# Patient Record
Sex: MALE | Race: WHITE
[De-identification: names, ages, dates, MRNs, and addresses within clinical notes are randomized per-mention and may not be internally consistent; named-entity substitution may affect disease eponyms.]

---

## 2017-03-08 ENCOUNTER — HOSPITAL ENCOUNTER (INPATIENT)
Dept: HOSPITAL 17 - NEPC | Age: 62
LOS: 19 days | Discharge: HOME | DRG: 853 | End: 2017-03-27
Attending: HOSPITALIST | Admitting: HOSPITALIST
Payer: COMMERCIAL

## 2017-03-08 VITALS
SYSTOLIC BLOOD PRESSURE: 105 MMHG | DIASTOLIC BLOOD PRESSURE: 60 MMHG | RESPIRATION RATE: 20 BRPM | HEART RATE: 86 BPM | OXYGEN SATURATION: 97 %

## 2017-03-08 VITALS
OXYGEN SATURATION: 96 % | SYSTOLIC BLOOD PRESSURE: 95 MMHG | TEMPERATURE: 97.8 F | DIASTOLIC BLOOD PRESSURE: 64 MMHG | RESPIRATION RATE: 24 BRPM | HEART RATE: 102 BPM

## 2017-03-08 VITALS
HEART RATE: 81 BPM | SYSTOLIC BLOOD PRESSURE: 105 MMHG | RESPIRATION RATE: 20 BRPM | OXYGEN SATURATION: 98 % | DIASTOLIC BLOOD PRESSURE: 63 MMHG

## 2017-03-08 VITALS — WEIGHT: 144.84 LBS | HEIGHT: 69 IN | BODY MASS INDEX: 21.45 KG/M2

## 2017-03-08 VITALS
RESPIRATION RATE: 18 BRPM | TEMPERATURE: 99.2 F | HEART RATE: 88 BPM | SYSTOLIC BLOOD PRESSURE: 113 MMHG | DIASTOLIC BLOOD PRESSURE: 63 MMHG | OXYGEN SATURATION: 97 %

## 2017-03-08 DIAGNOSIS — Z79.01: ICD-10-CM

## 2017-03-08 DIAGNOSIS — R63.4: ICD-10-CM

## 2017-03-08 DIAGNOSIS — J90: ICD-10-CM

## 2017-03-08 DIAGNOSIS — F41.9: ICD-10-CM

## 2017-03-08 DIAGNOSIS — J86.9: ICD-10-CM

## 2017-03-08 DIAGNOSIS — K22.4: ICD-10-CM

## 2017-03-08 DIAGNOSIS — R00.0: ICD-10-CM

## 2017-03-08 DIAGNOSIS — I42.9: ICD-10-CM

## 2017-03-08 DIAGNOSIS — Z91.14: ICD-10-CM

## 2017-03-08 DIAGNOSIS — D64.9: ICD-10-CM

## 2017-03-08 DIAGNOSIS — R13.10: ICD-10-CM

## 2017-03-08 DIAGNOSIS — Z87.891: ICD-10-CM

## 2017-03-08 DIAGNOSIS — E87.2: ICD-10-CM

## 2017-03-08 DIAGNOSIS — J18.9: ICD-10-CM

## 2017-03-08 DIAGNOSIS — K58.0: ICD-10-CM

## 2017-03-08 DIAGNOSIS — R18.8: ICD-10-CM

## 2017-03-08 DIAGNOSIS — E87.6: ICD-10-CM

## 2017-03-08 DIAGNOSIS — R65.20: ICD-10-CM

## 2017-03-08 DIAGNOSIS — A41.9: Primary | ICD-10-CM

## 2017-03-08 DIAGNOSIS — Z95.2: ICD-10-CM

## 2017-03-08 LAB
ALP SERPL-CCNC: 61 U/L (ref 45–117)
ALT SERPL-CCNC: 57 U/L (ref 12–78)
ANION GAP SERPL CALC-SCNC: 11 MEQ/L (ref 5–15)
APTT BLD: 44.8 SEC (ref 24.3–30.1)
AST SERPL-CCNC: 60 U/L (ref 15–37)
BASOPHILS # BLD AUTO: 0.1 TH/MM3 (ref 0–0.2)
BASOPHILS NFR BLD: 0.3 % (ref 0–2)
BILIRUB SERPL-MCNC: 0.5 MG/DL (ref 0.2–1)
BUN SERPL-MCNC: 11 MG/DL (ref 7–18)
CHLORIDE SERPL-SCNC: 99 MEQ/L (ref 98–107)
CK SERPL-CCNC: 47 U/L (ref 39–308)
EOSINOPHIL # BLD: 0 TH/MM3 (ref 0–0.4)
EOSINOPHIL NFR BLD: 0 % (ref 0–4)
ERYTHROCYTE [DISTWIDTH] IN BLOOD BY AUTOMATED COUNT: 13.2 % (ref 11.6–17.2)
GFR SERPLBLD BASED ON 1.73 SQ M-ARVRAT: 71 ML/MIN (ref 89–?)
HCO3 BLD-SCNC: 27.4 MEQ/L (ref 21–32)
HCT VFR BLD CALC: 33.8 % (ref 39–51)
HEMO FLAGS: (no result)
INR PPP: 1.1 RATIO
LYMPHOCYTES # BLD AUTO: 1.4 TH/MM3 (ref 1–4.8)
LYMPHOCYTES NFR BLD AUTO: 6.3 % (ref 9–44)
MCH RBC QN AUTO: 30.2 PG (ref 27–34)
MCHC RBC AUTO-ENTMCNC: 33.6 % (ref 32–36)
MCV RBC AUTO: 90.1 FL (ref 80–100)
MONOCYTES NFR BLD: 6.2 % (ref 0–8)
NEUTROPHILS # BLD AUTO: 18.8 TH/MM3 (ref 1.8–7.7)
NEUTROPHILS NFR BLD AUTO: 87.2 % (ref 16–70)
PLATELET # BLD: 280 TH/MM3 (ref 150–450)
POTASSIUM SERPL-SCNC: 3.7 MEQ/L (ref 3.5–5.1)
PROTHROMBIN TIME: 12.7 SEC (ref 9.8–11.6)
RBC # BLD AUTO: 3.75 MIL/MM3 (ref 4.5–5.9)
SODIUM SERPL-SCNC: 137 MEQ/L (ref 136–145)
WBC # BLD AUTO: 21.6 TH/MM3 (ref 4–11)

## 2017-03-08 PROCEDURE — 88305 TISSUE EXAM BY PATHOLOGIST: CPT

## 2017-03-08 PROCEDURE — 86077 PHYS BLOOD BANK SERV XMATCH: CPT

## 2017-03-08 PROCEDURE — 87070 CULTURE OTHR SPECIMN AEROBIC: CPT

## 2017-03-08 PROCEDURE — C9290 INJ, BUPIVACAINE LIPOSOME: HCPCS

## 2017-03-08 PROCEDURE — 86922 COMPATIBILITY TEST ANTIGLOB: CPT

## 2017-03-08 PROCEDURE — 87102 FUNGUS ISOLATION CULTURE: CPT

## 2017-03-08 PROCEDURE — 85610 PROTHROMBIN TIME: CPT

## 2017-03-08 PROCEDURE — 87116 MYCOBACTERIA CULTURE: CPT

## 2017-03-08 PROCEDURE — 80202 ASSAY OF VANCOMYCIN: CPT

## 2017-03-08 PROCEDURE — 87641 MR-STAPH DNA AMP PROBE: CPT

## 2017-03-08 PROCEDURE — 85730 THROMBOPLASTIN TIME PARTIAL: CPT

## 2017-03-08 PROCEDURE — 86703 HIV-1/HIV-2 1 RESULT ANTBDY: CPT

## 2017-03-08 PROCEDURE — 80048 BASIC METABOLIC PNL TOTAL CA: CPT

## 2017-03-08 PROCEDURE — 93005 ELECTROCARDIOGRAM TRACING: CPT

## 2017-03-08 PROCEDURE — 86901 BLOOD TYPING SEROLOGIC RH(D): CPT

## 2017-03-08 PROCEDURE — 83735 ASSAY OF MAGNESIUM: CPT

## 2017-03-08 PROCEDURE — 86920 COMPATIBILITY TEST SPIN: CPT

## 2017-03-08 PROCEDURE — 85025 COMPLETE CBC W/AUTO DIFF WBC: CPT

## 2017-03-08 PROCEDURE — 86880 COOMBS TEST DIRECT: CPT

## 2017-03-08 PROCEDURE — 86900 BLOOD TYPING SEROLOGIC ABO: CPT

## 2017-03-08 PROCEDURE — 81001 URINALYSIS AUTO W/SCOPE: CPT

## 2017-03-08 PROCEDURE — 93306 TTE W/DOPPLER COMPLETE: CPT

## 2017-03-08 PROCEDURE — 87206 SMEAR FLUORESCENT/ACID STAI: CPT

## 2017-03-08 PROCEDURE — 87804 INFLUENZA ASSAY W/OPTIC: CPT

## 2017-03-08 PROCEDURE — 71010: CPT

## 2017-03-08 PROCEDURE — 86140 C-REACTIVE PROTEIN: CPT

## 2017-03-08 PROCEDURE — C9113 INJ PANTOPRAZOLE SODIUM, VIA: HCPCS

## 2017-03-08 PROCEDURE — 87015 SPECIMEN INFECT AGNT CONCNTJ: CPT

## 2017-03-08 PROCEDURE — 96374 THER/PROPH/DIAG INJ IV PUSH: CPT

## 2017-03-08 PROCEDURE — 86870 RBC ANTIBODY IDENTIFICATION: CPT

## 2017-03-08 PROCEDURE — 83605 ASSAY OF LACTIC ACID: CPT

## 2017-03-08 PROCEDURE — 80053 COMPREHEN METABOLIC PANEL: CPT

## 2017-03-08 PROCEDURE — 74230 X-RAY XM SWLNG FUNCJ C+: CPT

## 2017-03-08 PROCEDURE — 80074 ACUTE HEPATITIS PANEL: CPT

## 2017-03-08 PROCEDURE — 83690 ASSAY OF LIPASE: CPT

## 2017-03-08 PROCEDURE — 94150 VITAL CAPACITY TEST: CPT

## 2017-03-08 PROCEDURE — 86902 BLOOD TYPE ANTIGEN DONOR EA: CPT

## 2017-03-08 PROCEDURE — 86850 RBC ANTIBODY SCREEN: CPT

## 2017-03-08 PROCEDURE — 94640 AIRWAY INHALATION TREATMENT: CPT

## 2017-03-08 PROCEDURE — 87176 TISSUE HOMOGENIZATION CULTR: CPT

## 2017-03-08 PROCEDURE — 82550 ASSAY OF CK (CPK): CPT

## 2017-03-08 PROCEDURE — P9045 ALBUMIN (HUMAN), 5%, 250 ML: HCPCS

## 2017-03-08 PROCEDURE — 87449 NOS EACH ORGANISM AG IA: CPT

## 2017-03-08 PROCEDURE — 85027 COMPLETE CBC AUTOMATED: CPT

## 2017-03-08 PROCEDURE — 93325 DOPPLER ECHO COLOR FLOW MAPG: CPT

## 2017-03-08 PROCEDURE — 93312 ECHO TRANSESOPHAGEAL: CPT

## 2017-03-08 PROCEDURE — 82565 ASSAY OF CREATININE: CPT

## 2017-03-08 PROCEDURE — 84484 ASSAY OF TROPONIN QUANT: CPT

## 2017-03-08 PROCEDURE — 88312 SPECIAL STAINS GROUP 1: CPT

## 2017-03-08 PROCEDURE — 76937 US GUIDE VASCULAR ACCESS: CPT

## 2017-03-08 PROCEDURE — C1769 GUIDE WIRE: HCPCS

## 2017-03-08 PROCEDURE — 87205 SMEAR GRAM STAIN: CPT

## 2017-03-08 PROCEDURE — 80307 DRUG TEST PRSMV CHEM ANLYZR: CPT

## 2017-03-08 PROCEDURE — 87040 BLOOD CULTURE FOR BACTERIA: CPT

## 2017-03-08 PROCEDURE — 71250 CT THORAX DX C-: CPT

## 2017-03-08 PROCEDURE — 93320 DOPPLER ECHO COMPLETE: CPT

## 2017-03-08 PROCEDURE — 86592 SYPHILIS TEST NON-TREP QUAL: CPT

## 2017-03-08 PROCEDURE — 94664 DEMO&/EVAL PT USE INHALER: CPT

## 2017-03-08 PROCEDURE — 96361 HYDRATE IV INFUSION ADD-ON: CPT

## 2017-03-08 RX ADMIN — VANCOMYCIN HYDROCHLORIDE SCH MLS/HR: 1 INJECTION, SOLUTION INTRAVENOUS at 19:58

## 2017-03-08 RX ADMIN — PANTOPRAZOLE SODIUM SCH MG: 40 INJECTION, POWDER, FOR SOLUTION INTRAVENOUS at 17:45

## 2017-03-08 RX ADMIN — CEFEPIME SCH MLS/HR: 1 INJECTION, POWDER, FOR SOLUTION INTRAMUSCULAR; INTRAVENOUS at 17:45

## 2017-03-08 NOTE — RADRPT
EXAM DATE/TIME:  03/08/2017 16:48 

 

[facility] COMPARISON:     

No previous studies available for comparison.

 

 

INDICATIONS :     

Evaluate for effusion.

                      

 

RADIATION DOSE:     

4.06 CTDIvol (mGy) 

 

 

MEDICAL HISTORY :     

Cardiovascular disease.   

 

SURGICAL HISTORY :        

valve replacement

 

ENCOUNTER:      

Initial

 

ACUITY:      

1 day

 

PAIN SCALE:      

0/10

 

LOCATION:       

Bilateral chest 

 

TECHNIQUE:      

Volumetric scanning of the chest was performed.  Using automated exposure control and adjustment of t
he mA and/or kV according to patient size, radiation dose was kept as low as reasonably achievable to
 obtain optimal diagnostic quality images. 

 

FINDINGS:     

There is a large loculated effusion at the right base. There is associated compressive atelectasis of
 the right lung. The left lung is free of acute parenchymal opacity. Median sternotomy wires are pres
ent.

 

Examination of the mediastinum demonstrates no abnormally enlarged lymph nodes by CT criteria. No axi
llary or hilar abnormalities are identified. Coronary artery calcifications are present. The visualiz
ed upper abdomen demonstrates no abnormality.

 

CONCLUSION:     

1. Large loculated effusion at the right base measuring 13 x 9 CM.

2. Aortic root dilatation measuring 4.5 CM

 

 

 

 Donnell Vincent MD on March 08, 2017 at 17:15           

Board Certified Radiologist.

 This report was verified electronically.

## 2017-03-08 NOTE — RADRPT
EXAM DATE/TIME:  03/08/2017 13:19 

 

[facility] COMPARISON:     

No previous studies available for comparison.

 

                     

INDICATIONS :     

Patient states he has had flu like symptoms for a month. He can't keep food or liquids down. He has b
een vomiting and has had diarrhea.

                     

 

MEDICAL HISTORY :     

None.          

 

SURGICAL HISTORY :     

None.   Aortic valve replacement.

 

ENCOUNTER:     

Initial                                        

 

ACUITY:     

1 month      

 

PAIN SCORE:     

0/10

 

LOCATION:      

chest 

 

FINDINGS:     

Portable upright AP view of the chest demonstrates a normal size cardiac silhouette. There is severe 
right mid and lower lung zone opacity along with blunting of the right costophrenic sulcus. No pneumo
thorax is visualized. Bones and soft tissues demonstrate no acute finding. Patient is post median nafisa
rnotomy.

 

CONCLUSION:     

Opacity in the mid and lower lung zone on the right that likely represents consolidation. The superio
r margin appears somewhat well-defined suggesting potential mass. However, the appearance favors cem
re airspace consolidation with small to moderate size pleural effusion. This may represent a pneumoni
a/infectious process given the clinical history. Recommend followup imaging to confirm improvement an
d ultimately resolution.

 

 

 

 Jonathan Ramirez MD on March 08, 2017 at 13:48           

Board Certified Radiologist.

 This report was verified electronically.

## 2017-03-08 NOTE — PD
HPI


Chief Complaint:  GI Complaint


Time Seen by Provider:  13:10


Travel History


International Travel<30 days:  No


Contact w/Intl Traveler<30days:  No


Traveled to known affect area:  No





History of Present Illness


HPI


61-year-old male with history of aortic stenosis with mechanical aortic valve 

replacement in 2005, on Coumadin who presents for evaluation of nausea and 

vomiting and diarrhea.  For the past month the patient has had vomiting and 

diarrhea on a daily basis.  He describes the diarrhea as watery, approximately 

3 episodes daily.  He reports that he vomits anytime he tries to drink 

anything.  He has been feeling generalized weakness.  His niece reports that 

she had similar symptoms initially but her symptoms have resolved.  The patient 

also endorses a cough with clear sputum production for 3 weeks as well as 

chills.  Denies any abdominal pain, dysuria, testicular or scrotal pain, rash, 

recent travel, recent dietary indiscretions.  He denies any melena, hematochezia

, hematemesis.  Denies any recent antibiotic use.  No history of C. difficile.  

No other complaints.





PFSH


Past Medical History


Hx Anticoagulant Therapy:  Yes


Cardiovascular Problems:  Yes (OPEN HEART SX)





Social History


Tobacco Use:  No





Allergies-Medications


(Allergen,Severity, Reaction):  


Coded Allergies:  


     No Known Allergies (Unverified , 3/8/17)


Reported Meds & Prescriptions





Reported Meds & Active Scripts


Active


Reported


Coumadin (Warfarin) 2 Mg Tab 2 Mg PO DAILY








Review of Systems


Except as stated in HPI:  all other systems reviewed are Neg





Physical Exam


Narrative


GENERAL: Well-developed well-nourished male in no acute distress


SKIN: Warm and dry.


HEAD: Atraumatic. Normocephalic. 


EYES: Pupils equal and round. No scleral icterus. No injection or drainage. 


ENT: No nasal bleeding or discharge.  Mucous membranes pink and moist.


NECK: Trachea midline. No JVD. 


CARDIOVASCULAR: Regular rate and rhythm.  No murmur appreciated.  Systolic 

click noted.


RESPIRATORY: No accessory muscle use. Clear to auscultation. Breath sounds 

equal bilaterally. 


GASTROINTESTINAL: Abdomen soft, non-tender, nondistended. Hepatic and splenic 

margins not palpable. 


MUSCULOSKELETAL: No obvious deformities.  No edema


NEUROLOGICAL: Awake and alert. No obvious cranial nerve deficits.  Motor 

grossly within normal limits. Normal speech.


PSYCHIATRIC: Appropriate mood and affect; insight and judgment normal.





Data


Data


Last Documented VS





Vital Signs








  Date Time  Temp Pulse Resp B/P Pulse Ox O2 Delivery O2 Flow Rate FiO2


 


3/8/17 13:40  81 20 105/63 98 Room Air  


 


3/8/17 12:41 97.8       








Orders





 Complete Blood Count With Diff (3/8/17 13:18)


Comprehensive Metabolic Panel (3/8/17 13:18)


Lipase (3/8/17 13:18)


Lactic Acid (3/8/17 13:18)


Prothrombin Time / Inr (Pt) (3/8/17 13:18)


Act Partial Throm Time (Ptt) (3/8/17 13:18)


Iv Access Insert/Monitor (3/8/17 13:18)


Ecg Monitoring (3/8/17 13:18)


Oximetry (3/8/17 13:18)


Ondansetron Inj (Zofran Inj) (3/8/17 13:30)


Sodium Chlor 0.9% 1000 Ml Inj (Ns 1000 M (3/8/17 13:18)


Sodium Chloride 0.9% Flush (Ns Flush) (3/8/17 13:30)


Electrocardiogram (3/8/17 13:18)


Sodium Chlor 0.9% 1000 Ml Inj (Ns 1000 M (3/8/17 13:18)


C Diff Toxin Pcr (3/8/17 13:18)


Enteric Path (Stool) (3/8/17 13:18)


Chest, Single Ap (3/8/17 )


Influenzae A/B Antigen (3/8/17 13:18)


Ckmb (Isoenzyme) Profile (3/8/17 13:57)


Troponin I (3/8/17 13:57)


Blood Culture (3/8/17 13:57)


Pneumococcal Urinary Antigen (3/8/17 14:05)


Legionella Urinary Antigen (3/8/17 14:05)


Sputum Culture And Gram Stain (3/8/17 14:05)


Ceftriaxone Inj (Rocephin Inj) (3/8/17 14:15)


Azithromycin Inj (Zithromax Inj) (3/8/17 14:15)





Labs





 Laboratory Tests








Test 3/8/17





 13:45


 


White Blood Count 21.6 TH/MM3


 


Red Blood Count 3.75 MIL/MM3


 


Hemoglobin 11.3 GM/DL


 


Hematocrit 33.8 %


 


Mean Corpuscular Volume 90.1 FL


 


Mean Corpuscular Hemoglobin 30.2 PG


 


Mean Corpuscular Hemoglobin 33.6 %





Concent 


 


Red Cell Distribution Width 13.2 %


 


Platelet Count 280 TH/MM3


 


Mean Platelet Volume 8.1 FL


 


Neutrophils (%) (Auto) 87.2 %


 


Lymphocytes (%) (Auto) 6.3 %


 


Monocytes (%) (Auto) 6.2 %


 


Eosinophils (%) (Auto) 0.0 %


 


Basophils (%) (Auto) 0.3 %


 


Neutrophils # (Auto) 18.8 TH/MM3


 


Lymphocytes # (Auto) 1.4 TH/MM3


 


Monocytes # (Auto) 1.3 TH/MM3


 


Eosinophils # (Auto) 0.0 TH/MM3


 


Basophils # (Auto) 0.1 TH/MM3


 


CBC Comment DIFF FINAL 


 


Differential Comment  


 


Prothrombin Time 12.7 SEC


 


Prothromb Time International 1.1 RATIO





Ratio 


 


Activated Partial 44.8 SEC





Thromboplast Time 


 


Sodium Level 137 MEQ/L


 


Potassium Level 3.7 MEQ/L


 


Chloride Level 99 MEQ/L


 


Carbon Dioxide Level 27.4 MEQ/L


 


Anion Gap 11 MEQ/L


 


Blood Urea Nitrogen 11 MG/DL


 


Creatinine 1.06 MG/DL


 


Estimat Glomerular Filtration 71 ML/MIN





Rate 


 


Random Glucose 134 MG/DL


 


Lactic Acid Level 2.5 mmol/L


 


Calcium Level 8.7 MG/DL


 


Total Bilirubin 0.5 MG/DL


 


Aspartate Amino Transf 60 U/L





(AST/SGOT) 


 


Alanine Aminotransferase 57 U/L





(ALT/SGPT) 


 


Alkaline Phosphatase 61 U/L


 


Total Creatine Kinase 47 U/L


 


Troponin I LESS THAN 0.02





 NG/ML


 


Total Protein 7.0 GM/DL


 


Albumin 2.1 GM/DL


 


Lipase 69 U/L











MDM


Medical Decision Making


Medical Screen Exam Complete:  Yes


Emergency Medical Condition:  Yes


Medical Record Reviewed:  Yes


Interpretation(s)


Chest x-ray reveals opacity in the mid and lower lung zone in the right long 

likely consolidative process


CBC WBC 21.6


EKG sinus rhythm, ST depression/T wave inversion noted in the lateral leads


Lactic acid 2.5


Differential Diagnosis


Pneumonia, gastroenteritis, dehydration, electrolyte abnormalities, sepsis


Narrative Course


61-year-old male with 3-4 weeks of nausea, vomiting, diarrhea as well as cough 

and generalized weakness.  His abdomen is soft and nontender.  He is mildly 

hypotensive.  We'll check lab work, chest x-ray, stool culture and C. difficile 

PCR.  The patient was given 2 L IV fluid bolus.  He'll be given Zofran.


Chest x-ray reveals opacity in the mid and lower lungs on the right lung 

consistent with pneumonia.  Patient is being started on azithromycin and 

Rocephin for community-acquired pneumonia.  Pneumococcal and Legionella antigen 

test have been ordered.  Sputum culture has been ordered.  He also has a lactic 

acidosis with a lactic acid of 2.5.


The patient is being admitted for community-acquired pneumonia, lactic acidosis

, sepsis.





Sepsis Criteria


SIRS Criteria (2 or more):  WBC > 99901, < 4000 or > 10% bands


Sepsis Criteria (SIRS+source):  Infect source susp/known


Severe Sepsis (+one):  Lactate >2





Diagnosis





 Primary Impression:  


 Pneumonia


 Qualified Code:  J18.9 - Pneumonia of right lung due to infectious organism, 

unspecified part of lung


 Additional Impressions:  


 Sepsis


 Qualified Code:  A41.9 - Sepsis, due to unspecified organism


 Lactic acidosis


 Vomiting and diarrhea





Admitting Information


Admitting Physician Requests:  Admit








Norman Shaikh Mar 8, 2017 13:21

## 2017-03-08 NOTE — PD
Physical Exam


Date Seen by Provider:  Mar 8, 2017


Time Seen by Provider:  14:50


Narrative


This is a 61-year-old gentleman with history of aortic valve replacement, who 

presents today with 1 month history of nausea and diarrhea.  Patient states 

that he's had these episodes daily.  His niece is at the bedside states she's 

had similar symptoms however her symptoms resolved.  The patient was seen with 

Norman Shaikh PA-C.





Data


Data


Last Documented VS





Vital Signs








  Date Time  Temp Pulse Resp B/P Pulse Ox O2 Delivery O2 Flow Rate FiO2


 


3/8/17 13:40  81 20 105/63 98 Room Air  


 


3/8/17 12:41 97.8       








Orders





 Complete Blood Count With Diff (3/8/17 13:18)


Comprehensive Metabolic Panel (3/8/17 13:18)


Lipase (3/8/17 13:18)


Lactic Acid (3/8/17 13:18)


Prothrombin Time / Inr (Pt) (3/8/17 13:18)


Act Partial Throm Time (Ptt) (3/8/17 13:18)


Iv Access Insert/Monitor (3/8/17 13:18)


Ecg Monitoring (3/8/17 13:18)


Oximetry (3/8/17 13:18)


Ondansetron Inj (Zofran Inj) (3/8/17 13:30)


Sodium Chlor 0.9% 1000 Ml Inj (Ns 1000 M (3/8/17 13:18)


Sodium Chloride 0.9% Flush (Ns Flush) (3/8/17 13:30)


Electrocardiogram (3/8/17 13:18)


Sodium Chlor 0.9% 1000 Ml Inj (Ns 1000 M (3/8/17 13:18)


C Diff Toxin Pcr (3/8/17 13:18)


Enteric Path (Stool) (3/8/17 13:18)


Chest, Single Ap (3/8/17 )


Influenzae A/B Antigen (3/8/17 13:18)


Ckmb (Isoenzyme) Profile (3/8/17 13:57)


Troponin I (3/8/17 13:57)


Blood Culture (3/8/17 13:57)


Pneumococcal Urinary Antigen (3/8/17 14:05)


Legionella Urinary Antigen (3/8/17 14:05)


Sputum Culture And Gram Stain (3/8/17 14:05)


Ceftriaxone Inj (Rocephin Inj) (3/8/17 14:15)


Azithromycin Inj (Zithromax Inj) (3/8/17 14:15)


Admit To Inpatient (3/8/17 )


Inpatient Certification (3/8/17 )


Diet Clear Liquid (3/8/17 Dinner)


Activity Bed Rest With Brp (3/8/17 14:52)


Vital Signs (Adult) [id number] (3/8/17 14:52)


Admit Order (Ed Use Only) (3/8/17 14:53)





Labs





 Laboratory Tests








Test 3/8/17





 13:45


 


White Blood Count 21.6 TH/MM3


 


Red Blood Count 3.75 MIL/MM3


 


Hemoglobin 11.3 GM/DL


 


Hematocrit 33.8 %


 


Mean Corpuscular Volume 90.1 FL


 


Mean Corpuscular Hemoglobin 30.2 PG


 


Mean Corpuscular Hemoglobin 33.6 %





Concent 


 


Red Cell Distribution Width 13.2 %


 


Platelet Count 280 TH/MM3


 


Mean Platelet Volume 8.1 FL


 


Neutrophils (%) (Auto) 87.2 %


 


Lymphocytes (%) (Auto) 6.3 %


 


Monocytes (%) (Auto) 6.2 %


 


Eosinophils (%) (Auto) 0.0 %


 


Basophils (%) (Auto) 0.3 %


 


Neutrophils # (Auto) 18.8 TH/MM3


 


Lymphocytes # (Auto) 1.4 TH/MM3


 


Monocytes # (Auto) 1.3 TH/MM3


 


Eosinophils # (Auto) 0.0 TH/MM3


 


Basophils # (Auto) 0.1 TH/MM3


 


CBC Comment DIFF FINAL 


 


Differential Comment  


 


Prothrombin Time 12.7 SEC


 


Prothromb Time International 1.1 RATIO





Ratio 


 


Activated Partial 44.8 SEC





Thromboplast Time 


 


Sodium Level 137 MEQ/L


 


Potassium Level 3.7 MEQ/L


 


Chloride Level 99 MEQ/L


 


Carbon Dioxide Level 27.4 MEQ/L


 


Anion Gap 11 MEQ/L


 


Blood Urea Nitrogen 11 MG/DL


 


Creatinine 1.06 MG/DL


 


Estimat Glomerular Filtration 71 ML/MIN





Rate 


 


Random Glucose 134 MG/DL


 


Lactic Acid Level 2.5 mmol/L


 


Calcium Level 8.7 MG/DL


 


Total Bilirubin 0.5 MG/DL


 


Aspartate Amino Transf 60 U/L





(AST/SGOT) 


 


Alanine Aminotransferase 57 U/L





(ALT/SGPT) 


 


Alkaline Phosphatase 61 U/L


 


Total Creatine Kinase 47 U/L


 


Troponin I LESS THAN 0.02





 NG/ML


 


Total Protein 7.0 GM/DL


 


Albumin 2.1 GM/DL


 


Lipase 69 U/L











[facility]


Medical Record Reviewed:  Yes


Supervised Visit with RAJIV:  Yes


Differential Diagnosis


Colitis versus gastroenteritis versus pancreatitis versus diverticulitis


Narrative Course


61-year-old gentleman with history of a aortic valve replacementl presents with 

one-month history of nausea and diarrhea.  The patient has a large right sided 

pneumonia.  White count 21,000.  He's been started on antibiotics here in the 

emergency department further testing for Legionella and pneumococcal antigen 

were ordered at the request of the admitting physician.  The patient be 

admitted for pneumonia.  His lactic acid was 2.5.  He does qualify for sepsis 

criteria.





I, Dr. Alva, have reviewed the advance practice practitioner's 

documentation and am in agreement, met with the patient face to face, made the 

diagnosis, and the medical decision making was done by me.  





*My assessment and Findings: As above


Diagnosis





 Primary Impression:  


 Pneumonia


 Qualified Code:  J18.9 - Pneumonia of right lung due to infectious organism, 

unspecified part of lung


 Additional Impressions:  


 Vomiting and diarrhea


 Sepsis


 Qualified Code:  A41.9 - Sepsis, due to unspecified organism


 Lactic acidosis








Ruiz Alva MD Mar 8, 2017 15:16

## 2017-03-08 NOTE — HHI.HP
__________________________________________________





[facility]


Service


[facility]


Primary Care Physician


No Primary Care Physician


Admission Diagnosis


pneumonia, sepsis, lactic acidosis, vomiting and diarrhea


Diagnoses:  


Chief Complaint:  


Nausea, vomiting diarrhea cough


Travel History


International Travel<30 Days:  No


Contact w/Intl Traveler <30 Da:  No


Traveled to Known Affected Are:  No





Sepsis Criteria


SIRS Criteria (2 or more):  Heart rate over 90


Sepsis Criteria (SIRS+source):  Infect source susp/known


Severe Sepsis (+one):  Lactate >2


Criteria Outcome:  Meets severe sepsis criteria


History of Present Illness


Patient is a very pleasant 61-year-old male with history of mechanical aortic 

valve replacement in 2005 who presented to the emergency room complaining of 

about 4 weeks Duration of nausea vomiting diarrhea intermittently on and off.  

Lasting for 5 days at a time and will be okay for to 3 days then recurred.  

Patient describes stools as liquid brown nonbloody.  For the past 5 days now 

has been having productive cough off white sputum associated with body Monday, 

nausea, diarrhea which prompted patient to come in here and admitted. 





On admission, with elevated WBC, elevated lactic acid level





Review of Systems


Constitutional:  COMPLAINS OF: Change in appetite


Endocrine:  DENIES: Heat/cold intolerance, Polydipsia, Polyuria, Polyphagia


Eyes:  DENIES: Blurred vision, Diplopia, Eye inflammation, Eye pain, Vision loss

, Photosensitivity, Double Vision


Ears, nose, mouth, throat:  DENIES: Tinnitus, Hearing loss, Vertigo, Nasal 

discharge, Oral lesions, Throat pain, Hoarseness, Ear Pain, Running Nose, 

Epistaxis, Sinus Pain, Toothache, Odynophagia


Respiratory:  COMPLAINS OF: Cough


Cardiovascular:  DENIES: Chest pain, Palpitations, Syncope, Dyspnea on Exertion

, PND, Lower Extremity Edema, Orthopnea, Claudication


Gastrointestinal:  COMPLAINS OF: Diarrhea, Nausea, Vomiting


Genitourinary:  DENIES: Sexual dysfunction, Urinary frequency, Urinary 

incontinence, Urgency, Hematuria, Dysuria, Nocturia, Penile Discharge, 

Testicular Pain, Testicular Swelling


Musculoskeletal:  COMPLAINS OF: Muscle aches,  DENIES: Joint pain, Stiffness, 

Joint Swelling, Back pain, Neck pain


Integumentary:  DENIES: Abnormal pigmentation, Nail changes, Pruritus, Rash


Hematologic/lymphatic:  DENIES: Bruising, Lymphadenopathy


Immunologic/allergic:  DENIES: Eczema, Urticaria


Neurologic:  DENIES: Abnormal gait, Headache, Localized weakness, Paresthesias, 

Seizures, Speech Problems, Tremor, Poor Balance


Psychiatric:  DENIES: Anxiety, Confusion, Mood changes, Depression, 

Hallucinations, Agitation, Suicidal Ideation, Homicidal Ideation, Delusions





Past Family Social History


Past Medical History


Aortic valve mechanical replacement in 2005


Past Surgical History


Open heart surgery with aortic valve replacement in 2005


Reported Medications


Takes Coumadin 2 mg daily


Allergies:  


Coded Allergies:  


     No Known Allergies (Unverified , 3/8/17)


Family History


Noncontributory


Social History


Smoker quit 20 years ago


Occasional beer


Denies any substance abuse





Physical Exam


Vital Signs





 Vital Signs








  Date Time  Temp Pulse Resp B/P Pulse Ox O2 Delivery O2 Flow Rate FiO2


 


3/8/17 13:40  81 20 105/63 98 Room Air  


 


3/8/17 12:41 97.8 102 24 95/64 96 Room Air  








Physical Exam


GENERAL: This is a well-nourished, well-developed patient, in no apparent 

distress.


SKIN: No rashes, ecchymoses or lesions. Cool and dry.


HEAD: Atraumatic. Normocephalic. No temporal or scalp tenderness.


EYES: Pupils equal round and reactive. Extraocular motions intact. No scleral 

icterus. No injection or drainage. 


ENT: Nose without bleeding, Throat without erythema, tonsillar hypertrophy or 

exudate. Uvula midline. Airway patent.


NECK: Trachea midline. No JVD or lymphadenopathy. Supple, nontender, no 

meningeal signs.


CARDIOVASCULAR: Regular rate and rhythm ,


RESPIRATORY: Decreased breath sounds and decreased focal fremitus by on the 

right lung field from base to mid


GASTROINTESTINAL: Abdomen soft, non-tender, nondistended. No hepato-splenomegaly

, or palpable masses. No guarding.


MUSCULOSKELETAL: Extremities without clubbing, cyanosis, or edema. No joint 

tenderness, effusion, or edema noted. No calf tenderness. Negative Homans sign 

bilaterally.


NEUROLOGICAL: Awake and alert. Cranial nerves II through XII intact.  Motor and 

sensory grossly within normal limits. Five out of 5 muscle strength in all 

muscle groups.  Normal speech.


Laboratory





Laboratory Tests








Test 3/8/17





 13:45


 


White Blood Count 21.6 


 


Red Blood Count 3.75 


 


Hemoglobin 11.3 


 


Hematocrit 33.8 


 


Mean Corpuscular Volume 90.1 


 


Mean Corpuscular Hemoglobin 30.2 


 


Mean Corpuscular Hemoglobin 33.6 





Concent 


 


Red Cell Distribution Width 13.2 


 


Platelet Count 280 


 


Mean Platelet Volume 8.1 


 


Neutrophils (%) (Auto) 87.2 


 


Lymphocytes (%) (Auto) 6.3 


 


Monocytes (%) (Auto) 6.2 


 


Eosinophils (%) (Auto) 0.0 


 


Basophils (%) (Auto) 0.3 


 


Neutrophils # (Auto) 18.8 


 


Lymphocytes # (Auto) 1.4 


 


Monocytes # (Auto) 1.3 


 


Eosinophils # (Auto) 0.0 


 


Basophils # (Auto) 0.1 


 


CBC Comment DIFF FINAL 


 


Differential Comment  


 


Prothrombin Time 12.7 


 


Prothromb Time International 1.1 





Ratio 


 


Activated Partial 44.8 





Thromboplast Time 


 


Sodium Level 137 


 


Potassium Level 3.7 


 


Chloride Level 99 


 


Carbon Dioxide Level 27.4 


 


Anion Gap 11 


 


Blood Urea Nitrogen 11 


 


Creatinine 1.06 


 


Estimat Glomerular Filtration 71 





Rate 


 


Random Glucose 134 


 


Lactic Acid Level 2.5 


 


Calcium Level 8.7 


 


Total Bilirubin 0.5 


 


Aspartate Amino Transf 60 





(AST/SGOT) 


 


Alanine Aminotransferase 57 





(ALT/SGPT) 


 


Alkaline Phosphatase 61 


 


Total Creatine Kinase 47 


 


Troponin I LESS THAN 0.02 


 


Total Protein 7.0 


 


Albumin 2.1 


 


Lipase 69 














 Date/Time Procedure Status





Source Growth 


 


 3/8/17 14:15 Aerobic Blood Culture Received





Blood Peripheral Pending 





 3/8/17 14:15 Anaerobic Blood Culture Received





Blood Peripheral Pending 


 


 3/8/17 13:45 Influenza Types A,B Antigen (BONI) - Final Complete





Nasal Aspirate NEGATIVE FOR FLU A AND B ANTIGEN.... 








Result Diagram:  


3/8/17 1345                                                                    

            3/8/17 1345





Imaging





Last Impressions








Chest X-Ray 3/8/17 0000 Signed





Impressions: 





 Service Date/Time:  Wednesday, March 8, 2017 13:19 - CONCLUSION:  Opacity in 

the 





 mid and lower lung zone on the right that likely represents consolidation. The 





 superior margin appears somewhat well-defined suggesting potential mass. 





 However, the appearance favors severe airspace consolidation with small to 





 moderate size pleural effusion. This may represent a pneumonia/infectious 





 process given the clinical history. Recommend followup imaging to confirm 





 improvement and ultimately resolution.     Jonathan Ramirez MD 











Septic Shock Reassessment


Heart:  Regular rate and rhythm


Lungs:  Diminished


Skin:  Warm





Assessment and Plan


Assessment and Plan


Patient is a 61-year-old male presenting with 4 weeks duration of body malaise, 

flulike symptoms nausea vomiting diarrhea for the past 5 days with cough 

productive of off-white sputum


Patient with leukocytosis 





Sepsis secondary to right pneumonic process- with effusion ? parapneumonic 

effusion


   Cultures has been drawn


   We'll start patient on cefepime and Vancomycin. consult ID


   We'll get a CT to quantify and verify effusion.-consider pleurocentesis if 

large amount 


   CBC in a.m.


   Monitor clinical response


   O2 when necessary


   Consider pulmonary consult


History of aortic mechanical valve replacement


   INR subtherapeutic


   Will give Coumadin 10 mg by mouth 1 today check INR in a.m.


Discussed Condition With


Patient





Physician Certification


2 Midnight Certification Type:  Admission for Inpatient Services


Order for Inpatient Services


The services are ordered in accordance with Medicare regulations or non-

Medicare payer requirements, as applicable.  In the case of services not 

specified as inpatient-only, they are appropriately provided as inpatient 

services in accordance with the 2-midnight benchmark.


Estimated LOS (days):  3


 days is the estimated time the patient will need to remain in the hospital, 

assuming treatment plan goals are met and no additional complications.


Post-Hospital Plan:  Not yet determined








Osmani Padgett MD Mar 8, 2017 16:29

## 2017-03-09 VITALS
OXYGEN SATURATION: 95 % | SYSTOLIC BLOOD PRESSURE: 109 MMHG | RESPIRATION RATE: 22 BRPM | DIASTOLIC BLOOD PRESSURE: 59 MMHG | HEART RATE: 110 BPM | TEMPERATURE: 101.7 F

## 2017-03-09 VITALS
RESPIRATION RATE: 18 BRPM | SYSTOLIC BLOOD PRESSURE: 112 MMHG | HEART RATE: 100 BPM | TEMPERATURE: 100.3 F | DIASTOLIC BLOOD PRESSURE: 56 MMHG | OXYGEN SATURATION: 95 %

## 2017-03-09 VITALS
DIASTOLIC BLOOD PRESSURE: 57 MMHG | RESPIRATION RATE: 22 BRPM | SYSTOLIC BLOOD PRESSURE: 104 MMHG | TEMPERATURE: 99.8 F | OXYGEN SATURATION: 96 % | HEART RATE: 104 BPM

## 2017-03-09 VITALS
RESPIRATION RATE: 18 BRPM | HEART RATE: 106 BPM | OXYGEN SATURATION: 92 % | TEMPERATURE: 102.2 F | DIASTOLIC BLOOD PRESSURE: 59 MMHG | SYSTOLIC BLOOD PRESSURE: 114 MMHG

## 2017-03-09 VITALS
DIASTOLIC BLOOD PRESSURE: 65 MMHG | OXYGEN SATURATION: 94 % | SYSTOLIC BLOOD PRESSURE: 116 MMHG | HEART RATE: 107 BPM | RESPIRATION RATE: 18 BRPM | TEMPERATURE: 100.5 F

## 2017-03-09 VITALS
SYSTOLIC BLOOD PRESSURE: 104 MMHG | HEART RATE: 79 BPM | OXYGEN SATURATION: 96 % | RESPIRATION RATE: 20 BRPM | DIASTOLIC BLOOD PRESSURE: 61 MMHG | TEMPERATURE: 98 F

## 2017-03-09 LAB
AMPHETAMINE, URINE: (no result)
BACTERIA #/AREA URNS HPF: (no result) /HPF
BARBITURATES, URINE: (no result)
BASOPHILS # BLD AUTO: 0 TH/MM3 (ref 0–0.2)
BASOPHILS NFR BLD: 0.3 % (ref 0–2)
COCAINE UR-MCNC: (no result) NG/ML
COLOR UR: YELLOW
COMMENT (UR): (no result)
CULTURE IF INDICATED: (no result)
EOSINOPHIL # BLD: 0 TH/MM3 (ref 0–0.4)
EOSINOPHIL NFR BLD: 0.1 % (ref 0–4)
ERYTHROCYTE [DISTWIDTH] IN BLOOD BY AUTOMATED COUNT: 13.4 % (ref 11.6–17.2)
GLUCOSE UR STRIP-MCNC: (no result) MG/DL
HCT VFR BLD CALC: 30 % (ref 39–51)
HEMO FLAGS: (no result)
HGB UR QL STRIP: (no result)
INR PPP: 1.2 RATIO
KETONES UR STRIP-MCNC: (no result) MG/DL
LYMPHOCYTES # BLD AUTO: 1 TH/MM3 (ref 1–4.8)
LYMPHOCYTES NFR BLD AUTO: 6.7 % (ref 9–44)
MCH RBC QN AUTO: 30.3 PG (ref 27–34)
MCHC RBC AUTO-ENTMCNC: 33.7 % (ref 32–36)
MCV RBC AUTO: 90.1 FL (ref 80–100)
MONOCYTES NFR BLD: 6.3 % (ref 0–8)
MUCOUS THREADS #/AREA URNS LPF: (no result) /LPF
NEUTROPHILS # BLD AUTO: 13.6 TH/MM3 (ref 1.8–7.7)
NEUTROPHILS NFR BLD AUTO: 86.6 % (ref 16–70)
NITRITE UR QL STRIP: (no result)
PLATELET # BLD: 251 TH/MM3 (ref 150–450)
PROTHROMBIN TIME: 13.3 SEC (ref 9.8–11.6)
RBC # BLD AUTO: 3.33 MIL/MM3 (ref 4.5–5.9)
SP GR UR STRIP: 1.01 (ref 1–1.03)
WBC # BLD AUTO: 15.7 TH/MM3 (ref 4–11)

## 2017-03-09 RX ADMIN — CEFEPIME SCH MLS/HR: 1 INJECTION, POWDER, FOR SOLUTION INTRAMUSCULAR; INTRAVENOUS at 02:47

## 2017-03-09 RX ADMIN — PANTOPRAZOLE SODIUM SCH MG: 40 INJECTION, POWDER, FOR SOLUTION INTRAVENOUS at 17:18

## 2017-03-09 RX ADMIN — ACETAMINOPHEN PRN MG: 325 TABLET ORAL at 04:45

## 2017-03-09 RX ADMIN — VANCOMYCIN HYDROCHLORIDE SCH MLS/HR: 1 INJECTION, SOLUTION INTRAVENOUS at 08:32

## 2017-03-09 RX ADMIN — VANCOMYCIN HYDROCHLORIDE SCH MLS/HR: 1 INJECTION, SOLUTION INTRAVENOUS at 21:11

## 2017-03-09 RX ADMIN — CEFEPIME SCH MLS/HR: 1 INJECTION, POWDER, FOR SOLUTION INTRAMUSCULAR; INTRAVENOUS at 10:00

## 2017-03-09 RX ADMIN — ACETAMINOPHEN PRN MG: 325 TABLET ORAL at 15:30

## 2017-03-09 RX ADMIN — CEFEPIME SCH MLS/HR: 1 INJECTION, POWDER, FOR SOLUTION INTRAMUSCULAR; INTRAVENOUS at 17:18

## 2017-03-09 NOTE — PD.ID.CON
History of Present Illness


Service


ID


Consult Requested By





Reason for Consult


Evaluation and Mment of loculated empyema possible endocarditis of mechanical 

aortic valve.


Primary Care Physician


No Primary Care Physician


Diagnoses:  


History of Present Illness


 is a 62 y/o CF with PMHx of congenital aortic valve problems 

diagnosed at age of 17 years when for a preemployment screen he was found to 

have a heart murmur. He subsequently underwent open heart surgery with 

mechanical aortic valve replacement in 2005?. Patient reports needing 

pericardiocentesis for hemopericardium based on his description. He also 

reports being on coumadin and being compliant with medications. He denies any 

heart valve infections or any infections in general. Patient kept repeating 

that other than all these problems I am very healthy person.





Patient reports that approximately 6 weeks back he had " Flu-like symptoms". He 

reports he does not like to go to doctors so he toughed it out. He reports 

symptoms lasting 5-6 days with cold, cough like symptoms. He reports feeling 

well for 4 days or so and since then for last 4 weeks he has had generalized 

weakness that progressed to the point that he was unable to go to the bathroom. 

He denies being on any antibiotics and did not go to MD or Urgent care. 





He denies chest pain or shortness of breath.


He denies any pleuritic chest pain or discomfort.


He reports productive cough with white sputum.


He thinks he may have had night sweats but no fevers


He thinks he has lost some weight but could not quantify.


He also reports nausea, vomiting and diarrhea non bloody with no mucus.





On admission, with elevated WBC, elevated lactic acid level, with fevers upto 

102 F. Patient underwent a sepsis workup and CT chest. CT chest shows large 

loculated effusion in the right base measuring 13 x 8 cm in size. Patient has 

been started on Cefepime IV and Vanco IV. ID is consulted for evaluation and 

Mment of Right side empyema.





Review of Systems


ROS Limitations:  Poor Historian, Other


Constitutional:  COMPLAINS OF: Diaphoretic episodes, Weight loss, Chills,  

DENIES: Fatigue, Fever, Weight gain, Dizziness, Change in appetite, Night Sweats


Endocrine:  DENIES: Heat/cold intolerance, Polydipsia, Polyuria, Polyphagia


Eyes:  DENIES: Blurred vision, Diplopia, Eye inflammation, Eye pain, Vision loss

, Photosensitivity, Double Vision


Ears, nose, mouth, throat:  DENIES: Tinnitus, Hearing loss, Vertigo, Nasal 

discharge, Oral lesions, Throat pain, Hoarseness, Ear Pain, Running Nose, 

Epistaxis, Sinus Pain, Toothache, Odynophagia


Respiratory:  COMPLAINS OF: Sputum production,  DENIES: Apneas, Cough, Snoring, 

Wheezing, Hemoptysis, Shortness of breath


Cardiovascular:  DENIES: Chest pain, Palpitations, Syncope, Dyspnea on Exertion

, PND, Lower Extremity Edema, Orthopnea, Claudication


Gastrointestinal:  DENIES: Abdominal pain, Black stools, Bloody stools, 

Constipation, Diarrhea, Nausea, Vomiting, Difficulty Swallowing, Anorexia


Genitourinary:  DENIES: Sexual dysfunction, Urinary frequency, Urinary 

incontinence, Urgency, Hematuria, Dysuria, Nocturia, Penile Discharge, 

Testicular Pain, Testicular Swelling


Musculoskeletal:  DENIES: Joint pain, Muscle aches, Stiffness, Joint Swelling, 

Back pain, Neck pain


Integumentary:  DENIES: Abnormal pigmentation, Nail changes, Pruritus, Rash


Hematologic/lymphatic:  DENIES: Bruising, Lymphadenopathy


Immunologic/allergic:  DENIES: Eczema, Urticaria


Neurologic:  DENIES: Abnormal gait, Headache, Localized weakness, Paresthesias, 

Seizures, Speech Problems, Tremor, Poor Balance


Psychiatric:  DENIES: Anxiety, Confusion, Mood changes, Depression, 

Hallucinations, Agitation, Suicidal Ideation, Homicidal Ideation, Delusions


Except as stated in HPI:  all other systems reviewed are Neg


? reliability.





Past Family Social History


Allergies:  


Coded Allergies:  


     No Known Allergies (Unverified , 3/8/17)


Past Medical History


Open heart surgery with Aortic valve mechanical replacement in 2005.


Pericardiocentesis.


Congenital aortic valve problem details not known.


On coumadin








Past Surgical History


Open heart surgery with Aortic valve mechanical replacement in 2005.


Pericardiocentesis.


Reported Medications


Reported Meds & Active Scripts


Active


Reported


Coumadin (Warfarin) 2 Mg Tab 2 Mg PO DAILY


Active Ordered Medications





 Current Medications








 Medications


  (Trade)  Dose


 Ordered  Sig/Marcia


 Route  Start Time


 Stop Time Status Last Admin


 


 IV Flush 2 ml  2 ml  UNSCH  PRN


 IVF  3/8/17 13:30


     


 


 


 Cefepime HCl 1000


 mg/Sodium Chloride  100 ml @ 


 200 mls/hr  Q8H


 IV  3/8/17 18:00


    3/9/17 02:47


 


 


  (Vancomycin Inj/


  ml Inj)  250 ml @ 


 250 mls/hr  Q12H


 IV  3/8/17 20:00


    3/9/17 08:32


 


 


  (Zofran Inj)  4 mg  Q8HR  PRN


 IV PUSH  3/8/17 17:00


     


 


 


  (Protonix Inj)  40 mg  Q24H


 IV PUSH  3/8/17 18:00


    3/8/17 17:45


 


 


  (Tylenol)  650 mg  Q4H  PRN


 PO  3/9/17 04:30


    3/9/17 15:30


 








Family History


reviewed and NC to current problems.


Social History


reviewed. Lives with niece and her daughter [place] home. 


Occ alcohol per records but patient denies alcohol.


No smoking per patient. Has told others he quit many yrs back.


Denies any IVDA


 two yrs back after 25 yrs of marriage.





Physical Exam


Vital Signs





 Vital Signs








  Date Time  Temp Pulse Resp B/P Pulse Ox O2 Delivery O2 Flow Rate FiO2


 


3/9/17 12:00 99.8 104 22 104/57 96   


 


3/9/17 08:00 98.0 79 20 104/61 96   


 


3/9/17 08:00     96 Room Air  


 


3/9/17 04:00 102.2 106 18 114/59 92   


 


3/9/17 00:00 100.3 100 18 112/56 95   


 


3/8/17 21:00      Room Air  


 


3/8/17 20:00 99.2 88 18 113/63 97   


 


3/8/17 18:53  86 20 105/60 97 Room Air  








Physical Exam


GENERAL: This is a well-nourished, well-developed patient, in no apparent 

distress.


SKIN: No rashes, ecchymoses or lesions. Cool and dry.


HEAD: Atraumatic. Normocephalic. No temporal or scalp tenderness.


EYES: Pupils equal round and reactive. Extraocular motions intact. No scleral 

icterus. No injection or drainage. 


ENT: Nose without bleeding, purulent drainage or septal hematoma. Throat 

without erythema, tonsillar hypertrophy or exudate. Uvula midline. Airway 

patent.


NECK: Trachea midline. No JVD or lymphadenopathy. Supple, nontender, no 

meningeal signs.


CARDIOVASCULAR: Regular rate and rhythm without murmurs, gallops, or rubs. 


RESPIRATORY: Clear to auscultation. Breath sounds equal diminished on right 

side.


GASTROINTESTINAL: Abdomen soft, non-tender, nondistended. No hepato-splenomegaly

, or palpable masses. No guarding.


MUSCULOSKELETAL: Extremities without clubbing, cyanosis, or edema. No joint 

tenderness, effusion, or edema noted. No calf tenderness. Negative Homans sign 

bilaterally.


NEUROLOGICAL: Awake and alert. Grossly non focal.


Psych: cooperative


IV line sites with no e.o infection.


Laboratory





Laboratory Tests








Test 3/8/17 3/9/17





 16:45 06:30


 


Lactic Acid Level 1.2  


 


White Blood Count  15.7 


 


Red Blood Count  3.33 


 


Hemoglobin  10.1 


 


Hematocrit  30.0 


 


Mean Corpuscular Volume  90.1 


 


Mean Corpuscular Hemoglobin  30.3 


 


Mean Corpuscular Hemoglobin  33.7 





Concent  


 


Red Cell Distribution Width  13.4 


 


Platelet Count  251 


 


Mean Platelet Volume  8.5 


 


Neutrophils (%) (Auto)  86.6 


 


Lymphocytes (%) (Auto)  6.7 


 


Monocytes (%) (Auto)  6.3 


 


Eosinophils (%) (Auto)  0.1 


 


Basophils (%) (Auto)  0.3 


 


Neutrophils # (Auto)  13.6 


 


Lymphocytes # (Auto)  1.0 


 


Monocytes # (Auto)  1.0 


 


Eosinophils # (Auto)  0.0 


 


Basophils # (Auto)  0.0 


 


CBC Comment  DIFF FINAL 


 


Differential Comment   


 


Prothrombin Time  13.3 


 


Prothromb Time International  1.2 





Ratio  














 Date/Time Procedure Status





Source Growth 


 


 3/8/17 16:45 Legionella Antigen - Final Complete





Urine Clean Catch PRESUMPTIVE NEGATIVE FOR LEGIONELLA P... 





 3/8/17 16:45 Streptococcus pneumoniae Antigen (M - Final Complete





Urine Clean Catch PRESUMPTIVE NEGATIVE FOR STREPTOCOCCU... 


 


 3/8/17 14:15 Aerobic Blood Culture - Preliminary Resulted





Blood Peripheral NO GROWTH IN 1 DAY 





 3/8/17 14:15 Anaerobic Blood Culture - Preliminary Resulted





Blood Peripheral NO GROWTH IN 1 DAY 


 


 3/8/17 13:45 Influenza Types A,B Antigen (BONI) - Final Complete





Nasal Aspirate NEGATIVE FOR FLU A AND B ANTIGEN.... 








Result Diagram:  


3/9/17 0630                                                                    

            3/8/17 1345





Imaging


Last Impressions








Chest CT 3/8/17 1642 Signed





Impressions: 





 Service Date/Time:  Wednesday, March 8, 2017 16:48 - CONCLUSION:  1. Large 





 loculated effusion at the right base measuring 13 x 9 CM. 2. Aortic root 





 dilatation measuring 4.5 CM     Donnell Vincent MD 


 


Chest X-Ray 3/8/17 0000 Signed





Impressions: 





 Service Date/Time:  Wednesday, March 8, 2017 13:19 - CONCLUSION:  Opacity in 

the 





 mid and lower lung zone on the right that likely represents consolidation. The 





 superior margin appears somewhat well-defined suggesting potential mass. 





 However, the appearance favors severe airspace consolidation with small to 





 moderate size pleural effusion. This may represent a pneumonia/infectious 





 process given the clinical history. Recommend followup imaging to confirm 





 improvement and ultimately resolution.     Jonathan Ramirez MD 











Assessment and Plan


Assessment and Plan


Sepsis present on admission.


Right side large empyema. 6 week history suggestive of infectious process after 

flu like symptoms.


Rule out mechanical aortic valve endocarditis.





Recs


Continue Cefepime IV 


Continue Vanco IV (target 15-20)


Consult Cardiothoracic surgery for Bronchoscopy and VATS/open surgery for Right 

loculated empyema. 


Follow 2D ECHO. 


May need MARY CARMEN and other endovascular workup to r/o mechanical valve endocarditis 

depending on blood cultures and ECHO.


Follow cultures


Follow clinically.


Patient does not want thoracentesis but is agreeable to surgery under 

anaesthesia. He reports a fear of needles after his pericardiocentesis as he 

was awake during that procedure.


Agrees to HIV and Hepatitis panel testing.


Check RPR.


Sputum culture and gram stain if able to deep expectorate.


Check UA.


d.w patient. Spent time explaining need for empyema to be addressed and workup 

for endocarditis. Time in excess of 80 mins total. Critical thinking and 

decision making.








Sharron Horowitz MD Mar 9, 2017 15:31

## 2017-03-09 NOTE — EKG
Date Performed: 03/08/2017       Time Performed: 13:51:25

 

PTAGE:      61 years

 

EKG:      Sinus rhythm 

 

 LEFT AXIS DEVIATION LEFT ANTERIOR FASCICULAR BLOCK NON SPECIFIC ST-T WAVE CHANGES CONNECTED TO ISCHE
LORE 

 

NO PREVIOUS TRACING            

 

DOCTOR:   Ti Melgar  Interpretating Date/Time  03/09/2017 13:05:44

## 2017-03-09 NOTE — HHI.PR
Subjective


Remarks


clinically feeling much much better


T max 102.2


no nausea or vomiting





Objective


Vitals





 Vital Signs








  Date Time  Temp Pulse Resp B/P Pulse Ox O2 Delivery O2 Flow Rate FiO2


 


3/9/17 08:00 98.0 79 20 104/61 96   


 


3/9/17 04:00 102.2 106 18 114/59 92   


 


3/9/17 00:00 100.3 100 18 112/56 95   


 


3/8/17 21:00      Room Air  


 


3/8/17 20:00 99.2 88 18 113/63 97   


 


3/8/17 18:53  86 20 105/60 97 Room Air  


 


3/8/17 13:40  81 20 105/63 98 Room Air  


 


3/8/17 12:41 97.8 102 24 95/64 96 Room Air  








 I/O








 3/8/17 3/8/17 3/8/17 3/9/17 3/9/17 3/9/17





 07:00 15:00 23:00 07:00 15:00 23:00


 


Intake Total   2600 ml 800 ml  


 


Output Total    1000 ml  


 


Balance   2600 ml -200 ml  


 


      


 


Intake Oral    800 ml  


 


IV Total   2600 ml   


 


Output Urine Total    1000 ml  


 


# Bowel Movements    0  








Result Diagram:  


3/9/17 0630                                                                    

            3/8/17 1345





Imaging





Last Impressions








Chest CT 3/8/17 1642 Signed





Impressions: 





 Service Date/Time:  Wednesday, March 8, 2017 16:48 - CONCLUSION:  1. Large 





 loculated effusion at the right base measuring 13 x 9 CM. 2. Aortic root 





 dilatation measuring 4.5 CM     Donnell Vincent MD 


 


Chest X-Ray 3/8/17 0000 Signed





Impressions: 





 Service Date/Time:  Wednesday, March 8, 2017 13:19 - CONCLUSION:  Opacity in 

the 





 mid and lower lung zone on the right that likely represents consolidation. The 





 superior margin appears somewhat well-defined suggesting potential mass. 





 However, the appearance favors severe airspace consolidation with small to 





 moderate size pleural effusion. This may represent a pneumonia/infectious 





 process given the clinical history. Recommend followup imaging to confirm 





 improvement and ultimately resolution.     Jonathan Ramirez MD 








Objective Remarks


awake and alert, NAD


decreased breath sounds and decrease vocal fremiti- r base to mid


regular rhythm


abdomen soft, nontender


extremities no edema


neuro exam- non focal





A/P


Assessment and Plan


Patient is a 61-year-old male presenting with 4 weeks duration of body malaise, 

flulike symptoms nausea vomiting diarrhea for the past 5 days with cough 

productive of off-white sputum


Patient with leukocytosis 





Sepsis secondary to right pneumonic process- with large pleural effusion 

parapneumonic effusion- T max 102.2


Leukoctyosis- trending down


   Cultures has been drawn


   on cefepime and Vancomycin. consult ID


   O2 when necessary


   Infectious disease consulted.


   IR consult for thoracentesis- fluid studies


History of aortic mechanical valve replacement


   INR subtherapeutic. start heparin drip after tap 


   hold coumadin for  tap today


   get Echo


PPI








Osmani Padgett MD Mar 9, 2017 12:04


Osmani Padgett MD Mar 9, 2017 12:04

## 2017-03-10 VITALS
HEART RATE: 114 BPM | OXYGEN SATURATION: 94 % | RESPIRATION RATE: 20 BRPM | TEMPERATURE: 100.2 F | SYSTOLIC BLOOD PRESSURE: 125 MMHG | DIASTOLIC BLOOD PRESSURE: 66 MMHG

## 2017-03-10 VITALS
HEART RATE: 111 BPM | RESPIRATION RATE: 20 BRPM | TEMPERATURE: 100 F | SYSTOLIC BLOOD PRESSURE: 122 MMHG | DIASTOLIC BLOOD PRESSURE: 69 MMHG | OXYGEN SATURATION: 96 %

## 2017-03-10 VITALS
HEART RATE: 132 BPM | SYSTOLIC BLOOD PRESSURE: 115 MMHG | RESPIRATION RATE: 18 BRPM | DIASTOLIC BLOOD PRESSURE: 68 MMHG | OXYGEN SATURATION: 94 % | TEMPERATURE: 97.8 F

## 2017-03-10 VITALS
TEMPERATURE: 98.6 F | SYSTOLIC BLOOD PRESSURE: 122 MMHG | OXYGEN SATURATION: 96 % | HEART RATE: 121 BPM | RESPIRATION RATE: 18 BRPM | DIASTOLIC BLOOD PRESSURE: 70 MMHG

## 2017-03-10 VITALS
HEART RATE: 91 BPM | DIASTOLIC BLOOD PRESSURE: 60 MMHG | SYSTOLIC BLOOD PRESSURE: 125 MMHG | RESPIRATION RATE: 18 BRPM | OXYGEN SATURATION: 94 % | TEMPERATURE: 101.3 F

## 2017-03-10 VITALS
DIASTOLIC BLOOD PRESSURE: 70 MMHG | RESPIRATION RATE: 20 BRPM | SYSTOLIC BLOOD PRESSURE: 123 MMHG | HEART RATE: 114 BPM | OXYGEN SATURATION: 94 % | TEMPERATURE: 100.2 F

## 2017-03-10 VITALS — HEART RATE: 90 BPM

## 2017-03-10 LAB
ANION GAP SERPL CALC-SCNC: 8 MEQ/L (ref 5–15)
APTT BLD: 40.3 SEC (ref 24.3–30.1)
APTT BLD: 48.6 SEC (ref 24.3–30.1)
BASOPHILS # BLD AUTO: 0.1 TH/MM3 (ref 0–0.2)
BASOPHILS NFR BLD: 0.4 % (ref 0–2)
BUN SERPL-MCNC: 9 MG/DL (ref 7–18)
CHLORIDE SERPL-SCNC: 105 MEQ/L (ref 98–107)
EOSINOPHIL # BLD: 0 TH/MM3 (ref 0–0.4)
EOSINOPHIL NFR BLD: 0.1 % (ref 0–4)
ERYTHROCYTE [DISTWIDTH] IN BLOOD BY AUTOMATED COUNT: 12.9 % (ref 11.6–17.2)
GFR SERPLBLD BASED ON 1.73 SQ M-ARVRAT: 92 ML/MIN (ref 89–?)
HCO3 BLD-SCNC: 26 MEQ/L (ref 21–32)
HCT VFR BLD CALC: 30.2 % (ref 39–51)
HEMO FLAGS: (no result)
INR PPP: 1.2 RATIO
LYMPHOCYTES # BLD AUTO: 1.2 TH/MM3 (ref 1–4.8)
LYMPHOCYTES NFR BLD AUTO: 5.8 % (ref 9–44)
MCH RBC QN AUTO: 29.9 PG (ref 27–34)
MCHC RBC AUTO-ENTMCNC: 33.3 % (ref 32–36)
MCV RBC AUTO: 89.7 FL (ref 80–100)
MONOCYTES NFR BLD: 6.2 % (ref 0–8)
NEUTROPHILS # BLD AUTO: 18.2 TH/MM3 (ref 1.8–7.7)
NEUTROPHILS NFR BLD AUTO: 87.5 % (ref 16–70)
PLATELET # BLD: 242 TH/MM3 (ref 150–450)
POTASSIUM SERPL-SCNC: 3.6 MEQ/L (ref 3.5–5.1)
PROTHROMBIN TIME: 13.1 SEC (ref 9.8–11.6)
RBC # BLD AUTO: 3.37 MIL/MM3 (ref 4.5–5.9)
SODIUM SERPL-SCNC: 139 MEQ/L (ref 136–145)
WBC # BLD AUTO: 20.8 TH/MM3 (ref 4–11)

## 2017-03-10 RX ADMIN — CEFEPIME SCH MLS/HR: 1 INJECTION, POWDER, FOR SOLUTION INTRAMUSCULAR; INTRAVENOUS at 17:02

## 2017-03-10 RX ADMIN — CEFEPIME SCH MLS/HR: 1 INJECTION, POWDER, FOR SOLUTION INTRAMUSCULAR; INTRAVENOUS at 08:38

## 2017-03-10 RX ADMIN — SODIUM CHLORIDE, PRESERVATIVE FREE SCH ML: 5 INJECTION INTRAVENOUS at 20:50

## 2017-03-10 RX ADMIN — CEFEPIME SCH MLS/HR: 1 INJECTION, POWDER, FOR SOLUTION INTRAMUSCULAR; INTRAVENOUS at 00:56

## 2017-03-10 RX ADMIN — ACETAMINOPHEN PRN MG: 325 TABLET ORAL at 00:54

## 2017-03-10 RX ADMIN — ACETAMINOPHEN PRN MG: 325 TABLET ORAL at 12:29

## 2017-03-10 RX ADMIN — SODIUM CHLORIDE SCH MLS/HR: 900 INJECTION INTRAVENOUS at 17:01

## 2017-03-10 RX ADMIN — HEPARIN SODIUM SCH MLS/HR: 10000 INJECTION, SOLUTION INTRAVENOUS at 13:41

## 2017-03-10 RX ADMIN — VANCOMYCIN HYDROCHLORIDE SCH MLS/HR: 1 INJECTION, SOLUTION INTRAVENOUS at 08:35

## 2017-03-10 NOTE — MB
cc:

JACKY BALLESTEROS MD

****

 

DATE OF CONSULTATION

3/10/17

 

 1955.

 

HISTORY OF PRESENT ILLNESS

Mr. Majano is a 61-year-old, history of congenital aortic valve problems as a

child or  noted at age 17 found during the pre-employment screen,  found to

have a heart murmur. He subsequently underwent mechanical aortic valve

replacement in  by Dr. Jonathan York at [facility].  He had

postoperative need for pericardiocentesis for hemopericardium. He has been

placed on Coumadin.  He states he has been compliant. However, after talking to

him he has a primary care that periodically, a Dr. Behrens in [facility] that

he gets his INRs checked, but he came in with an INR that was subtherapeutic.

He denies having any  heart infections and approximately six weeks ago he had

some flu-like symptoms.  He did not go to the doctor so he kind of toughed  it

out.  He  reported symptoms of 5-6 days of cold-like symptoms, got better for

about five days and for the last four weeks he has been generally weak with

fevers and chills, some nausea and vomiting at home, right-sided chest

discomfort, more pain also when he lies on his right side.  He has a productive

cough, was clear, no blood.  No recent ill exposure, no recent travel.   No

recent night sweats or weight loss. Apparently on admission he had an elevated

white cell count of 21,000, lactic acid was 2.5 and fevers were up to 102.  He

underwent sepsis workup and a CT of the chest which showed a large right

loculated effusion.  He was started on IV antibiotics to include Cefepime and

vancomycin.  We were consulted for right-sided empyema.  Currently, he is

pending a 2-D echo which apparently he refused earlier this morning because he

said he did not have a heart problem.

 

PAST MEDICAL HISTORY

1.  Aortic valve disease,

2.  Recent upper respiratory infection.

 

PAST SURGICAL HISTORY

1.  Mechanical aortic valve replacement in 

2.  Pericardiocentesis on Coumadin, concern for noncompliance.

ALLERGIES:

The patient has no known allergies

 

MEDICATIONS

Home meds include _____ 2 mg p.o. daily.

 

FAMILY HISTORY

Unremarkable.

 

SOCIAL HISTORY

Lives with his niece and her daughter,   two years back after 25-year

marriage. Smoked in the past, quit 20 years ago.  Occasional beer, no illicit

drugs.  No IV drug use.  Tox screen was negative.

 

REVIEW OF SYSTEMS

GENERAL: No night sweats,  heat and cold.  Positive for recent fever or chills.

 

SKIN:  No psoriasis, itching or hives.

HEENT:  No blurred vision, hearing loss.

RESPIRATORY:  Positive for shortness of breath, cough, pleuritic chest type

discomfort.

CARDIOVASCULAR:  As above in HPI.

GASTROINTESTINAL:  Recent nausea, vomiting, diarrhea which has improved.

GENITOURINARY::   No burning, frequency, urgency

CNS:  No history of TIA, CVA, seizure disorder.

ENDOCRINE: No history of diabetes and/or hypothyroidism

 

PHYSICAL EXAMINATION

VITAL SIGNS:  Blood pressure 120/70, heart rate of 110, temperature max now

100, respiratory rate of 20.  Room air sat 96%.

GENERAL:  Patient is awake, alert, no acute distress.

HEENT:   Head is normocephalic, atraumatic.  Pupils equal and reactive.  Oral

mucosa pink, moist.

NECK:  Supple.  No JVD.

CARDIAC:  Heart sounds S1-S2 slightly tachycardiac, positive for  click.

LUNGS:  Very diminished on the right lower mid lobe.

ABDOMEN:  Soft, nontender.

EXTREMITIES:  No cyanosis, clubbing or edema.

 

LABORATORY FINDINGS

Hemoglobin 10, hematocrit of 30, white cell count 20, platelet count 242.

 

Sodium 137, potassium 3.7, BUN of 11, creatinine 1.06, lactic acid 2.5, INR

1.2. Hepatitis is negative. HIV negative.

 

Micro negative blood cultures  times 48 hours done on the . Negative for

influenza A, negative for Streptococcus pneumoniae antigen and Legionella.

 

IMPRESSION

This is a 61-year-old male with history of mechanical aortic valve replacement

in 2005 now with fever, sepsis, large right loculated effusion, concern for

empyema on antibiotics.

 

PLAN

Right video-assisted thoracoscopy, possible thoracotomy, decortication

pleurodesis on Tuesday, 2017.  Continue antibiotics. Dr. Ballesteros did speak

with Dr. Padgett in regards to restarting at  least heparin at this time due

to his mechanical valve.  Procedures, alternatives and risks have been

discussed with the patient.  He is agreeable to proceed. Echo is still pending

to evaluate his valve.

 

 

Dictated by Jackie Terwilliger, ARNP

 

 

 

 

                              _________________________________

                              Jacky BARGER

D:  3/10/2017/2:29 PM

T:  3/13/2017/1:28 PM

Visit #:  [id number]

Job #:  [id number]

## 2017-03-10 NOTE — HHI.PR
Subjective


Remarks


patient refuses any thoracentesis/needles


when clarified states he is agreeable to VATS procedure


very anxious





Objective


Vitals





 Vital Signs








  Date Time  Temp Pulse Resp B/P Pulse Ox O2 Delivery O2 Flow Rate FiO2


 


3/10/17 12:00 100.0 111 20 122/69 96   


 


3/10/17 09:11      Room Air  


 


3/10/17 08:00 98.6 121 18 122/70 96   


 


3/10/17 04:00 97.8 132 18 115/68 94   


 


3/10/17 00:00 101.3 91 18 125/60 94   


 


3/9/17 20:00 100.5 107 18 116/65 94   


 


3/9/17 19:45      Room Air  


 


3/9/17 14:00 101.7 110 22 109/59 95   








 I/O








 3/9/17 3/9/17 3/9/17 3/10/17 3/10/17 3/10/17





 07:00 15:00 23:00 07:00 15:00 23:00


 


Intake Total 800 ml 840 ml 360 ml 120 ml  


 


Output Total 1000 ml 100 ml 400 ml 400 ml  


 


Balance -200 ml 740 ml -40 ml -280 ml  


 


      


 


Intake Oral 800 ml 840 ml 360 ml 120 ml  


 


Output Urine Total 1000 ml 100 ml 400 ml 400 ml  


 


# Voids  3    


 


# Bowel Movements 0 1 0 0  








Result Diagram:  


3/10/17 1204                                                                   

             3/10/17 0745





Imaging





Last Impressions








Chest CT 3/8/17 1642 Signed





Impressions: 





 Service Date/Time:  Wednesday, March 8, 2017 16:48 - CONCLUSION:  1. Large 





 loculated effusion at the right base measuring 13 x 9 CM. 2. Aortic root 





 dilatation measuring 4.5 CM     Donnell Vincent MD 


 


Chest X-Ray 3/8/17 0000 Signed





Impressions: 





 Service Date/Time:  Wednesday, March 8, 2017 13:19 - CONCLUSION:  Opacity in 

the 





 mid and lower lung zone on the right that likely represents consolidation. The 





 superior margin appears somewhat well-defined suggesting potential mass. 





 However, the appearance favors severe airspace consolidation with small to 





 moderate size pleural effusion. This may represent a pneumonia/infectious 





 process given the clinical history. Recommend followup imaging to confirm 





 improvement and ultimately resolution.     Jonathan Ramirez MD 








Objective Remarks


awake and alert, NAD


decreased breath sounds and decrease vocal fremiti-  base to mid, right 


regular rhythm


abdomen soft, nontender


extremities no edema


neuro exam- non focal





A/P


Assessment and Plan


Patient is a 61-year-old male presenting with 4 weeks duration of body malaise, 

flulike symptoms nausea vomiting diarrhea for the past 5 days with cough 

productive of off-white sputum


Patient with leukocytosis 





Sepsis secondary to right pneumonic process- with large pleural effusion 

parapneumonic effusion- T max 102.2


Leukoctyosis- trending down


   Cultures has been drawn


   on cefepime and Vancomycin. 


   O2 when necessary


   Infectious disease Service  ff along with us


   Dr. Gross ff- for VATs procedure- Tuesday- 


History of aortic mechanical valve replacement


   restart heparin drip 


   hold coumadin- for procedure Tuesday


   get Echo report pending


Anxiety- Reaction


   Ativan po prn


PPI








Osmani Padgett MD Mar 10, 2017 13:58

## 2017-03-10 NOTE — EC
Study

 

Study Date:03/10/2017

 

 

 

STUDY CONCLUSIONS

 

SUMMARY

 

- Left ventricle: The cavity size was normal. Wall thickness was

normal. Systolic function was moderately reduced. The estimated

ejection fraction was in the range of 35% to 40%. Wall motion was

normal; there were no regional wall motion abnormalities.

- Aortic valve: A mechanical prosthesis was present. Cannot exclude

vegetation. Valve area: 0.91cm^2(VTI). Valve area: 0.93cm^2

(Vmax).

- Pulmonary arteries: PA peak pressure: 36mm Hg (S).

Recommendations: Transesophageal echocardiography should be

performed in order to exclude intracardiac thrombus.

 

-------------------------------------------------------------------

If LV function is below 40, please consider prescribing an ACEI or

ARB or document rationale for non-use.

 

-------------------------------------------------------------------

PROCEDURE DATA

 

STUDY STATUS:

Elective. Procedure: Transthoracic echocardiography.

Image quality was good. Scanning was performed from the

parasternal, apical, and subcostal acoustic windows. Study

completion: The patient tolerated the procedure well.

Transthoracic echocardiography. M-mode, complete 2D, complete

spectral Doppler, and color Doppler. Patient status: Inpatient.

 

-------------------------------------------------------------------

CARDIAC ANATOMY

 

LEFT VENTRICLE:

The cavity size was normal. Wall thickness was

normal. Systolic function was moderately reduced. The estimated

ejection fraction was in the range of 35% to 40%. Wall motion was

normal; there were no regional wall motion abnormalities.

 

AORTIC VALVE:

Normal thickness leaflets. A mechanical prosthesis

was present. Cannot exclude vegetation. Doppler: Transvalvular

velocity was within the normal range. There was no stenosis. No

regurgitation.  Valve area: 0.91cm^2(VTI). Valve area: 0.93cm^2

(Vmax).  Mean gradient: 14mm Hg (S). Peak gradient: 28mm Hg (S).

 

AORTA:

Aortic root: The aortic root was normal in size.

 

MITRAL VALVE:

Structurally normal valve. Doppler: Transvalvular

velocity was within the normal range. There was no evidence for

stenosis. No regurgitation.

 

LEFT ATRIUM:

The atrium was normal in size.

 

RIGHT VENTRICLE:

The cavity size was normal. Wall thickness was

normal.

 

PULMONIC VALVE:

Doppler: Transvalvular velocity was within the

normal range. There was no evidence for stenosis. No regurgitation.

 

TRICUSPID VALVE:

Structurally normal valve. Doppler: Transvalvular

velocity was within the normal range. No regurgitation.

 

PULMONARY ARTERY:

The main pulmonary artery was normal-sized.

Systolic pressure was within the normal range.

 

RIGHT ATRIUM:

The atrium was normal in size.

 

PERICARDIUM:

There was no pericardial effusion.

 

SYSTEMIC VEINS:

Inferior vena cava: The vessel was normal in size.

 

-------------------------------------------------------------------

 

BASIC MEASUREMENTS                                      ADULT

Normal

Left ventricle

LV internal dimension, ED, chordal level,    50.2 mm    43-52

PLAX

LV internal dimension, ES, chordal level,   *43.1 mm    23-38

PLAX

Fractional shortening, chordal level, PLAX    *14 %     >29

LV posterior wall thickness, ED              11.3 mm    -----------

IVS/LVPW ratio, ED                            0.8       <1.3

Ventricular septum

Septal thickness, ED                         8.99 mm    -----------

Aorta

Root diameter, ED                              31 mm    -----------

Left atrium

Anterior-posterior dimension                   25 mm    -----------

Right ventricle

RV internal dimension, ED, PLAX              24.9 mm    19-38

 

DOPPLER MEASUREMENTS                                    ADULT

Normal

Main pulmonary artery

Pressure, S                                   *36 mm Hg =30

Aortic valve

Peak velocity, S                              265 cm/s  -----------

Mean velocity, S                              173 cm/s  -----------

VTI, S                                       38.7 cm    -----------

Mean gradient, S                               14 mm Hg -----------

Peak gradient, S                               28 mm Hg -----------

Valve area, VTI                              0.91 cm^2  -----------

Valve area, Vmax                             0.93 cm^2  -----------

Tricuspid valve

Regurgitant peak velocity                     255 cm/s  -----------

Peak RV-RA gradient, S                         26 mm Hg -----------

Maximal regurgitant velocity                  255 cm/s  -----------

Systemic veins

Estimated CVP                                  10 mm Hg -----------

Right ventricle

RV pressure, S                                *36 mm Hg <30

 

LEGEND:

Mean values are shown as u=mean value.

Asterisk (*) marks values outside specified normal range.

Prepared and signed by

 

Ephraim Gtz

[id number]

## 2017-03-10 NOTE — HHI.IDPN
Subjective


Subjective


Remarks


 is a 62 y/o CF with PMHx of congenital aortic valve problems 

diagnosed at age of 17 years when for a preemployment screen he was found to 

have a heart murmur. He subsequently underwent open heart surgery with 

mechanical aortic valve replacement in 2005?. Patient reports needing 

pericardiocentesis for hemopericardium based on his description. He also 

reports being on coumadin and being compliant with medications. He denies any 

heart valve infections or any infections in general. Patient kept repeating 

that other than all these problems I am very healthy person.





Patient reports that approximately 6 weeks back he had " Flu-like symptoms". He 

reports he does not like to go to doctors so he toughed it out. He reports 

symptoms lasting 5-6 days with cold, cough like symptoms. He reports feeling 

well for 4 days or so and since then for last 4 weeks he has had generalized 

weakness that progressed to the point that he was unable to go to the bathroom. 

He denies being on any antibiotics and did not go to MD or Urgent care. 





Patient now being evaluated and Managed for Empyema of lung, possible 

endocarditis.


Overnight events reviewed


Tmax 101.3 F


No rash


No diarrhea


Antibiotics


Cefepime IV


Vanco IV


Lines


Line sites with no e.o infection.


Past Medical History


reviewed


Allergies:  


Coded Allergies:  


     No Known Allergies (Unverified , 3/8/17)





Objective


.





 Vital Signs








  Date Time  Temp Pulse Resp B/P Pulse Ox O2 Delivery O2 Flow Rate FiO2


 


3/10/17 12:00 100.0 111 20 122/69 96   


 


3/10/17 09:11      Room Air  


 


3/10/17 08:00 98.6 121 18 122/70 96   


 


3/10/17 04:00 97.8 132 18 115/68 94   


 


3/10/17 00:00 101.3 91 18 125/60 94   


 


3/9/17 20:00 100.5 107 18 116/65 94   


 


3/9/17 19:45      Room Air  














 3/9/17 3/9/17 3/10/17





 15:00 23:00 07:00


 


Intake Total 840 ml 360 ml 120 ml


 


Output Total 100 ml 400 ml 400 ml


 


Balance 740 ml -40 ml -280 ml


 


   


 


Intake Oral 840 ml 360 ml 120 ml


 


Output Urine Total 100 ml 400 ml 400 ml


 


# Voids 3  


 


# Bowel Movements 1 0 0








.





Laboratory Tests








Test 3/9/17 3/10/17





 06:30 12:04


 


White Blood Count 15.7 TH/MM3 20.8 TH/MM3


 


Red Blood Count 3.33 MIL/MM3 3.37 MIL/MM3


 


Hemoglobin 10.1 GM/DL 10.1 GM/DL


 


Hematocrit 30.0 % 30.2 %


 


Mean Corpuscular Volume 90.1 FL 89.7 FL


 


Mean Corpuscular Hemoglobin 30.3 PG 29.9 PG


 


Mean Corpuscular Hemoglobin 33.7 % 33.3 %





Concent  


 


Red Cell Distribution Width 13.4 % 12.9 %


 


Platelet Count 251 TH/MM3 242 TH/MM3


 


Mean Platelet Volume 8.5 FL 8.4 FL


 


Neutrophils (%) (Auto) 86.6 % 87.5 %


 


Lymphocytes (%) (Auto) 6.7 % 5.8 %


 


Monocytes (%) (Auto) 6.3 % 6.2 %


 


Eosinophils (%) (Auto) 0.1 % 0.1 %


 


Basophils (%) (Auto) 0.3 % 0.4 %


 


Neutrophils # (Auto) 13.6 TH/MM3 18.2 TH/MM3


 


Lymphocytes # (Auto) 1.0 TH/MM3 1.2 TH/MM3


 


Monocytes # (Auto) 1.0 TH/MM3 1.3 TH/MM3


 


Eosinophils # (Auto) 0.0 TH/MM3 0.0 TH/MM3


 


Basophils # (Auto) 0.0 TH/MM3 0.1 TH/MM3


 


CBC Comment DIFF FINAL  DIFF FINAL 


 


Differential Comment    








Laboratory Tests








Test 3/8/17 3/9/17 3/10/17





 16:45 19:30 07:45


 


Lactic Acid Level 1.2 mmol/L  


 


C-Reactive Protein  16.00 MG/DL 


 


Sodium Level   139 MEQ/L


 


Potassium Level   3.6 MEQ/L


 


Chloride Level   105 MEQ/L


 


Carbon Dioxide Level   26.0 MEQ/L


 


Anion Gap   8 MEQ/L


 


Blood Urea Nitrogen   9 MG/DL


 


Creatinine   0.84 MG/DL


 


Estimat Glomerular Filtration   92 ML/MIN





Rate   


 


Random Glucose   110 MG/DL


 


Calcium Level   7.9 MG/DL


 


Magnesium Level   1.9 MG/DL








Microbiology








 Date/Time Procedure Status





Source Growth 


 


 3/8/17 13:45 Influenza Types A,B Antigen (BONI) - Final Complete





Nasal Aspirate NEGATIVE FOR FLU A AND B ANTIGEN.... 


 


 3/8/17 14:00 Aerobic Blood Culture - Preliminary Resulted





Blood Peripheral NO GROWTH IN 2 DAYS 





 3/8/17 14:00 Anaerobic Blood Culture - Preliminary Resulted





Blood Peripheral NO GROWTH IN 2 DAYS 


 


 3/8/17 14:15 Aerobic Blood Culture - Preliminary Resulted





Blood Peripheral NO GROWTH IN 2 DAYS 





 3/8/17 14:15 Anaerobic Blood Culture - Preliminary Resulted





Blood Peripheral NO GROWTH IN 2 DAYS 


 


 3/8/17 16:45 Legionella Antigen - Final Complete





Urine Clean Catch PRESUMPTIVE NEGATIVE FOR LEGIONELLA P... 





 3/8/17 16:45 Streptococcus pneumoniae Antigen (M - Final Complete





Urine Clean Catch PRESUMPTIVE NEGATIVE FOR STREPTOCOCCU... 








Imaging


Last Impressions








Chest CT 3/8/17 1642 Signed





Impressions: 





 Service Date/Time:  Wednesday, March 8, 2017 16:48 - CONCLUSION:  1. Large 





 loculated effusion at the right base measuring 13 x 9 CM. 2. Aortic root 





 dilatation measuring 4.5 CM     Donnell Vincent MD 


 


Chest X-Ray 3/8/17 0000 Signed





Impressions: 





 Service Date/Time:  Wednesday, March 8, 2017 13:19 - CONCLUSION:  Opacity in 

the 





 mid and lower lung zone on the right that likely represents consolidation. The 





 superior margin appears somewhat well-defined suggesting potential mass. 





 However, the appearance favors severe airspace consolidation with small to 





 moderate size pleural effusion. This may represent a pneumonia/infectious 





 process given the clinical history. Recommend followup imaging to confirm 





 improvement and ultimately resolution.     Jonathan Ramirez MD 








Physical Exam


GENERAL: This is a well-nourished, well-developed patient, in no apparent 

distress.


SKIN: No rashes, ecchymoses or lesions. Cool and dry.


HEAD: Atraumatic. Normocephalic. No temporal or scalp tenderness.


EYES: Pupils equal round and reactive. Extraocular motions intact. No scleral 

icterus. No injection or drainage. 


ENT: Nose without bleeding, purulent drainage or septal hematoma. Throat 

without erythema, tonsillar hypertrophy or exudate. Uvula midline. Airway 

patent.


NECK: Trachea midline. No JVD or lymphadenopathy. Supple, nontender, no 

meningeal signs.


CARDIOVASCULAR: Mechanical heart valve sounds. No murmur appreciated. 


RESPIRATORY: Clear to auscultation. Breath sounds equal diminished on right 

side.


GASTROINTESTINAL: Abdomen soft, non-tender, nondistended. 


MUSCULOSKELETAL: Extremities without clubbing, cyanosis, or edema. No joint 

tenderness, effusion, or edema noted. No calf tenderness. Negative Homans sign 

bilaterally.


NEUROLOGICAL: Awake and alert. Grossly non focal.


Psych: cooperative


IV line sites with no e.o infection.





Assessment & Plan


Remarks


Sepsis present on admission.


Right side large empyema. 6 week history suggestive of infectious process after 

flu like symptoms.


Rule out mechanical aortic valve endocarditis.





Recs


Continue Cefepime IV increase dose to PSAE doses.


Continue Vanco IV (target 15-20)


Add Flagyl ? anaerobic component.


Cardiothoracic surgery for Bronchoscopy and VATS/open surgery for Right 

loculated empyema: ON tuesday per d/w .


Now on Heparin gtt in preparation for surgery.


Follow 2D ECHO. \


HIV and Hepatitis panel negative


RPR negative.


May need MARY CARMEN and other endovascular workup to r/o mechanical valve endocarditis 

depending on blood cultures and ECHO.


Follow cultures


Follow clinically.


Patient does not want thoracentesis but is agreeable to surgery under 

anaesthesia. He reports a fear of needles after his pericardiocentesis as he 

was awake during that procedure.


d.w patient, 


Not ready for PICC, blood cultures pending.


 to cover for me over the weekend.








Sharron Horowitz MD Mar 10, 2017 15:18


Sharron Horowitz MD Mar 10, 2017 15:18

## 2017-03-10 NOTE — EKG
Date Performed: 03/10/2017       Time Performed: 06:25:44

 

PTAGE:      61 years

 

EKG:      Sinus tachycardia with PVC(s) Left anterior fascicular block Possible left ventricular hype
rtrophy Lateral ST-T changes are probably due to ventricular hypertrophy Abnormal ECG

 

PREVIOUS TRACING       : 03/08/2017 13.51 Compared to the previous tracing, previously more extensive
 ST/T wave changes

 

DOCTOR:   Pan Roberts  Interpretating Date/Time  03/10/2017 19:12:32

## 2017-03-11 VITALS
DIASTOLIC BLOOD PRESSURE: 67 MMHG | RESPIRATION RATE: 18 BRPM | OXYGEN SATURATION: 96 % | SYSTOLIC BLOOD PRESSURE: 108 MMHG | HEART RATE: 97 BPM | TEMPERATURE: 103.3 F

## 2017-03-11 VITALS
OXYGEN SATURATION: 94 % | RESPIRATION RATE: 20 BRPM | SYSTOLIC BLOOD PRESSURE: 142 MMHG | HEART RATE: 123 BPM | TEMPERATURE: 102.5 F | DIASTOLIC BLOOD PRESSURE: 65 MMHG

## 2017-03-11 VITALS
OXYGEN SATURATION: 96 % | DIASTOLIC BLOOD PRESSURE: 60 MMHG | RESPIRATION RATE: 16 BRPM | HEART RATE: 99 BPM | TEMPERATURE: 99 F | SYSTOLIC BLOOD PRESSURE: 91 MMHG

## 2017-03-11 VITALS
RESPIRATION RATE: 18 BRPM | OXYGEN SATURATION: 96 % | HEART RATE: 97 BPM | SYSTOLIC BLOOD PRESSURE: 108 MMHG | TEMPERATURE: 103.3 F | DIASTOLIC BLOOD PRESSURE: 67 MMHG

## 2017-03-11 VITALS
OXYGEN SATURATION: 95 % | SYSTOLIC BLOOD PRESSURE: 118 MMHG | RESPIRATION RATE: 20 BRPM | HEART RATE: 106 BPM | TEMPERATURE: 99.8 F | DIASTOLIC BLOOD PRESSURE: 66 MMHG

## 2017-03-11 VITALS
SYSTOLIC BLOOD PRESSURE: 117 MMHG | HEART RATE: 126 BPM | TEMPERATURE: 99.4 F | OXYGEN SATURATION: 95 % | RESPIRATION RATE: 20 BRPM | DIASTOLIC BLOOD PRESSURE: 70 MMHG

## 2017-03-11 VITALS — HEART RATE: 96 BPM

## 2017-03-11 VITALS
DIASTOLIC BLOOD PRESSURE: 72 MMHG | HEART RATE: 134 BPM | RESPIRATION RATE: 20 BRPM | TEMPERATURE: 99.9 F | OXYGEN SATURATION: 94 % | SYSTOLIC BLOOD PRESSURE: 119 MMHG

## 2017-03-11 VITALS — HEART RATE: 97 BPM

## 2017-03-11 VITALS — TEMPERATURE: 101.1 F

## 2017-03-11 LAB
ANION GAP SERPL CALC-SCNC: 9 MEQ/L (ref 5–15)
APTT BLD: 52.4 SEC (ref 24.3–30.1)
BASOPHILS # BLD AUTO: 0.1 TH/MM3 (ref 0–0.2)
BASOPHILS NFR BLD: 0.4 % (ref 0–2)
BUN SERPL-MCNC: 10 MG/DL (ref 7–18)
CHLORIDE SERPL-SCNC: 102 MEQ/L (ref 98–107)
EOSINOPHIL # BLD: 0.1 TH/MM3 (ref 0–0.4)
EOSINOPHIL NFR BLD: 0.3 % (ref 0–4)
ERYTHROCYTE [DISTWIDTH] IN BLOOD BY AUTOMATED COUNT: 13 % (ref 11.6–17.2)
GFR SERPLBLD BASED ON 1.73 SQ M-ARVRAT: 96 ML/MIN (ref 89–?)
HCO3 BLD-SCNC: 27.2 MEQ/L (ref 21–32)
HCT VFR BLD CALC: 31.1 % (ref 39–51)
HEMO FLAGS: (no result)
LYMPHOCYTES # BLD AUTO: 1.7 TH/MM3 (ref 1–4.8)
LYMPHOCYTES NFR BLD AUTO: 8.7 % (ref 9–44)
MAGNESIUM SERPL-MCNC: 1.8 MG/DL (ref 1.5–2.5)
MCH RBC QN AUTO: 30.7 PG (ref 27–34)
MCHC RBC AUTO-ENTMCNC: 34.7 % (ref 32–36)
MCV RBC AUTO: 88.6 FL (ref 80–100)
MONOCYTES NFR BLD: 5.9 % (ref 0–8)
NEUTROPHILS # BLD AUTO: 16.6 TH/MM3 (ref 1.8–7.7)
NEUTROPHILS NFR BLD AUTO: 84.7 % (ref 16–70)
PLATELET # BLD: 245 TH/MM3 (ref 150–450)
POTASSIUM SERPL-SCNC: 3.6 MEQ/L (ref 3.5–5.1)
RBC # BLD AUTO: 3.51 MIL/MM3 (ref 4.5–5.9)
SODIUM SERPL-SCNC: 138 MEQ/L (ref 136–145)
WBC # BLD AUTO: 19.5 TH/MM3 (ref 4–11)

## 2017-03-11 RX ADMIN — CARVEDILOL SCH MG: 3.12 TABLET, FILM COATED ORAL at 13:01

## 2017-03-11 RX ADMIN — ACETAMINOPHEN PRN MG: 325 TABLET ORAL at 15:16

## 2017-03-11 RX ADMIN — CEFEPIME SCH MLS/HR: 1 INJECTION, POWDER, FOR SOLUTION INTRAMUSCULAR; INTRAVENOUS at 17:26

## 2017-03-11 RX ADMIN — SODIUM CHLORIDE SCH MLS/HR: 900 INJECTION INTRAVENOUS at 18:00

## 2017-03-11 RX ADMIN — CEFEPIME SCH MLS/HR: 1 INJECTION, POWDER, FOR SOLUTION INTRAMUSCULAR; INTRAVENOUS at 08:30

## 2017-03-11 RX ADMIN — SODIUM CHLORIDE, PRESERVATIVE FREE SCH ML: 5 INJECTION INTRAVENOUS at 08:29

## 2017-03-11 RX ADMIN — CEFEPIME SCH MLS/HR: 1 INJECTION, POWDER, FOR SOLUTION INTRAMUSCULAR; INTRAVENOUS at 01:56

## 2017-03-11 RX ADMIN — ACETAMINOPHEN PRN MG: 325 TABLET ORAL at 00:14

## 2017-03-11 RX ADMIN — CARVEDILOL SCH MG: 3.12 TABLET, FILM COATED ORAL at 20:53

## 2017-03-11 RX ADMIN — SODIUM CHLORIDE SCH MLS/HR: 900 INJECTION INTRAVENOUS at 05:31

## 2017-03-11 RX ADMIN — PANTOPRAZOLE SCH MG: 40 TABLET, DELAYED RELEASE ORAL at 08:29

## 2017-03-11 NOTE — HHI.PR
Subjective


Remarks


feeling better- but very very anxious


d/w him results of echo-





HR- increase with agitation when calm- 100-105





Objective


Vitals





 Vital Signs








  Date Time  Temp Pulse Resp B/P Pulse Ox O2 Delivery O2 Flow Rate FiO2


 


3/11/17 08:55      Room Air  


 


3/11/17 08:00 99.9 134 20 119/72 94   


 


3/11/17 04:00 99.4 126 20 117/70 95   


 


3/11/17 00:14 102.5 123 20 142/65 94   


 


3/10/17 20:00  118      


 


3/10/17 20:00 100.2 114 20 123/70 94   


 


3/10/17 20:00      Room Air  


 


3/10/17 16:00 100.2 114 20 125/66 94   


 


3/10/17 12:00 100.0 111 20 122/69 96   








 I/O








 3/10/17 3/10/17 3/10/17 3/11/17 3/11/17 3/11/17





 07:00 15:00 23:00 07:00 15:00 23:00


 


Intake Total 120 ml 480 ml 720 ml 761 ml  


 


Output Total 400 ml 600 ml 1425 ml 1300 ml  


 


Balance -280 ml -120 ml -705 ml -539 ml  


 


      


 


Intake Oral 120 ml 480 ml 720 ml 360 ml  


 


IV Total    401 ml  


 


Output Urine Total 400 ml 600 ml 1425 ml 1300 ml  


 


# Bowel Movements 0  0 0  








Result Diagram:  


3/11/17 0341                                                                   

             3/11/17 0341





Imaging





Last Impressions








Chest CT 3/8/17 1642 Signed





Impressions: 





 Service Date/Time:  Wednesday, March 8, 2017 16:48 - CONCLUSION:  1. Large 





 loculated effusion at the right base measuring 13 x 9 CM. 2. Aortic root 





 dilatation measuring 4.5 CM     Donnell Vincent MD 


 


Chest X-Ray 3/8/17 0000 Signed





Impressions: 





 Service Date/Time:  Wednesday, March 8, 2017 13:19 - CONCLUSION:  Opacity in 

the 





 mid and lower lung zone on the right that likely represents consolidation. The 





 superior margin appears somewhat well-defined suggesting potential mass. 





 However, the appearance favors severe airspace consolidation with small to 





 moderate size pleural effusion. This may represent a pneumonia/infectious 





 process given the clinical history. Recommend followup imaging to confirm 





 improvement and ultimately resolution.     Jonathan Ramirez MD 








Objective Remarks


awake and alert, NAD


decreased breath sounds and decrease vocal fremiti-  base to mid, right 


regular rhythm


abdomen soft, nontender


extremities no edema


neuro exam- non focal





A/P


Assessment and Plan


Patient is a 61-year-old male presenting with 4 weeks duration of body malaise, 

flulike symptoms nausea vomiting diarrhea for the past 5 days with cough 

productive of off-white sputum


Patient with leukocytosis 





Sepsis secondary to right pneumonic process- with large pleural effusion 

parapneumonic effusion- T max 102.2


Leukoctyosis- trending down


   Cultures has been drawn


   on cefepime and Vancomycin. 


   O2 when necessary


   Infectious disease Service  ff along with us


   Dr. Gross ff- for VATs procedure- Tuesday- 


History of aortic mechanical valve replacement


TAchycardia- some anxiety component


Cardiomyopathy


Echo with possible vegetation/thrombus


   on heparin drip 


   hold coumadin- for procedure Tuesday


   Start Coreg 3.125 mg bid


   agrees to MARY CARMEN if needed - Cardiology consult for evaluation


Anxiety- Reaction


   Ativan po prn q 8 scheduled


PPI








Osmani Padgett MD Mar 11, 2017 11:46

## 2017-03-11 NOTE — MB
cc:

EVERARDO LATIF MD

****

 

 

DATE OF CONSULTATION:  03/11/2017.

 

REASON FOR CONSULTATION:

Abnormal echocardiogram.

 

HISTORY OF PRESENT ILLNESS:

The patient is a very pleasant 61-year-old gentleman with a history of a

mechanical aortic valve placed about 10 years ago.  The patient has not been

following up well with his Coumadin.  He says that he has a friend who would

give him 2 mg of Coumadin but he has not had  his INR checked in and about

eight or more months.  He presented with pneumonia symptoms but was found to

have a subtherapeutic INR of 1.1 and had an echocardiogram which could not rule

out mass or thrombus on the aortic valve and possibly also with a left

ventricular thrombus and a low ejection fraction.

 

The patient denies any cardiac symptoms such as chest pain, shortness of

breath, lightheadedness or dizziness.  He is somewhat agitated and is saying he

wishes he had never presented to the hospital as he is not sure why I have do

heart tests for pneumonia. I did explain this in length and he says that I

should "just do whatever"

 

PAST MEDICAL HISTORY:

1. Mechanical aortic valve placement.

2. Noncompliant on coumadin circa 2005.

3. Normal coronary arteries per the patient at the time of preoperative heart

   catheterization.

 

CURRENT MEDICATIONS:

1. Coreg 3.25 milligrams twice a day.

2. Heparin drip.

3. Cefazolin.

4. Vancomycin.

 

ALLERGIES:

NO KNOWN DRUG ALLERGIES.

 

PHYSICAL EXAMINATION:

VITAL SIGNS: Temperature 102.5 max, current 99.9, pulse 134, respiratory rate

20, blood pressure 199/72 and satting 94 on room air.

GENERAL:  A pleasant gentleman in no distress.

NECK: No jugular venous distention.

LUNGS: Clear to auscultation.

CARDIOVASCULAR: Mechanical sounds appreciated. Regular rhythm. Mildly

tachycardic rate.

EXTREMITIES: No edema.

 

LABORATORY DATA:

INR on admission was 1.1.  Current PTT is 52.4.

 

Sodium 138, potassium 3.6, chloride 102, bicarb 27.2, BUN 10, creatinine 0.81,

glucose 110.

 

White count 19.5 down from 20.8, hematocrit 31.1, platelets 245,000.

 

Micro has shown no growth to-date.

 

IMAGING STUDIES:

Chest CT showed a large loculated effusion at the right base with aortic root

dilatation of 4.5 cm.

 

Echocardiogram was read as an ejection fraction of 35% to 40% with inability to

exclude vegetation as well as the inability to exclude intracardiac thrombus

and transesophageal echocardiogram was recommended.

 

IMPRESSION:

1. Abnormal echocardiogram. With the patient's clinical findings and abnormal

echocardiogram above, I believe it is reasonable to do a transesophageal

echocardiogram. I explained this to the patient and though he initially was

somewhat agitated, he does wish me to proceed with the procedure.  He will be

kept n.p.o. past midnight.  We will obtain consents and then proceed with the

plan tomorrow or Monday.

 

Thank you again for the opportunity to participate in this patient's care.

 

                              _________________________________

                              MD JUAN MIGUEL Meehan/BRITT

D:  3/11/2017/12:28 PM

T:  3/11/2017/2:36 PM

Visit #:  [id number]

Job #:  [id number]

## 2017-03-12 VITALS
OXYGEN SATURATION: 94 % | TEMPERATURE: 100.5 F | SYSTOLIC BLOOD PRESSURE: 99 MMHG | RESPIRATION RATE: 24 BRPM | DIASTOLIC BLOOD PRESSURE: 67 MMHG | HEART RATE: 96 BPM

## 2017-03-12 VITALS
SYSTOLIC BLOOD PRESSURE: 97 MMHG | OXYGEN SATURATION: 97 % | DIASTOLIC BLOOD PRESSURE: 52 MMHG | RESPIRATION RATE: 16 BRPM | HEART RATE: 84 BPM | TEMPERATURE: 98.3 F

## 2017-03-12 VITALS
HEART RATE: 94 BPM | SYSTOLIC BLOOD PRESSURE: 97 MMHG | OXYGEN SATURATION: 95 % | RESPIRATION RATE: 20 BRPM | DIASTOLIC BLOOD PRESSURE: 62 MMHG | TEMPERATURE: 99.8 F

## 2017-03-12 VITALS
OXYGEN SATURATION: 94 % | SYSTOLIC BLOOD PRESSURE: 91 MMHG | RESPIRATION RATE: 20 BRPM | DIASTOLIC BLOOD PRESSURE: 53 MMHG | TEMPERATURE: 100.4 F | HEART RATE: 90 BPM

## 2017-03-12 VITALS
SYSTOLIC BLOOD PRESSURE: 88 MMHG | DIASTOLIC BLOOD PRESSURE: 51 MMHG | OXYGEN SATURATION: 94 % | HEART RATE: 107 BPM | TEMPERATURE: 101.2 F | RESPIRATION RATE: 16 BRPM

## 2017-03-12 VITALS — HEART RATE: 102 BPM

## 2017-03-12 VITALS
HEART RATE: 101 BPM | DIASTOLIC BLOOD PRESSURE: 67 MMHG | OXYGEN SATURATION: 95 % | RESPIRATION RATE: 24 BRPM | TEMPERATURE: 101.5 F | SYSTOLIC BLOOD PRESSURE: 91 MMHG

## 2017-03-12 VITALS — HEART RATE: 88 BPM

## 2017-03-12 VITALS — HEART RATE: 101 BPM

## 2017-03-12 VITALS — HEART RATE: 108 BPM

## 2017-03-12 VITALS — HEART RATE: 99 BPM

## 2017-03-12 VITALS — HEART RATE: 106 BPM

## 2017-03-12 VITALS — HEART RATE: 78 BPM

## 2017-03-12 VITALS — HEART RATE: 84 BPM

## 2017-03-12 VITALS — HEART RATE: 96 BPM

## 2017-03-12 LAB
ALP SERPL-CCNC: 53 U/L (ref 45–117)
ALT SERPL-CCNC: 29 U/L (ref 12–78)
ANION GAP SERPL CALC-SCNC: 8 MEQ/L (ref 5–15)
APTT BLD: 59 SEC (ref 24.3–30.1)
AST SERPL-CCNC: 16 U/L (ref 15–37)
BASOPHILS # BLD AUTO: 0.1 TH/MM3 (ref 0–0.2)
BASOPHILS NFR BLD: 0.4 % (ref 0–2)
BILIRUB SERPL-MCNC: 0.5 MG/DL (ref 0.2–1)
BUN SERPL-MCNC: 12 MG/DL (ref 7–18)
CHLORIDE SERPL-SCNC: 100 MEQ/L (ref 98–107)
EOSINOPHIL # BLD: 0 TH/MM3 (ref 0–0.4)
EOSINOPHIL NFR BLD: 0.2 % (ref 0–4)
ERYTHROCYTE [DISTWIDTH] IN BLOOD BY AUTOMATED COUNT: 13.4 % (ref 11.6–17.2)
GFR SERPLBLD BASED ON 1.73 SQ M-ARVRAT: 73 ML/MIN (ref 89–?)
HCO3 BLD-SCNC: 29 MEQ/L (ref 21–32)
HCT VFR BLD CALC: 32.4 % (ref 39–51)
HEMO FLAGS: (no result)
LYMPHOCYTES # BLD AUTO: 1.3 TH/MM3 (ref 1–4.8)
LYMPHOCYTES NFR BLD AUTO: 6.5 % (ref 9–44)
MCH RBC QN AUTO: 30.2 PG (ref 27–34)
MCHC RBC AUTO-ENTMCNC: 33.9 % (ref 32–36)
MCV RBC AUTO: 89.1 FL (ref 80–100)
MONOCYTES NFR BLD: 5.8 % (ref 0–8)
NEUTROPHILS # BLD AUTO: 17.6 TH/MM3 (ref 1.8–7.7)
NEUTROPHILS NFR BLD AUTO: 87.1 % (ref 16–70)
PLATELET # BLD: 251 TH/MM3 (ref 150–450)
POTASSIUM SERPL-SCNC: 3.8 MEQ/L (ref 3.5–5.1)
RBC # BLD AUTO: 3.64 MIL/MM3 (ref 4.5–5.9)
SODIUM SERPL-SCNC: 137 MEQ/L (ref 136–145)
WBC # BLD AUTO: 20.2 TH/MM3 (ref 4–11)

## 2017-03-12 PROCEDURE — B246ZZ4 ULTRASONOGRAPHY OF RIGHT AND LEFT HEART, TRANSESOPHAGEAL: ICD-10-PCS | Performed by: INTERNAL MEDICINE

## 2017-03-12 RX ADMIN — SODIUM CHLORIDE, PRESERVATIVE FREE SCH ML: 5 INJECTION INTRAVENOUS at 10:01

## 2017-03-12 RX ADMIN — SODIUM CHLORIDE SCH MLS/HR: 900 INJECTION INTRAVENOUS at 18:04

## 2017-03-12 RX ADMIN — CEFEPIME SCH MLS/HR: 1 INJECTION, POWDER, FOR SOLUTION INTRAMUSCULAR; INTRAVENOUS at 19:57

## 2017-03-12 RX ADMIN — SODIUM CHLORIDE SCH MLS/HR: 900 INJECTION INTRAVENOUS at 05:30

## 2017-03-12 RX ADMIN — CARVEDILOL SCH MG: 3.12 TABLET, FILM COATED ORAL at 21:00

## 2017-03-12 RX ADMIN — PANTOPRAZOLE SCH MG: 40 TABLET, DELAYED RELEASE ORAL at 10:00

## 2017-03-12 RX ADMIN — CARVEDILOL SCH MG: 3.12 TABLET, FILM COATED ORAL at 10:00

## 2017-03-12 RX ADMIN — CEFEPIME SCH MLS/HR: 1 INJECTION, POWDER, FOR SOLUTION INTRAMUSCULAR; INTRAVENOUS at 10:00

## 2017-03-12 RX ADMIN — CEFEPIME SCH MLS/HR: 1 INJECTION, POWDER, FOR SOLUTION INTRAMUSCULAR; INTRAVENOUS at 01:53

## 2017-03-12 RX ADMIN — ACETAMINOPHEN PRN MG: 325 TABLET ORAL at 00:03

## 2017-03-12 RX ADMIN — ACETAMINOPHEN PRN MG: 325 TABLET ORAL at 18:22

## 2017-03-12 RX ADMIN — HEPARIN SODIUM SCH MLS/HR: 10000 INJECTION, SOLUTION INTRAVENOUS at 10:04

## 2017-03-12 RX ADMIN — ACETAMINOPHEN PRN MG: 325 TABLET ORAL at 10:01

## 2017-03-12 RX ADMIN — SODIUM CHLORIDE, PRESERVATIVE FREE SCH ML: 5 INJECTION INTRAVENOUS at 21:24

## 2017-03-12 NOTE — HHI.PR
Subjective


Remarks


T max 101.2


cough non productive





Objective


Vitals





 Vital Signs








  Date Time  Temp Pulse Resp B/P Pulse Ox O2 Delivery O2 Flow Rate FiO2


 


3/12/17 07:00  96      


 


3/12/17 04:00 98.3 88 16 97/52 97   


 


3/12/17 04:00  84      


 


3/12/17 02:00  88      


 


3/12/17 00:00  107      


 


3/12/17 00:00 101.2 107 16 88/51 94   


 


3/11/17 22:00  96      


 


3/11/17 20:00 99.0 99 16 91/60 96   


 


3/11/17 20:00     96 Room Air  


 


3/11/17 20:00  99      


 


3/11/17 16:25 101.1       


 


3/11/17 15:58 103.3 97 18 108/67 96   


 


3/11/17 15:57  97      


 


3/11/17 15:56 103.3 97 18 108/67 96   


 


3/11/17 12:00 99.8 106 20 118/66 95   


 


3/11/17 08:55      Room Air  


 


3/11/17 08:00 99.9 134 20 119/72 94   








 I/O








 3/11/17 3/11/17 3/11/17 3/12/17 3/12/17 3/12/17





 07:00 15:00 23:00 07:00 15:00 23:00


 


Intake Total 761 ml  86 ml 833 ml  


 


Output Total 1300 ml   1000 ml  


 


Balance -539 ml  86 ml -167 ml  


 


      


 


Intake Oral 360 ml   480 ml  


 


IV Total 401 ml  86 ml 353 ml  


 


Output Urine Total 1300 ml   1000 ml  


 


# Bowel Movements 0   0  








Result Diagram:  


3/11/17 0341                                                                   

             3/11/17 0341





Imaging





Last Impressions








Chest CT 3/8/17 1642 Signed





Impressions: 





 Service Date/Time:  Wednesday, March 8, 2017 16:48 - CONCLUSION:  1. Large 





 loculated effusion at the right base measuring 13 x 9 CM. 2. Aortic root 





 dilatation measuring 4.5 CM     Donnell Vincent MD 


 


Chest X-Ray 3/8/17 0000 Signed





Impressions: 





 Service Date/Time:  Wednesday, March 8, 2017 13:19 - CONCLUSION:  Opacity in 

the 





 mid and lower lung zone on the right that likely represents consolidation. The 





 superior margin appears somewhat well-defined suggesting potential mass. 





 However, the appearance favors severe airspace consolidation with small to 





 moderate size pleural effusion. This may represent a pneumonia/infectious 





 process given the clinical history. Recommend followup imaging to confirm 





 improvement and ultimately resolution.     Jonathan Ramirez MD 








Objective Remarks


awake and alert, NAD


decreased breath sounds and decrease vocal fremiti-  base to mid, right 


regular rhythm


abdomen soft, nontender


extremities no edema





A/P


Assessment and Plan


Patient is a 61-year-old male presenting with 4 weeks duration of body malaise, 

flulike symptoms nausea vomiting diarrhea for the past 5 days with cough 

productive of off-white sputum


Patient with leukocytosis 





Sepsis secondary to right pneumonic process- with large pleural effusion 

parapneumonic effusion- T max 102.


   CBC repeat cultures


   Cultures negative so far x 3 days


   on cefepime, Vancomycin., Flagyl


   O2 when necessary


   Infectious disease Service  ff along with us


   Dr. Gross ff- for VATs procedure- Tuesday- 


   for MARY CARMEN today- Dr. Whitlock


History of aortic mechanical valve replacement


TAchycardia- some anxiety component


Cardiomyopathy


Echo with possible vegetation/thrombus


   on heparin drip 


   hold coumadin- for procedure Tuesday


   Coreg 3.125 mg bid


   MARY CARMEN


Anxiety- Reaction


   Ativan po prn q 8 scheduled- increase to 1 mg po q8


PPI


D/W patient








Osmani Padgett MD Mar 12, 2017 07:51


Osmani Padgett MD Mar 12, 2017 07:51

## 2017-03-12 NOTE — ETE
Study

 

Study Date:03/12/2017

 

 

 

STUDY CONCLUSIONS

 

SUMMARY

 

- Procedure narrative: Transesophageal echocardiography. Topical

anesthesia was obtained using viscous lidocaine. A

transesophageal probe was inserted by the attending cardiologist.

Image quality was suboptimal. The study was technically

difficult, as a result of shadowing artifact from prosthetic

material.

- Left ventricle: The cavity size was normal. Wall thickness was

normal. Systolic function was moderately reduced. Diffuse

hypokinesis. No evidence of thrombus.

- Aortic valve: A mechanical prosthesis was present. No evidence of

vegetation. Trace regurgitation.

- Mitral valve: No evidence of vegetation.

- Left atrium: No evidence of thrombus in the atrial cavity or

appendage.

- Right atrium: No evidence of thrombus in the atrial cavity or

appendage.

- Tricuspid valve: No evidence of vegetation.

- Pulmonic valve: No evidence of vegetation.

 

-------------------------------------------------------------------

If LV function is below 40, please consider prescribing an ACEI or

ARB or document rationale for non-use.

 

-------------------------------------------------------------------

PROCEDURE DATA

Consent: The risks, benefits, and alternatives to the procedure

were explained to the patient and informed consent was obtained.

Procedure: Initial setup. The patient was brought to the laboratory

in the fasting state. Intravenous access was obtained. Surface ECG

leads and pulse oximetric signals were monitored. Sedation.

Conscious sedation was administered by cardiology staff.

Transesophageal echocardiography. Topical anesthesia was obtained

using viscous lidocaine. A transesophageal probe was inserted by

the attending cardiologist. Image quality was suboptimal. The study

was technically difficult, as a result of shadowing artifact from

prosthetic material. Study completion: All IVs inserted during the

procedure were removed. The patient tolerated the procedure well.

There were no complications.    Transesophageal echocardiography.

2D, complete spectral Doppler, and color Doppler.

 

-------------------------------------------------------------------

CARDIAC ANATOMY

 

LEFT VENTRICLE:

The cavity size was normal. Wall thickness was

normal. Systolic function was moderately reduced. Diffuse

hypokinesis. No evidence of thrombus.

 

AORTIC VALVE:

Normal thickness leaflets. A mechanical prosthesis

was present. Cusp separation was normal. No evidence of vegetation.

Doppler: Trace regurgitation.

Aorta:

 

- There was mild atheromatous plaque extending to the mid aortic

arch. There was no evidence for dissection.

Aortic root: The aortic root was not dilated.

Ascending aorta: The ascending aorta was normal in size.

Descending aorta: The descending aorta was normal in size.

 

MITRAL VALVE:

Structurally normal valve. Leaflet separation was

normal. No evidence of vegetation. Doppler: No significant

regurgitation.

 

LEFT ATRIUM:

The atrium was normal in size. No evidence of thrombus

in the atrial cavity or appendage. The appendage was

morphologically a left appendage, multilobulated, and of normal

size. Emptying velocity was normal.

 

RIGHT VENTRICLE:

The cavity size was normal. Wall thickness was

normal. Systolic function was normal.

 

PULMONIC VALVE:

Poorly visualized. Structurally normal valve. No

evidence of vegetation.

 

TRICUSPID VALVE:

Poorly visualized. Structurally normal valve.

Leaflet separation was normal. No evidence of vegetation. Doppler:

Trace regurgitation.

 

PULMONARY ARTERY:

The main pulmonary artery was normal-sized.

 

RIGHT ATRIUM:

The atrium was normal in size. No evidence of

thrombus in the atrial cavity or appendage. The appendage was

morphologically a right appendage.

 

PERICARDIUM:

There was no pericardial effusion.

Amended

 

Bernard Whitlock

[id number]

## 2017-03-12 NOTE — PD.CARD.PN
Subjective


Subjective Remarks


pt doing well, no cardiac  complaints.





Objective


Medications





Administered Medications








 Medications


  (Trade)  Dose


 Ordered  Sig/Marcia


 Route


 PRN Reason  Start Time


 Stop Time Status Last Admin


Dose Admin


 


 Acetaminophen 650


 mg  650 mg  Q4H  PRN


 PO


 fever >101  3/9/17 04:30


    3/12/17 10:01


 


 


 Vancomycin HCl


 1250 mg/Sodium


 Chloride  262.5 ml @ 


 262.5 mls/


 hr  Q12H


 IV


   3/10/17 18:00


    3/12/17 05:30


 


 


 Heparin Sodium/


 Dextrose


  (Heparin-D5W Inj)  250 ml @ 0


 mls/hr  TITRATE


 IV


   3/10/17 12:00


    3/12/17 10:04


 


 


 Pantoprazole


 Sodium


  (Protonix)  40 mg  DAILY


 PO


   3/11/17 09:00


    3/12/17 10:00


 


 


 IV Flush 2 ml  2 ml  BID


 IV FLUSH


   3/10/17 21:00


    3/12/17 10:01


 


 


 Cefepime HCl/


 Sodium Chloride


  (Maxipime Inj/NS


 Inj)  100 ml @ 


 200 mls/hr  Q8H


 IV


   3/10/17 18:00


    3/12/17 10:00


 


 


 Metronidazole


  (Flagyl)  500 mg  Q8H


 PO


   3/10/17 16:00


    3/12/17 10:00


 


 


 Carvedilol


  (Coreg)  3.125 mg  BID


 PO


   3/11/17 12:00


    3/12/17 10:00


 


 


 Lorazepam


  (Ativan)  1 mg  Q8H  PRN


 PO


 ANXIETY  3/12/17 15:00


    3/12/17 13:43


 








Vital Signs / I&O





 Vital Signs








  Date Time  Temp Pulse Resp B/P Pulse Ox O2 Delivery O2 Flow Rate FiO2


 


3/12/17 07:00  96      


 


3/12/17 04:00 98.3 88 16 97/52 97   


 


3/12/17 04:00  84      


 


3/12/17 02:00  88      


 


3/12/17 00:00  107      


 


3/12/17 00:00 101.2 107 16 88/51 94   


 


3/11/17 22:00  96      


 


3/11/17 20:00 99.0 99 16 91/60 96   


 


3/11/17 20:00     96 Room Air  


 


3/11/17 20:00  99      


 


3/11/17 16:25 101.1       


 


3/11/17 15:58 103.3 97 18 108/67 96   


 


3/11/17 15:57  97      


 


3/11/17 15:56 103.3 97 18 108/67 96   








 I/O








 3/11/17 3/11/17 3/11/17 3/12/17 3/12/17 3/12/17





 07:00 15:00 23:00 07:00 15:00 23:00


 


Intake Total 761 ml  86 ml 833 ml  


 


Output Total 1300 ml   1000 ml  


 


Balance -539 ml  86 ml -167 ml  


 


      


 


Intake Oral 360 ml   480 ml  


 


IV Total 401 ml  86 ml 353 ml  


 


Output Urine Total 1300 ml   1000 ml  


 


# Bowel Movements 0   0  








Physical Exam


GENERAL: This is a well-nourished, well-developed patient, in no apparent 

distress.


CARDIOVASCULAR: mechanical valve sounds 


RESPIRATORY: Clear to auscultation. Breath sounds equal bilaterally. No wheezes

, rales, or rhonchi.  


GASTROINTESTINAL: Abdomen soft, non-tender, nondistended. Normal active bowel 

sounds


MUSCULOSKELETAL: Extremities without clubbing, cyanosis, or edema.


NEURO:  Alert & Oriented x4 to person, place, time, situation.  Moves all ext x4


Laboratory





Laboratory Tests








Test 3/12/17 3/12/17





 03:51 08:40


 


Activated Partial 59.0 SEC 





Thromboplast Time  


 


White Blood Count  20.2 TH/MM3


 


Red Blood Count  3.64 MIL/MM3


 


Hemoglobin  11.0 GM/DL


 


Hematocrit  32.4 %


 


Mean Corpuscular Volume  89.1 FL


 


Mean Corpuscular Hemoglobin  30.2 PG


 


Mean Corpuscular Hemoglobin  33.9 %





Concent  


 


Red Cell Distribution Width  13.4 %


 


Platelet Count  251 TH/MM3


 


Mean Platelet Volume  8.3 FL


 


Neutrophils (%) (Auto)  87.1 %


 


Lymphocytes (%) (Auto)  6.5 %


 


Monocytes (%) (Auto)  5.8 %


 


Eosinophils (%) (Auto)  0.2 %


 


Basophils (%) (Auto)  0.4 %


 


Neutrophils # (Auto)  17.6 TH/MM3


 


Lymphocytes # (Auto)  1.3 TH/MM3


 


Monocytes # (Auto)  1.2 TH/MM3


 


Eosinophils # (Auto)  0.0 TH/MM3


 


Basophils # (Auto)  0.1 TH/MM3


 


CBC Comment  DIFF FINAL 


 


Differential Comment   


 


Sodium Level  137 MEQ/L


 


Potassium Level  3.8 MEQ/L


 


Chloride Level  100 MEQ/L


 


Carbon Dioxide Level  29.0 MEQ/L


 


Anion Gap  8 MEQ/L


 


Blood Urea Nitrogen  12 MG/DL


 


Creatinine  1.03 MG/DL


 


Estimat Glomerular Filtration  73 ML/MIN





Rate  


 


Random Glucose  112 MG/DL


 


Calcium Level  8.4 MG/DL


 


Total Bilirubin  0.5 MG/DL


 


Aspartate Amino Transf  16 U/L





(AST/SGOT)  


 


Alanine Aminotransferase  29 U/L





(ALT/SGPT)  


 


Alkaline Phosphatase  53 U/L


 


Total Protein  6.5 GM/DL


 


Albumin  1.9 GM/DL








Imaging





Last Impressions








Chest CT 3/8/17 1642 Signed





Impressions: 





 Service Date/Time:  Wednesday, March 8, 2017 16:48 - CONCLUSION:  1. Large 





 loculated effusion at the right base measuring 13 x 9 CM. 2. Aortic root 





 dilatation measuring 4.5 CM     Donnell Vincent MD 


 


Chest X-Ray 3/8/17 0000 Signed





Impressions: 





 Service Date/Time:  Wednesday, March 8, 2017 13:19 - CONCLUSION:  Opacity in 

the 





 mid and lower lung zone on the right that likely represents consolidation. The 





 superior margin appears somewhat well-defined suggesting potential mass. 





 However, the appearance favors severe airspace consolidation with small to 





 moderate size pleural effusion. This may represent a pneumonia/infectious 





 process given the clinical history. Recommend followup imaging to confirm 





 improvement and ultimately resolution.     Jonathan Ramirez MD 











Assessment and Plan


Problem List:  


(1) Abnormal echocardiogram


Assessment and Plan:  MARY CARMEN performed, no evidence of endocarditis or cardiac 

thrombus (though was somewhat technically difficult study.)





(2) H/O mechanical aortic valve replacement


Assessment and Plan:  continue heparin bridge until INR 2.5-3.5; will require 

outpatient coumadin f/u w/ pcp





Assessment and Plan


will sign off at this time, please call with questions








Bernard Whitlock MD Mar 12, 2017 14:35

## 2017-03-13 VITALS
HEART RATE: 97 BPM | OXYGEN SATURATION: 92 % | TEMPERATURE: 99.4 F | DIASTOLIC BLOOD PRESSURE: 60 MMHG | SYSTOLIC BLOOD PRESSURE: 101 MMHG | RESPIRATION RATE: 18 BRPM

## 2017-03-13 VITALS
TEMPERATURE: 98.6 F | HEART RATE: 97 BPM | DIASTOLIC BLOOD PRESSURE: 62 MMHG | SYSTOLIC BLOOD PRESSURE: 100 MMHG | RESPIRATION RATE: 22 BRPM | OXYGEN SATURATION: 93 %

## 2017-03-13 VITALS
SYSTOLIC BLOOD PRESSURE: 104 MMHG | HEART RATE: 111 BPM | TEMPERATURE: 99.9 F | OXYGEN SATURATION: 97 % | RESPIRATION RATE: 20 BRPM | DIASTOLIC BLOOD PRESSURE: 63 MMHG

## 2017-03-13 VITALS
SYSTOLIC BLOOD PRESSURE: 87 MMHG | HEART RATE: 93 BPM | DIASTOLIC BLOOD PRESSURE: 60 MMHG | RESPIRATION RATE: 18 BRPM | TEMPERATURE: 98.7 F | OXYGEN SATURATION: 95 %

## 2017-03-13 VITALS — HEART RATE: 100 BPM

## 2017-03-13 VITALS — HEART RATE: 110 BPM

## 2017-03-13 VITALS
OXYGEN SATURATION: 96 % | SYSTOLIC BLOOD PRESSURE: 109 MMHG | RESPIRATION RATE: 18 BRPM | DIASTOLIC BLOOD PRESSURE: 69 MMHG | TEMPERATURE: 98.9 F | HEART RATE: 96 BPM

## 2017-03-13 VITALS — HEART RATE: 102 BPM

## 2017-03-13 VITALS
OXYGEN SATURATION: 93 % | SYSTOLIC BLOOD PRESSURE: 90 MMHG | DIASTOLIC BLOOD PRESSURE: 54 MMHG | TEMPERATURE: 101 F | HEART RATE: 109 BPM | RESPIRATION RATE: 20 BRPM

## 2017-03-13 VITALS — HEART RATE: 103 BPM

## 2017-03-13 VITALS — HEART RATE: 108 BPM

## 2017-03-13 VITALS — HEART RATE: 88 BPM

## 2017-03-13 VITALS — HEART RATE: 107 BPM

## 2017-03-13 VITALS — HEART RATE: 94 BPM

## 2017-03-13 VITALS
HEART RATE: 99 BPM | OXYGEN SATURATION: 92 % | DIASTOLIC BLOOD PRESSURE: 60 MMHG | TEMPERATURE: 100.5 F | RESPIRATION RATE: 22 BRPM | SYSTOLIC BLOOD PRESSURE: 101 MMHG

## 2017-03-13 VITALS — HEART RATE: 87 BPM

## 2017-03-13 VITALS — HEART RATE: 86 BPM

## 2017-03-13 VITALS — HEART RATE: 109 BPM

## 2017-03-13 VITALS — HEART RATE: 106 BPM

## 2017-03-13 VITALS — HEART RATE: 111 BPM

## 2017-03-13 VITALS — HEART RATE: 93 BPM

## 2017-03-13 VITALS — HEART RATE: 99 BPM

## 2017-03-13 VITALS — HEART RATE: 98 BPM

## 2017-03-13 LAB
APTT BLD: 49.1 SEC (ref 24.3–30.1)
ERYTHROCYTE [DISTWIDTH] IN BLOOD BY AUTOMATED COUNT: 13.5 % (ref 11.6–17.2)
HCT VFR BLD CALC: 29.3 % (ref 39–51)
MCH RBC QN AUTO: 30.4 PG (ref 27–34)
MCHC RBC AUTO-ENTMCNC: 33.7 % (ref 32–36)
MCV RBC AUTO: 90.3 FL (ref 80–100)
PLATELET # BLD: 215 TH/MM3 (ref 150–450)
RBC # BLD AUTO: 3.24 MIL/MM3 (ref 4.5–5.9)
REVIEW FLAG: (no result)
WBC # BLD AUTO: 17.1 TH/MM3 (ref 4–11)

## 2017-03-13 RX ADMIN — ACETAMINOPHEN PRN MG: 325 TABLET ORAL at 09:59

## 2017-03-13 RX ADMIN — PHENYLEPHRINE HYDROCHLORIDE SCH MLS/HR: 10 INJECTION INTRAVENOUS at 21:00

## 2017-03-13 RX ADMIN — LINEZOLID SCH MLS/HR: 600 INJECTION, SOLUTION INTRAVENOUS at 16:10

## 2017-03-13 RX ADMIN — SODIUM CHLORIDE, PRESERVATIVE FREE SCH ML: 5 INJECTION INTRAVENOUS at 09:00

## 2017-03-13 RX ADMIN — CEFEPIME SCH MLS/HR: 1 INJECTION, POWDER, FOR SOLUTION INTRAMUSCULAR; INTRAVENOUS at 18:28

## 2017-03-13 RX ADMIN — SODIUM CHLORIDE SCH MLS/HR: 900 INJECTION INTRAVENOUS at 05:28

## 2017-03-13 RX ADMIN — OXYTOCIN SCH MLS/HR: 10 INJECTION, SOLUTION INTRAMUSCULAR; INTRAVENOUS at 21:00

## 2017-03-13 RX ADMIN — CARVEDILOL SCH MG: 3.12 TABLET, FILM COATED ORAL at 09:56

## 2017-03-13 RX ADMIN — PANTOPRAZOLE SCH MG: 40 TABLET, DELAYED RELEASE ORAL at 09:56

## 2017-03-13 RX ADMIN — CARVEDILOL SCH MG: 3.12 TABLET, FILM COATED ORAL at 21:58

## 2017-03-13 RX ADMIN — CEFEPIME SCH MLS/HR: 1 INJECTION, POWDER, FOR SOLUTION INTRAMUSCULAR; INTRAVENOUS at 02:35

## 2017-03-13 RX ADMIN — SODIUM CHLORIDE, PRESERVATIVE FREE SCH ML: 5 INJECTION INTRAVENOUS at 21:58

## 2017-03-13 RX ADMIN — CEFEPIME SCH MLS/HR: 1 INJECTION, POWDER, FOR SOLUTION INTRAMUSCULAR; INTRAVENOUS at 10:03

## 2017-03-13 RX ADMIN — LISINOPRIL SCH MG: 5 TABLET ORAL at 09:00

## 2017-03-13 NOTE — PD.CAR.PN
CVT Progress Note


Subjective/Hospital Course:


 61-year-old male with history of mechanical aortic valve replacement in 2005 

who presented to the emergency room complaining of about 4 weeks Duration of 

nausea vomiting diarrhea intermittently on and off.  Lasting for 5 days at a 

time and will be okay for to 3 days then recurred.  Patient describes stools as 

liquid brown nonbloody.  For the past 5 days now has been having productive 

cough off white sputum associated with body Monday, nausea, diarrhea which 

prompted patient to come in here and admitted. 





On admission, with elevated WBC, elevated lactic acid level


found to have large loculated pleural effusion 





3/13


still having fevers 


on vanc , cefepime and flagly 


pending right VATS , possible decortication and possible pleurodesis in am


Objective:


GENERAL: 


SKIN: Warm and dry.


HEAD: Normocephalic.


EYES: No scleral icterus. No injection or drainage. 


NECK: Supple, trachea midline. No JVD or lymphadenopathy.


CARDIOVASCULAR: Regular rate and rhythm without murmurs, gallops, or rubs. 


RESPIRATORY: coarse bilateral breath sounds , R> left 


. No accessory muscle use.


GASTROINTESTINAL: Abdomen soft, non-tender, nondistended. 


MUSCULOSKELETAL: No cyanosis, or edema. 


BACK: Nontender without obvious deformity. No CVA tenderness.








 Vital Signs








  Date Time  Temp Pulse Resp B/P Pulse Ox O2 Delivery O2 Flow Rate FiO2


 


3/13/17 10:10 101.0 109 20 90/54 93   


 


3/13/17 06:00  107      


 


3/13/17 05:00  106      


 


3/13/17 04:44 99.9 111 20 104/63 97   


 


3/13/17 04:00      Room Air  


 


3/13/17 04:00  107      


 


3/13/17 03:00  109      


 


3/13/17 02:00  111      


 


3/13/17 01:00  99      


 


3/13/17 00:00 98.9 96 18 109/69 96   


 


3/13/17 00:00      Room Air  


 


3/13/17 00:00  96      


 


3/12/17 23:00  102      


 


3/12/17 22:00  99      


 


3/12/17 21:00  101      


 


3/12/17 20:00  94      


 


3/12/17 20:00 99.8 94 20 97/62 95   


 


3/12/17 20:00      Room Air  


 


3/12/17 18:00  101      


 


3/12/17 17:00  99      


 


3/12/17 16:00  88      


 


3/12/17 15:00  84      


 


3/12/17 15:00 100.4 90 20 91/53 94   


 


3/12/17 14:00  84      


 


3/12/17 13:00  78      








Labs:





Laboratory Tests








Test 3/13/17 3/13/17 3/13/17





 05:13 08:05 10:19


 


White Blood Count 17.1 TH/MM3  





 (4.0-11.0)  


 


Red Blood Count 3.24 MIL/MM3  





 (4.50-5.90)  


 


Hemoglobin 9.9 GM/DL  





 (13.0-17.0)  


 


Hematocrit 29.3 %  





 (39.0-51.0)  


 


Mean Corpuscular Volume 90.3 FL  





 (80.0-100.0)  


 


Mean Corpuscular Hemoglobin 30.4 PG  





 (27.0-34.0)  


 


Mean Corpuscular Hemoglobin 33.7 %  





Concent (32.0-36.0)  


 


Red Cell Distribution Width 13.5 %  





 (11.6-17.2)  


 


Platelet Count 215 TH/MM3  





 (150-450)  


 


Mean Platelet Volume 9.2 FL  





 (7.0-11.0)  


 


Activated Partial 49.1 SEC  





Thromboplast Time (24.3-30.1)  


 


Blood Type A NEGATIVE  A NEGATIVE  


 


Antibody Screen POSITIVE   


 


Antigen Identification K Antigen -  





 NEGATIVE  


 


Direct Antiglobulin Test NEGATIVE  





(Jennifer) (NEGATIVE)  


 


Crossmatch Leukocyte-Reduced  





 Red Blood  





 Cells  


 


Blood Bank Comment    


 


Antibody Identification   Anti-K 








Result Diagram:  


3/13/17 0513                                                                   

             3/12/17 0840








(1) Abnormal echocardiogram


Plan:  MARY CARMEN performed by Dr Whitlock 


no evidence of endocarditis or cardiac thrombus





(2) H/O mechanical aortic valve replacement


Plan:  continue heparin bridge





resume coumadin after surgery and when chest tube out 





 until INR 2.5-3.5; will require outpatient coumadin f/u w/ pcp





(3) Cardiomyopathy


Plan:  LVEF ~35-40%, on BB, 








re try low dose ace-I.





(4) right parapneumonic effusion 


Plan:  for right VATS , possible decortication, possible pleurodesis in am Terwilliger,Jacqueline R. ARNP Mar 13, 2017 12:16

## 2017-03-13 NOTE — PD.CARD.PN
Subjective


Subjective Remarks


Pt w/ cough, no cardiac complaints.





Objective


Medications





Administered Medications








 Medications


  (Trade)  Dose


 Ordered  Sig/Marcia


 Route


 PRN Reason  Start Time


 Stop Time Status Last Admin


Dose Admin


 


 Acetaminophen 650


 mg  650 mg  Q4H  PRN


 PO


 fever >101  3/9/17 04:30


    3/12/17 18:22


 


 


 Vancomycin HCl


 1250 mg/Sodium


 Chloride  262.5 ml @ 


 262.5 mls/


 hr  Q12H


 IV


   3/10/17 18:00


    3/13/17 05:28


 


 


 Heparin Sodium/


 Dextrose


  (Heparin-D5W Inj)  250 ml @ 0


 mls/hr  TITRATE


 IV


   3/10/17 12:00


    3/12/17 10:04


 


 


 Pantoprazole


 Sodium


  (Protonix)  40 mg  DAILY


 PO


   3/11/17 09:00


    3/12/17 10:00


 


 


 IV Flush 2 ml  2 ml  BID


 IV FLUSH


   3/10/17 21:00


    3/12/17 21:24


 


 


 Cefepime HCl/


 Sodium Chloride


  (Maxipime Inj/NS


 Inj)  100 ml @ 


 200 mls/hr  Q8H


 IV


   3/10/17 18:00


    3/13/17 02:35


 


 


 Metronidazole


  (Flagyl)  500 mg  Q8H


 PO


   3/10/17 16:00


    3/13/17 00:00


 


 


 Carvedilol


  (Coreg)  3.125 mg  BID


 PO


   3/11/17 12:00


    3/12/17 10:00


 


 


 Lorazepam


  (Ativan)  1 mg  Q8H  PRN


 PO


 ANXIETY  3/12/17 15:00


    3/12/17 21:25


 








Vital Signs / I&O





 Vital Signs








  Date Time  Temp Pulse Resp B/P Pulse Ox O2 Delivery O2 Flow Rate FiO2


 


3/13/17 06:00  107      


 


3/13/17 05:00  106      


 


3/13/17 04:44 99.9 111 20 104/63 97   


 


3/13/17 04:00      Room Air  


 


3/13/17 04:00  107      


 


3/13/17 03:00  109      


 


3/13/17 02:00  111      


 


3/13/17 01:00  99      


 


3/13/17 00:00 98.9 96 18 109/69 96   


 


3/13/17 00:00      Room Air  


 


3/13/17 00:00  96      


 


3/12/17 23:00  102      


 


3/12/17 22:00  99      


 


3/12/17 21:00  101      


 


3/12/17 20:00  94      


 


3/12/17 20:00 99.8 94 20 97/62 95   


 


3/12/17 20:00      Room Air  


 


3/12/17 18:00  101      


 


3/12/17 17:00  99      


 


3/12/17 16:00  88      


 


3/12/17 15:00  84      


 


3/12/17 15:00 100.4 90 20 91/53 94   


 


3/12/17 14:00  84      


 


3/12/17 13:00  78      


 


3/12/17 12:00  96      


 


3/12/17 11:00 101.5 106 24 91/67 95   


 


3/12/17 11:00  101      


 


3/12/17 10:00  102      


 


3/12/17 09:00  108      








 I/O








 3/12/17 3/12/17 3/12/17 3/13/17 3/13/17 3/13/17





 07:00 15:00 23:00 07:00 15:00 23:00


 


Intake Total 833 ml  1703 ml 1200 ml  


 


Output Total 1000 ml  1100 ml 950 ml  


 


Balance -167 ml  603 ml 250 ml  


 


      


 


Intake Oral 480 ml  680 ml 800 ml  


 


IV Total 353 ml  1023 ml 400 ml  


 


Output Urine Total 1000 ml  1100 ml 950 ml  


 


# Bowel Movements 0  0 0  








Physical Exam


GENERAL: This is a well-nourished, well-developed patient, in no apparent 

distress.


CARDIOVASCULAR: mechanical valve sounds 


RESPIRATORY: Clear to auscultation. Breath sounds equal bilaterally. No wheezes

, rales, or rhonchi.  


GASTROINTESTINAL: Abdomen soft, non-tender, nondistended. Normal active bowel 

sounds


MUSCULOSKELETAL: Extremities without clubbing, cyanosis, or edema.


NEURO:  Alert & Oriented x4 to person, place, time, situation.  Moves all ext x4


Laboratory





Laboratory Tests








Test 3/12/17 3/12/17 3/13/17 3/13/17





 08:40 18:00 05:13 08:05


 


White Blood Count 20.2 TH/MM3  17.1 TH/MM3 


 


Red Blood Count 3.64 MIL/MM3  3.24 MIL/MM3 


 


Hemoglobin 11.0 GM/DL  9.9 GM/DL 


 


Hematocrit 32.4 %  29.3 % 


 


Mean Corpuscular Volume 89.1 FL  90.3 FL 


 


Mean Corpuscular Hemoglobin 30.2 PG  30.4 PG 


 


Mean Corpuscular Hemoglobin 33.9 %  33.7 % 





Concent    


 


Red Cell Distribution Width 13.4 %  13.5 % 


 


Platelet Count 251 TH/MM3  215 TH/MM3 


 


Mean Platelet Volume 8.3 FL  9.2 FL 


 


Neutrophils (%) (Auto) 87.1 %   


 


Lymphocytes (%) (Auto) 6.5 %   


 


Monocytes (%) (Auto) 5.8 %   


 


Eosinophils (%) (Auto) 0.2 %   


 


Basophils (%) (Auto) 0.4 %   


 


Neutrophils # (Auto) 17.6 TH/MM3   


 


Lymphocytes # (Auto) 1.3 TH/MM3   


 


Monocytes # (Auto) 1.2 TH/MM3   


 


Eosinophils # (Auto) 0.0 TH/MM3   


 


Basophils # (Auto) 0.1 TH/MM3   


 


CBC Comment DIFF FINAL    


 


Differential Comment     


 


Sodium Level 137 MEQ/L   


 


Potassium Level 3.8 MEQ/L   


 


Chloride Level 100 MEQ/L   


 


Carbon Dioxide Level 29.0 MEQ/L   


 


Anion Gap 8 MEQ/L   


 


Blood Urea Nitrogen 12 MG/DL   


 


Creatinine 1.03 MG/DL   


 


Estimat Glomerular Filtration 73 ML/MIN   





Rate    


 


Random Glucose 112 MG/DL   


 


Calcium Level 8.4 MG/DL   


 


Total Bilirubin 0.5 MG/DL   


 


Aspartate Amino Transf 16 U/L   





(AST/SGOT)    


 


Alanine Aminotransferase 29 U/L   





(ALT/SGPT)    


 


Alkaline Phosphatase 53 U/L   


 


Total Protein 6.5 GM/DL   


 


Albumin 1.9 GM/DL   


 


Vancomycin Level Trough  17.0 MCG/ML  


 


Activated Partial   49.1 SEC 





Thromboplast Time    


 


Blood Type   A NEGATIVE  A NEGATIVE 


 


Antibody Screen   POSITIVE  


 


Blood Bank Comment     








Imaging





Last Impressions








Chest CT 3/8/17 1642 Signed





Impressions: 





 Service Date/Time:  Wednesday, March 8, 2017 16:48 - CONCLUSION:  1. Large 





 loculated effusion at the right base measuring 13 x 9 CM. 2. Aortic root 





 dilatation measuring 4.5 CM     Donnell Vincent MD 


 


Chest X-Ray 3/8/17 0000 Signed





Impressions: 





 Service Date/Time:  Wednesday, March 8, 2017 13:19 - CONCLUSION:  Opacity in 

the 





 mid and lower lung zone on the right that likely represents consolidation. The 





 superior margin appears somewhat well-defined suggesting potential mass. 





 However, the appearance favors severe airspace consolidation with small to 





 moderate size pleural effusion. This may represent a pneumonia/infectious 





 process given the clinical history. Recommend followup imaging to confirm 





 improvement and ultimately resolution.     Jonathan Ramirez MD 











Assessment and Plan


Problem List:  


(1) Abnormal echocardiogram


Assessment and Plan:  MARY CARMEN performed, no evidence of endocarditis or cardiac 

thrombus (though was somewhat technically difficult study.)





(2) H/O mechanical aortic valve replacement


Assessment and Plan:  continue heparin bridge until INR 2.5-3.5; will require 

outpatient coumadin f/u w/ pcp





(3) Cardiomyopathy


Assessment and Plan:  LVEF ~35-40%, on BB, will try adding low dose ace-I.





Assessment and Plan


will sign off at this time, please call with questions








Bernard Whitlock MD Mar 13, 2017 08:39

## 2017-03-13 NOTE — HHI.IDPN
Subjective


Subjective


Remarks


 is a 62 y/o CF with PMHx of congenital aortic valve problems 

diagnosed at age of 17 years when for a preemployment screen he was found to 

have a heart murmur. He subsequently underwent open heart surgery with 

mechanical aortic valve replacement in 2005?. Patient reports needing 

pericardiocentesis for hemopericardium based on his description. He also 

reports being on coumadin and being compliant with medications. He denies any 

heart valve infections or any infections in general. Patient kept repeating 

that other than all these problems I am very healthy person.





Patient reports that approximately 6 weeks back he had " Flu-like symptoms". He 

reports he does not like to go to doctors so he toughed it out. He reports 

symptoms lasting 5-6 days with cold, cough like symptoms. He reports feeling 

well for 4 days or so and since then for last 4 weeks he has had generalized 

weakness that progressed to the point that he was unable to go to the bathroom. 

He denies being on any antibiotics and did not go to MD or Urgent care. 





Delayed entry patient seen at 3 pm ~





Patient now being evaluated and Managed for Empyema of lung, possible 

endocarditis.


Overnight events reviewed


Tmax 101.3 F


No rash


No diarrhea


Antibiotics


Cefepime IV


Vanco IV


flagyl oral.


Lines


Line sites with no e.o infection.


Past Medical History


reviewed


Allergies:  


Coded Allergies:  


     No Known Allergies (Unverified , 3/8/17)





Objective


.





 Vital Signs








  Date Time  Temp Pulse Resp B/P Pulse Ox O2 Delivery O2 Flow Rate FiO2


 


3/13/17 18:00  94      


 


3/13/17 17:00  102      


 


3/13/17 16:54 99.4 97 18 101/60 92   


 


3/13/17 16:00  98      


 


3/13/17 15:00  88      


 


3/13/17 14:00  93      


 


3/13/17 13:00  88      


 


3/13/17 12:00  86      


 


3/13/17 11:00 98.7 86 18 87/60 95   


 


3/13/17 11:00  93      


 


3/13/17 10:10 101.0 109 20 90/54 93   


 


3/13/17 10:00  110      


 


3/13/17 09:00  108      


 


3/13/17 08:00  87      


 


3/13/17 07:00      Room Air  


 


3/13/17 07:00  103      


 


3/13/17 06:00  107      


 


3/13/17 05:00  106      


 


3/13/17 04:44 99.9 111 20 104/63 97   


 


3/13/17 04:00      Room Air  


 


3/13/17 04:00  107      


 


3/13/17 03:00  109      


 


3/13/17 02:00  111      


 


3/13/17 01:00  99      


 


3/13/17 00:00 98.9 96 18 109/69 96   


 


3/13/17 00:00      Room Air  


 


3/13/17 00:00  96      


 


3/12/17 23:00  102      


 


3/12/17 22:00  99      














 3/12/17 3/12/17 3/13/17





 15:00 23:00 07:00


 


Intake Total  1703 ml 1200 ml


 


Output Total  1100 ml 950 ml


 


Balance  603 ml 250 ml


 


   


 


Intake Oral  680 ml 800 ml


 


IV Total  1023 ml 400 ml


 


Output Urine Total  1100 ml 950 ml


 


# Bowel Movements  0 0








.





Laboratory Tests








Test 3/12/17 3/13/17





 08:40 05:13


 


White Blood Count 20.2 TH/MM3 17.1 TH/MM3


 


Red Blood Count 3.64 MIL/MM3 3.24 MIL/MM3


 


Hemoglobin 11.0 GM/DL 9.9 GM/DL


 


Hematocrit 32.4 % 29.3 %


 


Mean Corpuscular Volume 89.1 FL 90.3 FL


 


Mean Corpuscular Hemoglobin 30.2 PG 30.4 PG


 


Mean Corpuscular Hemoglobin 33.9 % 33.7 %





Concent  


 


Red Cell Distribution Width 13.4 % 13.5 %


 


Platelet Count 251 TH/MM3 215 TH/MM3


 


Mean Platelet Volume 8.3 FL 9.2 FL


 


Neutrophils (%) (Auto) 87.1 % 


 


Lymphocytes (%) (Auto) 6.5 % 


 


Monocytes (%) (Auto) 5.8 % 


 


Eosinophils (%) (Auto) 0.2 % 


 


Basophils (%) (Auto) 0.4 % 


 


Neutrophils # (Auto) 17.6 TH/MM3 


 


Lymphocytes # (Auto) 1.3 TH/MM3 


 


Monocytes # (Auto) 1.2 TH/MM3 


 


Eosinophils # (Auto) 0.0 TH/MM3 


 


Basophils # (Auto) 0.1 TH/MM3 


 


CBC Comment DIFF FINAL  


 


Differential Comment   








Laboratory Tests








Test 3/12/17





 08:40


 


Sodium Level 137 MEQ/L


 


Potassium Level 3.8 MEQ/L


 


Chloride Level 100 MEQ/L


 


Carbon Dioxide Level 29.0 MEQ/L


 


Anion Gap 8 MEQ/L


 


Blood Urea Nitrogen 12 MG/DL


 


Creatinine 1.03 MG/DL


 


Estimat Glomerular Filtration 73 ML/MIN





Rate 


 


Random Glucose 112 MG/DL


 


Calcium Level 8.4 MG/DL


 


Total Bilirubin 0.5 MG/DL


 


Aspartate Amino Transf 16 U/L





(AST/SGOT) 


 


Alanine Aminotransferase 29 U/L





(ALT/SGPT) 


 


Alkaline Phosphatase 53 U/L


 


Total Protein 6.5 GM/DL


 


Albumin 1.9 GM/DL








Microbiology








 Date/Time Procedure Status





Source Growth 


 


 3/12/17 08:40 Aerobic Blood Culture - Preliminary Resulted





Blood Peripheral NO GROWTH IN 1 DAY 





 3/12/17 08:40 Anaerobic Blood Culture - Preliminary Resulted





Blood Peripheral NO GROWTH IN 1 DAY 


 


 3/12/17 08:48 Aerobic Blood Culture - Preliminary Resulted





Blood Peripheral NO GROWTH IN 1 DAY 





 3/12/17 08:48 Anaerobic Blood Culture - Preliminary Resulted





Blood Peripheral NO GROWTH IN 1 DAY 








Imaging


Last Impressions








Chest CT 3/8/17 1642 Signed





Impressions: 





 Service Date/Time:  Wednesday, March 8, 2017 16:48 - CONCLUSION:  1. Large 





 loculated effusion at the right base measuring 13 x 9 CM. 2. Aortic root 





 dilatation measuring 4.5 CM     Donnell Vincent MD 


 


Chest X-Ray 3/8/17 0000 Signed





Impressions: 





 Service Date/Time:  Wednesday, March 8, 2017 13:19 - CONCLUSION:  Opacity in 

the 





 mid and lower lung zone on the right that likely represents consolidation. The 





 superior margin appears somewhat well-defined suggesting potential mass. 





 However, the appearance favors severe airspace consolidation with small to 





 moderate size pleural effusion. This may represent a pneumonia/infectious 





 process given the clinical history. Recommend followup imaging to confirm 





 improvement and ultimately resolution.     Jonathan Ramirez MD 








Physical Exam


GENERAL: This is a well-nourished, well-developed patient, in no apparent 

distress.


SKIN: No rashes, ecchymoses or lesions. Cool and dry.


HEAD: Atraumatic. Normocephalic. No temporal or scalp tenderness.


EYES: Pupils equal round and reactive. Extraocular motions intact. No scleral 

icterus. No injection or drainage. 


ENT: Nose without bleeding, purulent drainage or septal hematoma. Throat 

without erythema, tonsillar hypertrophy or exudate. Uvula midline. Airway 

patent.


NECK: Trachea midline. No JVD or lymphadenopathy. Supple, nontender, no 

meningeal signs.


CARDIOVASCULAR: Mechanical heart valve sounds. No murmur appreciated. 


RESPIRATORY: Clear to auscultation. Breath sounds equal diminished on right 

side.


GASTROINTESTINAL: Abdomen soft, non-tender, nondistended. 


MUSCULOSKELETAL: Extremities without clubbing, cyanosis, or edema. No joint 

tenderness, effusion, or edema noted. No calf tenderness. Negative Homans sign 

bilaterally.


NEUROLOGICAL: Awake and alert. Grossly non focal.


Psych: cooperative


IV line sites with no e.o infection.





Assessment & Plan


Remarks


Sepsis present on admission.


Right side large empyema. 6 week history suggestive of infectious process after 

flu like symptoms.


Rule out mechanical aortic valve endocarditis.


Persistent fevers despite broad spectrum antibiotics ? drug fever ? vanco IV





Recs


Continue Cefepime IV increase dose to PSAE doses.


DC Vanco IV (? drug fever)


Start Zyvox IV


Continue Flagyl ? anaerobic component.


Appreciate Cardiothoracic surgery for Bronchoscopy and VATS/open surgery for 

Right loculated empyema: ON tuesday per d/w .


Now on Heparin gtt in preparation for surgery.


MARY CARMEN negative for vegetations.


HIV and Hepatitis panel negative


RPR negative.


Follow cultures


Follow clinically.


Not ready for PICC, blood cultures pending.


Ok to place central line in OR.








Sharron Horowitz MD Mar 13, 2017 21:40

## 2017-03-13 NOTE — HHI.PR
Subjective


Remarks


cough less- "dry hacks", no sputum production, feeling better


slept very well overnight, not anxious


no pain complaints, no diarrhea


still with some low grade fever





Objective


Vitals





 Vital Signs








  Date Time  Temp Pulse Resp B/P Pulse Ox O2 Delivery O2 Flow Rate FiO2


 


3/13/17 06:00  107      


 


3/13/17 05:00  106      


 


3/13/17 04:44 99.9 111 20 104/63 97   


 


3/13/17 04:00      Room Air  


 


3/13/17 04:00  107      


 


3/13/17 03:00  109      


 


3/13/17 02:00  111      


 


3/13/17 01:00  99      


 


3/13/17 00:00 98.9 96 18 109/69 96   


 


3/13/17 00:00      Room Air  


 


3/13/17 00:00  96      


 


3/12/17 23:00  102      


 


3/12/17 22:00  99      


 


3/12/17 21:00  101      


 


3/12/17 20:00  94      


 


3/12/17 20:00 99.8 94 20 97/62 95   


 


3/12/17 20:00      Room Air  


 


3/12/17 18:00  101      


 


3/12/17 17:00  99      


 


3/12/17 16:00  88      


 


3/12/17 15:00  84      


 


3/12/17 15:00 100.4 90 20 91/53 94   


 


3/12/17 14:00  84      


 


3/12/17 13:00  78      


 


3/12/17 12:00  96      


 


3/12/17 11:00 101.5 106 24 91/67 95   


 


3/12/17 11:00  101      


 


3/12/17 10:00  102      


 


3/12/17 09:00  108      


 


3/12/17 08:00  106      








 I/O








 3/12/17 3/12/17 3/12/17 3/13/17 3/13/17 3/13/17





 07:00 15:00 23:00 07:00 15:00 23:00


 


Intake Total 833 ml  1703 ml 1200 ml  


 


Output Total 1000 ml  1100 ml 950 ml  


 


Balance -167 ml  603 ml 250 ml  


 


      


 


Intake Oral 480 ml  680 ml 800 ml  


 


IV Total 353 ml  1023 ml 400 ml  


 


Output Urine Total 1000 ml  1100 ml 950 ml  


 


# Bowel Movements 0  0 0  








Result Diagram:  


3/13/17 0513                                                                   

             3/12/17 0840





Imaging





Last Impressions








Chest CT 3/8/17 1642 Signed





Impressions: 





 Service Date/Time:  Wednesday, March 8, 2017 16:48 - CONCLUSION:  1. Large 





 loculated effusion at the right base measuring 13 x 9 CM. 2. Aortic root 





 dilatation measuring 4.5 CM     Donnell Vincent MD 


 


Chest X-Ray 3/8/17 0000 Signed





Impressions: 





 Service Date/Time:  Wednesday, March 8, 2017 13:19 - CONCLUSION:  Opacity in 

the 





 mid and lower lung zone on the right that likely represents consolidation. The 





 superior margin appears somewhat well-defined suggesting potential mass. 





 However, the appearance favors severe airspace consolidation with small to 





 moderate size pleural effusion. This may represent a pneumonia/infectious 





 process given the clinical history. Recommend followup imaging to confirm 





 improvement and ultimately resolution.     Jonathan Ramirez MD 








Objective Remarks


awake and alert, NAD


decreased breath sounds and decrease vocal fremiti-  base to mid, right 


regular rhythm-HR 98- 102


abdomen soft, nontender


extremities no edema


neuro exam- unremarkable


Procedures


3/12- MARY CARMEN- no vegetations, no thrombus





A/P


Assessment and Plan


Patient is a 61-year-old male presenting with 4 weeks duration of body malaise, 

flulike symptoms nausea vomiting diarrhea for the past 5 days with cough 

productive of off-white sputum


Patient with leukocytosis 





Sepsis secondary to right pneumonic process- with large pleural effusion 

parapneumonic effusion- still low grade fever


   Cultures negative so far


   on cefepime, Vancomycin., Flagyl


   O2 when necessary


   Infectious disease Service  ff along with us


   Dr. Gross ff- for VATs procedure- Tuesday- 


History of aortic mechanical valve replacement


TAchycardia- some anxiety component


Cardiomyopathy


   Echo with possible vegetation/thrombus


   S/P MARY CARMEN 3/12- no vegetation,s no thrombus EF 30-40%


   on heparin drip 


   hold coumadin- for procedure Tuesday


   Coreg 3.125 mg bid-- . consider eventually adding ACE- as BP tolerates


Anxiety- Reaction


   Ativan po 1 mg po q8


PPI


D/W patient








Osmani Padgett MD Mar 13, 2017 07:43

## 2017-03-14 VITALS
SYSTOLIC BLOOD PRESSURE: 85 MMHG | OXYGEN SATURATION: 95 % | HEART RATE: 63 BPM | RESPIRATION RATE: 12 BRPM | DIASTOLIC BLOOD PRESSURE: 59 MMHG

## 2017-03-14 VITALS
OXYGEN SATURATION: 92 % | SYSTOLIC BLOOD PRESSURE: 86 MMHG | DIASTOLIC BLOOD PRESSURE: 55 MMHG | HEART RATE: 65 BPM | RESPIRATION RATE: 20 BRPM | TEMPERATURE: 97.3 F

## 2017-03-14 VITALS
TEMPERATURE: 102.8 F | OXYGEN SATURATION: 92 % | DIASTOLIC BLOOD PRESSURE: 64 MMHG | RESPIRATION RATE: 20 BRPM | HEART RATE: 103 BPM | SYSTOLIC BLOOD PRESSURE: 106 MMHG

## 2017-03-14 VITALS
HEART RATE: 64 BPM | OXYGEN SATURATION: 96 % | DIASTOLIC BLOOD PRESSURE: 54 MMHG | RESPIRATION RATE: 18 BRPM | SYSTOLIC BLOOD PRESSURE: 82 MMHG

## 2017-03-14 VITALS — HEART RATE: 100 BPM

## 2017-03-14 VITALS
DIASTOLIC BLOOD PRESSURE: 58 MMHG | HEART RATE: 78 BPM | RESPIRATION RATE: 20 BRPM | OXYGEN SATURATION: 97 % | SYSTOLIC BLOOD PRESSURE: 85 MMHG | TEMPERATURE: 97.6 F

## 2017-03-14 VITALS — HEART RATE: 66 BPM

## 2017-03-14 VITALS — HEART RATE: 69 BPM

## 2017-03-14 VITALS — HEART RATE: 102 BPM

## 2017-03-14 VITALS — HEART RATE: 70 BPM

## 2017-03-14 VITALS — HEART RATE: 104 BPM

## 2017-03-14 VITALS — OXYGEN SATURATION: 92 %

## 2017-03-14 VITALS — HEART RATE: 92 BPM

## 2017-03-14 VITALS — HEART RATE: 67 BPM

## 2017-03-14 VITALS — HEART RATE: 63 BPM

## 2017-03-14 VITALS — HEART RATE: 80 BPM

## 2017-03-14 LAB
APTT BLD: 73.1 SEC (ref 24.3–30.1)
GFR SERPLBLD BASED ON 1.73 SQ M-ARVRAT: 75 ML/MIN (ref 89–?)

## 2017-03-14 PROCEDURE — 0W9940Z DRAINAGE OF RIGHT PLEURAL CAVITY WITH DRAINAGE DEVICE, PERCUTANEOUS ENDOSCOPIC APPROACH: ICD-10-PCS | Performed by: THORACIC SURGERY (CARDIOTHORACIC VASCULAR SURGERY)

## 2017-03-14 PROCEDURE — 0BDN4ZZ EXTRACTION OF RIGHT PLEURA, PERCUTANEOUS ENDOSCOPIC APPROACH: ICD-10-PCS | Performed by: THORACIC SURGERY (CARDIOTHORACIC VASCULAR SURGERY)

## 2017-03-14 PROCEDURE — 3E0T3CZ: ICD-10-PCS | Performed by: THORACIC SURGERY (CARDIOTHORACIC VASCULAR SURGERY)

## 2017-03-14 RX ADMIN — CARVEDILOL SCH MG: 3.12 TABLET, FILM COATED ORAL at 14:01

## 2017-03-14 RX ADMIN — CLONIDINE HYDROCHLORIDE SCH MG: 0.1 INJECTION, SOLUTION EPIDURAL at 12:15

## 2017-03-14 RX ADMIN — ACETAMINOPHEN SCH MG: 10 INJECTION, SOLUTION INTRAVENOUS at 21:12

## 2017-03-14 RX ADMIN — LINEZOLID SCH MLS/HR: 600 INJECTION, SOLUTION INTRAVENOUS at 03:00

## 2017-03-14 RX ADMIN — ALBUTEROL SULFATE SCH MG: 2.5 SOLUTION RESPIRATORY (INHALATION) at 21:02

## 2017-03-14 RX ADMIN — PANTOPRAZOLE SCH MG: 40 TABLET, DELAYED RELEASE ORAL at 21:24

## 2017-03-14 RX ADMIN — CEFEPIME SCH MLS/HR: 1 INJECTION, POWDER, FOR SOLUTION INTRAMUSCULAR; INTRAVENOUS at 13:59

## 2017-03-14 RX ADMIN — PHENYLEPHRINE HYDROCHLORIDE SCH MLS/HR: 10 INJECTION INTRAVENOUS at 13:40

## 2017-03-14 RX ADMIN — LISINOPRIL SCH MG: 5 TABLET ORAL at 14:01

## 2017-03-14 RX ADMIN — DOCUSATE CALCIUM SCH MG: 240 CAPSULE, LIQUID FILLED ORAL at 21:00

## 2017-03-14 RX ADMIN — CEFEPIME SCH MLS/HR: 1 INJECTION, POWDER, FOR SOLUTION INTRAMUSCULAR; INTRAVENOUS at 17:31

## 2017-03-14 RX ADMIN — OXYTOCIN SCH MLS/HR: 10 INJECTION, SOLUTION INTRAMUSCULAR; INTRAVENOUS at 21:00

## 2017-03-14 RX ADMIN — CARVEDILOL SCH MG: 3.12 TABLET, FILM COATED ORAL at 21:24

## 2017-03-14 RX ADMIN — HEPARIN SODIUM SCH MLS/HR: 10000 INJECTION, SOLUTION INTRAVENOUS at 14:07

## 2017-03-14 RX ADMIN — LINEZOLID SCH MLS/HR: 600 INJECTION, SOLUTION INTRAVENOUS at 16:36

## 2017-03-14 RX ADMIN — ACETAMINOPHEN PRN MG: 325 TABLET ORAL at 04:52

## 2017-03-14 RX ADMIN — SODIUM CHLORIDE, PRESERVATIVE FREE SCH ML: 5 INJECTION INTRAVENOUS at 21:25

## 2017-03-14 RX ADMIN — CLONIDINE HYDROCHLORIDE SCH MG: 0.1 INJECTION, SOLUTION EPIDURAL at 17:47

## 2017-03-14 RX ADMIN — ACETAMINOPHEN SCH MG: 10 INJECTION, SOLUTION INTRAVENOUS at 14:00

## 2017-03-14 RX ADMIN — CEFEPIME SCH MLS/HR: 1 INJECTION, POWDER, FOR SOLUTION INTRAMUSCULAR; INTRAVENOUS at 02:00

## 2017-03-14 NOTE — PD.OP
cc:   Bayron Gross MD


__________________________________________________





Operative Report


Date of Surgery:  Mar 14, 2017


Preoperative Diagnosis:  


Postoperative Diagnosis:  


Procedure:








1. Right Video-Assisted Thoracoscopic Surgery (VATS). 


2. Evacuation of Pleural Abscess. 


3. Decortication. 


4. Intercostal Nerve Block








.


Surgeon:


Bayron Gross


Assistant(s):


JUAN Vanegas


Operation and Findings:











PREOPERATIVE DIAGNOSES 


1. Right Empyema. 


2. Sepsis


3. S/P Mechanical AVR 





POSTOPERATIVE DIAGNOSES 


Same





SURGICAL PROCEDURE 


1. Right Video-Assisted Thoracoscopic Surgery (VATS). 


2. Evacuation of Pleural Abscess. 


3. Decortication. 


4. Intercostal Nerve Block





SURGEON 


Bayron Gross MD 





ASSISTANT 


Andrew Palma, [facility]





ANESTHESIA 


General double lumen endotracheal. 





ANESTHETIST 


GUERO Tineo MD





PREPARATION 


ChloraPrep. 





COUNTS 


Needle, sponge, and instrument counts are correct. 





DRAINS 


One 32 FR CT. 





COMPLICATIONS 


None. 





INDICATIONS 


The patient is a 62 yo male status post AVR, now presenting with general 

malaise and SOB with CT evidence of loculated empyema. The patient is being 

brought to the operating room for drainage procedure. 





DESCRIPTION OF PROCEDURE 


The patient was brought to the operating room and placed supine on the OR 

table. Following the induction of adequate general double lumen endotracheal 

anesthesia and placement of appropriate monitoring devices, the patient was 

placed in the left lateral decubitus position. The right chest and surrounding 

areas were then prepped and draped in a standard sterile fashion. A 5 mm camera 

port was introduced into the 8th intercostal space in posterior axillary line, 

and the camera introduced. A second 5 mm port was then placed inferiorly under 

direct visual guidance. Through the port, the suction device was advanced, and 

approximately 700 mls of pus was evacuated and sent for cytologic and 

microbiologic analysis. Complete evacuation of all fluid was confirmed under 

visual guidance.Decortication of the right lung was then performed. The pleural 

cavity was copiously irrigated with sterile saline solution.A 32 Fr CT was 

placed and exited through the anterolateral chest wall. This was maintained in 

place with a nonabsorbable suture. Both of the port sites were injected with 

Exparel. Following confirmation of the chest tube in the proper place, the 

incisions were closed with 2 layers. The CT was attached to a suction device, 

and sterile dressing applied. Intercostal nerve block was performed using 

Ropivacaine/Morphine solution. The patient tolerated the procedure well and was 

extubated and transferred to the recovery room in stable condition.








Bayron Gross MD Mar 14, 2017 17:47

## 2017-03-14 NOTE — RADRPT
EXAM DATE/TIME:  03/14/2017 09:36 

 

[facility] COMPARISON:     

CHEST SINGLE AP, March 08, 2017, 13:19.

 

                     

INDICATIONS :     

Short of breath.

                     

 

MEDICAL HISTORY :     

None.          

 

SURGICAL HISTORY :        

Post thoracotomy

 

ENCOUNTER:     

Initial                                        

 

ACUITY:     

1 day      

 

PAIN SCORE:     

Non-responsive.

 

LOCATION:     

Bilateral chest 

 

FINDINGS:     

A single view of the chest demonstrates right basilar pleural-parenchymal density. Right-sided chest 
tube placed with decreasing density when compared to previous study. No pneumothorax. Previous median
 sternotomy and heart valve replacement.  Osseous structures are intact.

 

CONCLUSION:     

1. Right-sided chest tube placement without pneumothorax.

2. Decrease in right basilar pleural-parenchymal density.

 

 

 

 Jeremy Tejeda MD on March 14, 2017 at 10:57           

Board Certified Radiologist.

 This report was verified electronically.

## 2017-03-14 NOTE — HHI.PR
Subjective


Remarks


patient back from procedure- tolerted well- chest tube in place


no pain complains





Objective


Vitals





 Vital Signs








  Date Time  Temp Pulse Resp B/P Pulse Ox O2 Delivery O2 Flow Rate FiO2


 


3/14/17 13:23   18     


 


3/14/17 06:00  92      


 


3/14/17 05:00  104      


 


3/14/17 04:00  102      


 


3/14/17 03:00 102.8 103 20 106/64 92   


 


3/14/17 03:00  101      


 


3/14/17 02:00  102      


 


3/14/17 01:00  104      


 


3/14/17 00:00  100      


 


3/13/17 23:00  99      


 


3/13/17 23:00 100.5 100 22 101/60 92   


 


3/13/17 22:00  108      


 


3/13/17 21:00  100      


 


3/13/17 20:00  100      


 


3/13/17 19:00 98.6 107 22 100/62 93   


 


3/13/17 19:00     93 Room Air  


 


3/13/17 19:00  97      


 


3/13/17 18:00  94      


 


3/13/17 17:00  102      


 


3/13/17 16:54 99.4 97 18 101/60 92   


 


3/13/17 16:00  98      


 


3/13/17 15:00  88      


 


3/13/17 14:00  93      








 I/O








 3/13/17 3/13/17 3/13/17 3/14/17 3/14/17 3/14/17





 07:00 15:00 23:00 07:00 15:00 23:00


 


Intake Total 1200 ml 960 ml 1622 ml 892 ml  


 


Output Total 950 ml 1300 ml 1275 ml 1000 ml  


 


Balance 250 ml -340 ml 347 ml -108 ml  


 


      


 


Intake Oral 800 ml 960 ml 960 ml 360 ml  


 


IV Total 400 ml  662 ml 532 ml  


 


Output Urine Total 950 ml 1300 ml 1275 ml 1000 ml  


 


# Bowel Movements 0 0    








Result Diagram:  


3/13/17 0513                                                                   

             3/14/17 0505





Imaging





Last Impressions








Chest CT 3/8/17 1642 Signed





Impressions: 





 Service Date/Time:  Wednesday, March 8, 2017 16:48 - CONCLUSION:  1. Large 





 loculated effusion at the right base measuring 13 x 9 CM. 2. Aortic root 





 dilatation measuring 4.5 CM     Donnell Vincent MD 


 


Chest X-Ray 3/8/17 0000 Signed





Impressions: 





 Service Date/Time:  Wednesday, March 8, 2017 13:19 - CONCLUSION:  Opacity in 

the 





 mid and lower lung zone on the right that likely represents consolidation. The 





 superior margin appears somewhat well-defined suggesting potential mass. 





 However, the appearance favors severe airspace consolidation with small to 





 moderate size pleural effusion. This may represent a pneumonia/infectious 





 process given the clinical history. Recommend followup imaging to confirm 





 improvement and ultimately resolution.     Jonathan Ramirez MD 








Objective Remarks


awake and alert, NAD


decreased breath sounds and decrease vocal fremiti-  base to mid, right , chest 

tube in place- 


regular rhythm-


abdomen soft, nontender


extremities no edema


neuro exam- unremarkable


Procedures


3/12- MARY CARMEN- no vegetations, no thrombus


3/14- VAT





A/P


Assessment and Plan


Patient is a 61-year-old male presenting with 4 weeks duration of body malaise, 

flulike symptoms nausea vomiting diarrhea for the past 5 days with cough 

productive of off-white sputum


Patient with leukocytosis 





Sepsis secondary to  right empyema - S/P VAT 3/14 


   on cefepime, zyvox. ff cultures


   O2 when necessary


   Infectious disease Service  ff along with us


History of aortic mechanical valve replacement


TAchycardia- some anxiety component


Cardiomyopathy


   S/P MARY CARMEN 3/12- no vegetation,s no thrombus EF 30-40%


   start  heparin drip 


   hold coumadin- - restart after chest tube out


   Coreg 3.125 mg bid-- . consider eventually adding ACE- as BP tolerates


Anxiety- Reaction


   Ativan po 1 mg po q8


PPI


D/W patient and staff nurse








Osmani Padgett MD Mar 14, 2017 13:46


Osmani Padgett MD Mar 14, 2017 13:46

## 2017-03-15 VITALS — HEART RATE: 80 BPM

## 2017-03-15 VITALS — HEART RATE: 62 BPM

## 2017-03-15 VITALS
HEART RATE: 72 BPM | TEMPERATURE: 97.6 F | OXYGEN SATURATION: 98 % | SYSTOLIC BLOOD PRESSURE: 96 MMHG | DIASTOLIC BLOOD PRESSURE: 66 MMHG | RESPIRATION RATE: 16 BRPM

## 2017-03-15 VITALS — DIASTOLIC BLOOD PRESSURE: 41 MMHG | SYSTOLIC BLOOD PRESSURE: 67 MMHG

## 2017-03-15 VITALS
DIASTOLIC BLOOD PRESSURE: 45 MMHG | RESPIRATION RATE: 16 BRPM | SYSTOLIC BLOOD PRESSURE: 81 MMHG | OXYGEN SATURATION: 97 % | HEART RATE: 76 BPM

## 2017-03-15 VITALS
HEART RATE: 78 BPM | RESPIRATION RATE: 16 BRPM | TEMPERATURE: 97.5 F | SYSTOLIC BLOOD PRESSURE: 72 MMHG | OXYGEN SATURATION: 98 % | DIASTOLIC BLOOD PRESSURE: 45 MMHG

## 2017-03-15 VITALS — HEART RATE: 74 BPM

## 2017-03-15 VITALS
DIASTOLIC BLOOD PRESSURE: 53 MMHG | HEART RATE: 61 BPM | RESPIRATION RATE: 18 BRPM | OXYGEN SATURATION: 96 % | SYSTOLIC BLOOD PRESSURE: 74 MMHG

## 2017-03-15 VITALS — HEART RATE: 87 BPM

## 2017-03-15 VITALS
HEART RATE: 76 BPM | OXYGEN SATURATION: 98 % | DIASTOLIC BLOOD PRESSURE: 55 MMHG | RESPIRATION RATE: 16 BRPM | SYSTOLIC BLOOD PRESSURE: 106 MMHG

## 2017-03-15 VITALS — DIASTOLIC BLOOD PRESSURE: 53 MMHG | SYSTOLIC BLOOD PRESSURE: 84 MMHG

## 2017-03-15 VITALS — SYSTOLIC BLOOD PRESSURE: 85 MMHG | DIASTOLIC BLOOD PRESSURE: 54 MMHG

## 2017-03-15 VITALS — HEART RATE: 76 BPM

## 2017-03-15 VITALS — HEART RATE: 68 BPM

## 2017-03-15 VITALS — DIASTOLIC BLOOD PRESSURE: 67 MMHG | SYSTOLIC BLOOD PRESSURE: 110 MMHG

## 2017-03-15 VITALS — HEART RATE: 58 BPM

## 2017-03-15 VITALS — OXYGEN SATURATION: 97 %

## 2017-03-15 VITALS — HEART RATE: 54 BPM

## 2017-03-15 VITALS — SYSTOLIC BLOOD PRESSURE: 78 MMHG | DIASTOLIC BLOOD PRESSURE: 50 MMHG

## 2017-03-15 VITALS — HEART RATE: 71 BPM

## 2017-03-15 VITALS — HEART RATE: 64 BPM

## 2017-03-15 VITALS — HEART RATE: 81 BPM

## 2017-03-15 VITALS — HEART RATE: 61 BPM

## 2017-03-15 VITALS — HEART RATE: 66 BPM

## 2017-03-15 VITALS — SYSTOLIC BLOOD PRESSURE: 93 MMHG | DIASTOLIC BLOOD PRESSURE: 56 MMHG

## 2017-03-15 VITALS — HEART RATE: 78 BPM

## 2017-03-15 VITALS — HEART RATE: 72 BPM

## 2017-03-15 LAB
ANION GAP SERPL CALC-SCNC: 12 MEQ/L (ref 5–15)
APTT BLD: 49.5 SEC (ref 24.3–30.1)
APTT BLD: 50.7 SEC (ref 24.3–30.1)
APTT BLD: 78.3 SEC (ref 24.3–30.1)
BASOPHILS # BLD AUTO: 0 TH/MM3 (ref 0–0.2)
BASOPHILS NFR BLD: 0.1 % (ref 0–2)
BUN SERPL-MCNC: 15 MG/DL (ref 7–18)
CHLORIDE SERPL-SCNC: 102 MEQ/L (ref 98–107)
EOSINOPHIL # BLD: 0 TH/MM3 (ref 0–0.4)
EOSINOPHIL NFR BLD: 0 % (ref 0–4)
ERYTHROCYTE [DISTWIDTH] IN BLOOD BY AUTOMATED COUNT: 13.3 % (ref 11.6–17.2)
GFR SERPLBLD BASED ON 1.73 SQ M-ARVRAT: 76 ML/MIN (ref 89–?)
HCO3 BLD-SCNC: 25.3 MEQ/L (ref 21–32)
HCT VFR BLD CALC: 29.8 % (ref 39–51)
HEMO FLAGS: (no result)
LYMPHOCYTES # BLD AUTO: 0.6 TH/MM3 (ref 1–4.8)
LYMPHOCYTES NFR BLD AUTO: 4.5 % (ref 9–44)
MCH RBC QN AUTO: 30.1 PG (ref 27–34)
MCHC RBC AUTO-ENTMCNC: 33.8 % (ref 32–36)
MCV RBC AUTO: 89.1 FL (ref 80–100)
MONOCYTES NFR BLD: 3.3 % (ref 0–8)
NEUTROPHILS # BLD AUTO: 12.5 TH/MM3 (ref 1.8–7.7)
NEUTROPHILS NFR BLD AUTO: 92.1 % (ref 16–70)
PLATELET # BLD: 191 TH/MM3 (ref 150–450)
POTASSIUM SERPL-SCNC: 3.2 MEQ/L (ref 3.5–5.1)
RBC # BLD AUTO: 3.34 MIL/MM3 (ref 4.5–5.9)
SODIUM SERPL-SCNC: 139 MEQ/L (ref 136–145)
WBC # BLD AUTO: 13.6 TH/MM3 (ref 4–11)

## 2017-03-15 RX ADMIN — CLONIDINE HYDROCHLORIDE SCH MG: 0.1 INJECTION, SOLUTION EPIDURAL at 00:25

## 2017-03-15 RX ADMIN — HEPARIN SODIUM SCH MLS/HR: 10000 INJECTION, SOLUTION INTRAVENOUS at 13:30

## 2017-03-15 RX ADMIN — SODIUM CHLORIDE, PRESERVATIVE FREE SCH ML: 5 INJECTION INTRAVENOUS at 09:41

## 2017-03-15 RX ADMIN — CARVEDILOL SCH MG: 3.12 TABLET, FILM COATED ORAL at 21:00

## 2017-03-15 RX ADMIN — ACETAMINOPHEN SCH MG: 10 INJECTION, SOLUTION INTRAVENOUS at 02:00

## 2017-03-15 RX ADMIN — LINEZOLID SCH MLS/HR: 600 INJECTION, SOLUTION INTRAVENOUS at 14:18

## 2017-03-15 RX ADMIN — LINEZOLID SCH MLS/HR: 600 INJECTION, SOLUTION INTRAVENOUS at 03:00

## 2017-03-15 RX ADMIN — LISINOPRIL SCH MG: 5 TABLET ORAL at 09:40

## 2017-03-15 RX ADMIN — CEFEPIME SCH MLS/HR: 1 INJECTION, POWDER, FOR SOLUTION INTRAMUSCULAR; INTRAVENOUS at 17:26

## 2017-03-15 RX ADMIN — CARVEDILOL SCH MG: 3.12 TABLET, FILM COATED ORAL at 09:40

## 2017-03-15 RX ADMIN — PANTOPRAZOLE SCH MG: 40 TABLET, DELAYED RELEASE ORAL at 20:48

## 2017-03-15 RX ADMIN — OXYCODONE HYDROCHLORIDE AND ACETAMINOPHEN PRN TAB: 5; 325 TABLET ORAL at 20:48

## 2017-03-15 RX ADMIN — CLONIDINE HYDROCHLORIDE SCH MG: 0.1 INJECTION, SOLUTION EPIDURAL at 06:00

## 2017-03-15 RX ADMIN — SODIUM CHLORIDE, PRESERVATIVE FREE SCH ML: 5 INJECTION INTRAVENOUS at 21:00

## 2017-03-15 RX ADMIN — ACETAMINOPHEN SCH MG: 10 INJECTION, SOLUTION INTRAVENOUS at 08:00

## 2017-03-15 RX ADMIN — OXYTOCIN SCH MLS/HR: 10 INJECTION, SOLUTION INTRAMUSCULAR; INTRAVENOUS at 21:00

## 2017-03-15 RX ADMIN — ALBUTEROL SULFATE SCH MG: 2.5 SOLUTION RESPIRATORY (INHALATION) at 16:13

## 2017-03-15 RX ADMIN — ALBUTEROL SULFATE SCH MG: 2.5 SOLUTION RESPIRATORY (INHALATION) at 08:54

## 2017-03-15 RX ADMIN — CEFEPIME SCH MLS/HR: 1 INJECTION, POWDER, FOR SOLUTION INTRAMUSCULAR; INTRAVENOUS at 09:42

## 2017-03-15 RX ADMIN — CEFEPIME SCH MLS/HR: 1 INJECTION, POWDER, FOR SOLUTION INTRAMUSCULAR; INTRAVENOUS at 02:00

## 2017-03-15 RX ADMIN — DOCUSATE CALCIUM SCH MG: 240 CAPSULE, LIQUID FILLED ORAL at 21:00

## 2017-03-15 RX ADMIN — ALBUTEROL SULFATE SCH MG: 2.5 SOLUTION RESPIRATORY (INHALATION) at 21:46

## 2017-03-15 NOTE — RADRPT
EXAM DATE/TIME:  03/15/2017 04:26 

 

[facility] COMPARISON:     

CHEST SINGLE AP, March 14, 2017, 9:36.

 

                     

INDICATIONS :     

Post op thoracotomy.

                     

 

MEDICAL HISTORY :     

None.          

 

SURGICAL HISTORY :     

None.   Thoracotomy

 

ENCOUNTER:     

Subsequent                                        

 

ACUITY:     

3 days      

 

PAIN SCORE:     

5/10

 

LOCATION:     

Bilateral chest 

 

FINDINGS:     

Single AP view of the chest. Median sternotomy wires. Patient is somewhat rotated into the UMA positi
on. Right-sided chest tube remains in place. Right lung base opacity unchanged. No evidence of pneumo
thorax.

 

CONCLUSION:     

Rotation of the patient. No other significant interval change with right-sided chest tube in place an
d persistent patchy right lower lung opacity.

 

 

 

 Gregg Jennings MD on March 15, 2017 at 5:52           

Board Certified Radiologist.

 This report was verified electronically.

## 2017-03-15 NOTE — HHI.PR
Subjective


Remarks


patient feels diaphoretic last evening-


voiding well, no nausea or vomiting





T down- patient getting Tylenol IV bolus scheduled q 8 last dose this morning


patient felt cold and chilly- some decrease BP readings


now 93/60








now states having difficulty swallowing for the past 3-4 months





Objective


Vitals





 Vital Signs








  Date Time  Temp Pulse Resp B/P Pulse Ox O2 Delivery O2 Flow Rate FiO2


 


3/15/17 06:00  66      


 


3/15/17 05:57    85/54    


 


3/15/17 05:00  68      


 


3/15/17 04:00  54      


 


3/15/17 03:40    67/41    


 


3/15/17 03:30    78/50    


 


3/15/17 03:00  61      


 


3/15/17 03:00  62 18 74/53 96   


 


3/15/17 02:00  64      


 


3/15/17 01:00  62      


 


3/15/17 00:35    110/67    


 


3/15/17 00:00  58      


 


3/14/17 23:00  64 18 82/54 96   


 


3/14/17 23:00  67      


 


3/14/17 22:00  66      


 


3/14/17 21:02     92   21


 


3/14/17 21:00  70      


 


3/14/17 20:00  80      


 


3/14/17 19:00     95 Room Air  


 


3/14/17 19:00  71 12 85/59 95   


 


3/14/17 19:00  63      


 


3/14/17 18:00  67      


 


3/14/17 17:00  69      


 


3/14/17 16:00  63      


 


3/14/17 15:00 97.3 65 20 86/55 92   


 


3/14/17 15:00  64      


 


3/14/17 14:32   18     


 


3/14/17 14:00  66      


 


3/14/17 13:23   18     


 


3/14/17 13:00 97.6 78 20 85/58 97   


 


3/14/17 13:00  76      


 


3/14/17 13:00     97 Nasal Cannula 2.00 


 


3/14/17 12:30 98.1 76 14 91/47 96 Nasal Cannula 3 





        


 


3/14/17 12:15  79 14 81/56 97 Nasal Cannula 3 





        


 


3/14/17 12:00  77 13  96 Nasal Cannula 3 





        


 


3/14/17 11:45  79 12 82/51 95 Nasal Cannula 3 





    86/48    


 


3/14/17 11:30 97.9 82 14 82/51 95 Nasal Cannula 3 





    90/47    


 


3/14/17 11:15  81 13 82/48 95 Nasal Cannula 3 





    94/59    


 


3/14/17 11:00  84 15 69/45 96 Nasal Cannula 3 





    68/40    


 


3/14/17 10:45  92 15 74/50 98 Nasal Cannula 3 





    79/41    


 


3/14/17 10:30 98.3 88 17 76/51 98 Nasal Cannula 3 





    89/34    








 I/O








 3/14/17 3/14/17 3/14/17 3/15/17 3/15/17 3/15/17





 07:00 15:00 23:00 07:00 15:00 23:00


 


Intake Total 892 ml 1900 ml 3158 ml 2120 ml  


 


Output Total 1000 ml 665 ml 1180 ml 565 ml  


 


Balance -108 ml 1235 ml 1978 ml 1555 ml  


 


      


 


Intake Oral 360 ml 0 ml 380 ml 720 ml  


 


IV Total 532 ml  2778 ml 1400 ml  


 


Other  1900 ml    


 


Output Urine Total 1000 ml 500 ml 1000 ml 475 ml  


 


Chest Tube Drainage Total  90 ml 180 ml 90 ml  


 


Estimated Blood Loss  75 ml    


 


# Bowel Movements   0   








Result Diagram:  


3/15/17 0315                                                                   

             3/15/17 0315





Imaging





Last Impressions








Chest X-Ray 3/15/17 0500 Signed





Impressions: 





 Service Date/Time:  Wednesday, March 15, 2017 04:26 - CONCLUSION:  Rotation of 





 the patient. No other significant interval change with right-sided chest tube 

in 





 place and persistent patchy right lower lung opacity.     Gregg Jennings MD 


 


Chest CT 3/8/17 1642 Signed





Impressions: 





 Service Date/Time:  Wednesday, March 8, 2017 16:48 - CONCLUSION:  1. Large 





 loculated effusion at the right base measuring 13 x 9 CM. 2. Aortic root 





 dilatation measuring 4.5 CM     Donnell Vincent MD 








Objective Remarks


awake and alert, NAD


decreased breath sounds and decrease vocal fremiti-  base to mid, right , chest 

tube in place- right posterior chest wall


regular rhythm-


abdomen soft, nontender


extremities no edema


neuro exam- unremarkable


Procedures


3/12- MARY CARMEN- no vegetations, no thrombus


3/14- VAT





A/P


Assessment and Plan


Patient is a 61-year-old male presenting with 4 weeks duration of body malaise, 

flulike symptoms nausea vomiting diarrhea for the past 5 days with cough 

productive of off-white sputum


Patient with leukocytosis 





Sepsis secondary to  right empyema - S/P VAT 3/14 - BP 93/60


   on cefepime, zyvox, Flagyl . ff cultures


   O2 when necessary


   Infectious disease Service  ff along with us


   Cardiothoracic surgery ff


   WBC trending down


Hypothermia with low BPS- likely related to meds


   also with Sepsis- clinically feeling better


   DC scheduled Ofirmir post op - 


   continue to montior- BP improved now- continue to monitor closely


   H and H stable


History of aortic mechanical valve replacement


TAchycardia- improved


Cardiomyopathy


   S/P MARY CARMEN 3/12- no vegetation,s no thrombus EF 30-40%


   on heparin drip 


   hold coumadin- - restart after chest tube out


   Coreg 3.125 mg bid-- . consider eventually adding ACE- as BP tolerates


Hypokalemia- this am labs


   received 30 meq po KCL


   recheck 


Anxiety- Reaction


   Ativan po 1 mg po q8


Dysphagia- with solid - now states going on for 2-3 weeks


   get speech to do a swallowing evaluation


   GI consult after evaluation


PPI


Increase activity- PT








Osmani Padgett MD Mar 15, 2017 09:31

## 2017-03-16 VITALS — HEART RATE: 74 BPM

## 2017-03-16 VITALS
DIASTOLIC BLOOD PRESSURE: 58 MMHG | OXYGEN SATURATION: 98 % | HEART RATE: 76 BPM | SYSTOLIC BLOOD PRESSURE: 94 MMHG | RESPIRATION RATE: 18 BRPM | TEMPERATURE: 97.7 F

## 2017-03-16 VITALS
OXYGEN SATURATION: 97 % | TEMPERATURE: 98.4 F | RESPIRATION RATE: 16 BRPM | HEART RATE: 88 BPM | DIASTOLIC BLOOD PRESSURE: 73 MMHG | SYSTOLIC BLOOD PRESSURE: 111 MMHG

## 2017-03-16 VITALS — HEART RATE: 75 BPM

## 2017-03-16 VITALS — HEART RATE: 88 BPM

## 2017-03-16 VITALS — HEART RATE: 68 BPM

## 2017-03-16 VITALS — HEART RATE: 77 BPM

## 2017-03-16 VITALS
DIASTOLIC BLOOD PRESSURE: 65 MMHG | RESPIRATION RATE: 16 BRPM | OXYGEN SATURATION: 98 % | HEART RATE: 71 BPM | SYSTOLIC BLOOD PRESSURE: 118 MMHG

## 2017-03-16 VITALS
TEMPERATURE: 97.8 F | HEART RATE: 82 BPM | RESPIRATION RATE: 18 BRPM | OXYGEN SATURATION: 97 % | DIASTOLIC BLOOD PRESSURE: 66 MMHG | SYSTOLIC BLOOD PRESSURE: 102 MMHG

## 2017-03-16 VITALS
HEART RATE: 74 BPM | OXYGEN SATURATION: 96 % | SYSTOLIC BLOOD PRESSURE: 123 MMHG | RESPIRATION RATE: 18 BRPM | DIASTOLIC BLOOD PRESSURE: 73 MMHG | TEMPERATURE: 97.7 F

## 2017-03-16 VITALS — OXYGEN SATURATION: 99 %

## 2017-03-16 VITALS — OXYGEN SATURATION: 96 %

## 2017-03-16 VITALS — OXYGEN SATURATION: 98 %

## 2017-03-16 VITALS — HEART RATE: 84 BPM

## 2017-03-16 VITALS — HEART RATE: 66 BPM

## 2017-03-16 VITALS — HEART RATE: 82 BPM

## 2017-03-16 VITALS — HEART RATE: 76 BPM

## 2017-03-16 VITALS — HEART RATE: 80 BPM

## 2017-03-16 VITALS — HEART RATE: 78 BPM

## 2017-03-16 LAB
ANION GAP SERPL CALC-SCNC: 8 MEQ/L (ref 5–15)
APTT BLD: 52.4 SEC (ref 24.3–30.1)
BUN SERPL-MCNC: 15 MG/DL (ref 7–18)
CHLORIDE SERPL-SCNC: 105 MEQ/L (ref 98–107)
ERYTHROCYTE [DISTWIDTH] IN BLOOD BY AUTOMATED COUNT: 13.5 % (ref 11.6–17.2)
GFR SERPLBLD BASED ON 1.73 SQ M-ARVRAT: 79 ML/MIN (ref 89–?)
HCO3 BLD-SCNC: 27.6 MEQ/L (ref 21–32)
HCT VFR BLD CALC: 27.5 % (ref 39–51)
MCH RBC QN AUTO: 30 PG (ref 27–34)
MCHC RBC AUTO-ENTMCNC: 33.5 % (ref 32–36)
MCV RBC AUTO: 89.6 FL (ref 80–100)
PLATELET # BLD: 191 TH/MM3 (ref 150–450)
POTASSIUM SERPL-SCNC: 3.6 MEQ/L (ref 3.5–5.1)
RBC # BLD AUTO: 3.07 MIL/MM3 (ref 4.5–5.9)
REVIEW FLAG: (no result)
SODIUM SERPL-SCNC: 141 MEQ/L (ref 136–145)
WBC # BLD AUTO: 12.9 TH/MM3 (ref 4–11)

## 2017-03-16 RX ADMIN — SODIUM CHLORIDE, PRESERVATIVE FREE SCH ML: 5 INJECTION INTRAVENOUS at 08:07

## 2017-03-16 RX ADMIN — PANTOPRAZOLE SCH MG: 40 TABLET, DELAYED RELEASE ORAL at 22:01

## 2017-03-16 RX ADMIN — LINEZOLID SCH MLS/HR: 600 INJECTION, SOLUTION INTRAVENOUS at 03:00

## 2017-03-16 RX ADMIN — HEPARIN SODIUM SCH MLS/HR: 10000 INJECTION, SOLUTION INTRAVENOUS at 18:41

## 2017-03-16 RX ADMIN — ALBUTEROL SULFATE SCH MG: 2.5 SOLUTION RESPIRATORY (INHALATION) at 09:28

## 2017-03-16 RX ADMIN — DOCUSATE CALCIUM SCH MG: 240 CAPSULE, LIQUID FILLED ORAL at 21:00

## 2017-03-16 RX ADMIN — ALBUTEROL SULFATE SCH MG: 2.5 SOLUTION RESPIRATORY (INHALATION) at 03:53

## 2017-03-16 RX ADMIN — CEFEPIME SCH MLS/HR: 1 INJECTION, POWDER, FOR SOLUTION INTRAMUSCULAR; INTRAVENOUS at 17:17

## 2017-03-16 RX ADMIN — ALBUTEROL SULFATE SCH MG: 2.5 SOLUTION RESPIRATORY (INHALATION) at 21:12

## 2017-03-16 RX ADMIN — LISINOPRIL SCH MG: 5 TABLET ORAL at 08:07

## 2017-03-16 RX ADMIN — CEFEPIME SCH MLS/HR: 1 INJECTION, POWDER, FOR SOLUTION INTRAMUSCULAR; INTRAVENOUS at 10:34

## 2017-03-16 RX ADMIN — CARVEDILOL SCH MG: 3.12 TABLET, FILM COATED ORAL at 08:07

## 2017-03-16 RX ADMIN — SODIUM CHLORIDE, PRESERVATIVE FREE SCH ML: 5 INJECTION INTRAVENOUS at 22:02

## 2017-03-16 RX ADMIN — CEFEPIME SCH MLS/HR: 1 INJECTION, POWDER, FOR SOLUTION INTRAMUSCULAR; INTRAVENOUS at 02:00

## 2017-03-16 RX ADMIN — OXYCODONE HYDROCHLORIDE AND ACETAMINOPHEN PRN TAB: 5; 325 TABLET ORAL at 22:01

## 2017-03-16 RX ADMIN — ALBUTEROL SULFATE SCH MG: 2.5 SOLUTION RESPIRATORY (INHALATION) at 15:42

## 2017-03-16 RX ADMIN — CARVEDILOL SCH MG: 3.12 TABLET, FILM COATED ORAL at 21:59

## 2017-03-16 RX ADMIN — OXYTOCIN SCH MLS/HR: 10 INJECTION, SOLUTION INTRAMUSCULAR; INTRAVENOUS at 22:03

## 2017-03-16 NOTE — PD.CAR.PN
CVT Progress Note


Subjective/Hospital Course:


 61-year-old male with history of mechanical aortic valve replacement in 2005 

who presented to the emergency room complaining of about 4 weeks Duration of 

nausea vomiting diarrhea intermittently on and off.  Lasting for 5 days at a 

time and will be okay for to 3 days then recurred.  Patient describes stools as 

liquid brown nonbloody.  For the past 5 days now has been having productive 

cough off white sputum associated with body Monday, nausea, diarrhea which 

prompted patient to come in here and admitted. 





On admission, with elevated WBC, elevated lactic acid level


found to have large loculated pleural effusion 





3/13


still having fevers 


on vanc , cefepime and flagly 


pending right VATS , possible decortication and possible pleurodesis in am 





3/14


. Right Video-Assisted Thoracoscopic Surgery (VATS). 


2. Evacuation of Pleural Abscess. 


3. Decortication. 


4. Intercostal Nerve Block





3/15


leave chest tube in 


continue ambulating 


pulmonary toileting 





3/16


drained 15cc/ 12 hrs 


will hold heparin gtt in am at 0600 for removal of chest tube in am 4 hours 

after holding 





then eval for resuming coumadin tomorrow pm


Objective:


GENERAL: 


SKIN: Warm and dry.


HEAD: Normocephalic.


EYES: No scleral icterus. No injection or drainage. 


NECK: Supple, trachea midline. No JVD or lymphadenopathy.


CARDIOVASCULAR: Regular rate and rhythm without murmurs, gallops, or rubs. 


RESPIRATORY: diminished tight lower lobe


chest tube to wall suction, no air leak, some serous drainage 


Breath sounds equal bilaterally. No accessory muscle use.


GASTROINTESTINAL: Abdomen soft, non-tender, nondistended. 


MUSCULOSKELETAL: No cyanosis, or edema. 


BACK: Nontender without obvious deformity. No CVA tenderness.








 Vital Signs








  Date Time  Temp Pulse Resp B/P Pulse Ox O2 Delivery O2 Flow Rate FiO2


 


3/16/17 10:22  74      


 


3/16/17 09:29     99   21


 


3/16/17 09:22  74      


 


3/16/17 08:00  77      


 


3/16/17 07:30     97 Room Air  


 


3/16/17 07:30  80      


 


3/16/17 07:30 97.8 82 18 102/66 97   


 


3/16/17 06:00  68      


 


3/16/17 05:00  68      


 


3/16/17 04:00  66      


 


3/16/17 03:53     98   


 


3/16/17 03:00  62      


 


3/16/17 03:00  71 16 118/65 98   


 


3/16/17 02:00  68      


 


3/16/17 01:00  76      


 


3/16/17 00:00  66      


 


3/15/17 23:30  76 16 106/55 98   


 


3/15/17 23:00  80      


 


3/15/17 22:00  76      


 


3/15/17 21:46     97   21


 


3/15/17 21:00  78      


 


3/15/17 20:00 97.6 76 16 96/66 98   


 


3/15/17 20:00  72      


 


3/15/17 19:20     96 Room Air  


 


3/15/17 19:00  76      


 


3/15/17 18:07  76      


 


3/15/17 17:40  74      


 


3/15/17 16:19  71      


 


3/15/17 15:40  78      


 


3/15/17 15:40 97.5 78 16 72/48 98   





    72/45    


 


3/15/17 14:12  76      


 


3/15/17 13:08  61      


 


3/15/17 12:55  72      


 


3/15/17 11:30  76      


 


3/15/17 11:30  71 16 81/52 97   





    75/45    








Labs:





Laboratory Tests








Test 3/16/17





 04:35


 


White Blood Count 12.9 TH/MM3





 (4.0-11.0)


 


Red Blood Count 3.07 MIL/MM3





 (4.50-5.90)


 


Hemoglobin 9.2 GM/DL





 (13.0-17.0)


 


Hematocrit 27.5 %





 (39.0-51.0)


 


Mean Corpuscular Volume 89.6 FL





 (80.0-100.0)


 


Mean Corpuscular Hemoglobin 30.0 PG





 (27.0-34.0)


 


Mean Corpuscular Hemoglobin 33.5 %





Concent (32.0-36.0)


 


Red Cell Distribution Width 13.5 %





 (11.6-17.2)


 


Platelet Count 191 TH/MM3





 (150-450)


 


Mean Platelet Volume 9.3 FL





 (7.0-11.0)


 


Activated Partial 52.4 SEC





Thromboplast Time (24.3-30.1)


 


Sodium Level 141 MEQ/L





 (136-145)


 


Potassium Level 3.6 MEQ/L





 (3.5-5.1)


 


Chloride Level 105 MEQ/L





 ()


 


Carbon Dioxide Level 27.6 MEQ/L





 (21.0-32.0)


 


Anion Gap 8 MEQ/L (5-15)


 


Blood Urea Nitrogen 15 MG/DL (7-18)


 


Creatinine 0.96 MG/DL





 (0.60-1.30)


 


Estimat Glomerular Filtration 79 ML/MIN (>89)





Rate 


 


Random Glucose 160 MG/DL





 ()


 


Calcium Level 8.2 MG/DL





 (8.5-10.1)








Result Diagram:  


3/16/17 0435                                                                   

             3/16/17 0435





Telemetry:


NSR





(1) Abnormal echocardiogram


Plan:  MARY CARMEN performed by Dr Whitlock 


no evidence of endocarditis or cardiac thrombus





(2) H/O mechanical aortic valve replacement


Plan:  continue heparin bridge





resume coumadin after surgery and when chest tube out 





 until INR 2.5-3.5; will require outpatient coumadin f/u w/ pcp





(3) Cardiomyopathy


Plan:  LVEF ~35-40%, on BB, 








re try low dose ace-I.





(4) right parapneumonic effusion 


Plan:  .3/13 Right Video-Assisted Thoracoscopic Surgery (VATS). 


2. Evacuation of Pleural Abscess. 


3. Decortication. 








no growth in cultures to date 


on IV antibiotics , will remove chest tube in am 











Terwilliger,Jacqueline R. ARNP Mar 16, 2017 10:47

## 2017-03-16 NOTE — PD.CONS
HPI


History of Present Illness


This  is a very pleasant 61-year-old male with history of mechanical aortic 

valve replacement in 2005 who presented to the emergency room complaining of 

about 4 weeks Duration of flu like symptoms that consisted of nausea vomiting 

diarrhea intermittently on and off and was found to have sepsis.  Patient 

currently is being followed by ID, cardiology, CTS. He is being treated for 

empyema, he is s/p VAT on 3/14. GI have been consulted for dysphagia on going 

for the past 5 moths but progressively worsening in severity and frequency. 

Patient states, he usually takes small bites, by the 5th bite, he feels spasm, 

chest tightness, pain and the food wouldn't go either way to where he has to 

induce vomiting. He has lost about 40 ibs in the past few months due to fear of 

eating. He never had EGD before. He denies GERD, nausea, vomiting, abdomen pain

, melena or hematochezia. (Gregg Grove)





PFSH


Past Medical History


Aortic valve mechanical replacement in 2005


empyema


Dysphagia


Past Surgical History


Open heart surgery with aortic valve replacement in 2005 (Gregg Grove)


Coded Allergies:  


     No Known Allergies (Unverified , 3/8/17)


Medications





 Current Medications








 Medications


  (Trade)  Dose


 Ordered  Sig/Marcia


 Route  Start Time


 Stop Time Status Last Admin


 


 Heparin Sodium


  (Porcine) 5000


 units  5,000 units  UNSCH  PRN


 IV  3/10/17 18:00


     


 


 


 Heparin Sodium/


 Dextrose  250 ml @ 0


 mls/hr  TITRATE


 IV  3/10/17 12:00


   Future Hold 3/15/17 13:30


 


 


  (Maxipime Inj/NS


 Inj)  100 ml @ 


 200 mls/hr  Q8H


 IV  3/10/17 18:00


    3/16/17 10:34


 


 


  (Flagyl)  500 mg  Q8H


 PO  3/10/17 16:00


    3/16/17 08:06


 


 


  (Coreg)  3.125 mg  BID


 PO  3/11/17 12:00


    3/16/17 08:07


 


 


  (Ativan)  1 mg  Q8H  PRN


 PO  3/12/17 15:00


    3/16/17 08:07


 


 


  (Prinivil)  2.5 mg  DAILY


 PO  3/13/17 09:00


    3/16/17 08:07


 


 


 Miscellaneous 1 ea  1 ea  UNSCH  PRN


 OTHER  3/13/17 09:00


     


 


 


  (Lr 1000 ml Inj)  1,000 ml @ 


 30 mls/hr  Q24H


 IV  3/13/17 21:00


     


 


 


  (NS Flush)  2 ml  BID


 IV FLUSH  3/14/17 21:00


    3/16/17 08:07


 


 


  (NS Flush)  2 ml  UNSCH  PRN


 IV FLUSH  3/14/17 10:15


     


 


 


  (Protonix)  40 mg  HS


 PO  3/14/17 21:00


    3/15/17 20:48


 


 


  (Zofran Inj)  4 mg  Q6H  PRN


 IV PUSH  3/14/17 10:15


     


 


 


  (Surfak)  240 mg  HS


 PO  3/14/17 21:00


     


 


 


  (Milk Of


 Magnesia Liq)  30 ml  DAILY  PRN


 PO  3/14/17 10:15


     


 


 


  (Tylenol)  650 mg  Q4H  PRN


 PO  3/14/17 10:15


     


 


 


  (Percocet  5-325


 Mg)  1 tab  Q3H  PRN


 PO  3/14/17 10:15


    3/15/17 20:48


 


 


  (Percocet  5-325


 Mg)  2 tab  Q3H  PRN


 PO  3/14/17 10:15


     


 








Family History


Noncontributory


Social History


Smoker quit 20 years ago


Occasional beer


Denies any substance abuse (Gregg Grove)





Review of Systems


Constitutional:  COMPLAINS OF: Fatigue, Weight loss


Endocrine:  DENIES: Polyuria


Eyes:  DENIES: Double Vision


Ears, nose, mouth, throat:  DENIES: Hoarseness


Respiratory:  DENIES: Shortness of breath


Cardiovascular:  DENIES: Claudication


Gastrointestinal:  COMPLAINS OF: Difficulty Swallowing, Anorexia,  DENIES: 

Abdominal pain, Black stools, Bloody stools, Constipation, Diarrhea, Nausea, 

Vomiting, Swelling of Abdomen, Heartburn, Hematemesis


Genitourinary:  DENIES: Hematuria


Musculoskeletal:  DENIES: Neck pain


Integumentary:  DENIES: Jaundice


Hematologic/lymphatic:  DENIES: Bruising


Neurologic:  DENIES: Abnormal gait


Psychiatric:  COMPLAINS OF: Anxiety (Gregg Grove)





GI Exam


Vitals I&O





 Vital Signs








  Date Time  Temp Pulse Resp B/P Pulse Ox O2 Delivery O2 Flow Rate FiO2


 


3/16/17 13:48  75      


 


3/16/17 12:02  75      


 


3/16/17 11:20  76      


 


3/16/17 11:20 97.7 76 18 94/58 98   


 


3/16/17 10:22  74      


 


3/16/17 09:29     99   21


 


3/16/17 09:22  74      


 


3/16/17 08:00  77      


 


3/16/17 07:30     97 Room Air  


 


3/16/17 07:30  80      


 


3/16/17 07:30 97.8 82 18 102/66 97   


 


3/16/17 06:00  68      


 


3/16/17 05:00  68      


 


3/16/17 04:00  66      


 


3/16/17 03:53     98   


 


3/16/17 03:00  62      


 


3/16/17 03:00  71 16 118/65 98   


 


3/16/17 02:00  68      


 


3/16/17 01:00  76      


 


3/16/17 00:00  66      


 


3/15/17 23:30  76 16 106/55 98   


 


3/15/17 23:00  80      


 


3/15/17 22:00  76      


 


3/15/17 21:46     97   21


 


3/15/17 21:00  78      


 


3/15/17 20:00 97.6 76 16 96/66 98   


 


3/15/17 20:00  72      


 


3/15/17 19:20     96 Room Air  


 


3/15/17 19:00  76      


 


3/15/17 18:07  76      


 


3/15/17 17:40  74      


 


3/15/17 16:19  71      


 


3/15/17 15:40  78      


 


3/15/17 15:40 97.5 78 16 72/48 98   





    72/45    


 


3/15/17 14:12  76      








 I/O








 3/15/17 3/15/17 3/15/17 3/16/17 3/16/17 3/16/17





 07:00 15:00 23:00 07:00 15:00 23:00


 


Intake Total 2120 ml  960 ml 1120 ml  


 


Output Total 565 ml  670 ml 1040 ml  


 


Balance 1555 ml  290 ml 80 ml  


 


      


 


Intake Oral 720 ml  960 ml 360 ml  


 


IV Total 1400 ml   760 ml  


 


Output Urine Total 475 ml  600 ml 1025 ml  


 


Chest Tube Drainage Total 90 ml  70 ml 15 ml  


 


# Voids   1   


 


# Bowel Movements   1 0  








Imaging





Last Impressions








Chest X-Ray 3/16/17 0500 Signed





Impressions: 





 Service Date/Time:  Thursday, March 16, 2017 04:19 - CONCLUSION: Patchy 

opacity 





 in the right lung unchanged.     Gregg Jennings MD 


 


Chest CT 3/8/17 1642 Signed





Impressions: 





 Service Date/Time:  Wednesday, March 8, 2017 16:48 - CONCLUSION:  1. Large 





 loculated effusion at the right base measuring 13 x 9 CM. 2. Aortic root 





 dilatation measuring 4.5 CM     Donnell Vincent MD 








Laboratory











Test 3/15/17 3/16/17





 20:54 04:35


 


Activated Partial 50.7 SEC 52.4 SEC





Thromboplast Time  


 


White Blood Count  12.9 TH/MM3


 


Red Blood Count  3.07 MIL/MM3


 


Hemoglobin  9.2 GM/DL


 


Hematocrit  27.5 %


 


Mean Corpuscular Volume  89.6 FL


 


Mean Corpuscular Hemoglobin  30.0 PG


 


Mean Corpuscular Hemoglobin  33.5 %





Concent  


 


Red Cell Distribution Width  13.5 %


 


Platelet Count  191 TH/MM3


 


Mean Platelet Volume  9.3 FL


 


Sodium Level  141 MEQ/L


 


Potassium Level  3.6 MEQ/L


 


Chloride Level  105 MEQ/L


 


Carbon Dioxide Level  27.6 MEQ/L


 


Anion Gap  8 MEQ/L


 


Blood Urea Nitrogen  15 MG/DL


 


Creatinine  0.96 MG/DL


 


Estimat Glomerular Filtration  79 ML/MIN





Rate  


 


Random Glucose  160 MG/DL


 


Calcium Level  8.2 MG/DL














 Date/Time Procedure Status





Source Growth 


 


 3/14/17 10:00 Gram Stain - Final Resulted





Wound Lung  





 3/14/17 10:00 Wound Culture - Preliminary Resulted





Wound Lung NO GROWTH IN 48 HOURS. 


 


 3/14/17 10:00 Fungal Smear - Final Resulted





Wound Lung NO FUNGAL ELEMENTS SEEN. 





 3/14/17 10:00 Fungal Culture Resulted





Wound Lung Pending 


 


 3/14/17 10:00 Acid Fast Stain - Final Resulted





Wound Lung NO ACID FAST BACILLI SEEN 





 3/14/17 10:00 Mycobacterial Culture Resulted





Wound Lung Pending 


 


 3/12/17 08:48 Aerobic Blood Culture - Preliminary Resulted





Blood Peripheral NO GROWTH IN 4 DAYS 





 3/12/17 08:48 Anaerobic Blood Culture - Preliminary Resulted





Blood Peripheral NO GROWTH IN 4 DAYS 








Physical Examination


HEENT:normocephalic; atraumatic; no jaundice.  Throat is clear.


NECK: Neck is supple, no JVD, no lymphadenopathy.


CHEST:  diminished tight lower lobe,chest tube to wall suction,


Breath sounds equal bilaterally. No accessory muscle use.


CARDIAC:  Regular rate and rhythm with no murmur gallop or rubs.


ABDOMEN:  Soft, nondistended, nontender; no hepatosplenomegaly; bowel sounds 

are present in all four quadrants.


EXTREMITIES: No clubbing, cyanosis, or edema.


SKIN:  Normal; no rash; no jaundice.


CNS:  No focal deficits; alert and oriented times three. (Gregg Grove)





Assessment and Plan


Plan


-  Dysphagia/odynophagia- GI have been consulted for dysphagia on going for the 

past 5 moths but progressively worsening in severity and frequency. 


   Patient states, he usually takes small bites, by the 5th bite, he feels spasm

, chest tightness, pain and the food wouldn't go either way to where he 


   has to induce vomiting. He has lost about 40 ibs in the past few months due 

to fear of eating. He never had EGD before. 


   He denies GERD, nausea, vomiting, abdomen pain, melena or hematochezia.


-  Sepsis/empyema- improving, being followed by ID, Cardiology , CTS, abx,  s/p 

VAT on 3/14, plan to remove chest tube tomorrow, cx pending 


-  Cardiomyopathy- MARY CARMEN no vegetation, no thrombus EF 30-40 %


-  Anemia- hgb 9.2, no obvious bleeding reported, not very keen to having 

colonoscopy done 


-  mechanical aortic valve replacement in 2005 





Plan:


- Diet per attending


- BS


- EGD/dill tomorrow, this was discussed with Dr. Horowitz and ARNP Terwilliger, 

he is cleared to have EGD in the am


- NPO mn


- Cont. PPI


- Hold Heparin by  MN


- Consider colonoscopy, this could be done as an OP, patient not very keen to 

having this done 


- Supportive care


- Patient seen and examined by dr. atwood and myself and this note is written 

on his behalf  (Gregg Grove)


Physician Comments


Seen and examined with PAULINE, egd/dilation planned for tomorrow. Cleareance 

obtained from ID/Cardiology. Will follow, thank you (Shun Atwood MD)








Gregg Grove Mar 16, 2017 14:15


Shun Atwood MD Mar 16, 2017 14:56

## 2017-03-16 NOTE — HHI.IDPN
Subjective


Subjective


Remarks


 is a 60 y/o CF with PMHx of congenital aortic valve problems 

diagnosed at age of 17 years when for a preemployment screen he was found to 

have a heart murmur. He subsequently underwent open heart surgery with 

mechanical aortic valve replacement in 2005?. Patient reports needing 

pericardiocentesis for hemopericardium based on his description. He also 

reports being on coumadin and being compliant with medications. He denies any 

heart valve infections or any infections in general. Patient kept repeating 

that other than all these problems I am very healthy person.





Patient reports that approximately 6 weeks back he had " Flu-like symptoms". He 

reports he does not like to go to doctors so he toughed it out. He reports 

symptoms lasting 5-6 days with cold, cough like symptoms. He reports feeling 

well for 4 days or so and since then for last 4 weeks he has had generalized 

weakness that progressed to the point that he was unable to go to the bathroom. 

He denies being on any antibiotics and did not go to MD or Urgent care. 





Patient now being evaluated and Managed for Empyema of lung, possible 

endocarditis.


Overnight events reviewed


No fevers


No rash


No diarrhea


CT still in place.


Antibiotics


Cefepime IV


Zyvox IV


flagyl oral.


Lines


Line sites with no e.o infection.


Past Medical History


reviewed


Allergies:  


Coded Allergies:  


     No Known Allergies (Unverified , 3/8/17)





Objective


.





 Vital Signs








  Date Time  Temp Pulse Resp B/P Pulse Ox O2 Delivery O2 Flow Rate FiO2


 


3/16/17 12:02  75      


 


3/16/17 11:20  76      


 


3/16/17 11:20 97.7 76 18 94/58 98   


 


3/16/17 10:22  74      


 


3/16/17 09:29     99   21


 


3/16/17 09:22  74      


 


3/16/17 08:00  77      


 


3/16/17 07:30     97 Room Air  


 


3/16/17 07:30  80      


 


3/16/17 07:30 97.8 82 18 102/66 97   


 


3/16/17 06:00  68      


 


3/16/17 05:00  68      


 


3/16/17 04:00  66      


 


3/16/17 03:53     98   


 


3/16/17 03:00  62      


 


3/16/17 03:00  71 16 118/65 98   


 


3/16/17 02:00  68      


 


3/16/17 01:00  76      


 


3/16/17 00:00  66      


 


3/15/17 23:30  76 16 106/55 98   


 


3/15/17 23:00  80      


 


3/15/17 22:00  76      


 


3/15/17 21:46     97   21


 


3/15/17 21:00  78      


 


3/15/17 20:00 97.6 76 16 96/66 98   


 


3/15/17 20:00  72      


 


3/15/17 19:20     96 Room Air  


 


3/15/17 19:00  76      


 


3/15/17 18:07  76      


 


3/15/17 17:40  74      


 


3/15/17 16:19  71      


 


3/15/17 15:40  78      


 


3/15/17 15:40 97.5 78 16 72/48 98   





    72/45    


 


3/15/17 14:12  76      


 


3/15/17 13:08  61      


 


3/15/17 12:55  72      














 3/15/17 3/15/17 3/16/17





 15:00 23:00 07:00


 


Intake Total  960 ml 1120 ml


 


Output Total  670 ml 1040 ml


 


Balance  290 ml 80 ml


 


   


 


Intake Oral  960 ml 360 ml


 


IV Total   760 ml


 


Output Urine Total  600 ml 1025 ml


 


Chest Tube Drainage Total  70 ml 15 ml


 


# Voids  1 


 


# Bowel Movements  1 0








.





Laboratory Tests








Test 3/15/17 3/16/17





 03:15 04:35


 


White Blood Count 13.6 TH/MM3 12.9 TH/MM3


 


Red Blood Count 3.34 MIL/MM3 3.07 MIL/MM3


 


Hemoglobin 10.1 GM/DL 9.2 GM/DL


 


Hematocrit 29.8 % 27.5 %


 


Mean Corpuscular Volume 89.1 FL 89.6 FL


 


Mean Corpuscular Hemoglobin 30.1 PG 30.0 PG


 


Mean Corpuscular Hemoglobin 33.8 % 33.5 %





Concent  


 


Red Cell Distribution Width 13.3 % 13.5 %


 


Platelet Count 191 TH/MM3 191 TH/MM3


 


Mean Platelet Volume 9.4 FL 9.3 FL


 


Neutrophils (%) (Auto) 92.1 % 


 


Lymphocytes (%) (Auto) 4.5 % 


 


Monocytes (%) (Auto) 3.3 % 


 


Eosinophils (%) (Auto) 0.0 % 


 


Basophils (%) (Auto) 0.1 % 


 


Neutrophils # (Auto) 12.5 TH/MM3 


 


Lymphocytes # (Auto) 0.6 TH/MM3 


 


Monocytes # (Auto) 0.5 TH/MM3 


 


Eosinophils # (Auto) 0.0 TH/MM3 


 


Basophils # (Auto) 0.0 TH/MM3 


 


CBC Comment DIFF FINAL  


 


Differential Comment   








Laboratory Tests








Test 3/15/17 3/16/17





 03:15 04:35


 


Sodium Level 139 MEQ/L 141 MEQ/L


 


Potassium Level 3.2 MEQ/L 3.6 MEQ/L


 


Chloride Level 102 MEQ/L 105 MEQ/L


 


Carbon Dioxide Level 25.3 MEQ/L 27.6 MEQ/L


 


Anion Gap 12 MEQ/L 8 MEQ/L


 


Blood Urea Nitrogen 15 MG/DL 15 MG/DL


 


Creatinine 0.99 MG/DL 0.96 MG/DL


 


Estimat Glomerular Filtration 76 ML/MIN 79 ML/MIN





Rate  


 


Random Glucose 174 MG/ MG/DL


 


Calcium Level 8.2 MG/DL 8.2 MG/DL


 


Magnesium Level 2.1 MG/DL 








Microbiology








 Date/Time Procedure Status





Source Growth 


 


 3/14/17 10:00 Gram Stain - Final Resulted





Fluid Other  





 3/14/17 10:00 Body Fluid Culture - Preliminary Resulted





Fluid Other NO GROWTH IN 48 HOURS. 


 


 3/14/17 10:00 Acid Fast Stain - Final Resulted





Fluid Other NO ACID FAST BACILLI SEEN 





 3/14/17 10:00 Mycobacterial Culture Resulted





Fluid Other Pending 


 


 3/14/17 10:00 Fungal Smear - Final Resulted





Fluid Other NO FUNGAL ELEMENTS SEEN. 





 3/14/17 10:00 Fungal Culture Resulted





Fluid Other Pending 


 


 3/14/17 10:00 Gram Stain - Final Resulted





Wound Lung  





 3/14/17 10:00 Wound Culture - Preliminary Resulted





Wound Lung NO GROWTH IN 48 HOURS. 


 


 3/14/17 10:00 Acid Fast Stain - Final Resulted





Wound Lung NO ACID FAST BACILLI SEEN 





 3/14/17 10:00 Mycobacterial Culture Resulted





Wound Lung Pending 


 


 3/14/17 10:00 Fungal Smear - Final Resulted





Wound Lung NO FUNGAL ELEMENTS SEEN. 





 3/14/17 10:00 Fungal Culture Resulted





Wound Lung Pending 








Imaging


Last Impressions








Chest CT 3/8/17 1642 Signed





Impressions: 





 Service Date/Time:  Wednesday, March 8, 2017 16:48 - CONCLUSION:  1. Large 





 loculated effusion at the right base measuring 13 x 9 CM. 2. Aortic root 





 dilatation measuring 4.5 CM     Donnell Vincent MD 


 


Chest X-Ray 3/8/17 0000 Signed





Impressions: 





 Service Date/Time:  Wednesday, March 8, 2017 13:19 - CONCLUSION:  Opacity in 

the 





 mid and lower lung zone on the right that likely represents consolidation. The 





 superior margin appears somewhat well-defined suggesting potential mass. 





 However, the appearance favors severe airspace consolidation with small to 





 moderate size pleural effusion. This may represent a pneumonia/infectious 





 process given the clinical history. Recommend followup imaging to confirm 





 improvement and ultimately resolution.     Jonathan Ramirez MD 








Physical Exam


GENERAL: This is a well-nourished, well-developed patient, in no apparent 

distress.


SKIN: No rashes, ecchymoses or lesions. Cool and dry.


HEAD: Atraumatic. Normocephalic. No temporal or scalp tenderness.


EYES: Pupils equal round and reactive. Extraocular motions intact. No scleral 

icterus. No injection or drainage. 


ENT: Nose without bleeding, purulent drainage or septal hematoma. Throat 

without erythema, tonsillar hypertrophy or exudate. Uvula midline. Airway 

patent.


NECK: Trachea midline. No JVD or lymphadenopathy. Supple, nontender, no 

meningeal signs.


CARDIOVASCULAR: Mechanical heart valve sounds. No murmur appreciated. 


RESPIRATORY: Clear to auscultation. Breath sounds equal diminished on right 

side. CT right side with sanguinous drainage.


GASTROINTESTINAL: Abdomen soft, non-tender, nondistended. 


MUSCULOSKELETAL: Extremities without clubbing, cyanosis, or edema. 


NEUROLOGICAL: Awake and alert. Grossly non focal.


Psych: cooperative


IV line sites with no e.o infection.





Assessment & Plan


Remarks


Sepsis present on admission.


Right side pleural effusion ? cardiac vs infection related. s/p VATS cultures 

pending.


Ascites :  ? Diaphragmatic defect related connection between pleural and 

peritoneal cavities.


Rule out mechanical aortic valve endocarditis.


Persistent fevers despite broad spectrum antibiotics ? drug fever ? vanco IV





Recs


Continue Cefepime IV increase dose to PSAE doses.


DC Zyvox IV


Continue Flagyl ? anaerobic component.


MARY CARMEN negative for vegetations.


HIV and Hepatitis panel negative


RPR negative.


Follow CXR


Follow cultures


Follow clinically.


I will be OOT from 3/17/17 to 3/24/17 other ID MDs to cover for me.








Sharron Horowitz MD Mar 16, 2017 12:43

## 2017-03-16 NOTE — RADRPT
EXAM DATE/TIME:  03/16/2017 04:19 

 

[facility] COMPARISON:     

CHEST SINGLE AP, March 15, 2017, 4:26.

 

                     

INDICATIONS :     

Shortness of breath, possible pulmonary disease.

                     

 

MEDICAL HISTORY :     

None.          

 

SURGICAL HISTORY :        

Thoracotomy

 

ENCOUNTER:     

Subsequent                                        

 

ACUITY:     

4 - 6 days      

 

PAIN SCORE:     

3/10

LOCATION:     Bilateral chest 

 

FINDINGS:     

Single AP view of the chest. Right-sided chest tube again seen. Patchy opacity in the right mid to lo
wer lung zone unchanged. Left lung clear. Cardiomediastinal silhouette within normal limits. No evide
nce of pleural effusion or pneumothorax.

 

CONCLUSION: Patchy opacity in the right lung unchanged. 

 

 

 Gregg Jennings MD on March 16, 2017 at 5:32           

Board Certified Radiologist.

 This report was verified electronically.

## 2017-03-16 NOTE — HHI.PR
Subjective


Remarks


feels good


po improved


minimal pain- chest tube site





dysphagia- now states feels better -that it may be positional as he huucnhed 

over when eating- but described feeling of 


food stuck on the midchest and felt that he loss some weight from this


told him we will proceed with GI evaluation





Objective


Vitals





 Vital Signs








  Date Time  Temp Pulse Resp B/P Pulse Ox O2 Delivery O2 Flow Rate FiO2


 


3/16/17 09:29     99   21


 


3/16/17 09:22  74      


 


3/16/17 08:00  77      


 


3/16/17 07:30     97 Room Air  


 


3/16/17 07:30  80      


 


3/16/17 07:30 97.8 82 18 102/66 97   


 


3/16/17 06:00  68      


 


3/16/17 05:00  68      


 


3/16/17 04:00  66      


 


3/16/17 03:53     98   


 


3/16/17 03:00  62      


 


3/16/17 03:00  71 16 118/65 98   


 


3/16/17 02:00  68      


 


3/16/17 01:00  76      


 


3/16/17 00:00  66      


 


3/15/17 23:30  76 16 106/55 98   


 


3/15/17 23:00  80      


 


3/15/17 22:00  76      


 


3/15/17 21:46     97   21


 


3/15/17 21:00  78      


 


3/15/17 20:00 97.6 76 16 96/66 98   


 


3/15/17 20:00  72      


 


3/15/17 19:20     96 Room Air  


 


3/15/17 19:00  76      


 


3/15/17 18:07  76      


 


3/15/17 17:40  74      


 


3/15/17 16:19  71      


 


3/15/17 15:40  78      


 


3/15/17 15:40 97.5 78 16 72/48 98   





    72/45    


 


3/15/17 14:12  76      


 


3/15/17 13:08  61      


 


3/15/17 12:55  72      


 


3/15/17 11:30  76      


 


3/15/17 11:30  71 16 81/52 97   





    75/45    


 


3/15/17 10:14  87      








 I/O








 3/15/17 3/15/17 3/15/17 3/16/17 3/16/17 3/16/17





 07:00 15:00 23:00 07:00 15:00 23:00


 


Intake Total 2120 ml  960 ml 1120 ml  


 


Output Total 565 ml  670 ml 1040 ml  


 


Balance 1555 ml  290 ml 80 ml  


 


      


 


Intake Oral 720 ml  960 ml 360 ml  


 


IV Total 1400 ml   760 ml  


 


Output Urine Total 475 ml  600 ml 1025 ml  


 


Chest Tube Drainage Total 90 ml  70 ml 15 ml  


 


# Voids   1   


 


# Bowel Movements   1 0  








Result Diagram:  


3/16/17 0435                                                                   

             3/16/17 0435





Imaging





Last 24 hours Impressions








Chest X-Ray 3/16/17 0500 Signed





Impressions: 





 Service Date/Time:  Thursday, March 16, 2017 04:19 - CONCLUSION: Patchy 

opacity 





 in the right lung unchanged.     Gregg Jennings MD 








Objective Remarks


awake and alert, NAD


decreased breath sounds and decrease vocal fremiti-  base to mid, right , chest 

tube in place- right posterior chest wall


regular rhythm-


abdomen soft, nontender


extremities no edema


neuro exam- unremarkable


Procedures


3/12- MARY CARMEN- no vegetations, no thrombus


3/14- VAT





A/P


Assessment and Plan


Patient is a 61-year-old male presenting with 4 weeks duration of body malaise, 

flulike symptoms nausea vomiting diarrhea for the past 5 days with cough 

productive of off-white sputum


Patient with leukocytosis 





Sepsis secondary to  right empyema - S/P VAT 3/14 - BP 93/60


   on cefepime, zyvox, Flagyl . ff cultures= pending


   O2 when necessary


   Infectious disease Service  ff along with us


   Cardiothoracic surgery ff


   WBC trending down


History of aortic mechanical valve replacement


TAchycardia- improved


Cardiomyopathy


   S/P MARY CARMEN 3/12- no vegetation,s no thrombus EF 30-40%


   on heparin drip 


   hold coumadin- - restart after chest tube out


   Coreg 3.125 mg bid-- . consider eventually adding ACE- as BP tolerates


Hypokalemia- improved


   received 30 meq po KCL


   recheck 


Anxiety- Reaction


   improved with Ativan po 1 mg po q8 prn


Odynophagia/Dysphagia- with solid - 2-3 weeks and lost weight from not eating 


   now think it may be positional but state ongoing- -feels better today


   d/w him we will go ahead with GI consult for evaluation


PPI


Increase activity- PT- doing great with ambulating








Osmani Padgett MD Mar 16, 2017 09:48

## 2017-03-16 NOTE — RADRPT
EXAM DATE/TIME:  03/16/2017 16:23 

 

[facility] COMPARISON:     

No previous studies available for comparison.

 

                     

INDICATIONS :     

Patient has had trouble swallowing solids and liquids for four to five months. While eatting patient 
feels that the food gets stuck in the middle of his throat and then vomits it up.

                     

 

FLUORO TIME:     

1.3 minutes

 

IMAGE COUNT:     

16

                     

 

CONTRAST:      

1. E-Z HD Barium Sulfate (98% w/w)

                     

 

MEDICAL HISTORY :     

None.          

 

SURGICAL HISTORY :        

Post thoracotomy

 

ENCOUNTER:     

Initial                                        

 

ACUITY:     

1 day      

 

PAIN SCORE:     

0/10

 

LOCATION:     

Bilateral  Esophagus.

 

FINDINGS:     

There is a corkscrew appearance of the mid to distal esophagus on all images. Contrast is seen to pas
s through this region. The GE junction appears intact. A mucosal lesion is not clearly seen.

 

CONCLUSION:     

Corkscrew appearance of the mid to distal esophagus concerning for spasm.

 

 

 

 Jonathan Moreno MD on March 16, 2017 at 17:00           

Board Certified Radiologist.

 This report was verified electronically.

## 2017-03-17 VITALS — OXYGEN SATURATION: 98 %

## 2017-03-17 VITALS — HEART RATE: 79 BPM

## 2017-03-17 VITALS
TEMPERATURE: 98.6 F | SYSTOLIC BLOOD PRESSURE: 110 MMHG | OXYGEN SATURATION: 98 % | RESPIRATION RATE: 16 BRPM | DIASTOLIC BLOOD PRESSURE: 65 MMHG | HEART RATE: 70 BPM

## 2017-03-17 VITALS
DIASTOLIC BLOOD PRESSURE: 66 MMHG | TEMPERATURE: 98.6 F | SYSTOLIC BLOOD PRESSURE: 108 MMHG | OXYGEN SATURATION: 97 % | RESPIRATION RATE: 16 BRPM | HEART RATE: 92 BPM

## 2017-03-17 VITALS
SYSTOLIC BLOOD PRESSURE: 126 MMHG | DIASTOLIC BLOOD PRESSURE: 83 MMHG | TEMPERATURE: 98.8 F | OXYGEN SATURATION: 96 % | HEART RATE: 82 BPM | RESPIRATION RATE: 20 BRPM

## 2017-03-17 VITALS
RESPIRATION RATE: 16 BRPM | TEMPERATURE: 98.6 F | DIASTOLIC BLOOD PRESSURE: 65 MMHG | SYSTOLIC BLOOD PRESSURE: 110 MMHG | HEART RATE: 82 BPM | OXYGEN SATURATION: 98 %

## 2017-03-17 VITALS — HEART RATE: 81 BPM

## 2017-03-17 VITALS — HEART RATE: 70 BPM

## 2017-03-17 VITALS — HEART RATE: 84 BPM

## 2017-03-17 VITALS
TEMPERATURE: 99.6 F | DIASTOLIC BLOOD PRESSURE: 56 MMHG | HEART RATE: 91 BPM | RESPIRATION RATE: 20 BRPM | SYSTOLIC BLOOD PRESSURE: 103 MMHG | OXYGEN SATURATION: 96 %

## 2017-03-17 VITALS — HEART RATE: 75 BPM

## 2017-03-17 VITALS
DIASTOLIC BLOOD PRESSURE: 79 MMHG | OXYGEN SATURATION: 97 % | TEMPERATURE: 98.2 F | SYSTOLIC BLOOD PRESSURE: 123 MMHG | HEART RATE: 78 BPM | RESPIRATION RATE: 20 BRPM

## 2017-03-17 VITALS
HEART RATE: 78 BPM | OXYGEN SATURATION: 99 % | RESPIRATION RATE: 16 BRPM | SYSTOLIC BLOOD PRESSURE: 113 MMHG | DIASTOLIC BLOOD PRESSURE: 71 MMHG | TEMPERATURE: 98.1 F

## 2017-03-17 VITALS — HEART RATE: 73 BPM

## 2017-03-17 VITALS — HEART RATE: 66 BPM

## 2017-03-17 VITALS — HEART RATE: 76 BPM

## 2017-03-17 VITALS — HEART RATE: 100 BPM

## 2017-03-17 VITALS — HEART RATE: 72 BPM

## 2017-03-17 VITALS — OXYGEN SATURATION: 95 %

## 2017-03-17 VITALS — HEART RATE: 74 BPM

## 2017-03-17 VITALS — HEART RATE: 68 BPM

## 2017-03-17 LAB
APTT BLD: 31.2 SEC (ref 24.3–30.1)
APTT BLD: 42.6 SEC (ref 24.3–30.1)
INR PPP: 1.1 RATIO
PROTHROMBIN TIME: 11.7 SEC (ref 9.8–11.6)

## 2017-03-17 PROCEDURE — 0D758ZZ DILATION OF ESOPHAGUS, VIA NATURAL OR ARTIFICIAL OPENING ENDOSCOPIC: ICD-10-PCS | Performed by: INTERNAL MEDICINE

## 2017-03-17 PROCEDURE — 0DB58ZX EXCISION OF ESOPHAGUS, VIA NATURAL OR ARTIFICIAL OPENING ENDOSCOPIC, DIAGNOSTIC: ICD-10-PCS | Performed by: INTERNAL MEDICINE

## 2017-03-17 RX ADMIN — CEFEPIME SCH MLS/HR: 1 INJECTION, POWDER, FOR SOLUTION INTRAMUSCULAR; INTRAVENOUS at 18:10

## 2017-03-17 RX ADMIN — SODIUM CHLORIDE, PRESERVATIVE FREE SCH ML: 5 INJECTION INTRAVENOUS at 09:00

## 2017-03-17 RX ADMIN — PANTOPRAZOLE SCH MG: 40 TABLET, DELAYED RELEASE ORAL at 22:49

## 2017-03-17 RX ADMIN — CARVEDILOL SCH MG: 3.12 TABLET, FILM COATED ORAL at 22:48

## 2017-03-17 RX ADMIN — OXYCODONE HYDROCHLORIDE AND ACETAMINOPHEN PRN TAB: 5; 325 TABLET ORAL at 15:39

## 2017-03-17 RX ADMIN — OXYTOCIN SCH MLS/HR: 10 INJECTION, SOLUTION INTRAMUSCULAR; INTRAVENOUS at 22:47

## 2017-03-17 RX ADMIN — ALBUTEROL SULFATE SCH MG: 2.5 SOLUTION RESPIRATORY (INHALATION) at 15:45

## 2017-03-17 RX ADMIN — OXYCODONE HYDROCHLORIDE AND ACETAMINOPHEN PRN TAB: 5; 325 TABLET ORAL at 01:56

## 2017-03-17 RX ADMIN — CEFEPIME SCH MLS/HR: 1 INJECTION, POWDER, FOR SOLUTION INTRAMUSCULAR; INTRAVENOUS at 10:00

## 2017-03-17 RX ADMIN — OXYCODONE HYDROCHLORIDE AND ACETAMINOPHEN PRN TAB: 5; 325 TABLET ORAL at 22:48

## 2017-03-17 RX ADMIN — SODIUM CHLORIDE, PRESERVATIVE FREE SCH ML: 5 INJECTION INTRAVENOUS at 22:49

## 2017-03-17 RX ADMIN — LISINOPRIL SCH MG: 5 TABLET ORAL at 12:47

## 2017-03-17 RX ADMIN — DOCUSATE CALCIUM SCH MG: 240 CAPSULE, LIQUID FILLED ORAL at 21:00

## 2017-03-17 RX ADMIN — CEFEPIME SCH MLS/HR: 1 INJECTION, POWDER, FOR SOLUTION INTRAMUSCULAR; INTRAVENOUS at 01:53

## 2017-03-17 RX ADMIN — ALBUTEROL SULFATE SCH MG: 2.5 SOLUTION RESPIRATORY (INHALATION) at 03:50

## 2017-03-17 RX ADMIN — CARVEDILOL SCH MG: 3.12 TABLET, FILM COATED ORAL at 12:47

## 2017-03-17 RX ADMIN — ALBUTEROL SULFATE SCH MG: 2.5 SOLUTION RESPIRATORY (INHALATION) at 09:58

## 2017-03-17 RX ADMIN — DICYCLOMINE HYDROCHLORIDE SCH MG: 20 TABLET ORAL at 13:00

## 2017-03-17 RX ADMIN — ALBUTEROL SULFATE SCH MG: 2.5 SOLUTION RESPIRATORY (INHALATION) at 22:29

## 2017-03-17 RX ADMIN — DICYCLOMINE HYDROCHLORIDE SCH MG: 20 TABLET ORAL at 18:00

## 2017-03-17 NOTE — PD.CAR.PN
CVT Progress Note


Subjective/Hospital Course:


 61-year-old male with history of mechanical aortic valve replacement in 2005 

who presented to the emergency room complaining of about 4 weeks Duration of 

nausea vomiting diarrhea intermittently on and off.  Lasting for 5 days at a 

time and will be okay for to 3 days then recurred.  Patient describes stools as 

liquid brown nonbloody.  For the past 5 days now has been having productive 

cough off white sputum associated with body Monday, nausea, diarrhea which 

prompted patient to come in here and admitted. 





On admission, with elevated WBC, elevated lactic acid level


found to have large loculated pleural effusion 





3/13


still having fevers 


on vanc , cefepime and flagly 


pending right VATS , possible decortication and possible pleurodesis in am 





3/14


. Right Video-Assisted Thoracoscopic Surgery (VATS). 


2. Evacuation of Pleural Abscess. 


3. Decortication. 


4. Intercostal Nerve Block





3/15


leave chest tube in 


continue ambulating 


pulmonary toileting 





3/16


drained 15cc/ 12 hrs 


will hold heparin gtt in am at 0600 for removal of chest tube in am 4 hours 

after holding 





then eval for resuming coumadin tomorrow pm 





3/17


resume coumadin this pm and resume heparin gtt this afternoon 


chest tube removed earlier today 


s/p EGD


Objective:


GENERAL: 


SKIN: Warm and dry.incision intact right posterolateral chest wall 


dressing over chest tube site 


HEAD: Normocephalic.


EYES: No scleral icterus. No injection or drainage. 


NECK: Supple, trachea midline. No JVD or lymphadenopathy.


CARDIOVASCULAR: Regular rate and rhythm without murmurs, gallops, or rubs. 


RESPIRATORY: diminished right lower lobe 


Breath sounds equal bilaterally. No accessory muscle use.


GASTROINTESTINAL: Abdomen soft, non-tender, nondistended. 


MUSCULOSKELETAL: No cyanosis, or edema. 


BACK: Nontender without obvious deformity. No CVA tenderness.








 Vital Signs








  Date Time  Temp Pulse Resp B/P Pulse Ox O2 Delivery O2 Flow Rate FiO2


 


3/17/17 10:10 98.6 70 16 110/65 98   


 


3/17/17 09:59     98 Nasal Cannula  21


 


3/17/17 08:00     97 Room Air  


 


3/17/17 08:00 98.2 78 20 123/79 97   





    Arterial Line    


 


3/17/17 07:00  70      


 


3/17/17 06:00  74      


 


3/17/17 05:00  66      


 


3/17/17 04:10 98.6 82 16 110/65 98   


 


3/17/17 04:00  68      


 


3/17/17 03:00  66      


 


3/17/17 02:00  70      


 


3/17/17 01:00  70      


 


3/17/17 00:56 98.6 92 16 108/66 97   


 


3/17/17 00:00  70      


 


3/16/17 23:00  82      


 


3/16/17 22:00  88      


 


3/16/17 21:00  80      


 


3/16/17 20:44     97 Room Air  


 


3/16/17 20:10 98.4 88 16 111/73 97   


 


3/16/17 20:00  84      


 


3/16/17 19:28     96   21


 


3/16/17 19:00  77      


 


3/16/17 18:02  77      


 


3/16/17 17:04  74      


 


3/16/17 15:20  74      


 


3/16/17 15:20 97.7 77 18 123/73 96   


 


3/16/17 14:40  78      








Labs:





Laboratory Tests








Test 3/17/17





 05:15


 


Activated Partial 31.2 SEC





Thromboplast Time (24.3-30.1)








Result Diagram:  


3/16/17 0435                                                                   

             3/16/17 0435





Telemetry:


NSR





(1) Abnormal echocardiogram


Plan:  MARY CARMEN performed by Dr Whitlock 


no evidence of endocarditis or cardiac thrombus





(2) H/O mechanical aortic valve replacement


Plan:  continue heparin bridge





resume coumadin today 








 until INR 2.5-3.5; 





will require outpatient coumadin f/u w/ pcp





(3) Cardiomyopathy


Plan:  LVEF ~35-40%, on BB, 








re try low dose ace-I.





(4) right parapneumonic effusion 


Plan:  .3/13 Right Video-Assisted Thoracoscopic Surgery (VATS). 


2. Evacuation of Pleural Abscess. 


3. Decortication. 








no growth in cultures to date 


on IV antibiotics ,


chest tube removed 





resume heparin gtt, restart coumadin this pm





will see prn  


has f/u appoint with Dr Gross in 2 weeks











Terwilliger,Jacqueline R. ARNP Mar 17, 2017 14:14

## 2017-03-17 NOTE — GIPROC
[facility]

[address] [facility], [address]

 

 

EGD PROCEDURE REPORT     EXAM DATE: 03/17/2017

 

PATIENT NAME:      Donnell Majano           MR #:      [id number]

YOB: 1955      VISIT #:     [id number]

ATTENDING:     Shun Atwood MD     ORDER #:     [id number]

ASSISTANT:      Keegan Sharp and Jeremiah Lopez     STATUS:     inpatient

 

INDICATIONS:  The patient is a 61 yr old male here for an EGD due to dysphagia

 

PROCEDURE PERFORMED:     EGD w/ biopsy

EGD w/ dilation of esophagus via guidewire

MEDICATIONS:     None and Per Anesthesia.

TOPICAL ANESTHETIC:

 

CONSENT: The patient understands the risks and benefits of the procedure and

understands that these risks include, but are not limited to: sedation,

allergic reaction, infection, perforation and/or bleeding. Alternative means of

evaluation and treatment include, among others: physical exam, x-rays, and/or

surgical intervention. The patient elects to proceed with this endoscopic

procedure.

 



medical equipment was checked for proper function. Hand hygiene and appropriate

measures for infection prevention was taken. After the risks, benefits and

alternatives of the procedure were thoroughly explained, Informed consent was

verified, confirmed and timeout was successfully executed by the treatment

team. The patient was anesthetized with topical anesthesia and the Pentax

[id number] and [id number] endoscope was introduced through the mouth and advanced to

the second portion of the duodenum.  Retroflexed views revealed no

abnormalities  The gastroscope was then slowly withdrawn and removed.

 

ESOPHAGUS: Cork screw esophagus, dilated 17mm savary dilator.

 

 

 

ADVERSE EVENTS:     There were no complications.

IMPRESSIONS:     1.  Cork screw esophagus, dilated 17mm savary dilator

2.  Retroflexed views revealed no abnormalities

 

RECOMMENDATIONS:     1.  Await biopsy results.  Biopsy results will not be ready

for 7-10 days.  If you don't hear from us in two weeks, call our office for

biopsy results.

2.  Anti-reflux regimen

3.  Continue PPI

4.  Dicyclomine 20mg 1 po tid

PATIENT CONDITION:     stable

DISPOSITION:     Inpatient

REPEAT EXAM:     Return as needed for EGD with dilatation

 

 

___________________________________

Shun Atwood MD

eSigned:  Shun Atwood MD 03/17/2017 10:43 AM

 

 

cc:

## 2017-03-17 NOTE — RADRPT
EXAM DATE/TIME:  03/17/2017 06:02 

 

[facility] COMPARISON:     

CHEST SINGLE AP, March 16, 2017, 4:19.

 

                     

INDICATIONS :     

Shortness of breath. 

                     

 

MEDICAL HISTORY :     

None.          

 

SURGICAL HISTORY :        

Thoracotomy

 

ENCOUNTER:     

Subsequent                                        

 

ACUITY:     

1 week      

 

PAIN SCORE:     

Non-responsive.

 

LOCATION:      

chest 

 

FINDINGS:     

There is evidence for prior median sternotomy. Chest tube is present on the right side. There is no c
hange in right mid and lower lung infiltrates.

 

CONCLUSION:     No appreciable change.

 

 

 

 NABILA Jones MD on March 17, 2017 at 7:06           

Board Certified Radiologist.

 This report was verified electronically.

## 2017-03-17 NOTE — HHI.PR
Subjective


Remarks


Patient stable seen in his bedroom no new issues, no Nausea, vomit or diarrhea


Chest tube removed. on Echocardiogram no evidence of Endocarditis or cardiac 

thrombus, 


Mechanical Aortic Valve replacement continue Heparin bridge and resume Coumadin


until INR 2.5 to 3.5, Cardiomyopathy LVEF 35-40% on Beta blockers, status post


Right Video Assisted Thoracoscopic surgery VATS, on 3/13, evaluation of pleural 

abscess


and Decortication. recommended to follow with Doctor Gross in two weeks. 


Antibiotics as per ID specialist.





Objective





 Vital Signs








  Date Time  Temp Pulse Resp B/P Pulse Ox O2 Delivery O2 Flow Rate FiO2


 


3/17/17 06:00  74      


 


3/17/17 05:00  66      


 


3/17/17 04:10 98.6 82 16 110/65 98   


 


3/17/17 04:00  68      


 


3/17/17 03:00  66      


 


3/17/17 02:00  70      


 


3/17/17 01:00  70      


 


3/17/17 00:56 98.6 92 16 108/66 97   


 


3/17/17 00:00  70      


 


3/16/17 23:00  82      


 


3/16/17 22:00  88      


 


3/16/17 21:00  80      


 


3/16/17 20:44     97 Room Air  


 


3/16/17 20:10 98.4 88 16 111/73 97   


 


3/16/17 20:00  84      


 


3/16/17 19:28     96   21


 


3/16/17 19:00  77      


 


3/16/17 18:02  77      


 


3/16/17 17:04  74      


 


3/16/17 15:20  74      


 


3/16/17 15:20 97.7 77 18 123/73 96   


 


3/16/17 14:40  78      


 


3/16/17 13:48  75      


 


3/16/17 12:02  75      


 


3/16/17 11:20  76      


 


3/16/17 11:20 97.7 76 18 94/58 98   


 


3/16/17 10:22  74      


 


3/16/17 09:29     99   21


 


3/16/17 09:22  74      








 I/O








 3/16/17 3/16/17 3/16/17 3/17/17 3/17/17 3/17/17





 07:00 15:00 23:00 07:00 15:00 23:00


 


Intake Total 1120 ml  720 ml  240 ml 


 


Output Total 1040 ml  500 ml  610 ml 


 


Balance 80 ml  220 ml  -370 ml 


 


      


 


Intake Oral 360 ml  720 ml  240 ml 


 


IV Total 760 ml     


 


Output Urine Total 1025 ml  450 ml  550 ml 


 


Chest Tube Drainage Total 15 ml  50 ml  60 ml 


 


# Voids   3   


 


# Bowel Movements 0  1   








Result Diagram:  


3/16/17 0435                                                                   

             3/16/17 0435





Imaging





Last Impressions








Chest X-Ray 3/17/17 0500 Signed





Impressions: 





 Service Date/Time:  Friday, March 17, 2017 06:02 - CONCLUSION: No appreciable 





 change.     NABILA Jones MD 


 


Barium Swallow X-Ray 3/16/17 0000 Signed





Impressions: 





 Service Date/Time:  Thursday, March 16, 2017 16:23 - CONCLUSION:  Corkscrew 





 appearance of the mid to distal esophagus concerning for spasm.     Jonathan Moreno MD 


 


Chest CT 3/8/17 1642 Signed





Impressions: 





 Service Date/Time:  Wednesday, March 8, 2017 16:48 - CONCLUSION:  1. Large 





 loculated effusion at the right base measuring 13 x 9 CM. 2. Aortic root 





 dilatation measuring 4.5 CM     Donnell Vincent MD 








Procedures


Right Video Assisted Thoracoscopic surgery VATS, on 3/13, evaluation of pleural 

abscess


and Decortication.


Other Results





 Laboratory Tests








Test 3/13/17 3/13/17 3/13/17 3/15/17





 05:13 08:05 10:19 03:15


 


Antibody Screen POSITIVE    


 


Antigen Identification K Antigen -   





 NEGATIVE   


 


Direct Antiglobulin Test NEGATIVE    





(Jennifer)    


 


Crossmatch Leukocyte-Reduced   





 Red Blood   





 Cells   


 


Blood Bank Comment     


 


Blood Type  A NEGATIVE   


 


Antibody Identification   Anti-K  


 


Neutrophils (%) (Auto)    92.1 %


 


Lymphocytes (%) (Auto)    4.5 %


 


Monocytes (%) (Auto)    3.3 %


 


Eosinophils (%) (Auto)    0.0 %


 


Basophils (%) (Auto)    0.1 %


 


Neutrophils # (Auto)    12.5 TH/MM3


 


Lymphocytes # (Auto)    0.6 TH/MM3


 


Monocytes # (Auto)    0.5 TH/MM3


 


Eosinophils # (Auto)    0.0 TH/MM3


 


Basophils # (Auto)    0.0 TH/MM3


 


CBC Comment    DIFF FINAL 


 


Differential Comment     


 


Magnesium Level    2.1 MG/DL


 


    





Test 3/16/17 3/17/17  





 04:35 05:15  


 


White Blood Count 12.9 TH/MM3   


 


Red Blood Count 3.07 MIL/MM3   


 


Hemoglobin 9.2 GM/DL   


 


Hematocrit 27.5 %   


 


Mean Corpuscular Volume 89.6 FL   


 


Mean Corpuscular Hemoglobin 30.0 PG   


 


Mean Corpuscular Hemoglobin 33.5 %   





Concent    


 


Red Cell Distribution Width 13.5 %   


 


Platelet Count 191 TH/MM3   


 


Mean Platelet Volume 9.3 FL   


 


Sodium Level 141 MEQ/L   


 


Potassium Level 3.6 MEQ/L   


 


Chloride Level 105 MEQ/L   


 


Carbon Dioxide Level 27.6 MEQ/L   


 


Anion Gap 8 MEQ/L   


 


Blood Urea Nitrogen 15 MG/DL   


 


Creatinine 0.96 MG/DL   


 


Estimat Glomerular Filtration 79 ML/MIN   





Rate    


 


Random Glucose 160 MG/DL   


 


Calcium Level 8.2 MG/DL   


 


Activated Partial  31.2 SEC  





Thromboplast Time    








Objective Remarks


GENERAL: 


SKIN: Warm and dry.


HEAD: Atraumatic. Normocephalic. 


EYES: Pupils equal and round. No scleral icterus. No injection or drainage. 


ENT: No nasal bleeding or discharge.  Mucous membranes pink and moist.


NECK: Trachea midline. No JVD. 


CARDIOVASCULAR: Regular rate and rhythm.  


RESPIRATORY: No accessory muscle use. Clear to auscultation. Breath sounds 

equal bilaterally. 


GASTROINTESTINAL: Abdomen soft, non-tender, nondistended. Hepatic and splenic 

margins not palpable. 


MUSCULOSKELETAL: Extremities without clubbing, cyanosis, or edema. No obvious 

deformities. 


NEUROLOGICAL: Awake and alert. No obvious cranial nerve deficits.  Motor 

grossly within normal limits. Five out of 5 muscle strength in the arms and 

legs.  Normal speech.


PSYCHIATRIC: Appropriate mood and affect; insight and judgment normal.


Medications and IVs





 Current Medications








 Medications


  (Trade)  Dose


 Ordered  Sig/Marcia


 Route  Start Time


 Stop Time Status Last Admin


 


 Heparin Sodium


  (Porcine) 5000


 units  5,000 units  UNSCH  PRN


 IV  3/10/17 18:00


   Hold  


 


 


 Heparin Sodium/


 Dextrose  250 ml @ 0


 mls/hr  TITRATE


 IV  3/10/17 12:00


   Hold 3/16/17 18:41


 


 


  (Maxipime Inj/NS


 Inj)  100 ml @ 


 200 mls/hr  Q8H


 IV  3/10/17 18:00


    3/17/17 01:53


 


 


  (Flagyl)  500 mg  Q8H


 PO  3/10/17 16:00


    3/17/17 00:00


 


 


  (Coreg)  3.125 mg  BID


 PO  3/11/17 12:00


    3/16/17 21:59


 


 


  (Ativan)  1 mg  Q8H  PRN


 PO  3/12/17 15:00


    3/16/17 22:00


 


 


  (Prinivil)  2.5 mg  DAILY


 PO  3/13/17 09:00


    3/16/17 08:07


 


 


 Miscellaneous 1 ea  1 ea  UNSCH  PRN


 OTHER  3/13/17 09:00


     


 


 


  (Lr 1000 ml Inj)  1,000 ml @ 


 30 mls/hr  Q24H


 IV  3/13/17 21:00


    3/16/17 22:03


 


 


  (NS Flush)  2 ml  BID


 IV FLUSH  3/14/17 21:00


    3/16/17 22:02


 


 


  (NS Flush)  2 ml  UNSCH  PRN


 IV FLUSH  3/14/17 10:15


     


 


 


  (Protonix)  40 mg  HS


 PO  3/14/17 21:00


    3/16/17 22:01


 


 


  (Zofran Inj)  4 mg  Q6H  PRN


 IV PUSH  3/14/17 10:15


     


 


 


  (Surfak)  240 mg  HS


 PO  3/14/17 21:00


     


 


 


  (Milk Of


 Magnesia Liq)  30 ml  DAILY  PRN


 PO  3/14/17 10:15


     


 


 


  (Tylenol)  650 mg  Q4H  PRN


 PO  3/14/17 10:15


     


 


 


  (Percocet  5-325


 Mg)  1 tab  Q3H  PRN


 PO  3/14/17 10:15


    3/15/17 20:48


 


 


  (Percocet  5-325


 Mg)  2 tab  Q3H  PRN


 PO  3/14/17 10:15


    3/17/17 01:56


 











A/P


Assessment and Plan





Patient is a 61-year-old male presenting with 4 weeks duration of body malaise, 

flulike symptoms nausea vomiting diarrhea for the past 5 days with cough 

productive of off-white sputum


Patient with leukocytosis 





1. Sepsis secondary to Right Empyema status post VATS 3/13, removal of pleural 

abscess


    and decortication, Chest tube removed today, recommended to continue 

antibiotics as per


    ID specialist and follow with doctor Gross in two weeks. continue Heparin 

bridging for 


    INR between 2.5 and 3.5 will need to continue follow up as outpatient. no 

need for Oxygen


    Discontinued antibiotics as per ID specialist. 


2. Cardiomyopathy LVEF 35-40% on Beta Blockers and retry low dose of ACE 

inhibitors


3. Status pos Mechanical Aortic Valve Replacement continue bridge and resume 

Coumadin


    new MARY CARMEN performed and no evidence of Endocarditis or Cardiac Embolus. 


4. Anxiety on Ativan PRN


5. Odynophagia/Dysphagia with solid food, Status post EGD Cork screw esophagus, 

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


    recommended to await for biopsy result, anti-reflux therapy continue PPI 

and Dicyclomine 20 TID


   return as needed for EGD and Dilation. 





PPI


Increase activity- PT- doing great with ambulating


Discharge Planning


Awaiting final by ID specialist for discharge.








Syed Merida MD Mar 17, 2017 08:32

## 2017-03-18 VITALS
HEART RATE: 75 BPM | OXYGEN SATURATION: 97 % | SYSTOLIC BLOOD PRESSURE: 112 MMHG | RESPIRATION RATE: 18 BRPM | TEMPERATURE: 98.1 F | DIASTOLIC BLOOD PRESSURE: 73 MMHG

## 2017-03-18 VITALS
HEART RATE: 82 BPM | OXYGEN SATURATION: 96 % | TEMPERATURE: 98.5 F | SYSTOLIC BLOOD PRESSURE: 107 MMHG | RESPIRATION RATE: 18 BRPM | DIASTOLIC BLOOD PRESSURE: 68 MMHG

## 2017-03-18 VITALS — OXYGEN SATURATION: 96 %

## 2017-03-18 VITALS
TEMPERATURE: 97.7 F | HEART RATE: 64 BPM | DIASTOLIC BLOOD PRESSURE: 73 MMHG | OXYGEN SATURATION: 96 % | RESPIRATION RATE: 18 BRPM | SYSTOLIC BLOOD PRESSURE: 110 MMHG

## 2017-03-18 VITALS
RESPIRATION RATE: 16 BRPM | SYSTOLIC BLOOD PRESSURE: 118 MMHG | DIASTOLIC BLOOD PRESSURE: 72 MMHG | OXYGEN SATURATION: 98 % | TEMPERATURE: 98.2 F | HEART RATE: 79 BPM

## 2017-03-18 VITALS — HEART RATE: 75 BPM

## 2017-03-18 VITALS
SYSTOLIC BLOOD PRESSURE: 127 MMHG | HEART RATE: 73 BPM | RESPIRATION RATE: 16 BRPM | DIASTOLIC BLOOD PRESSURE: 78 MMHG | OXYGEN SATURATION: 98 % | TEMPERATURE: 98.2 F

## 2017-03-18 VITALS
DIASTOLIC BLOOD PRESSURE: 71 MMHG | HEART RATE: 68 BPM | SYSTOLIC BLOOD PRESSURE: 108 MMHG | OXYGEN SATURATION: 97 % | TEMPERATURE: 98 F | RESPIRATION RATE: 18 BRPM

## 2017-03-18 VITALS — HEART RATE: 69 BPM

## 2017-03-18 VITALS — HEART RATE: 82 BPM

## 2017-03-18 VITALS — HEART RATE: 66 BPM

## 2017-03-18 VITALS — HEART RATE: 70 BPM

## 2017-03-18 VITALS — HEART RATE: 62 BPM

## 2017-03-18 VITALS — HEART RATE: 74 BPM

## 2017-03-18 VITALS — HEART RATE: 72 BPM

## 2017-03-18 VITALS — HEART RATE: 68 BPM

## 2017-03-18 VITALS — HEART RATE: 78 BPM

## 2017-03-18 VITALS — HEART RATE: 84 BPM

## 2017-03-18 LAB
APTT BLD: 40.6 SEC (ref 24.3–30.1)
APTT BLD: 48.5 SEC (ref 24.3–30.1)
INR PPP: 1.1 RATIO
PROTHROMBIN TIME: 11.9 SEC (ref 9.8–11.6)

## 2017-03-18 RX ADMIN — OXYTOCIN SCH MLS/HR: 10 INJECTION, SOLUTION INTRAMUSCULAR; INTRAVENOUS at 21:00

## 2017-03-18 RX ADMIN — SODIUM CHLORIDE, PRESERVATIVE FREE SCH ML: 5 INJECTION INTRAVENOUS at 09:00

## 2017-03-18 RX ADMIN — DICYCLOMINE HYDROCHLORIDE SCH MG: 20 TABLET ORAL at 13:00

## 2017-03-18 RX ADMIN — ALBUTEROL SULFATE SCH MG: 2.5 SOLUTION RESPIRATORY (INHALATION) at 10:22

## 2017-03-18 RX ADMIN — ALBUTEROL SULFATE SCH MG: 2.5 SOLUTION RESPIRATORY (INHALATION) at 03:22

## 2017-03-18 RX ADMIN — OXYCODONE HYDROCHLORIDE AND ACETAMINOPHEN PRN TAB: 5; 325 TABLET ORAL at 22:19

## 2017-03-18 RX ADMIN — HEPARIN SODIUM SCH MLS/HR: 10000 INJECTION, SOLUTION INTRAVENOUS at 11:06

## 2017-03-18 RX ADMIN — DICYCLOMINE HYDROCHLORIDE SCH MG: 20 TABLET ORAL at 09:45

## 2017-03-18 RX ADMIN — CARVEDILOL SCH MG: 3.12 TABLET, FILM COATED ORAL at 22:19

## 2017-03-18 RX ADMIN — OXYCODONE HYDROCHLORIDE AND ACETAMINOPHEN PRN TAB: 5; 325 TABLET ORAL at 02:20

## 2017-03-18 RX ADMIN — PANTOPRAZOLE SCH MG: 40 TABLET, DELAYED RELEASE ORAL at 22:19

## 2017-03-18 RX ADMIN — OXYCODONE HYDROCHLORIDE AND ACETAMINOPHEN PRN TAB: 5; 325 TABLET ORAL at 06:26

## 2017-03-18 RX ADMIN — CEFEPIME SCH MLS/HR: 1 INJECTION, POWDER, FOR SOLUTION INTRAMUSCULAR; INTRAVENOUS at 09:47

## 2017-03-18 RX ADMIN — DOCUSATE CALCIUM SCH MG: 240 CAPSULE, LIQUID FILLED ORAL at 21:00

## 2017-03-18 RX ADMIN — SODIUM CHLORIDE, PRESERVATIVE FREE SCH ML: 5 INJECTION INTRAVENOUS at 22:20

## 2017-03-18 RX ADMIN — LISINOPRIL SCH MG: 5 TABLET ORAL at 09:45

## 2017-03-18 RX ADMIN — OXYCODONE HYDROCHLORIDE AND ACETAMINOPHEN PRN TAB: 5; 325 TABLET ORAL at 18:00

## 2017-03-18 RX ADMIN — CARVEDILOL SCH MG: 3.12 TABLET, FILM COATED ORAL at 09:46

## 2017-03-18 RX ADMIN — CEFEPIME SCH MLS/HR: 1 INJECTION, POWDER, FOR SOLUTION INTRAMUSCULAR; INTRAVENOUS at 02:21

## 2017-03-18 RX ADMIN — CEFEPIME SCH MLS/HR: 1 INJECTION, POWDER, FOR SOLUTION INTRAMUSCULAR; INTRAVENOUS at 18:00

## 2017-03-18 RX ADMIN — DICYCLOMINE HYDROCHLORIDE SCH MG: 20 TABLET ORAL at 18:00

## 2017-03-18 NOTE — RADRPT
EXAM DATE/TIME:  03/18/2017 03:09 

 

[facility] COMPARISON:     

CHEST SINGLE AP, March 17, 2017, 6:02.

 

                     

INDICATIONS :     

Shortness of breath, possible pulmonary disease.

                     

 

MEDICAL HISTORY :     

None.          

 

SURGICAL HISTORY :     

CABG.   Thoracotomy

 

ENCOUNTER:     

Subsequent                                        

 

ACUITY:     

1 week      

 

PAIN SCORE:     

3/10

 

LOCATION:     

Right chest 

 

FINDINGS:     

Postop changes of median sternotomy and aortic valve replacement. Dilatation of ascending aorta prese
nt. Mild right basilar airspace disease and pleural thickening unchanged. Left lung remains clear.

 

CONCLUSION:     

1. Postoperative median sternotomy and aortic valve replacement. Mild right basilar airspace disease 
and pleural thickening similar to prior study. No new infiltrate.

 

 

 

 Alvarez Murlilo MD on March 18, 2017 at 4:58           

Board Certified Radiologist.

 This report was verified electronically.

## 2017-03-18 NOTE — HHI.GIFU
Subjective


Remarks


Resting in bed.  States he has not had anything to eat yet today because he has 

not been able to get through to the kitchen.  He did okay with a hamburger, 

potato chops, and soda last night- without any difficulty. No bleeding.  No n/

v.  No abdominal pain.  Would like to go home soon. (Iva Calloway)





Objective


Vitals I&O





 Vital Signs








  Date Time  Temp Pulse Resp B/P Pulse Ox O2 Delivery O2 Flow Rate FiO2


 


3/18/17 12:00  74      


 


3/18/17 11:00 98.0 68 18 108/71 97   


 


3/18/17 11:00  62      


 


3/18/17 10:00  84      


 


3/18/17 09:00  78      


 


3/18/17 08:00  66      


 


3/18/17 07:30 97.7 64 18 110/73 96   


 


3/18/17 07:00  62      


 


3/18/17 06:00  75      


 


3/18/17 05:00  72      


 


3/18/17 04:38 98.2 73 16 127/78 98   


 


3/18/17 04:00  75      


 


3/18/17 03:00  69      


 


3/18/17 02:00  66      


 


3/18/17 01:04 98.2 79 16 118/72 98   


 


3/18/17 01:00  69      


 


3/18/17 00:00  68      


 


3/17/17 23:00  75      


 


3/17/17 22:00  76      


 


3/17/17 21:00  84      


 


3/17/17 20:59     99 Room Air  


 


3/17/17 20:56 98.1 78 16 113/71 99   


 


3/17/17 20:00  72      


 


3/17/17 19:18     95   21


 


3/17/17 19:00  81      


 


3/17/17 18:10   18     


 


3/17/17 18:00  73      


 


3/17/17 17:00  100      


 


3/17/17 16:00 99.6 88 20 103/56 96   


 


3/17/17 16:00  91      


 


3/17/17 15:00  84      








 I/O








 3/17/17 3/17/17 3/17/17 3/18/17 3/18/17 3/18/17





 07:00 15:00 23:00 07:00 15:00 23:00


 


Intake Total  390 ml 1295 ml 240 ml  


 


Output Total  610 ml 1325 ml 180 ml  


 


Balance  -220 ml -30 ml 60 ml  


 


      


 


Intake Oral  240 ml 720 ml 240 ml  


 


IV Total  150 ml 575 ml   


 


Output Urine Total  550 ml 1325 ml 180 ml  


 


Chest Tube Drainage Total  60 ml    


 


# Voids    2  


 


# Bowel Movements   0   








Laboratory





Laboratory Tests








Test 3/17/17 3/17/17 3/18/17 3/18/17





 15:00 21:30 05:53 12:05


 


Prothrombin Time 11.7   11.9  


 


Prothromb Time International 1.1   1.1  





Ratio    


 


Activated Partial  42.6  48.5  40.6 





Thromboplast Time    














 Date/Time Procedure Status





Source Growth 


 


 3/14/17 10:00 Gram Stain - Final Complete





Wound Lung  





 3/14/17 10:00 Wound Culture - Final Complete





Wound Lung NO GROWTH IN 72 HRS.--AEROBICALLY OR ... 


 


 3/14/17 10:00 Fungal Smear - Final Resulted





Wound Lung NO FUNGAL ELEMENTS SEEN. 





 3/14/17 10:00 Fungal Culture Resulted





Wound Lung Pending 


 


 3/14/17 10:00 Acid Fast Stain - Final Resulted





Wound Lung NO ACID FAST BACILLI SEEN 





 3/14/17 10:00 Mycobacterial Culture Resulted





Wound Lung Pending 








Imaging





Last Impressions








Chest X-Ray 3/18/17 0600 Signed





Impressions: 





 Service Date/Time:  Saturday, March 18, 2017 03:09 - CONCLUSION:  1. 





 Postoperative median sternotomy and aortic valve replacement. Mild right 

basilar 





 airspace disease and pleural thickening similar to prior study. No new 





 infiltrate.     Alvarez Murillo MD 


 


Barium Swallow X-Ray 3/16/17 0000 Signed





Impressions: 





 Service Date/Time:  Thursday, March 16, 2017 16:23 - CONCLUSION:  Corkscrew 





 appearance of the mid to distal esophagus concerning for spasm.     Jonathan Moreno MD 


 


Chest CT 3/8/17 1642 Signed





Impressions: 





 Service Date/Time:  Wednesday, March 8, 2017 16:48 - CONCLUSION:  1. Large 





 loculated effusion at the right base measuring 13 x 9 CM. 2. Aortic root 





 dilatation measuring 4.5 CM     Donnell Vincent MD 








Physical Exam


HEENT: Normocephalic; atraumatic; no jaundice. 


CHEST:  CTA


CARDIAC:  RRR


ABDOMEN:  Soft, nondistended, nontender; no hepatosplenomegaly; bowel sounds 

are present in all four quadrants.


EXTREMITIES: No clubbing, cyanosis, or edema.


SKIN:  Normal; no rash; no jaundice.


CNS:  No focal deficits; alert and oriented times three. (Iva Calloway)





Assessment and Plan


Plan


ASSESSMENT:


-  Dysphagia/odynophagia.  Barium Swallow X-Ray (3/16/17)----> Corkscrew 

appearance of the mid to distal esophagus concerning for spasm.   


   S/P EGD (3/17/17)----> 1.  Cork screw esophagus, dilated 17mm savary dilator 

2.  Retroflexed views revealed no abnormalities.  Pathology pending.


   PPI. Dicyclomine.  Tolerating diet.  Had a hamburger/potato chips without 

difficulty last night.  


-  Anemia.  HH Stable at 9.2/27.5.





Plan:


- TIM


- Await pathology


- PPI


- Consider colonoscopy as outpatient


- FU DARION in 2 weeks


- Patient seen and examined by Dr. Bedner and myself and this note is written 

on his behalf  (Iva Calloway)


Physician Comments


Patient seen and examined


Agree with above


Continue with current supportive care


Monitor labs


Follow-up with GI post discharge


We will sign off (Francis Bender MD)








Iva Calloway Mar 18, 2017 14:15


Francis Bender MD Mar 18, 2017 17:07

## 2017-03-18 NOTE — HHI.PR
Subjective


Remarks


Patient stable seen in his bedroom no new issues, no Nausea, vomit or diarrhea


Chest tube removed. on Echocardiogram no evidence of Endocarditis or cardiac 

thrombus, 


Mechanical Aortic Valve replacement continue Heparin bridge and resume Coumadin


until INR 2.5 to 3.5, Cardiomyopathy LVEF 35-40% on Beta blockers, status post


Right Video Assisted Thoracoscopic surgery VATS, on 3/13, evaluation of pleural 

abscess


and Decortication. recommended to follow with Doctor Gross in two weeks. 


Antibiotics as per ID specialist. 


3/18 patient stable will discuss on Monday with ID specialist for antibiotics 

for discharge





Objective





 Vital Signs








  Date Time  Temp Pulse Resp B/P Pulse Ox O2 Delivery O2 Flow Rate FiO2


 


3/18/17 12:00  74      


 


3/18/17 11:00 98.0 68 18 108/71 97   


 


3/18/17 11:00  62      


 


3/18/17 10:00  84      


 


3/18/17 09:00  78      


 


3/18/17 08:00  66      


 


3/18/17 07:30 97.7 64 18 110/73 96   


 


3/18/17 07:00  62      


 


3/18/17 06:00  75      


 


3/18/17 05:00  72      


 


3/18/17 04:38 98.2 73 16 127/78 98   


 


3/18/17 04:00  75      


 


3/18/17 03:00  69      


 


3/18/17 02:00  66      


 


3/18/17 01:04 98.2 79 16 118/72 98   


 


3/18/17 01:00  69      


 


3/18/17 00:00  68      


 


3/17/17 23:00  75      


 


3/17/17 22:00  76      


 


3/17/17 21:00  84      


 


3/17/17 20:59     99 Room Air  


 


3/17/17 20:56 98.1 78 16 113/71 99   


 


3/17/17 20:00  72      


 


3/17/17 19:18     95   21


 


3/17/17 19:00  81      


 


3/17/17 18:10   18     


 


3/17/17 18:00  73      


 


3/17/17 17:00  100      


 


3/17/17 16:00 99.6 88 20 103/56 96   


 


3/17/17 16:00  91      








 I/O








 3/17/17 3/17/17 3/17/17 3/18/17 3/18/17 3/18/17





 07:00 15:00 23:00 07:00 15:00 23:00


 


Intake Total  390 ml 1295 ml 240 ml  


 


Output Total  610 ml 1325 ml 180 ml  


 


Balance  -220 ml -30 ml 60 ml  


 


      


 


Intake Oral  240 ml 720 ml 240 ml  


 


IV Total  150 ml 575 ml   


 


Output Urine Total  550 ml 1325 ml 180 ml  


 


Chest Tube Drainage Total  60 ml    


 


# Voids    2  


 


# Bowel Movements   0   








Result Diagram:  


3/16/17 0435                                                                   

             3/16/17 0435





Imaging





Last Impressions








Chest X-Ray 3/18/17 0600 Signed





Impressions: 





 Service Date/Time:  Saturday, March 18, 2017 03:09 - CONCLUSION:  1. 





 Postoperative median sternotomy and aortic valve replacement. Mild right 

basilar 





 airspace disease and pleural thickening similar to prior study. No new 





 infiltrate.     Alvarez Murillo MD 


 


Barium Swallow X-Ray 3/16/17 0000 Signed





Impressions: 





 Service Date/Time:  Thursday, March 16, 2017 16:23 - CONCLUSION:  Corkscrew 





 appearance of the mid to distal esophagus concerning for spasm.     Jonathan Moreno MD 


 


Chest CT 3/8/17 1642 Signed





Impressions: 





 Service Date/Time:  Wednesday, March 8, 2017 16:48 - CONCLUSION:  1. Large 





 loculated effusion at the right base measuring 13 x 9 CM. 2. Aortic root 





 dilatation measuring 4.5 CM     Donnell Vincent MD 








Procedures


Right Video Assisted Thoracoscopic surgery VATS, on 3/13, evaluation of pleural 

abscess


and Decortication.


Other Results





 Laboratory Tests








Test 3/13/17 3/15/17 3/16/17 3/18/17





 05:13 03:15 04:35 05:53


 


Blood Type A NEGATIVE    


 


Antibody Screen POSITIVE    


 


Antigen Identification K Antigen -   





 NEGATIVE   


 


Direct Antiglobulin Test NEGATIVE    





(Jennifer)    


 


Crossmatch Leukocyte-Reduced   





 Red Blood   





 Cells   


 


Blood Bank Comment     


 


Neutrophils (%) (Auto)  92.1 %  


 


Lymphocytes (%) (Auto)  4.5 %  


 


Monocytes (%) (Auto)  3.3 %  


 


Eosinophils (%) (Auto)  0.0 %  


 


Basophils (%) (Auto)  0.1 %  


 


Neutrophils # (Auto)  12.5 TH/MM3  


 


Lymphocytes # (Auto)  0.6 TH/MM3  


 


Monocytes # (Auto)  0.5 TH/MM3  


 


Eosinophils # (Auto)  0.0 TH/MM3  


 


Basophils # (Auto)  0.0 TH/MM3  


 


CBC Comment  DIFF FINAL   


 


Differential Comment     


 


Magnesium Level  2.1 MG/DL  


 


White Blood Count   12.9 TH/MM3 


 


Red Blood Count   3.07 MIL/MM3 


 


Hemoglobin   9.2 GM/DL 


 


Hematocrit   27.5 % 


 


Mean Corpuscular Volume   89.6 FL 


 


Mean Corpuscular Hemoglobin   30.0 PG 


 


Mean Corpuscular Hemoglobin   33.5 % 





Concent    


 


Red Cell Distribution Width   13.5 % 


 


Platelet Count   191 TH/MM3 


 


Mean Platelet Volume   9.3 FL 


 


Sodium Level   141 MEQ/L 


 


Potassium Level   3.6 MEQ/L 


 


Chloride Level   105 MEQ/L 


 


Carbon Dioxide Level   27.6 MEQ/L 


 


Anion Gap   8 MEQ/L 


 


Blood Urea Nitrogen   15 MG/DL 


 


Creatinine   0.96 MG/DL 


 


Estimat Glomerular Filtration   79 ML/MIN 





Rate    


 


Random Glucose   160 MG/DL 


 


Calcium Level   8.2 MG/DL 


 


Prothrombin Time    11.9 SEC


 


Prothromb Time International    1.1 RATIO





Ratio    


 


    





Test 3/18/17   





 12:05   


 


Activated Partial 40.6 SEC   





Thromboplast Time    








Objective Remarks


GENERAL: Stable no distress. 


SKIN: Warm and dry.


HEAD: Atraumatic. Normocephalic. 


EYES: Pupils equal and round. No scleral icterus. No injection or drainage. 


ENT: No nasal bleeding or discharge.  Mucous membranes pink and moist.


NECK: Trachea midline. No JVD. 


CARDIOVASCULAR: Regular rate and rhythm.  


RESPIRATORY: No accessory muscle use. Clear to auscultation. Breath sounds 

equal bilaterally. 


GASTROINTESTINAL: Abdomen soft, non-tender, nondistended. Hepatic and splenic 

margins not palpable. 


MUSCULOSKELETAL: Extremities without clubbing, cyanosis, or edema. No obvious 

deformities. 


NEUROLOGICAL: Awake and alert. No obvious cranial nerve deficits.  Motor 

grossly within normal limits. Five out of 5 muscle strength in the arms and 

legs.  Normal speech.


PSYCHIATRIC: Appropriate mood and affect; insight and judgment normal.


Medications and IVs





 Current Medications








 Medications


  (Trade)  Dose


 Ordered  Sig/Marcia


 Route  Start Time


 Stop Time Status Last Admin


 


 Heparin Sodium


  (Porcine) 5000


 units  5,000 units  UNSCH  PRN


 IV  3/10/17 18:00


     


 


 


 Heparin Sodium/


 Dextrose  250 ml @ 0


 mls/hr  TITRATE


 IV  3/10/17 12:00


    3/18/17 11:06


 


 


  (Maxipime Inj/NS


 Inj)  100 ml @ 


 200 mls/hr  Q8H


 IV  3/10/17 18:00


    3/18/17 09:47


 


 


  (Flagyl)  500 mg  Q8H


 PO  3/10/17 16:00


    3/18/17 09:45


 


 


  (Coreg)  3.125 mg  BID


 PO  3/11/17 12:00


    3/18/17 09:46


 


 


  (Ativan)  1 mg  Q8H  PRN


 PO  3/12/17 15:00


    3/18/17 06:25


 


 


  (Prinivil)  2.5 mg  DAILY


 PO  3/13/17 09:00


    3/18/17 09:45


 


 


 Miscellaneous 1 ea  1 ea  UNSCH  PRN


 OTHER  3/13/17 09:00


     


 


 


  (Lr 1000 ml Inj)  1,000 ml @ 


 30 mls/hr  Q24H


 IV  3/13/17 21:00


    3/17/17 22:47


 


 


  (NS Flush)  2 ml  BID


 IV FLUSH  3/14/17 21:00


    3/18/17 09:00


 


 


  (NS Flush)  2 ml  UNSCH  PRN


 IV FLUSH  3/14/17 10:15


     


 


 


  (Protonix)  40 mg  HS


 PO  3/14/17 21:00


    3/17/17 22:49


 


 


  (Zofran Inj)  4 mg  Q6H  PRN


 IV PUSH  3/14/17 10:15


     


 


 


  (Surfak)  240 mg  HS


 PO  3/14/17 21:00


     


 


 


  (Milk Of


 Magnesia Liq)  30 ml  DAILY  PRN


 PO  3/14/17 10:15


     


 


 


  (Tylenol)  650 mg  Q4H  PRN


 PO  3/14/17 10:15


     


 


 


  (Percocet  5-325


 Mg)  1 tab  Q3H  PRN


 PO  3/14/17 10:15


    3/15/17 20:48


 


 


  (Percocet  5-325


 Mg)  2 tab  Q3H  PRN


 PO  3/14/17 10:15


    3/18/17 06:26


 


 


  (Bentyl)  20 mg  TID


 PO  3/17/17 13:00


    3/18/17 13:00


 


 


  (Coumadin)  3 mg  DAILY@16


 PO  3/17/17 16:30


    3/17/17 18:09


 











A/P


Assessment and Plan


Patient is a 61-year-old male presenting with 4 weeks duration of body malaise, 

flulike symptoms nausea vomiting diarrhea for the past 5 days with cough 

productive of off-white sputum


Patient with leukocytosis 





1. Sepsis secondary to Right Empyema status post VATS 3/13, removal of pleural 

abscess


    and decortication, Chest tube removed today, recommended to continue 

antibiotics as per


    ID specialist and follow with doctor Gross in two weeks. continue Heparin 

bridging for 


    INR between 2.5 and 3.5 will need to continue follow up as outpatient. no 

need for Oxygen


    as per ID specialist continue Cefepime and Flagyl. 


2. Cardiomyopathy LVEF 35-40% on Beta Blockers and retry low dose of ACE 

inhibitors


3. Status pos Mechanical Aortic Valve Replacement continue bridge and resume 

Coumadin


    new MARY CARMEN performed and no evidence of Endocarditis or Cardiac Embolus. INR 

today 1.1


    increased Warfarin 5 mg day


4. Anxiety on Ativan PRN


5. Odynophagia/Dysphagia with solid food, Status post EGD Cork screw esophagus, 

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


    recommended to await for biopsy result, anti-reflux therapy continue PPI 

and Dicyclomine 20 TID


   return as needed for EGD and Dilation. 





PPI


Increase activity- PT- doing great with ambulating


Discharge Planning


Awaiting final by ID specialist for discharge.








Syed Merida MD Mar 18, 2017 15:04

## 2017-03-19 VITALS
RESPIRATION RATE: 18 BRPM | HEART RATE: 73 BPM | SYSTOLIC BLOOD PRESSURE: 130 MMHG | DIASTOLIC BLOOD PRESSURE: 80 MMHG | OXYGEN SATURATION: 96 % | TEMPERATURE: 98.2 F

## 2017-03-19 VITALS
HEART RATE: 78 BPM | OXYGEN SATURATION: 96 % | DIASTOLIC BLOOD PRESSURE: 64 MMHG | RESPIRATION RATE: 14 BRPM | SYSTOLIC BLOOD PRESSURE: 110 MMHG | TEMPERATURE: 97.2 F

## 2017-03-19 VITALS
DIASTOLIC BLOOD PRESSURE: 82 MMHG | SYSTOLIC BLOOD PRESSURE: 118 MMHG | HEART RATE: 65 BPM | RESPIRATION RATE: 16 BRPM | TEMPERATURE: 98.1 F | OXYGEN SATURATION: 98 %

## 2017-03-19 VITALS
RESPIRATION RATE: 16 BRPM | SYSTOLIC BLOOD PRESSURE: 120 MMHG | TEMPERATURE: 98.7 F | OXYGEN SATURATION: 96 % | DIASTOLIC BLOOD PRESSURE: 67 MMHG | HEART RATE: 73 BPM

## 2017-03-19 VITALS
DIASTOLIC BLOOD PRESSURE: 66 MMHG | RESPIRATION RATE: 18 BRPM | OXYGEN SATURATION: 96 % | SYSTOLIC BLOOD PRESSURE: 103 MMHG | HEART RATE: 70 BPM | TEMPERATURE: 98.2 F

## 2017-03-19 VITALS — HEART RATE: 80 BPM

## 2017-03-19 VITALS — HEART RATE: 74 BPM

## 2017-03-19 VITALS
DIASTOLIC BLOOD PRESSURE: 72 MMHG | HEART RATE: 81 BPM | OXYGEN SATURATION: 97 % | RESPIRATION RATE: 16 BRPM | SYSTOLIC BLOOD PRESSURE: 107 MMHG | TEMPERATURE: 98.1 F

## 2017-03-19 VITALS — HEART RATE: 68 BPM

## 2017-03-19 VITALS — HEART RATE: 82 BPM

## 2017-03-19 VITALS — HEART RATE: 71 BPM

## 2017-03-19 VITALS — HEART RATE: 70 BPM

## 2017-03-19 VITALS — HEART RATE: 72 BPM

## 2017-03-19 VITALS — HEART RATE: 78 BPM

## 2017-03-19 VITALS — HEART RATE: 66 BPM

## 2017-03-19 VITALS — HEART RATE: 67 BPM

## 2017-03-19 VITALS — HEART RATE: 86 BPM

## 2017-03-19 LAB
APTT BLD: 32.8 SEC (ref 24.3–30.1)
APTT BLD: 39.8 SEC (ref 24.3–30.1)
APTT BLD: 42.7 SEC (ref 24.3–30.1)
ERYTHROCYTE [DISTWIDTH] IN BLOOD BY AUTOMATED COUNT: 13.7 % (ref 11.6–17.2)
HCT VFR BLD CALC: 30.7 % (ref 39–51)
INR PPP: 1.1 RATIO
MCH RBC QN AUTO: 29.9 PG (ref 27–34)
MCHC RBC AUTO-ENTMCNC: 33.2 % (ref 32–36)
MCV RBC AUTO: 90 FL (ref 80–100)
PLATELET # BLD: 220 TH/MM3 (ref 150–450)
PROTHROMBIN TIME: 11.8 SEC (ref 9.8–11.6)
RBC # BLD AUTO: 3.41 MIL/MM3 (ref 4.5–5.9)
REVIEW FLAG: (no result)
WBC # BLD AUTO: 7.7 TH/MM3 (ref 4–11)

## 2017-03-19 RX ADMIN — DICYCLOMINE HYDROCHLORIDE SCH MG: 20 TABLET ORAL at 12:41

## 2017-03-19 RX ADMIN — OXYTOCIN SCH MLS/HR: 10 INJECTION, SOLUTION INTRAMUSCULAR; INTRAVENOUS at 21:00

## 2017-03-19 RX ADMIN — LISINOPRIL SCH MG: 5 TABLET ORAL at 08:04

## 2017-03-19 RX ADMIN — CARVEDILOL SCH MG: 3.12 TABLET, FILM COATED ORAL at 21:00

## 2017-03-19 RX ADMIN — SODIUM CHLORIDE, PRESERVATIVE FREE SCH ML: 5 INJECTION INTRAVENOUS at 08:07

## 2017-03-19 RX ADMIN — CEFEPIME SCH MLS/HR: 1 INJECTION, POWDER, FOR SOLUTION INTRAMUSCULAR; INTRAVENOUS at 09:11

## 2017-03-19 RX ADMIN — SODIUM CHLORIDE, PRESERVATIVE FREE SCH ML: 5 INJECTION INTRAVENOUS at 21:00

## 2017-03-19 RX ADMIN — CEFEPIME SCH MLS/HR: 1 INJECTION, POWDER, FOR SOLUTION INTRAMUSCULAR; INTRAVENOUS at 02:41

## 2017-03-19 RX ADMIN — DICYCLOMINE HYDROCHLORIDE SCH MG: 20 TABLET ORAL at 08:04

## 2017-03-19 RX ADMIN — CEFEPIME SCH MLS/HR: 1 INJECTION, POWDER, FOR SOLUTION INTRAMUSCULAR; INTRAVENOUS at 17:17

## 2017-03-19 RX ADMIN — CARVEDILOL SCH MG: 3.12 TABLET, FILM COATED ORAL at 08:04

## 2017-03-19 RX ADMIN — HEPARIN SODIUM PRN UNITS: 1000 INJECTION, SOLUTION INTRAVENOUS; SUBCUTANEOUS at 21:24

## 2017-03-19 RX ADMIN — HEPARIN SODIUM SCH MLS/HR: 10000 INJECTION, SOLUTION INTRAVENOUS at 05:17

## 2017-03-19 RX ADMIN — DICYCLOMINE HYDROCHLORIDE SCH MG: 20 TABLET ORAL at 17:17

## 2017-03-19 RX ADMIN — DOCUSATE CALCIUM SCH MG: 240 CAPSULE, LIQUID FILLED ORAL at 21:00

## 2017-03-19 RX ADMIN — PANTOPRAZOLE SCH MG: 40 TABLET, DELAYED RELEASE ORAL at 21:00

## 2017-03-19 NOTE — HHI.PR
Subjective


Remarks


Patient stable seen in his bedroom no new issues, no Nausea, vomit or diarrhea


Chest tube removed. on Echocardiogram no evidence of Endocarditis or cardiac 

thrombus, 


Mechanical Aortic Valve replacement continue Heparin bridge and resume Coumadin


until INR 2.5 to 3.5, Cardiomyopathy LVEF 35-40% on Beta blockers, status post


Right Video Assisted Thoracoscopic surgery VATS, on 3/13, evaluation of pleural 

abscess


and Decortication. recommended to follow with Doctor Ginny in two weeks. 


Antibiotics as per ID specialist. 


3/9 Seen in his bedroom stable, no nausea, vomit or diarrhea. as per nurse miss Uribe


he was confused during the night.





Objective





 Vital Signs








  Date Time  Temp Pulse Resp B/P Pulse Ox O2 Delivery O2 Flow Rate FiO2


 


3/19/17 07:43      Room Air  


 


3/19/17 05:30 98.2 70 18 103/66 96   


 


3/19/17 05:00  71      


 


3/19/17 04:00  70      


 


3/19/17 03:00  67      


 


3/19/17 02:00  66      


 


3/19/17 01:00  86      


 


3/19/17 00:54 98.2 73 18 130/80 96   


 


3/18/17 22:32     96   21


 


3/18/17 21:01     99 Room Air  


 


3/18/17 21:00 98.1 75 18 112/73 97   


 


3/18/17 17:00  66      


 


3/18/17 16:00  70      


 


3/18/17 15:00  82      


 


3/18/17 15:00 98.5 68 18 107/68 96   


 


3/18/17 13:00  82      


 


3/18/17 12:00  74      


 


3/18/17 11:00 98.0 68 18 108/71 97   


 


3/18/17 11:00  62      


 


3/18/17 10:00  84      


 


3/18/17 09:00  78      


 


3/18/17 08:00  66      








 I/O








 3/18/17 3/18/17 3/18/17 3/19/17 3/19/17 3/19/17





 07:00 15:00 23:00 07:00 15:00 23:00


 


Intake Total 240 ml   1240 ml  


 


Output Total 180 ml   600 ml  


 


Balance 60 ml   640 ml  


 


      


 


Intake Oral 240 ml   1020 ml  


 


IV Total    220 ml  


 


Output Urine Total 180 ml   600 ml  


 


# Voids 2     








Result Diagram:  


3/19/17 0700                                                                   

             3/16/17 0435





Imaging





Last Impressions








Chest X-Ray 3/18/17 0600 Signed





Impressions: 





 Service Date/Time:  Saturday, March 18, 2017 03:09 - CONCLUSION:  1. 





 Postoperative median sternotomy and aortic valve replacement. Mild right 

basilar 





 airspace disease and pleural thickening similar to prior study. No new 





 infiltrate.     Alvarez Murillo MD 


 


Barium Swallow X-Ray 3/16/17 0000 Signed





Impressions: 





 Service Date/Time:  Thursday, March 16, 2017 16:23 - CONCLUSION:  Corkscrew 





 appearance of the mid to distal esophagus concerning for spasm.     Jonathan Moreno MD 


 


Chest CT 3/8/17 1642 Signed





Impressions: 





 Service Date/Time:  Wednesday, March 8, 2017 16:48 - CONCLUSION:  1. Large 





 loculated effusion at the right base measuring 13 x 9 CM. 2. Aortic root 





 dilatation measuring 4.5 CM     Donnell Vincent MD 








Procedures


Right Video Assisted Thoracoscopic surgery VATS, on 3/13, evaluation of pleural 

abscess


and Decortication.


Other Results





 Laboratory Tests








Test 3/13/17 3/15/17 3/16/17 3/19/17





 05:13 03:15 04:35 07:00


 


Blood Type A NEGATIVE    


 


Antibody Screen POSITIVE    


 


Antigen Identification K Antigen -   





 NEGATIVE   


 


Direct Antiglobulin Test NEGATIVE    





(Jennifer)    


 


Crossmatch Leukocyte-Reduced   





 Red Blood   





 Cells   


 


Blood Bank Comment     


 


Neutrophils (%) (Auto)  92.1 %  


 


Lymphocytes (%) (Auto)  4.5 %  


 


Monocytes (%) (Auto)  3.3 %  


 


Eosinophils (%) (Auto)  0.0 %  


 


Basophils (%) (Auto)  0.1 %  


 


Neutrophils # (Auto)  12.5 TH/MM3  


 


Lymphocytes # (Auto)  0.6 TH/MM3  


 


Monocytes # (Auto)  0.5 TH/MM3  


 


Eosinophils # (Auto)  0.0 TH/MM3  


 


Basophils # (Auto)  0.0 TH/MM3  


 


CBC Comment  DIFF FINAL   


 


Differential Comment     


 


Magnesium Level  2.1 MG/DL  


 


Sodium Level   141 MEQ/L 


 


Potassium Level   3.6 MEQ/L 


 


Chloride Level   105 MEQ/L 


 


Carbon Dioxide Level   27.6 MEQ/L 


 


Anion Gap   8 MEQ/L 


 


Blood Urea Nitrogen   15 MG/DL 


 


Creatinine   0.96 MG/DL 


 


Estimat Glomerular Filtration   79 ML/MIN 





Rate    


 


Random Glucose   160 MG/DL 


 


Calcium Level   8.2 MG/DL 


 


White Blood Count    7.7 TH/MM3


 


Red Blood Count    3.41 MIL/MM3


 


Hemoglobin    10.2 GM/DL


 


Hematocrit    30.7 %


 


Mean Corpuscular Volume    90.0 FL


 


Mean Corpuscular Hemoglobin    29.9 PG


 


Mean Corpuscular Hemoglobin    33.2 %





Concent    


 


Red Cell Distribution Width    13.7 %


 


Platelet Count    220 TH/MM3


 


Mean Platelet Volume    8.9 FL


 


Prothrombin Time    11.8 SEC


 


Prothromb Time International    1.1 RATIO





Ratio    


 


Activated Partial    32.8 SEC





Thromboplast Time    








Objective Remarks


GENERAL: 


SKIN: Warm and dry.


HEAD: Atraumatic. Normocephalic. 


EYES: Pupils equal and round. No scleral icterus. No injection or drainage. 


ENT: No nasal bleeding or discharge.  Mucous membranes pink and moist.


NECK: Trachea midline. No JVD. 


CARDIOVASCULAR: Regular rate and rhythm.  


RESPIRATORY: No accessory muscle use. Clear to auscultation. Breath sounds 

equal bilaterally. 


GASTROINTESTINAL: Abdomen soft, non-tender, nondistended. Hepatic and splenic 

margins not palpable. 


MUSCULOSKELETAL: Extremities without clubbing, cyanosis, or edema. No obvious 

deformities. 


NEUROLOGICAL: Awake and alert. No obvious cranial nerve deficits.  Motor 

grossly within normal limits. Five out of 5 muscle strength in the arms and 

legs.  Normal speech.


PSYCHIATRIC: Appropriate mood and affect; insight and judgment normal.


Medications and IVs





 Current Medications








 Medications


  (Trade)  Dose


 Ordered  Sig/Marcia


 Route  Start Time


 Stop Time Status Last Admin


 


 Heparin Sodium


  (Porcine) 5000


 units  5,000 units  UNSCH  PRN


 IV  3/10/17 18:00


     


 


 


 Heparin Sodium/


 Dextrose  250 ml @ 0


 mls/hr  TITRATE


 IV  3/10/17 12:00


    3/19/17 05:17


 


 


  (Maxipime Inj/NS


 Inj)  100 ml @ 


 200 mls/hr  Q8H


 IV  3/10/17 18:00


    3/19/17 02:41


 


 


  (Flagyl)  500 mg  Q8H


 PO  3/10/17 16:00


    3/19/17 02:41


 


 


  (Coreg)  3.125 mg  BID


 PO  3/11/17 12:00


    3/18/17 22:19


 


 


  (Ativan)  1 mg  Q8H  PRN


 PO  3/12/17 15:00


    3/18/17 22:19


 


 


  (Prinivil)  2.5 mg  DAILY


 PO  3/13/17 09:00


    3/18/17 09:45


 


 


 Miscellaneous 1 ea  1 ea  UNSCH  PRN


 OTHER  3/13/17 09:00


     


 


 


  (Lr 1000 ml Inj)  1,000 ml @ 


 30 mls/hr  Q24H


 IV  3/13/17 21:00


    3/17/17 22:47


 


 


  (NS Flush)  2 ml  BID


 IV FLUSH  3/14/17 21:00


    3/18/17 22:20


 


 


  (NS Flush)  2 ml  UNSCH  PRN


 IV FLUSH  3/14/17 10:15


     


 


 


  (Protonix)  40 mg  HS


 PO  3/14/17 21:00


    3/18/17 22:19


 


 


  (Zofran Inj)  4 mg  Q6H  PRN


 IV PUSH  3/14/17 10:15


     


 


 


  (Surfak)  240 mg  HS


 PO  3/14/17 21:00


     


 


 


  (Milk Of


 Magnesia Liq)  30 ml  DAILY  PRN


 PO  3/14/17 10:15


     


 


 


  (Tylenol)  650 mg  Q4H  PRN


 PO  3/14/17 10:15


     


 


 


  (Percocet  5-325


 Mg)  1 tab  Q3H  PRN


 PO  3/14/17 10:15


    3/18/17 18:00


 


 


  (Percocet  5-325


 Mg)  2 tab  Q3H  PRN


 PO  3/14/17 10:15


    3/18/17 22:19


 


 


  (Bentyl)  20 mg  TID


 PO  3/17/17 13:00


    3/18/17 18:00


 


 


  (Coumadin)  5 mg  DAILY@16


 PO  3/18/17 16:00


    3/18/17 16:00


 











A/P


Assessment and Plan





Patient is a 61-year-old male presenting with 4 weeks duration of body malaise, 

flulike symptoms nausea vomiting diarrhea for the past 5 days with cough 

productive of off-white sputum


Patient with leukocytosis 





1. Sepsis secondary to Right Empyema status post VATS 3/13, removal of pleural 

abscess


    and decortication, Chest tube removed today, recommended to continue 

antibiotics as per


    ID specialist and follow with doctor Gross in two weeks. continue Heparin 

bridging for 


    INR between 2.5 and 3.5 will need to continue follow up as outpatient. no 

need for Oxygen


    as per ID to continue Cefepime and Flagyl. 


2. Cardiomyopathy LVEF 35-40% on Beta Blockers and retry low dose of ACE 

inhibitors


3. Status pos Mechanical Aortic Valve Replacement continue bridge and resume 

Coumadin


    new MARY CARMEN performed and no evidence of Endocarditis or Cardiac Embolus. INR 

today 1.1


    will increase Warfarin to 10 mg today and following. 


4. Anxiety on Ativan PRN


5. Odynophagia/Dysphagia with solid food, Status post EGD Cork screw esophagus, 

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


    recommended to await for biopsy result, anti-reflux therapy continue PPI 

and Dicyclomine 20 TID


   return as needed for EGD and Dilation. 





PPI


Increase activity- PT- doing great with ambulating


Discharge Planning


Awaiting final by ID specialist for discharge.








Syed Merida MD Mar 19, 2017 07:59

## 2017-03-20 VITALS — HEART RATE: 74 BPM

## 2017-03-20 VITALS
SYSTOLIC BLOOD PRESSURE: 132 MMHG | HEART RATE: 78 BPM | DIASTOLIC BLOOD PRESSURE: 73 MMHG | OXYGEN SATURATION: 97 % | RESPIRATION RATE: 16 BRPM | TEMPERATURE: 97.4 F

## 2017-03-20 VITALS — HEART RATE: 104 BPM

## 2017-03-20 VITALS — HEART RATE: 79 BPM

## 2017-03-20 VITALS
HEART RATE: 76 BPM | SYSTOLIC BLOOD PRESSURE: 97 MMHG | OXYGEN SATURATION: 96 % | DIASTOLIC BLOOD PRESSURE: 63 MMHG | TEMPERATURE: 98.2 F | RESPIRATION RATE: 16 BRPM

## 2017-03-20 VITALS
DIASTOLIC BLOOD PRESSURE: 80 MMHG | OXYGEN SATURATION: 97 % | RESPIRATION RATE: 16 BRPM | HEART RATE: 70 BPM | TEMPERATURE: 98.2 F | SYSTOLIC BLOOD PRESSURE: 128 MMHG

## 2017-03-20 VITALS
SYSTOLIC BLOOD PRESSURE: 119 MMHG | OXYGEN SATURATION: 97 % | TEMPERATURE: 97.6 F | HEART RATE: 65 BPM | DIASTOLIC BLOOD PRESSURE: 68 MMHG | RESPIRATION RATE: 18 BRPM

## 2017-03-20 VITALS
RESPIRATION RATE: 20 BRPM | TEMPERATURE: 97.9 F | DIASTOLIC BLOOD PRESSURE: 59 MMHG | HEART RATE: 78 BPM | SYSTOLIC BLOOD PRESSURE: 91 MMHG | OXYGEN SATURATION: 98 %

## 2017-03-20 VITALS — HEART RATE: 68 BPM

## 2017-03-20 VITALS — HEART RATE: 72 BPM

## 2017-03-20 VITALS
RESPIRATION RATE: 17 BRPM | TEMPERATURE: 98.1 F | OXYGEN SATURATION: 97 % | DIASTOLIC BLOOD PRESSURE: 79 MMHG | HEART RATE: 74 BPM | SYSTOLIC BLOOD PRESSURE: 140 MMHG

## 2017-03-20 VITALS — HEART RATE: 75 BPM

## 2017-03-20 VITALS — HEART RATE: 89 BPM

## 2017-03-20 VITALS — HEART RATE: 80 BPM

## 2017-03-20 VITALS — HEART RATE: 67 BPM

## 2017-03-20 VITALS — HEART RATE: 81 BPM

## 2017-03-20 VITALS — HEART RATE: 71 BPM

## 2017-03-20 VITALS — HEART RATE: 78 BPM

## 2017-03-20 VITALS — HEART RATE: 70 BPM

## 2017-03-20 LAB
APTT BLD: 38.3 SEC (ref 24.3–30.1)
APTT BLD: 59.5 SEC (ref 24.3–30.1)
APTT BLD: 73.8 SEC (ref 24.3–30.1)
INR PPP: 1.2 RATIO
PROTHROMBIN TIME: 13.6 SEC (ref 9.8–11.6)

## 2017-03-20 RX ADMIN — SODIUM CHLORIDE, PRESERVATIVE FREE SCH ML: 5 INJECTION INTRAVENOUS at 09:00

## 2017-03-20 RX ADMIN — CARVEDILOL SCH MG: 3.12 TABLET, FILM COATED ORAL at 20:43

## 2017-03-20 RX ADMIN — PANTOPRAZOLE SCH MG: 40 TABLET, DELAYED RELEASE ORAL at 20:43

## 2017-03-20 RX ADMIN — CEFEPIME SCH MLS/HR: 1 INJECTION, POWDER, FOR SOLUTION INTRAMUSCULAR; INTRAVENOUS at 02:00

## 2017-03-20 RX ADMIN — LISINOPRIL SCH MG: 5 TABLET ORAL at 09:06

## 2017-03-20 RX ADMIN — ACETAMINOPHEN PRN MG: 325 TABLET ORAL at 09:19

## 2017-03-20 RX ADMIN — CARVEDILOL SCH MG: 3.12 TABLET, FILM COATED ORAL at 09:06

## 2017-03-20 RX ADMIN — DICYCLOMINE HYDROCHLORIDE SCH MG: 20 TABLET ORAL at 13:06

## 2017-03-20 RX ADMIN — CEFEPIME SCH MLS/HR: 1 INJECTION, POWDER, FOR SOLUTION INTRAMUSCULAR; INTRAVENOUS at 17:55

## 2017-03-20 RX ADMIN — CEFEPIME SCH MLS/HR: 1 INJECTION, POWDER, FOR SOLUTION INTRAMUSCULAR; INTRAVENOUS at 09:06

## 2017-03-20 RX ADMIN — DICYCLOMINE HYDROCHLORIDE SCH MG: 20 TABLET ORAL at 09:06

## 2017-03-20 RX ADMIN — HEPARIN SODIUM PRN UNITS: 1000 INJECTION, SOLUTION INTRAVENOUS; SUBCUTANEOUS at 05:59

## 2017-03-20 RX ADMIN — DOCUSATE CALCIUM SCH MG: 240 CAPSULE, LIQUID FILLED ORAL at 20:43

## 2017-03-20 RX ADMIN — OXYTOCIN SCH MLS/HR: 10 INJECTION, SOLUTION INTRAMUSCULAR; INTRAVENOUS at 21:00

## 2017-03-20 RX ADMIN — DICYCLOMINE HYDROCHLORIDE SCH MG: 20 TABLET ORAL at 17:55

## 2017-03-20 RX ADMIN — SODIUM CHLORIDE, PRESERVATIVE FREE SCH ML: 5 INJECTION INTRAVENOUS at 20:45

## 2017-03-20 NOTE — HHI.PR
Subjective


Remarks


Patient stable seen in his bedroom no new issues, no Nausea, vomit or diarrhea


Chest tube removed. on Echocardiogram no evidence of Endocarditis or cardiac 

thrombus, 


Mechanical Aortic Valve replacement continue Heparin bridge and resume Coumadin


until INR 2.5 to 3.5, Cardiomyopathy LVEF 35-40% on Beta blockers, status post


Right Video Assisted Thoracoscopic surgery VATS, on 3/13, evaluation of pleural 

abscess


and Decortication. recommended to follow with Doctor Ginny in two weeks. 


Antibiotics as per ID specialist. 


3/19 Seen in his bedroom stable, no nausea, vomit or diarrhea. as per nurse 

miss Uribe


he was confused during the night. 


3/20 Stable sleeping, seen with nurse Miss Squires, no new complaint, awaiting for 

his INR 


to be therapeutic, today 1.2 Warfarin increased to 10 mg today and following, 

No nausea, 


vomit or diarrhea.





Objective





 Vital Signs








  Date Time  Temp Pulse Resp B/P Pulse Ox O2 Delivery O2 Flow Rate FiO2


 


3/20/17 06:00  74      


 


3/20/17 05:00  70      


 


3/20/17 04:00  71      


 


3/20/17 03:51 97.4 78 16 132/73 97   


 


3/20/17 03:00  72      


 


3/20/17 02:00  80      


 


3/20/17 01:00  80      


 


3/20/17 00:00  81      


 


3/19/17 23:00  72      


 


3/19/17 22:00  78      


 


3/19/17 21:00  74      


 


3/19/17 20:00     98 Room Air  


 


3/19/17 20:00  78      


 


3/19/17 20:00 97.2 85 14 110/64 96   


 


3/19/17 18:00  80      


 


3/19/17 17:00  68      


 


3/19/17 16:00  65      


 


3/19/17 16:00 98.1 81 16 107/72 97   


 


3/19/17 15:00  68      


 


3/19/17 14:00  82      


 


3/19/17 13:00  72      


 


3/19/17 12:00  65      


 


3/19/17 12:00 98.1 68 16 118/82 98   


 


3/19/17 11:00  70      


 


3/19/17 10:00  68      


 


3/19/17 09:00  72      


 


3/19/17 08:00 98.7 73 16 120/67 96   


 


3/19/17 08:00  66      


 


3/19/17 07:43      Room Air  








 I/O








 3/19/17 3/19/17 3/19/17 3/20/17 3/20/17 3/20/17





 07:00 15:00 23:00 07:00 15:00 23:00


 


Intake Total 1240 ml  1117 ml 340 ml  


 


Output Total 600 ml 800 ml 1150 ml 400 ml  


 


Balance 640 ml -800 ml -33 ml -60 ml  


 


      


 


Intake Oral 1020 ml  480 ml 240 ml  


 


IV Total 220 ml  637 ml 100 ml  


 


Output Urine Total 600 ml 800 ml 1150 ml 400 ml  


 


# Bowel Movements   1 0  








Result Diagram:  


3/19/17 0700                                                                   

             3/16/17 0435





Imaging





Last Impressions








Chest X-Ray 3/18/17 0600 Signed





Impressions: 





 Service Date/Time:  Saturday, March 18, 2017 03:09 - CONCLUSION:  1. 





 Postoperative median sternotomy and aortic valve replacement. Mild right 

basilar 





 airspace disease and pleural thickening similar to prior study. No new 





 infiltrate.     Alvarez Murillo MD 


 


Barium Swallow X-Ray 3/16/17 0000 Signed





Impressions: 





 Service Date/Time:  Thursday, March 16, 2017 16:23 - CONCLUSION:  Corkscrew 





 appearance of the mid to distal esophagus concerning for spasm.     Jonathan Moreno MD 


 


Chest CT 3/8/17 1642 Signed





Impressions: 





 Service Date/Time:  Wednesday, March 8, 2017 16:48 - CONCLUSION:  1. Large 





 loculated effusion at the right base measuring 13 x 9 CM. 2. Aortic root 





 dilatation measuring 4.5 CM     Donnell Vincent MD 








Procedures


Right Video Assisted Thoracoscopic surgery VATS, on 3/13, evaluation of pleural 

abscess


and Decortication.


Other Results





 Laboratory Tests








Test 3/13/17 3/16/17 3/19/17 3/20/17





 05:13 04:35 07:00 04:30


 


Blood Type A NEGATIVE    


 


Antibody Screen POSITIVE    


 


Antigen Identification K Antigen -   





 NEGATIVE   


 


Direct Antiglobulin Test NEGATIVE    





(Jennifer)    


 


Crossmatch Leukocyte-Reduced   





 Red Blood   





 Cells   


 


Blood Bank Comment     


 


Sodium Level  141 MEQ/L  


 


Potassium Level  3.6 MEQ/L  


 


Chloride Level  105 MEQ/L  


 


Carbon Dioxide Level  27.6 MEQ/L  


 


Anion Gap  8 MEQ/L  


 


Blood Urea Nitrogen  15 MG/DL  


 


Creatinine  0.96 MG/DL  


 


Estimat Glomerular Filtration  79 ML/MIN  





Rate    


 


Random Glucose  160 MG/DL  


 


Calcium Level  8.2 MG/DL  


 


White Blood Count   7.7 TH/MM3 


 


Red Blood Count   3.41 MIL/MM3 


 


Hemoglobin   10.2 GM/DL 


 


Hematocrit   30.7 % 


 


Mean Corpuscular Volume   90.0 FL 


 


Mean Corpuscular Hemoglobin   29.9 PG 


 


Mean Corpuscular Hemoglobin   33.2 % 





Concent    


 


Red Cell Distribution Width   13.7 % 


 


Platelet Count   220 TH/MM3 


 


Mean Platelet Volume   8.9 FL 


 


Prothrombin Time    13.6 SEC


 


Prothromb Time International    1.2 RATIO





Ratio    


 


Activated Partial    38.3 SEC





Thromboplast Time    








Objective Remarks


GENERAL: Stable no distress. 


SKIN: Warm and dry.


HEAD: Atraumatic. Normocephalic. 


EYES: Pupils equal and round. No scleral icterus. No injection or drainage. 


ENT: No nasal bleeding or discharge.  Mucous membranes pink and moist.


NECK: Trachea midline. No JVD. 


CARDIOVASCULAR: Regular rate and rhythm.  


RESPIRATORY: No accessory muscle use. Clear to auscultation. Breath sounds 

equal bilaterally. 


GASTROINTESTINAL: Abdomen soft, non-tender, nondistended. Hepatic and splenic 

margins not palpable. 


MUSCULOSKELETAL: Extremities without clubbing, cyanosis, or edema. No obvious 

deformities. 


NEUROLOGICAL: Awake and alert. No obvious cranial nerve deficits.  Motor 

grossly within normal limits. Five out of 5 muscle strength in the arms and 

legs.  Normal speech.


PSYCHIATRIC: Appropriate mood and affect; insight and judgment normal.


Medications and IVs





 Current Medications








 Medications


  (Trade)  Dose


 Ordered  Sig/Marcia


 Route  Start Time


 Stop Time Status Last Admin


 


 Heparin Sodium


  (Porcine) 5000


 units  5,000 units  UNSCH  PRN


 IV  3/10/17 18:00


    3/20/17 05:59


 


 


 Heparin Sodium/


 Dextrose  250 ml @ 0


 mls/hr  TITRATE


 IV  3/10/17 12:00


    3/19/17 05:17


 


 


  (Maxipime Inj/NS


 Inj)  100 ml @ 


 200 mls/hr  Q8H


 IV  3/10/17 18:00


    3/20/17 02:00


 


 


  (Flagyl)  500 mg  Q8H


 PO  3/10/17 16:00


    3/20/17 00:00


 


 


  (Coreg)  3.125 mg  BID


 PO  3/11/17 12:00


    3/19/17 08:04


 


 


  (Ativan)  1 mg  Q8H  PRN


 PO  3/12/17 15:00


    3/18/17 22:19


 


 


  (Prinivil)  2.5 mg  DAILY


 PO  3/13/17 09:00


    3/19/17 08:04


 


 


 Miscellaneous 1 ea  1 ea  UNSCH  PRN


 OTHER  3/13/17 09:00


     


 


 


  (Lr 1000 ml Inj)  1,000 ml @ 


 30 mls/hr  Q24H


 IV  3/13/17 21:00


    3/17/17 22:47


 


 


  (NS Flush)  2 ml  BID


 IV FLUSH  3/14/17 21:00


    3/18/17 22:20


 


 


  (NS Flush)  2 ml  UNSCH  PRN


 IV FLUSH  3/14/17 10:15


     


 


 


  (Protonix)  40 mg  HS


 PO  3/14/17 21:00


    3/18/17 22:19


 


 


  (Zofran Inj)  4 mg  Q6H  PRN


 IV PUSH  3/14/17 10:15


     


 


 


  (Surfak)  240 mg  HS


 PO  3/14/17 21:00


     


 


 


  (Milk Of


 Magnesia Liq)  30 ml  DAILY  PRN


 PO  3/14/17 10:15


     


 


 


  (Tylenol)  650 mg  Q4H  PRN


 PO  3/14/17 10:15


     


 


 


  (Percocet  5-325


 Mg)  1 tab  Q3H  PRN


 PO  3/14/17 10:15


    3/18/17 18:00


 


 


  (Percocet  5-325


 Mg)  2 tab  Q3H  PRN


 PO  3/14/17 10:15


    3/18/17 22:19


 


 


  (Bentyl)  20 mg  TID


 PO  3/17/17 13:00


    3/19/17 17:17


 











A/P


Assessment and Plan





Patient is a 61-year-old male presenting with 4 weeks duration of body malaise, 

flulike symptoms nausea vomiting diarrhea for the past 5 days with cough 

productive of off-white sputum


Patient with leukocytosis 





1. Sepsis secondary to Right Empyema status post VATS 3/13, removal of pleural 

abscess


    and decortication, Chest tube removed today, recommended to continue 

antibiotics as per


    ID specialist and follow with doctor Gross in two weeks. continue Heparin 

bridging for 


    INR between 2.5 and 3.5 will need to continue follow up as outpatient. no 

need for Oxygen


    as per ID to continue Cefepime and Flagyl. 


2. Cardiomyopathy LVEF 35-40% on Beta Blockers and retry low dose of ACE 

inhibitors


3. Status pos Mechanical Aortic Valve Replacement continue bridge and resume 

Coumadin


    new MARY CARMEN performed and no evidence of Endocarditis or Cardiac Embolus. INR 

today 1.1


    will increase Warfarin to 10 mg today and following. 


4. Anxiety on Ativan PRN


5. Odynophagia/Dysphagia with solid food, Status post EGD Cork screw esophagus, 

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


    recommended to await for biopsy result, anti-reflux therapy continue PPI 

and Dicyclomine 20 TID


   return as needed for EGD and Dilation. 





PPI


DVT Prophylaxis he is on Heparin drip


Discharge Planning


Awaiting for INR to be therapeutic today 1.2 continue Warfarin, 


awaiting also ID specialist recommendations.








Syed Merida MD Mar 20, 2017 07:38

## 2017-03-21 VITALS
SYSTOLIC BLOOD PRESSURE: 124 MMHG | OXYGEN SATURATION: 97 % | TEMPERATURE: 97.7 F | RESPIRATION RATE: 16 BRPM | HEART RATE: 74 BPM | DIASTOLIC BLOOD PRESSURE: 73 MMHG

## 2017-03-21 VITALS — HEART RATE: 64 BPM

## 2017-03-21 VITALS
OXYGEN SATURATION: 96 % | RESPIRATION RATE: 20 BRPM | TEMPERATURE: 98.2 F | SYSTOLIC BLOOD PRESSURE: 136 MMHG | HEART RATE: 70 BPM | DIASTOLIC BLOOD PRESSURE: 74 MMHG

## 2017-03-21 VITALS
HEART RATE: 63 BPM | SYSTOLIC BLOOD PRESSURE: 125 MMHG | RESPIRATION RATE: 20 BRPM | DIASTOLIC BLOOD PRESSURE: 79 MMHG | TEMPERATURE: 98.9 F | OXYGEN SATURATION: 95 %

## 2017-03-21 VITALS — HEART RATE: 91 BPM

## 2017-03-21 VITALS — HEART RATE: 80 BPM

## 2017-03-21 VITALS — HEART RATE: 69 BPM

## 2017-03-21 VITALS — HEART RATE: 68 BPM

## 2017-03-21 VITALS
HEART RATE: 64 BPM | RESPIRATION RATE: 20 BRPM | SYSTOLIC BLOOD PRESSURE: 128 MMHG | OXYGEN SATURATION: 97 % | TEMPERATURE: 98.7 F | DIASTOLIC BLOOD PRESSURE: 78 MMHG

## 2017-03-21 VITALS
TEMPERATURE: 98.1 F | RESPIRATION RATE: 16 BRPM | HEART RATE: 61 BPM | OXYGEN SATURATION: 98 % | DIASTOLIC BLOOD PRESSURE: 68 MMHG | SYSTOLIC BLOOD PRESSURE: 119 MMHG

## 2017-03-21 VITALS — HEART RATE: 72 BPM

## 2017-03-21 VITALS — HEART RATE: 122 BPM

## 2017-03-21 VITALS — HEART RATE: 62 BPM

## 2017-03-21 VITALS — HEART RATE: 70 BPM

## 2017-03-21 VITALS — HEART RATE: 76 BPM

## 2017-03-21 VITALS — HEART RATE: 59 BPM

## 2017-03-21 VITALS — HEART RATE: 84 BPM

## 2017-03-21 VITALS — HEART RATE: 66 BPM

## 2017-03-21 LAB
APTT BLD: 72.1 SEC (ref 24.3–30.1)
INR PPP: 1.6 RATIO
PROTHROMBIN TIME: 17.6 SEC (ref 9.8–11.6)

## 2017-03-21 RX ADMIN — CEFEPIME SCH MLS/HR: 1 INJECTION, POWDER, FOR SOLUTION INTRAMUSCULAR; INTRAVENOUS at 09:24

## 2017-03-21 RX ADMIN — PANTOPRAZOLE SCH MG: 40 TABLET, DELAYED RELEASE ORAL at 21:36

## 2017-03-21 RX ADMIN — CEFEPIME SCH MLS/HR: 1 INJECTION, POWDER, FOR SOLUTION INTRAMUSCULAR; INTRAVENOUS at 17:43

## 2017-03-21 RX ADMIN — SODIUM CHLORIDE, PRESERVATIVE FREE SCH ML: 5 INJECTION INTRAVENOUS at 09:00

## 2017-03-21 RX ADMIN — CARVEDILOL SCH MG: 3.12 TABLET, FILM COATED ORAL at 21:36

## 2017-03-21 RX ADMIN — HEPARIN SODIUM SCH MLS/HR: 10000 INJECTION, SOLUTION INTRAVENOUS at 21:43

## 2017-03-21 RX ADMIN — SODIUM CHLORIDE, PRESERVATIVE FREE SCH ML: 5 INJECTION INTRAVENOUS at 21:36

## 2017-03-21 RX ADMIN — CARVEDILOL SCH MG: 3.12 TABLET, FILM COATED ORAL at 09:24

## 2017-03-21 RX ADMIN — LISINOPRIL SCH MG: 5 TABLET ORAL at 09:24

## 2017-03-21 RX ADMIN — DOCUSATE CALCIUM SCH MG: 240 CAPSULE, LIQUID FILLED ORAL at 21:36

## 2017-03-21 RX ADMIN — DICYCLOMINE HYDROCHLORIDE SCH MG: 20 TABLET ORAL at 13:00

## 2017-03-21 RX ADMIN — DICYCLOMINE HYDROCHLORIDE SCH MG: 20 TABLET ORAL at 17:43

## 2017-03-21 RX ADMIN — OXYTOCIN SCH MLS/HR: 10 INJECTION, SOLUTION INTRAMUSCULAR; INTRAVENOUS at 21:00

## 2017-03-21 RX ADMIN — CEFEPIME SCH MLS/HR: 1 INJECTION, POWDER, FOR SOLUTION INTRAMUSCULAR; INTRAVENOUS at 02:06

## 2017-03-21 RX ADMIN — DICYCLOMINE HYDROCHLORIDE SCH MG: 20 TABLET ORAL at 09:24

## 2017-03-21 NOTE — HHI.IDPN
Subjective


Subjective


Remarks


Chart revewed\





 is a 62 y/o CF with PMHx of congenital aortic valve problems 

diagnosed at age of 17 years when for a preemployment screen he was found to 

have a heart murmur. 


He subsequently underwent open heart surgery with mechanical aortic valve 

replacement in 2005?. 


Patient reports needing pericardiocentesis for hemopericardium based on his 

description. He also reports being on coumadin and being compliant with 

medications. He denies any heart valve infections or any infections in general. 

Patient kept repeating that other than all these problems I am very healthy 

person.





Patient reports that approximately 6 weeks back he had " Flu-like symptoms". He 

reports he does not like to go to doctors so he toughed it out. He reports 

symptoms lasting 5-6 days with cold, cough like symptoms. He reports feeling 

well for 4 days or so and since then for last 4 weeks he has had generalized 

weakness that progressed to the point that he was unable to go to the bathroom. 

He denies being on any antibiotics and did not go to MD or Urgent care. 





Sp decortication 3/14; op report noted: approximately 700 mls of pus was 

removed during sx


CT is now removed


All cultures are negative, including 4 sets of negative blood clx, pleural 

fluid clx and op clx 





No fevers


No rash


No diarrhea


Antibiotics


Cefepime IV


Zyvox IV


flagyl oral.


Lines


Line sites with no e.o infection.


Past Medical History


reviewed


Allergies:  


Coded Allergies:  


     No Known Allergies (Unverified , 3/8/17)





Objective


.





 Vital Signs








  Date Time  Temp Pulse Resp B/P Pulse Ox O2 Delivery O2 Flow Rate FiO2


 


3/21/17 15:00 98.7 64 20 128/78 97   


 


3/21/17 15:00  61      


 


3/21/17 14:00  64      


 


3/21/17 13:00  122      


 


3/21/17 12:00  59      


 


3/21/17 11:00  61      


 


3/21/17 11:00 98.1 67 16 119/68 98   


 


3/21/17 10:00  69      


 


3/21/17 09:00  66      


 


3/21/17 08:00  80      


 


3/21/17 07:00 98.2 69 20 136/74 96   


 


3/21/17 07:00  70      


 


3/21/17 06:00  62      


 


3/21/17 05:00  64      


 


3/21/17 04:00  68      


 


3/21/17 03:00  62      


 


3/21/17 03:00 97.7 74 16 124/73 97   


 


3/21/17 02:00  68      


 


3/21/17 01:00  68      


 


3/21/17 00:00  70      


 


3/20/17 23:00 98.2 70 16 128/80 97   


 


3/20/17 23:00  70      


 


3/20/17 22:00  74      


 


3/20/17 21:00  70      


 


3/20/17 20:00  104      


 


3/20/17 19:00 98.2 73 16 97/63 96   


 


3/20/17 19:00  76      


 


3/20/17 18:00  75      


 


3/20/17 17:00  68      














 3/20/17 3/20/17 3/21/17





 15:00 23:00 07:00


 


Intake Total  859 ml 519 ml


 


Output Total  900 ml 620 ml


 


Balance  -41 ml -101 ml


 


   


 


Intake Oral  720 ml 360 ml


 


IV Total  139 ml 159 ml


 


Output Urine Total  900 ml 620 ml


 


# Bowel Movements  1 








Imaging





Last Impressions








Chest X-Ray 3/18/17 0600 Signed





Impressions: 





 Service Date/Time:  Saturday, March 18, 2017 03:09 - CONCLUSION:  1. 





 Postoperative median sternotomy and aortic valve replacement. Mild right 

basilar 





 airspace disease and pleural thickening similar to prior study. No new 





 infiltrate.     Alvarez Murillo MD 


 


Barium Swallow X-Ray 3/16/17 0000 Signed





Impressions: 





 Service Date/Time:  Thursday, March 16, 2017 16:23 - CONCLUSION:  Corkscrew 





 appearance of the mid to distal esophagus concerning for spasm.     Jonathan Moreno MD 


 


Chest CT 3/8/17 1642 Signed





Impressions: 





 Service Date/Time:  Wednesday, March 8, 2017 16:48 - CONCLUSION:  1. Large 





 loculated effusion at the right base measuring 13 x 9 CM. 2. Aortic root 





 dilatation measuring 4.5 CM     Donnell Vincent MD 








Physical Exam


GENERAL: This is a well-nourished, well-developed patient, in no apparent 

distress.


SKIN: No rashes, ecchymoses or lesions. Cool and dry.


HEAD: Atraumatic. Normocephalic. No temporal or scalp tenderness.


EYES: Pupils equal round and reactive. Extraocular motions intact. No scleral 

icterus. No injection or drainage. 


ENT: Nose without bleeding, purulent drainage or septal hematoma. Throat 

without erythema, tonsillar hypertrophy or exudate. Uvula midline. Airway 

patent.


NECK: Trachea midline. No JVD or lymphadenopathy. Supple, nontender, no 

meningeal signs.


CARDIOVASCULAR: Mechanical heart valve sounds. No murmur appreciated. 


RESPIRATORY: Clear to auscultation. Breath sounds equal diminished on right 

side. 


GASTROINTESTINAL: Abdomen soft, non-tender, nondistended. 


MUSCULOSKELETAL: Extremities without clubbing, cyanosis, or edema. 


NEUROLOGICAL: Awake and alert. Grossly non focal.


Psych: cooperative


IV line sites with no e.o infection.





Assessment & Plan


Remarks


Sepsis present on admission.


Right side pleural effusion, culture negative 


Suspected empyema


Ascites :  ? Diaphragmatic defect related connection between pleural and 

peritoneal cavities.


Rule out mechanical aortic valve endocarditis


   - multiple negative blood clx


Persistent fevers despite broad spectrum antibiotics ? drug fever ? vanco IV


   - fever resolved 


   MARY CARMEN negative for vegetations.


Leukocytosis - resolved 





Recs


cont PO abx at home  upon dc :  Levaquine 750 daily + clindamycin 450 mg TID 

for 2 more weeks at least  (thru 4/4)


FU wht CT surgeon in 2 weeks or less


Duration to be determined by clinical and radiological resolution but at least 

for  2 more  weeks; further extention to be desided during fu with CT surgeon 


If no  clin no radiological (other than stable chronic changes) abbx can be 

stoppped


OK to dc pt home 





dw Stacie Sánchez MD Mar 21, 2017 16:09

## 2017-03-21 NOTE — HHI.DS
__________________________________________________





Discharge Summary


Admission Date


Mar 8, 2017 at 14:54


Admitting Diagnosis


pneumonia, sepsis, lactic acidosis, vomiting and diarrhea





Procedures


3/12- MARY CARMEN- no vegetations, no thrombus


3/14- VAT


Brief History - From Admission


Patient is a very pleasant 61-year-old male with history of mechanical aortic 

valve replacement in 2005 who presented to the emergency room complaining of 

about 4 weeks Duration of nausea vomiting diarrhea intermittently on and off.  

Lasting for 5 days at a time and will be okay for to 3 days then recurred.  

Patient describes stools as liquid brown nonbloody.  For the past 5 days now 

has been having productive cough off white sputum associated with body Monday, 

nausea, diarrhea which prompted patient to come in here and admitted. 





On admission, with elevated WBC, elevated lactic acid level


CBC/BMP:  


3/19/17 0700





Significant Findings





Laboratory Tests








Test 3/19/17 3/19/17 3/19/17 3/20/17





 07:00 13:20 18:53 04:30


 


Red Blood Count 3.41 MIL/MM3   





 (4.50-5.90)   


 


Hemoglobin 10.2 GM/DL   





 (13.0-17.0)   


 


Hematocrit 30.7 %   





 (39.0-51.0)   


 


Prothrombin Time 11.8 SEC   13.6 SEC





 (9.8-11.6)   (9.8-11.6)


 


Activated Partial 32.8 SEC 42.7 SEC 39.8 SEC 38.3 SEC





Thromboplast Time (24.3-30.1) (24.3-30.1) (24.3-30.1) (24.3-30.1)


 


    





Test 3/20/17 3/20/17 3/21/17 





 12:15 19:35 06:25 


 


Activated Partial 73.8 SEC 59.5 SEC 72.1 SEC 





Thromboplast Time (24.3-30.1) (24.3-30.1) (24.3-30.1) 


 


Prothrombin Time   17.6 SEC 





   (9.8-11.6) 








PE at Discharge


awake and alert, NAD


decreased breath sounds and decrease vocal fremiti-  base to mid, right , chest 

tube in place- right posterior chest wall


regular rhythm-


abdomen soft, nontender


extremities no edema


neuro exam- unremarkable








Syed Merida MD Mar 21, 2017 17:51

## 2017-03-21 NOTE — HHI.PR
Subjective


Remarks


Patient stable seen in his bedroom no new issues, no Nausea, vomit or diarrhea


Chest tube removed. on Echocardiogram no evidence of Endocarditis or cardiac 

thrombus, 


Mechanical Aortic Valve replacement continue Heparin bridge and resume Coumadin


until INR 2.5 to 3.5, Cardiomyopathy LVEF 35-40% on Beta blockers, status post


Right Video Assisted Thoracoscopic surgery VATS, on 3/13, evaluation of pleural 

abscess


and Decortication. recommended to follow with Doctor Ginny in two weeks. 


Antibiotics as per ID specialist. 


3/19 Seen in his bedroom stable, no nausea, vomit or diarrhea. as per nurse 

miss Uribe


he was confused during the night. 


3/20 Stable sleeping, seen with nurse Miss Squires, no new complaint, awaiting for 

his INR 


to be therapeutic, today 1.2 Warfarin increased to 10 mg today and following. 


3/21 Stable no new issues, the patient wants to go home today, explained we 

need to get


his INR within a range between 2.5 and 3.5 for discharge safely, he will need 

to go and 


follow with PCP or Warfarin clinic as outpatient initially will go with 

Cardiothoracic surgery 


specialist for follow up. no Nausea, vomit or diarrhea today, will give 

Warfarin 7.5 mg and 


follow in am tomorrow.





Objective





 Vital Signs








  Date Time  Temp Pulse Resp B/P Pulse Ox O2 Delivery O2 Flow Rate FiO2


 


3/21/17 06:00  62      


 


3/21/17 05:00  64      


 


3/21/17 04:00  68      


 


3/21/17 03:00  62      


 


3/21/17 03:00 97.7 74 16 124/73 97   


 


3/21/17 02:00  68      


 


3/21/17 01:00  68      


 


3/21/17 00:00  70      


 


3/20/17 23:00 98.2 70 16 128/80 97   


 


3/20/17 23:00  70      


 


3/20/17 22:00  74      


 


3/20/17 21:00  70      


 


3/20/17 20:00  104      


 


3/20/17 19:00 98.2 73 16 97/63 96   


 


3/20/17 19:00  76      


 


3/20/17 18:00  75      


 


3/20/17 17:00  68      


 


3/20/17 16:00  79      


 


3/20/17 15:00 97.9 80 20 91/59 98   


 


3/20/17 15:00  78      


 


3/20/17 14:00  89      


 


3/20/17 13:00  78      


 


3/20/17 12:00  75      


 


3/20/17 11:15 97.6 65 18 119/68 97   


 


3/20/17 11:00  67      


 


3/20/17 10:00  70      


 


3/20/17 09:00  72      


 


3/20/17 08:00  72      








 I/O








 3/20/17 3/20/17 3/20/17 3/21/17 3/21/17 3/21/17





 07:00 15:00 23:00 07:00 15:00 23:00


 


Intake Total 340 ml  859 ml 519 ml  


 


Output Total 400 ml  900 ml 620 ml  


 


Balance -60 ml  -41 ml -101 ml  


 


      


 


Intake Oral 240 ml  720 ml 360 ml  


 


IV Total 100 ml  139 ml 159 ml  


 


Output Urine Total 400 ml  900 ml 620 ml  


 


# Bowel Movements 0  1   








Result Diagram:  


3/19/17 0700





Imaging





Last Impressions








Chest X-Ray 3/18/17 0600 Signed





Impressions: 





 Service Date/Time:  Saturday, March 18, 2017 03:09 - CONCLUSION:  1. 





 Postoperative median sternotomy and aortic valve replacement. Mild right 

basilar 





 airspace disease and pleural thickening similar to prior study. No new 





 infiltrate.     Alvarez Murillo MD 


 


Barium Swallow X-Ray 3/16/17 0000 Signed





Impressions: 





 Service Date/Time:  Thursday, March 16, 2017 16:23 - CONCLUSION:  Corkscrew 





 appearance of the mid to distal esophagus concerning for spasm.     Jonathan Moreno MD 


 


Chest CT 3/8/17 1642 Signed





Impressions: 





 Service Date/Time:  Wednesday, March 8, 2017 16:48 - CONCLUSION:  1. Large 





 loculated effusion at the right base measuring 13 x 9 CM. 2. Aortic root 





 dilatation measuring 4.5 CM     Donnell Vincent MD 








Procedures


Right Video Assisted Thoracoscopic surgery VATS, on 3/13, evaluation of pleural 

abscess


and Decortication.


Other Results





 Laboratory Tests








Test 3/19/17 3/21/17





 07:00 06:25


 


White Blood Count 7.7 TH/MM3 


 


Red Blood Count 3.41 MIL/MM3 


 


Hemoglobin 10.2 GM/DL 


 


Hematocrit 30.7 % 


 


Mean Corpuscular Volume 90.0 FL 


 


Mean Corpuscular Hemoglobin 29.9 PG 


 


Mean Corpuscular Hemoglobin 33.2 % 





Concent  


 


Red Cell Distribution Width 13.7 % 


 


Platelet Count 220 TH/MM3 


 


Mean Platelet Volume 8.9 FL 


 


Prothrombin Time  17.6 SEC


 


Prothromb Time International  1.6 RATIO





Ratio  


 


Activated Partial  72.1 SEC





Thromboplast Time  








Objective Remarks


GENERAL: Stable no distress. 


SKIN: Warm and dry.


HEAD: Atraumatic. Normocephalic. 


EYES: Pupils equal and round. No scleral icterus. No injection or drainage. 


ENT: No nasal bleeding or discharge.  Mucous membranes pink and moist.


NECK: Trachea midline. No JVD. 


CARDIOVASCULAR: Regular rate and rhythm.  


RESPIRATORY: No accessory muscle use. Clear to auscultation. Breath sounds 

equal bilaterally. 


GASTROINTESTINAL: Abdomen soft, non-tender, nondistended. Hepatic and splenic 

margins not palpable. 


MUSCULOSKELETAL: Extremities without clubbing, cyanosis, or edema. No obvious 

deformities. 


NEUROLOGICAL: Awake and alert. No obvious cranial nerve deficits.  Motor 

grossly within normal limits. Five out of 5 muscle strength in the arms and 

legs.  Normal speech.


PSYCHIATRIC: Appropriate mood and affect; insight and judgment normal.


Medications and IVs





 Current Medications








 Medications


  (Trade)  Dose


 Ordered  Sig/Marcia


 Route  Start Time


 Stop Time Status Last Admin


 


 Heparin Sodium


  (Porcine) 5000


 units  5,000 units  UNSCH  PRN


 IV  3/10/17 18:00


    3/20/17 05:59


 


 


 Heparin Sodium/


 Dextrose  250 ml @ 0


 mls/hr  TITRATE


 IV  3/10/17 12:00


    3/19/17 05:17


 


 


  (Maxipime Inj/NS


 Inj)  100 ml @ 


 200 mls/hr  Q8H


 IV  3/10/17 18:00


    3/21/17 02:06


 


 


  (Flagyl)  500 mg  Q8H


 PO  3/10/17 16:00


    3/20/17 23:51


 


 


  (Coreg)  3.125 mg  BID


 PO  3/11/17 12:00


    3/20/17 20:43


 


 


  (Ativan)  1 mg  Q8H  PRN


 PO  3/12/17 15:00


    3/18/17 22:19


 


 


  (Prinivil)  2.5 mg  DAILY


 PO  3/13/17 09:00


    3/20/17 09:06


 


 


 Miscellaneous 1 ea  1 ea  UNSCH  PRN


 OTHER  3/13/17 09:00


     


 


 


  (Lr 1000 ml Inj)  1,000 ml @ 


 30 mls/hr  Q24H


 IV  3/13/17 21:00


    3/17/17 22:47


 


 


  (NS Flush)  2 ml  BID


 IV FLUSH  3/14/17 21:00


    3/20/17 20:45


 


 


  (NS Flush)  2 ml  UNSCH  PRN


 IV FLUSH  3/14/17 10:15


     


 


 


  (Protonix)  40 mg  HS


 PO  3/14/17 21:00


    3/20/17 20:43


 


 


  (Zofran Inj)  4 mg  Q6H  PRN


 IV PUSH  3/14/17 10:15


     


 


 


  (Surfak)  240 mg  HS


 PO  3/14/17 21:00


    3/20/17 20:43


 


 


  (Milk Of


 Magnesia Liq)  30 ml  DAILY  PRN


 PO  3/14/17 10:15


     


 


 


  (Tylenol)  650 mg  Q4H  PRN


 PO  3/14/17 10:15


    3/20/17 09:19


 


 


  (Percocet  5-325


 Mg)  1 tab  Q3H  PRN


 PO  3/14/17 10:15


    3/18/17 18:00


 


 


  (Percocet  5-325


 Mg)  2 tab  Q3H  PRN


 PO  3/14/17 10:15


    3/18/17 22:19


 


 


  (Bentyl)  20 mg  TID


 PO  3/17/17 13:00


    3/20/17 17:55


 











A/P


Assessment and Plan


Patient is a 61-year-old male presenting with 4 weeks duration of body malaise, 

flulike symptoms nausea vomiting diarrhea for the past 5 days with cough 

productive of off-white sputum


Patient with leukocytosis 





1. Sepsis secondary to Right Empyema status post VATS 3/13, removal of pleural 

abscess


    and decortication, Chest tube removed today, recommended to continue 

antibiotics as per


    ID specialist and follow with doctor Gross in two weeks. continue Heparin 

bridging for 


    INR between 2.5 and 3.5 will need to continue follow up as outpatient. no 

need for Oxygen


    as per ID to continue Cefepime and Flagyl. will need to follow with ID 

specialist for 


    Discharge asked for ID specialist for follow up before discharge, Doctor Horowitz not available 


2. Cardiomyopathy LVEF 35-40% on Beta Blockers and retry low dose of ACE 

inhibitors


3. Status pos Mechanical Aortic Valve Replacement continue bridge and resume 

Coumadin


    new MARY CARMEN performed and no evidence of Endocarditis or Cardiac Embolus. INR 

today 1.6


    will give Warfarin 7.5 mg daily, follow in am tomorrow 


4. Anxiety on Ativan PRN


5. Odynophagia/Dysphagia with solid food, Status post EGD Cork screw esophagus, 

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


    recommended to await for biopsy result, anti-reflux therapy continue PPI 

and Dicyclomine 20 TID


   return as needed for EGD and Dilation. 





PPI


DVT Prophylaxis he is on Heparin drip


Discharge Planning


Awaiting for INR to be therapeutic today 1.6 continue Warfarin, 


awaiting also ID specialist recommendations.








Syed Merida MD Mar 21, 2017 07:36

## 2017-03-22 VITALS — HEART RATE: 71 BPM

## 2017-03-22 VITALS — HEART RATE: 72 BPM

## 2017-03-22 VITALS — HEART RATE: 78 BPM

## 2017-03-22 VITALS
HEART RATE: 82 BPM | SYSTOLIC BLOOD PRESSURE: 102 MMHG | DIASTOLIC BLOOD PRESSURE: 60 MMHG | TEMPERATURE: 97.5 F | RESPIRATION RATE: 20 BRPM | OXYGEN SATURATION: 100 %

## 2017-03-22 VITALS — HEART RATE: 74 BPM

## 2017-03-22 VITALS — HEART RATE: 80 BPM

## 2017-03-22 VITALS
SYSTOLIC BLOOD PRESSURE: 99 MMHG | RESPIRATION RATE: 16 BRPM | HEART RATE: 76 BPM | TEMPERATURE: 98.1 F | DIASTOLIC BLOOD PRESSURE: 62 MMHG | OXYGEN SATURATION: 95 %

## 2017-03-22 VITALS
DIASTOLIC BLOOD PRESSURE: 59 MMHG | RESPIRATION RATE: 16 BRPM | TEMPERATURE: 97.6 F | OXYGEN SATURATION: 97 % | HEART RATE: 87 BPM | SYSTOLIC BLOOD PRESSURE: 90 MMHG

## 2017-03-22 VITALS
DIASTOLIC BLOOD PRESSURE: 66 MMHG | OXYGEN SATURATION: 95 % | TEMPERATURE: 97.9 F | RESPIRATION RATE: 18 BRPM | SYSTOLIC BLOOD PRESSURE: 114 MMHG | HEART RATE: 64 BPM

## 2017-03-22 VITALS — HEART RATE: 67 BPM

## 2017-03-22 VITALS — HEART RATE: 65 BPM

## 2017-03-22 VITALS — HEART RATE: 63 BPM

## 2017-03-22 VITALS
OXYGEN SATURATION: 100 % | HEART RATE: 79 BPM | TEMPERATURE: 98.4 F | DIASTOLIC BLOOD PRESSURE: 78 MMHG | SYSTOLIC BLOOD PRESSURE: 122 MMHG | RESPIRATION RATE: 17 BRPM

## 2017-03-22 VITALS — HEART RATE: 64 BPM

## 2017-03-22 VITALS — HEART RATE: 70 BPM

## 2017-03-22 VITALS — HEART RATE: 69 BPM

## 2017-03-22 VITALS — HEART RATE: 84 BPM

## 2017-03-22 LAB
APTT BLD: 66.9 SEC (ref 24.3–30.1)
APTT BLD: 78.4 SEC (ref 24.3–30.1)
ERYTHROCYTE [DISTWIDTH] IN BLOOD BY AUTOMATED COUNT: 14 % (ref 11.6–17.2)
HCT VFR BLD CALC: 30.8 % (ref 39–51)
INR PPP: 1.8 RATIO
MCH RBC QN AUTO: 30.8 PG (ref 27–34)
MCHC RBC AUTO-ENTMCNC: 34.6 % (ref 32–36)
MCV RBC AUTO: 89.2 FL (ref 80–100)
PLATELET # BLD: 243 TH/MM3 (ref 150–450)
PROTHROMBIN TIME: 20.7 SEC (ref 9.8–11.6)
RBC # BLD AUTO: 3.45 MIL/MM3 (ref 4.5–5.9)
REVIEW FLAG: (no result)
WBC # BLD AUTO: 8 TH/MM3 (ref 4–11)

## 2017-03-22 RX ADMIN — DICYCLOMINE HYDROCHLORIDE SCH MG: 20 TABLET ORAL at 17:28

## 2017-03-22 RX ADMIN — SODIUM CHLORIDE, PRESERVATIVE FREE SCH ML: 5 INJECTION INTRAVENOUS at 08:46

## 2017-03-22 RX ADMIN — CEFEPIME SCH MLS/HR: 1 INJECTION, POWDER, FOR SOLUTION INTRAMUSCULAR; INTRAVENOUS at 00:57

## 2017-03-22 RX ADMIN — CEFEPIME SCH MLS/HR: 1 INJECTION, POWDER, FOR SOLUTION INTRAMUSCULAR; INTRAVENOUS at 08:48

## 2017-03-22 RX ADMIN — LISINOPRIL SCH MG: 5 TABLET ORAL at 08:47

## 2017-03-22 RX ADMIN — HEPARIN SODIUM SCH MLS/HR: 10000 INJECTION, SOLUTION INTRAVENOUS at 13:52

## 2017-03-22 RX ADMIN — DICYCLOMINE HYDROCHLORIDE SCH MG: 20 TABLET ORAL at 13:50

## 2017-03-22 RX ADMIN — OXYTOCIN SCH MLS/HR: 10 INJECTION, SOLUTION INTRAMUSCULAR; INTRAVENOUS at 21:00

## 2017-03-22 RX ADMIN — CEFEPIME SCH MLS/HR: 1 INJECTION, POWDER, FOR SOLUTION INTRAMUSCULAR; INTRAVENOUS at 17:28

## 2017-03-22 RX ADMIN — SODIUM CHLORIDE, PRESERVATIVE FREE SCH ML: 5 INJECTION INTRAVENOUS at 21:00

## 2017-03-22 RX ADMIN — CARVEDILOL SCH MG: 3.12 TABLET, FILM COATED ORAL at 08:46

## 2017-03-22 RX ADMIN — PANTOPRAZOLE SCH MG: 40 TABLET, DELAYED RELEASE ORAL at 23:00

## 2017-03-22 RX ADMIN — CARVEDILOL SCH MG: 3.12 TABLET, FILM COATED ORAL at 21:00

## 2017-03-22 RX ADMIN — DOCUSATE CALCIUM SCH MG: 240 CAPSULE, LIQUID FILLED ORAL at 21:00

## 2017-03-22 RX ADMIN — DICYCLOMINE HYDROCHLORIDE SCH MG: 20 TABLET ORAL at 08:47

## 2017-03-22 NOTE — HHI.PR
Subjective


Remarks


Patient stable seen in his bedroom no new issues, no Nausea, vomit or diarrhea


Chest tube removed. on Echocardiogram no evidence of Endocarditis or cardiac 

thrombus, 


Mechanical Aortic Valve replacement continue Heparin bridge and resume Coumadin


until INR 2.5 to 3.5, Cardiomyopathy LVEF 35-40% on Beta blockers, status post


Right Video Assisted Thoracoscopic surgery VATS, on 3/13, evaluation of pleural 

abscess


and Decortication. recommended to follow with Doctor Ginny in two weeks. 


Antibiotics as per ID specialist. 


3/19 Seen in his bedroom stable, no nausea, vomit or diarrhea. as per nurse 

miss Uribe


he was confused during the night. 


3/20 Stable sleeping, seen with nurse Miss Squires, no new complaint, awaiting for 

his INR 


to be therapeutic, today 1.2 Warfarin increased to 10 mg today and following. 


3/21 Stable no new issues, the patient wants to go home today, explained we 

need to get


his INR within a range between 2.5 and 3.5 for discharge safely, he will need 

to go and 


follow with PCP or Warfarin clinic as outpatient initially will go with 

Cardiothoracic surgery 


specialist for follow up. no Nausea, vomit or diarrhea today, will give 

Warfarin 7.5 mg and 


follow in am tomorrow. 


3/22 seen in his bedroom and discussed with nurse Miss Koch and present Charge 

nurse


explained why in this case the patient has to wait until the INR is therapeutic 

he has high


risk for DVT and PE complications due to Aortic Valve replacement, today his 

INR is 1.8 


tomorrow will need to re assess and give Warfarin dosages for maintenance, 

today will give


Warfarin 10 mg and follow tomorrow, no nausea, vomit or diarrhea.





Objective





 Vital Signs








  Date Time  Temp Pulse Resp B/P Pulse Ox O2 Delivery O2 Flow Rate FiO2


 


3/22/17 12:00  64      


 


3/22/17 11:00  71      


 


3/22/17 11:00 98.1 76 16 99/62 95   


 


3/22/17 10:00  70      


 


3/22/17 10:00  70      


 


3/22/17 09:18  63      


 


3/22/17 09:00  64      


 


3/22/17 08:19  74      


 


3/22/17 08:00  69      


 


3/22/17 07:30 98.4 79 17 122/78 100   


 


3/22/17 07:00  74      


 


3/22/17 06:16  65      


 


3/22/17 05:40  63      


 


3/22/17 04:39  63      


 


3/22/17 03:43 97.9 64 18 114/66 95   


 


3/22/17 03:00  71      


 


3/22/17 02:00  67      


 


3/22/17 01:00  70      


 


3/22/17 00:00  70      


 


3/21/17 23:00 98.9 87 20 125/79 95   


 


3/21/17 23:00  63      


 


3/21/17 22:00  72      


 


3/21/17 22:00  72      


 


3/21/17 21:00  76      


 


3/21/17 20:00  72      


 


3/21/17 20:00  80      


 


3/21/17 19:40  84      


 


3/21/17 18:00  91      


 


3/21/17 17:00  72      


 


3/21/17 16:00  64      


 


3/21/17 15:00 98.7 64 20 128/78 97   


 


3/21/17 15:00  61      








 I/O








 3/21/17 3/21/17 3/21/17 3/22/17 3/22/17 3/22/17





 07:00 15:00 23:00 07:00 15:00 23:00


 


Intake Total 519 ml  1633 ml 1000 ml  


 


Output Total 620 ml  600 ml 600 ml 300 ml 


 


Balance -101 ml  1033 ml 400 ml -300 ml 


 


      


 


Intake Oral 360 ml  1080 ml 600 ml  


 


IV Total 159 ml  553 ml 400 ml  


 


Output Urine Total 620 ml  600 ml 600 ml 300 ml 


 


# Voids   4   


 


# Bowel Movements   1 0  








Result Diagram:  


3/22/17 0450





Imaging





Last Impressions








Chest X-Ray 3/18/17 0600 Signed





Impressions: 





 Service Date/Time:  Saturday, March 18, 2017 03:09 - CONCLUSION:  1. 





 Postoperative median sternotomy and aortic valve replacement. Mild right 

basilar 





 airspace disease and pleural thickening similar to prior study. No new 





 infiltrate.     Alvarez Murillo MD 


 


Barium Swallow X-Ray 3/16/17 0000 Signed





Impressions: 





 Service Date/Time:  Thursday, March 16, 2017 16:23 - CONCLUSION:  Corkscrew 





 appearance of the mid to distal esophagus concerning for spasm.     Jonathan Moreno MD 


 


Chest CT 3/8/17 1642 Signed





Impressions: 





 Service Date/Time:  Wednesday, March 8, 2017 16:48 - CONCLUSION:  1. Large 





 loculated effusion at the right base measuring 13 x 9 CM. 2. Aortic root 





 dilatation measuring 4.5 CM     Donnell Vincent MD 








Procedures


Right Video Assisted Thoracoscopic surgery VATS, on 3/13, evaluation of pleural 

abscess


and Decortication.


Other Results





 Laboratory Tests








Test 3/22/17





 04:50


 


White Blood Count 8.0 TH/MM3


 


Red Blood Count 3.45 MIL/MM3


 


Hemoglobin 10.6 GM/DL


 


Hematocrit 30.8 %


 


Mean Corpuscular Volume 89.2 FL


 


Mean Corpuscular Hemoglobin 30.8 PG


 


Mean Corpuscular Hemoglobin 34.6 %





Concent 


 


Red Cell Distribution Width 14.0 %


 


Platelet Count 243 TH/MM3


 


Mean Platelet Volume 8.1 FL


 


Prothrombin Time 20.7 SEC


 


Prothromb Time International 1.8 RATIO





Ratio 


 


Activated Partial 78.4 SEC





Thromboplast Time 








Objective Remarks


GENERAL: Stable no distress. 


SKIN: Warm and dry.


HEAD: Atraumatic. Normocephalic. 


EYES: Pupils equal and round. No scleral icterus. No injection or drainage. 


ENT: No nasal bleeding or discharge.  Mucous membranes pink and moist.


NECK: Trachea midline. No JVD. 


CARDIOVASCULAR: Regular rate and rhythm.  


RESPIRATORY: No accessory muscle use. Clear to auscultation. Breath sounds 

equal bilaterally. 


GASTROINTESTINAL: Abdomen soft, non-tender, nondistended. Hepatic and splenic 

margins not palpable. 


MUSCULOSKELETAL: Extremities without clubbing, cyanosis, or edema. No obvious 

deformities. 


NEUROLOGICAL: Awake and alert. No obvious cranial nerve deficits.  Motor 

grossly within normal limits. Five out of 5 muscle strength in the arms and 

legs.  Normal speech.


PSYCHIATRIC: Appropriate mood and affect; insight and judgment normal.


Medications and IVs





 Current Medications








 Medications


  (Trade)  Dose


 Ordered  Sig/Marcia


 Route  Start Time


 Stop Time Status Last Admin


 


 Heparin Sodium


  (Porcine) 5000


 units  5,000 units  UNSCH  PRN


 IV  3/10/17 18:00


    3/20/17 05:59


 


 


 Heparin Sodium/


 Dextrose  250 ml @ 0


 mls/hr  TITRATE


 IV  3/10/17 12:00


    3/22/17 13:52


 


 


  (Maxipime Inj/NS


 Inj)  100 ml @ 


 200 mls/hr  Q8H


 IV  3/10/17 18:00


    3/22/17 08:48


 


 


  (Flagyl)  500 mg  Q8H


 PO  3/10/17 16:00


    3/22/17 08:46


 


 


  (Coreg)  3.125 mg  BID


 PO  3/11/17 12:00


    3/22/17 08:46


 


 


  (Ativan)  1 mg  Q8H  PRN


 PO  3/12/17 15:00


    3/22/17 09:00


 


 


  (Prinivil)  2.5 mg  DAILY


 PO  3/13/17 09:00


    3/22/17 08:47


 


 


 Miscellaneous 1 ea  1 ea  UNSCH  PRN


 OTHER  3/13/17 09:00


     


 


 


  (Lr 1000 ml Inj)  1,000 ml @ 


 30 mls/hr  Q24H


 IV  3/13/17 21:00


    3/17/17 22:47


 


 


  (NS Flush)  2 ml  BID


 IV FLUSH  3/14/17 21:00


    3/22/17 08:46


 


 


  (NS Flush)  2 ml  UNSCH  PRN


 IV FLUSH  3/14/17 10:15


     


 


 


  (Protonix)  40 mg  HS


 PO  3/14/17 21:00


    3/21/17 21:36


 


 


  (Zofran Inj)  4 mg  Q6H  PRN


 IV PUSH  3/14/17 10:15


     


 


 


  (Surfak)  240 mg  HS


 PO  3/14/17 21:00


    3/21/17 21:36


 


 


  (Milk Of


 Magnesia Liq)  30 ml  DAILY  PRN


 PO  3/14/17 10:15


     


 


 


  (Tylenol)  650 mg  Q4H  PRN


 PO  3/14/17 10:15


    3/20/17 09:19


 


 


  (Percocet  5-325


 Mg)  1 tab  Q3H  PRN


 PO  3/14/17 10:15


    3/18/17 18:00


 


 


  (Percocet  5-325


 Mg)  2 tab  Q3H  PRN


 PO  3/14/17 10:15


    3/18/17 22:19


 


 


  (Bentyl)  20 mg  TID


 PO  3/17/17 13:00


    3/22/17 13:50


 


 


  (Coumadin)  10 mg  ONCE  ONCE


 PO  3/22/17 16:00


 3/22/17 16:01   


 


 


  (Coumadin


 Booklet)  1  ONCE  ONCE


 XX  3/22/17 15:00


 3/22/17 15:01   


 











A/P


Assessment and Plan


Patient is a 61-year-old male presenting with 4 weeks duration of body malaise, 

flulike symptoms nausea vomiting diarrhea for the past 5 days with cough 

productive of off-white sputum


Patient with leukocytosis 





1. Sepsis secondary to Right Empyema status post VATS 3/13, removal of pleural 

abscess


    and decortication, Chest tube removed today, recommended to continue 

antibiotics as per


    ID specialist and follow with doctor Gross in two weeks. continue Heparin 

bridging for 


    INR between 2.5 and 3.5 will need to continue follow up as outpatient. no 

need for Oxygen


    seen by Infectious disease specialist doctor Stacie Angela and 

discussed with 


    her yesterday night, ruled out mechanical aortic valve endocarditis, 

Multiple negative blood


    cultures, Fever Resolved, MARY CARMEN negative for vegetations, Leukocytosis 

resolved, recommended


    to Discharge on Levaquine 750 daily + clindamycin 450 mg TID for 2 more 

weeks at least  (thru 4/4)


    follow up with CT Surgeon in two weeks or less. Okay to discharge home. 





2. Cardiomyopathy LVEF 35-40% on Beta Blockers and retry low dose of ACE 

inhibitors


3. Status pos Mechanical Aortic Valve Replacement continue bridge and resume 

Coumadin


    new MARY CARMEN performed and no evidence of Endocarditis or Cardiac Embolus. INR 

today 1.8


    will give Warfarin 10 mg and follow in am tomorrow. 


4. Anxiety on Ativan PRN


5. Odynophagia/Dysphagia with solid food, Status post EGD Cork screw esophagus, 

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


    recommended to await for biopsy result, anti-reflux therapy continue PPI 

and Dicyclomine 20 TID


   return as needed for EGD and Dilation. 





PPI


DVT Prophylaxis he is on Heparin drip


Discharge Planning


Awaiting for INR to be therapeutic today 1.8 continue Warfarin, 


Long conversation with the patient nurse Miss Koch and charge nurse present 

explained to the patient


why can not be discharged by me at this time, he needs to be therapeutic and 

also very compliant with 


his medicine and PT and INR as outpatient, also will find resources in the 

community to continue following 


his PT and INR as outpatient.








Syed Merida MD Mar 22, 2017 14:28

## 2017-03-23 VITALS
RESPIRATION RATE: 20 BRPM | TEMPERATURE: 96.4 F | SYSTOLIC BLOOD PRESSURE: 109 MMHG | OXYGEN SATURATION: 96 % | DIASTOLIC BLOOD PRESSURE: 67 MMHG | HEART RATE: 73 BPM

## 2017-03-23 VITALS
SYSTOLIC BLOOD PRESSURE: 112 MMHG | OXYGEN SATURATION: 99 % | DIASTOLIC BLOOD PRESSURE: 61 MMHG | HEART RATE: 72 BPM | TEMPERATURE: 97.6 F | RESPIRATION RATE: 20 BRPM

## 2017-03-23 VITALS
DIASTOLIC BLOOD PRESSURE: 65 MMHG | HEART RATE: 80 BPM | SYSTOLIC BLOOD PRESSURE: 103 MMHG | OXYGEN SATURATION: 100 % | TEMPERATURE: 97.9 F | RESPIRATION RATE: 19 BRPM

## 2017-03-23 VITALS
OXYGEN SATURATION: 99 % | RESPIRATION RATE: 18 BRPM | HEART RATE: 77 BPM | DIASTOLIC BLOOD PRESSURE: 69 MMHG | TEMPERATURE: 96.7 F | SYSTOLIC BLOOD PRESSURE: 107 MMHG

## 2017-03-23 VITALS
DIASTOLIC BLOOD PRESSURE: 67 MMHG | HEART RATE: 87 BPM | TEMPERATURE: 97.3 F | SYSTOLIC BLOOD PRESSURE: 102 MMHG | RESPIRATION RATE: 20 BRPM | OXYGEN SATURATION: 98 %

## 2017-03-23 LAB
ANION GAP SERPL CALC-SCNC: 9 MEQ/L (ref 5–15)
APTT BLD: 62.2 SEC (ref 24.3–30.1)
BUN SERPL-MCNC: 8 MG/DL (ref 7–18)
CHLORIDE SERPL-SCNC: 107 MEQ/L (ref 98–107)
GFR SERPLBLD BASED ON 1.73 SQ M-ARVRAT: 91 ML/MIN (ref 89–?)
HCO3 BLD-SCNC: 26 MEQ/L (ref 21–32)
INR PPP: 1.8 RATIO
POTASSIUM SERPL-SCNC: 3.8 MEQ/L (ref 3.5–5.1)
PROTHROMBIN TIME: 20.8 SEC (ref 9.8–11.6)
SODIUM SERPL-SCNC: 142 MEQ/L (ref 136–145)

## 2017-03-23 RX ADMIN — CEFEPIME SCH MLS/HR: 1 INJECTION, POWDER, FOR SOLUTION INTRAMUSCULAR; INTRAVENOUS at 17:46

## 2017-03-23 RX ADMIN — LISINOPRIL SCH MG: 5 TABLET ORAL at 08:02

## 2017-03-23 RX ADMIN — WARFARIN SODIUM SCH MG: 5 TABLET ORAL at 15:44

## 2017-03-23 RX ADMIN — DOCUSATE CALCIUM SCH MG: 240 CAPSULE, LIQUID FILLED ORAL at 21:00

## 2017-03-23 RX ADMIN — OXYTOCIN SCH MLS/HR: 10 INJECTION, SOLUTION INTRAMUSCULAR; INTRAVENOUS at 21:00

## 2017-03-23 RX ADMIN — CARVEDILOL SCH MG: 3.12 TABLET, FILM COATED ORAL at 21:00

## 2017-03-23 RX ADMIN — SODIUM CHLORIDE, PRESERVATIVE FREE SCH ML: 5 INJECTION INTRAVENOUS at 08:02

## 2017-03-23 RX ADMIN — CEFEPIME SCH MLS/HR: 1 INJECTION, POWDER, FOR SOLUTION INTRAMUSCULAR; INTRAVENOUS at 02:00

## 2017-03-23 RX ADMIN — PANTOPRAZOLE SCH MG: 40 TABLET, DELAYED RELEASE ORAL at 21:47

## 2017-03-23 RX ADMIN — DICYCLOMINE HYDROCHLORIDE SCH MG: 20 TABLET ORAL at 17:46

## 2017-03-23 RX ADMIN — DICYCLOMINE HYDROCHLORIDE SCH MG: 20 TABLET ORAL at 12:22

## 2017-03-23 RX ADMIN — CARVEDILOL SCH MG: 3.12 TABLET, FILM COATED ORAL at 08:02

## 2017-03-23 RX ADMIN — SODIUM CHLORIDE, PRESERVATIVE FREE SCH ML: 5 INJECTION INTRAVENOUS at 21:00

## 2017-03-23 RX ADMIN — HEPARIN SODIUM SCH MLS/HR: 10000 INJECTION, SOLUTION INTRAVENOUS at 12:29

## 2017-03-23 RX ADMIN — DICYCLOMINE HYDROCHLORIDE SCH MG: 20 TABLET ORAL at 08:02

## 2017-03-23 RX ADMIN — CEFEPIME SCH MLS/HR: 1 INJECTION, POWDER, FOR SOLUTION INTRAMUSCULAR; INTRAVENOUS at 10:07

## 2017-03-23 NOTE — HHI.PR
Subjective


Remarks


Follow-up empyema and anticoagulation for aVR.  Denies chest pain and shortness 

of breath.  He wants to go home but understands he still needs IV heparin 

secondary to aVR.  Unable to arrange for Lovenox because of no payor source.  

Discussed with RN in case management.  Patient may sign out AGAINST MEDICAL 

ADVICE





Objective


Vitals





 Vital Signs








  Date Time  Temp Pulse Resp B/P Pulse Ox O2 Delivery O2 Flow Rate FiO2


 


3/23/17 08:00 97.6 72 20 112/61 99   


 


3/23/17 00:00 96.4 73 20 109/67 96   


 


3/22/17 20:00 97.5 82 20 102/60 100   


 


3/22/17 18:00  84      


 


3/22/17 17:00  80      


 


3/22/17 16:00  78      


 


3/22/17 15:00  87      


 


3/22/17 15:00 97.6 77 16 90/59 97   


 


3/22/17 14:00  78      


 


3/22/17 13:00  72      


 


3/22/17 12:00  64      


 


3/22/17 11:00  71      


 


3/22/17 11:00 98.1 76 16 99/62 95   








 I/O








 3/22/17 3/22/17 3/22/17 3/23/17 3/23/17 3/23/17





 07:00 15:00 23:00 07:00 15:00 23:00


 


Intake Total 1000 ml  1584 ml 240 ml 120 ml 


 


Output Total 600 ml 300 ml 500 ml 400 ml  


 


Balance 400 ml -300 ml 1084 ml -160 ml 120 ml 


 


      


 


Intake Oral 600 ml  960 ml 240 ml 120 ml 


 


IV Total 400 ml  624 ml   


 


Output Urine Total 600 ml 300 ml 500 ml 400 ml  


 


# Voids   1 1  


 


# Bowel Movements 0  1 0  








Result Diagram:  


3/22/17 0450                                                                   

             3/23/17 0442





Imaging





Last Impressions








Chest X-Ray 3/18/17 0600 Signed





Impressions: 





 Service Date/Time:  Saturday, March 18, 2017 03:09 - CONCLUSION:  1. 





 Postoperative median sternotomy and aortic valve replacement. Mild right 

basilar 





 airspace disease and pleural thickening similar to prior study. No new 





 infiltrate.     Alvarez Murillo MD 


 


Barium Swallow X-Ray 3/16/17 0000 Signed





Impressions: 





 Service Date/Time:  Thursday, March 16, 2017 16:23 - CONCLUSION:  Corkscrew 





 appearance of the mid to distal esophagus concerning for spasm.     Jonathan Moreno MD 


 


Chest CT 3/8/17 1642 Signed





Impressions: 





 Service Date/Time:  Wednesday, March 8, 2017 16:48 - CONCLUSION:  1. Large 





 loculated effusion at the right base measuring 13 x 9 CM. 2. Aortic root 





 dilatation measuring 4.5 CM     Donnell Vincent MD 








Objective Remarks


GENERAL: Stable no distress. 


SKIN: Warm and dry.


HEAD: Atraumatic. Normocephalic. 


EYES: Pupils equal and round. No scleral icterus. No injection or drainage. 


ENT: No nasal bleeding or discharge.  Mucous membranes pink and moist.


NECK: Trachea midline. No JVD. 


CARDIOVASCULAR: Regular rate and rhythm.  Metallic click


RESPIRATORY: No accessory muscle use. Clear to auscultation. Breath sounds 

equal bilaterally. 


GASTROINTESTINAL: Abdomen soft, non-tender, nondistended. 


MUSCULOSKELETAL: Extremities without clubbing, cyanosis, or edema. No obvious 

deformities. 


NEUROLOGICAL: Awake and alert. No obvious cranial nerve deficits.  Motor 

grossly within normal limits. Five out of 5 muscle strength in the arms and 

legs.  Normal speech.


PSYCHIATRIC: Appropriate mood and affect; insight and judgment normal.


Procedures


3/12- MARY CARMEN- no vegetations, no thrombus


3/14- VAT


EGD





A/P


Problem List:  


(1) Sepsis


ICD Code:  A41.9


Status:  Acute


(2) H/O mechanical aortic valve replacement


ICD Code:  Z95.2


Status:  Acute


Assessment and Plan


Patient is a 61-year-old male presenting with 4 weeks duration of body malaise, 

flulike symptoms nausea vomiting diarrhea for the past 5 days with cough 

productive of off-white sputum


Patient with leukocytosis 





1. Severe sepsis epsis secondary to Right Empyema status post VATS 3/13, 

removal of pleural abscess and decortication, Chest tube removed , recommended 

to continue antibiotics as per ID specialist and follow with Dr Gross in two  

   weeks. No need for Oxygen. Seen by Infectious disease specialist doctor 

Stacie Angela ruled out mechanical aortic valve endocarditis, Multiple 

negative blood cultures, Fever Resolved, MARY CARMEN negative for vegetations, 

Leukocytosis resolved, recommended to Discharge on Levaquin 750 daily + 

clindamycin 450 mg TID for 2 more weeks at least  (thru 4/4). Okay to discharge 

home. 





2. Cardiomyopathy LVEF 35-40% on Beta Blockers and retry low dose of ACE 

inhibitors


3. Status pos Mechanical Aortic Valve Replacement continue Coumadin INR today 

1.8 and Heparin bridging for 


    INR between 2.5 and 3.5 will need to continue follow up as outpatient. 


4. Anxiety on Ativan PRN


5. Odynophagia/Dysphagia with solid food, Status post EGD Cork screw esophagus, 

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


    biopsy with reactive/chemical gastropathy, anti-reflux therapy continue PPI 

and Dicyclomine 20 TID


   return as needed for EGD and Dilation. 





DVT Prophylaxis he is on Heparin drip and coumadin


Discharge Planning


Awaiting for INR to be therapeutic. CM for pt assistance





Problem Qualifiers





(1) Sepsis:  


Qualified Code:  A41.9 - Sepsis, due to unspecified organism





Buddy Nicholson MD Mar 23, 2017 10:12

## 2017-03-24 VITALS
TEMPERATURE: 96.3 F | DIASTOLIC BLOOD PRESSURE: 71 MMHG | HEART RATE: 88 BPM | RESPIRATION RATE: 20 BRPM | OXYGEN SATURATION: 91 % | SYSTOLIC BLOOD PRESSURE: 114 MMHG

## 2017-03-24 VITALS
RESPIRATION RATE: 21 BRPM | SYSTOLIC BLOOD PRESSURE: 102 MMHG | OXYGEN SATURATION: 93 % | HEART RATE: 62 BPM | DIASTOLIC BLOOD PRESSURE: 65 MMHG | TEMPERATURE: 97.9 F

## 2017-03-24 VITALS
HEART RATE: 81 BPM | SYSTOLIC BLOOD PRESSURE: 93 MMHG | RESPIRATION RATE: 18 BRPM | OXYGEN SATURATION: 94 % | TEMPERATURE: 95.4 F | DIASTOLIC BLOOD PRESSURE: 59 MMHG

## 2017-03-24 VITALS
OXYGEN SATURATION: 97 % | HEART RATE: 78 BPM | DIASTOLIC BLOOD PRESSURE: 69 MMHG | TEMPERATURE: 98.2 F | RESPIRATION RATE: 20 BRPM | SYSTOLIC BLOOD PRESSURE: 117 MMHG

## 2017-03-24 VITALS
DIASTOLIC BLOOD PRESSURE: 69 MMHG | RESPIRATION RATE: 18 BRPM | HEART RATE: 79 BPM | SYSTOLIC BLOOD PRESSURE: 104 MMHG | OXYGEN SATURATION: 99 % | TEMPERATURE: 95.6 F

## 2017-03-24 LAB
APTT BLD: 46.8 SEC (ref 24.3–30.1)
APTT BLD: 48 SEC (ref 24.3–30.1)
APTT BLD: 49.3 SEC (ref 24.3–30.1)
APTT BLD: 55.5 SEC (ref 24.3–30.1)
INR PPP: 1.6 RATIO
INR PPP: 1.7 RATIO
PROTHROMBIN TIME: 18.5 SEC (ref 9.8–11.6)
PROTHROMBIN TIME: 18.7 SEC (ref 9.8–11.6)

## 2017-03-24 RX ADMIN — CARVEDILOL SCH MG: 3.12 TABLET, FILM COATED ORAL at 09:00

## 2017-03-24 RX ADMIN — SODIUM CHLORIDE, PRESERVATIVE FREE SCH ML: 5 INJECTION INTRAVENOUS at 09:00

## 2017-03-24 RX ADMIN — LISINOPRIL SCH MG: 5 TABLET ORAL at 09:00

## 2017-03-24 RX ADMIN — DICYCLOMINE HYDROCHLORIDE SCH MG: 20 TABLET ORAL at 09:27

## 2017-03-24 RX ADMIN — OXYTOCIN SCH MLS/HR: 10 INJECTION, SOLUTION INTRAMUSCULAR; INTRAVENOUS at 21:00

## 2017-03-24 RX ADMIN — CEFEPIME SCH MLS/HR: 1 INJECTION, POWDER, FOR SOLUTION INTRAMUSCULAR; INTRAVENOUS at 00:19

## 2017-03-24 RX ADMIN — PANTOPRAZOLE SCH MG: 40 TABLET, DELAYED RELEASE ORAL at 20:25

## 2017-03-24 RX ADMIN — CEFEPIME SCH MLS/HR: 1 INJECTION, POWDER, FOR SOLUTION INTRAMUSCULAR; INTRAVENOUS at 09:26

## 2017-03-24 RX ADMIN — DOCUSATE CALCIUM SCH MG: 240 CAPSULE, LIQUID FILLED ORAL at 20:27

## 2017-03-24 RX ADMIN — ACETAMINOPHEN PRN MG: 325 TABLET ORAL at 11:42

## 2017-03-24 RX ADMIN — DICYCLOMINE HYDROCHLORIDE SCH MG: 20 TABLET ORAL at 14:25

## 2017-03-24 RX ADMIN — ACETAMINOPHEN PRN MG: 325 TABLET ORAL at 23:46

## 2017-03-24 RX ADMIN — WARFARIN SODIUM SCH MG: 5 TABLET ORAL at 16:06

## 2017-03-24 RX ADMIN — CARVEDILOL SCH MG: 3.12 TABLET, FILM COATED ORAL at 20:49

## 2017-03-24 RX ADMIN — CEFEPIME SCH MLS/HR: 1 INJECTION, POWDER, FOR SOLUTION INTRAMUSCULAR; INTRAVENOUS at 17:18

## 2017-03-24 RX ADMIN — SODIUM CHLORIDE, PRESERVATIVE FREE SCH ML: 5 INJECTION INTRAVENOUS at 20:27

## 2017-03-24 RX ADMIN — HEPARIN SODIUM SCH MLS/HR: 10000 INJECTION, SOLUTION INTRAVENOUS at 14:31

## 2017-03-24 RX ADMIN — DICYCLOMINE HYDROCHLORIDE SCH MG: 20 TABLET ORAL at 17:21

## 2017-03-24 NOTE — HHI.PR
Subjective


Remarks


Follow-up sepsis/right empyema


03/24/17-patient seen and examined, denies any chest pain or shortness of 

breath.  INR 1.7





Objective


Vitals





 Vital Signs








  Date Time  Temp Pulse Resp B/P Pulse Ox O2 Delivery O2 Flow Rate FiO2


 


3/24/17 00:00 98.2 78 20 117/69 97   


 


3/23/17 20:00 97.3 87 20 102/67 98   


 


3/23/17 16:00 96.7 77 18 107/69 99   


 


3/23/17 12:00 97.9 80 19 103/65 100   








 I/O








 3/23/17 3/23/17 3/23/17 3/24/17 3/24/17 3/24/17





 07:00 15:00 23:00 07:00 15:00 23:00


 


Intake Total 240 ml 585 ml 907 ml 500 ml  


 


Output Total 400 ml 550 ml  500 ml  


 


Balance -160 ml 35 ml 907 ml 0 ml  


 


      


 


Intake Oral 240 ml 360 ml 720 ml 320 ml  


 


IV Total  225 ml 187 ml 180 ml  


 


Output Urine Total 400 ml 550 ml  500 ml  


 


# Voids 1  2 1  


 


# Bowel Movements 0 0 2 1  








Result Diagram:  


3/22/17 0450                                                                   

             3/23/17 0442





Imaging





Last Impressions








Chest X-Ray 3/18/17 0600 Signed





Impressions: 





 Service Date/Time:  Saturday, March 18, 2017 03:09 - CONCLUSION:  1. 





 Postoperative median sternotomy and aortic valve replacement. Mild right 

basilar 





 airspace disease and pleural thickening similar to prior study. No new 





 infiltrate.     Alvarez Murillo MD 


 


Barium Swallow X-Ray 3/16/17 0000 Signed





Impressions: 





 Service Date/Time:  Thursday, March 16, 2017 16:23 - CONCLUSION:  Corkscrew 





 appearance of the mid to distal esophagus concerning for spasm.     Jonathan Moreno MD 


 


Chest CT 3/8/17 1642 Signed





Impressions: 





 Service Date/Time:  Wednesday, March 8, 2017 16:48 - CONCLUSION:  1. Large 





 loculated effusion at the right base measuring 13 x 9 CM. 2. Aortic root 





 dilatation measuring 4.5 CM     Donnell Vincent MD 








Objective Remarks


GENERAL: NAD


SKIN: Warm and dry.


HEAD: Normocephalic.


EYES: No scleral icterus. No injection or drainage. 


NECK: Supple, trachea midline. No JVD or lymphadenopathy.


CARDIOVASCULAR: Regular rate and rhythm without murmurs, gallops, or rubs. 


RESPIRATORY: Breath sounds equal bilaterally. No accessory muscle use.


GASTROINTESTINAL: Abdomen soft, non-tender, nondistended. 


MUSCULOSKELETAL: No cyanosis, or edema. 


BACK: Nontender without obvious deformity. No CVA tenderness.





Procedures


3/12- MARY CARMEN- no vegetations, no thrombus


3/14- VAT


EGD





A/P


Problem List:  


(1) Sepsis


ICD Code:  A41.9


Status:  Acute


(2) H/O mechanical aortic valve replacement


ICD Code:  Z95.2


Status:  Acute


(3) Empyema of right pleural space


ICD Code:  J86.9


Status:  Acute


Assessment and Plan


61-year-old man with





1. Severe sepsis: secondary to Right Empyema status post VATS 3/13, removal of 

pleural abscess and decortication, Chest tube removed . No need for Oxygen. 

Seen by Infectious disease specialist doctor Stacie Angela ruled out 

mechanical aortic valve endocarditis, Multiple negative blood cultures. MARY CARMEN 

negative for vegetations. recommended to Discharge on Levaquin 750 daily + 

clindamycin 450 mg TID for 2 more weeks at least  (thru 4/4).. 





2. Cardiomyopathy LVEF 35-40% on Beta Blockers and retry low dose of ACE 

inhibitors


3. Status pos Mechanical Aortic Valve Replacement continue Coumadin and Heparin 

bridging for 


    INR between 2.5 and 3.5 will need to continue follow up as outpatient.  INR 

subtherapeutic 1.7 today 03/24/17


4. Anxiety on Ativan PRN


5. Odynophagia/Dysphagia with solid food, Status post EGD Cork screw esophagus, 

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


    biopsy with reactive/chemical gastropathy, anti-reflux therapy continue PPI 

and Dicyclomine 20 TID





DVT Prophylaxis: Heparin drip and Coumadin





Problem Qualifiers





(1) Sepsis:  


Qualified Code:  A41.9 - Sepsis, due to unspecified organism





Jeremy Gilman MD Mar 24, 2017 10:10

## 2017-03-25 VITALS
TEMPERATURE: 98.2 F | DIASTOLIC BLOOD PRESSURE: 68 MMHG | HEART RATE: 93 BPM | RESPIRATION RATE: 22 BRPM | SYSTOLIC BLOOD PRESSURE: 106 MMHG | OXYGEN SATURATION: 100 %

## 2017-03-25 VITALS
HEART RATE: 88 BPM | TEMPERATURE: 96.8 F | SYSTOLIC BLOOD PRESSURE: 113 MMHG | DIASTOLIC BLOOD PRESSURE: 69 MMHG | RESPIRATION RATE: 20 BRPM | OXYGEN SATURATION: 100 %

## 2017-03-25 VITALS
HEART RATE: 98 BPM | TEMPERATURE: 97.6 F | RESPIRATION RATE: 20 BRPM | OXYGEN SATURATION: 100 % | DIASTOLIC BLOOD PRESSURE: 58 MMHG | SYSTOLIC BLOOD PRESSURE: 94 MMHG

## 2017-03-25 VITALS
HEART RATE: 95 BPM | OXYGEN SATURATION: 94 % | TEMPERATURE: 98.7 F | SYSTOLIC BLOOD PRESSURE: 97 MMHG | RESPIRATION RATE: 20 BRPM | DIASTOLIC BLOOD PRESSURE: 61 MMHG

## 2017-03-25 VITALS
OXYGEN SATURATION: 100 % | TEMPERATURE: 98.2 F | DIASTOLIC BLOOD PRESSURE: 58 MMHG | RESPIRATION RATE: 20 BRPM | HEART RATE: 79 BPM | SYSTOLIC BLOOD PRESSURE: 108 MMHG

## 2017-03-25 VITALS
DIASTOLIC BLOOD PRESSURE: 59 MMHG | OXYGEN SATURATION: 100 % | TEMPERATURE: 97.2 F | RESPIRATION RATE: 21 BRPM | HEART RATE: 98 BPM | SYSTOLIC BLOOD PRESSURE: 106 MMHG

## 2017-03-25 LAB
APTT BLD: 50.2 SEC (ref 24.3–30.1)
ERYTHROCYTE [DISTWIDTH] IN BLOOD BY AUTOMATED COUNT: 15 % (ref 11.6–17.2)
HCT VFR BLD CALC: 33.6 % (ref 39–51)
INR PPP: 1.7 RATIO
MCH RBC QN AUTO: 30 PG (ref 27–34)
MCHC RBC AUTO-ENTMCNC: 33 % (ref 32–36)
MCV RBC AUTO: 91.1 FL (ref 80–100)
PLATELET # BLD: 191 TH/MM3 (ref 150–450)
PROTHROMBIN TIME: 19.1 SEC (ref 9.8–11.6)
RBC # BLD AUTO: 3.69 MIL/MM3 (ref 4.5–5.9)
REVIEW FLAG: (no result)
WBC # BLD AUTO: 7.7 TH/MM3 (ref 4–11)

## 2017-03-25 RX ADMIN — DICYCLOMINE HYDROCHLORIDE SCH MG: 20 TABLET ORAL at 08:25

## 2017-03-25 RX ADMIN — DICYCLOMINE HYDROCHLORIDE SCH MG: 20 TABLET ORAL at 12:37

## 2017-03-25 RX ADMIN — LISINOPRIL SCH MG: 5 TABLET ORAL at 09:00

## 2017-03-25 RX ADMIN — CARVEDILOL SCH MG: 3.12 TABLET, FILM COATED ORAL at 09:00

## 2017-03-25 RX ADMIN — SODIUM CHLORIDE, PRESERVATIVE FREE SCH ML: 5 INJECTION INTRAVENOUS at 21:21

## 2017-03-25 RX ADMIN — HEPARIN SODIUM SCH MLS/HR: 10000 INJECTION, SOLUTION INTRAVENOUS at 12:43

## 2017-03-25 RX ADMIN — CEFEPIME SCH MLS/HR: 1 INJECTION, POWDER, FOR SOLUTION INTRAMUSCULAR; INTRAVENOUS at 01:48

## 2017-03-25 RX ADMIN — SODIUM CHLORIDE, PRESERVATIVE FREE SCH ML: 5 INJECTION INTRAVENOUS at 08:25

## 2017-03-25 RX ADMIN — CARVEDILOL SCH MG: 3.12 TABLET, FILM COATED ORAL at 21:00

## 2017-03-25 RX ADMIN — ACETAMINOPHEN PRN MG: 325 TABLET ORAL at 21:21

## 2017-03-25 RX ADMIN — CEFEPIME SCH MLS/HR: 1 INJECTION, POWDER, FOR SOLUTION INTRAMUSCULAR; INTRAVENOUS at 18:00

## 2017-03-25 RX ADMIN — PANTOPRAZOLE SCH MG: 40 TABLET, DELAYED RELEASE ORAL at 21:20

## 2017-03-25 RX ADMIN — DICYCLOMINE HYDROCHLORIDE SCH MG: 20 TABLET ORAL at 17:59

## 2017-03-25 RX ADMIN — DOCUSATE CALCIUM SCH MG: 240 CAPSULE, LIQUID FILLED ORAL at 21:00

## 2017-03-25 RX ADMIN — ACETAMINOPHEN PRN MG: 325 TABLET ORAL at 12:37

## 2017-03-25 RX ADMIN — CEFEPIME SCH MLS/HR: 1 INJECTION, POWDER, FOR SOLUTION INTRAMUSCULAR; INTRAVENOUS at 09:56

## 2017-03-25 RX ADMIN — WARFARIN SODIUM SCH MG: 6 TABLET ORAL at 16:04

## 2017-03-25 NOTE — HHI.PR
Subjective


Remarks


Follow-up sepsis/right empyema


03/24/17-patient seen and examined, denies any chest pain or shortness of 

breath.  INR 1.7


03/25/17-patient seen and examined, INR still 1.7.  Patient threatening to 

leave AMA last night however now states he will stay hospital.  Currently 

vitals stable and denies any chest pain or shortness of breath





Objective


Vitals





 Vital Signs








  Date Time  Temp Pulse Resp B/P Pulse Ox O2 Delivery O2 Flow Rate FiO2


 


3/25/17 08:00 97.6 98 20 94/58 100   


 


3/25/17 04:00 97.2 98 21 106/59 100   


 


3/25/17 00:08 98.2 79 20 108/58 100   


 


3/24/17 20:00 97.9 62 21 102/65 93   


 


3/24/17 16:00 96.3 88 20 114/71 91   


 


3/24/17 12:42   18     


 


3/24/17 12:00 95.4 81 18 93/59 94   








 I/O








 3/24/17 3/24/17 3/24/17 3/25/17 3/25/17 3/25/17





 07:00 15:00 23:00 07:00 15:00 23:00


 


Intake Total 500 ml 946 ml 120 ml 240 ml  


 


Output Total 500 ml 800 ml 200 ml 1300 ml  


 


Balance 0 ml 146 ml -80 ml -1060 ml  


 


      


 


Intake Oral 320 ml 600 ml 120 ml 240 ml  


 


IV Total 180 ml 346 ml    


 


Output Urine Total 500 ml 800 ml 200 ml 1300 ml  


 


# Voids 1     


 


# Bowel Movements 1 2 0 0  








Result Diagram:  


3/25/17 0346                                                                   

             3/23/17 0442





Objective Remarks


GENERAL: NAD


SKIN: Warm and dry.


HEAD: Normocephalic.


EYES: No scleral icterus. No injection or drainage. 


NECK: Supple, trachea midline. No JVD or lymphadenopathy.


CARDIOVASCULAR: Regular rate and rhythm without murmurs, gallops, or rubs. 


RESPIRATORY: Breath sounds equal bilaterally. No accessory muscle use.


GASTROINTESTINAL: Abdomen soft, non-tender, nondistended. 


MUSCULOSKELETAL: No cyanosis, or edema. 


BACK: Nontender without obvious deformity. No CVA tenderness.





Procedures


3/12- MARY CARMEN- no vegetations, no thrombus


3/14- VAT


EGD





A/P


Problem List:  


(1) Sepsis


ICD Code:  A41.9


Status:  Acute


(2) H/O mechanical aortic valve replacement


ICD Code:  Z95.2


Status:  Acute


(3) Empyema of right pleural space


ICD Code:  J86.9


Status:  Acute


Assessment and Plan


61-year-old man with





1. Severe sepsis: Resolved.  Secondary to Right Empyema status post VATS 3/13, 

removal of pleural abscess and decortication, Chest tube removed . No need for 

Oxygen.  Multiple negative blood cultures. MARY CARMEN negative for vegetations.  ID ID 

recommends to Discharge on Levaquin 750 daily + clindamycin 450 mg TID for 2 

more weeks at least  (thru 4/4).. 





2. Cardiomyopathy LVEF 35-40% on Beta Blockers and retry low dose of ACE 

inhibitors


3. Status pos Mechanical Aortic Valve Replacement continue Coumadin and Heparin 

bridging for 


    INR between 2.5 and 3.5 will need to continue follow up as outpatient.  INR 

subtherapeutic 1.7 today 03/25/17


4. Anxiety on Ativan PRN


5. Odynophagia/Dysphagia with solid food, Status post EGD Cork screw esophagus, 

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


    biopsy with reactive/chemical gastropathy, anti-reflux therapy continue PPI 

and Dicyclomine 20 TID





DVT Prophylaxis: Heparin drip and Coumadin





Problem Qualifiers





(1) Sepsis:  


Qualified Code:  A41.9 - Sepsis, due to unspecified organism





Jeremy Gilman MD Mar 25, 2017 09:24

## 2017-03-26 VITALS
TEMPERATURE: 97.5 F | OXYGEN SATURATION: 99 % | HEART RATE: 87 BPM | RESPIRATION RATE: 20 BRPM | SYSTOLIC BLOOD PRESSURE: 114 MMHG | DIASTOLIC BLOOD PRESSURE: 69 MMHG

## 2017-03-26 VITALS
RESPIRATION RATE: 20 BRPM | HEART RATE: 101 BPM | TEMPERATURE: 96.4 F | SYSTOLIC BLOOD PRESSURE: 105 MMHG | OXYGEN SATURATION: 100 % | DIASTOLIC BLOOD PRESSURE: 65 MMHG

## 2017-03-26 VITALS
HEART RATE: 86 BPM | TEMPERATURE: 96.3 F | RESPIRATION RATE: 20 BRPM | OXYGEN SATURATION: 98 % | SYSTOLIC BLOOD PRESSURE: 115 MMHG | DIASTOLIC BLOOD PRESSURE: 67 MMHG

## 2017-03-26 VITALS
HEART RATE: 101 BPM | TEMPERATURE: 99.6 F | DIASTOLIC BLOOD PRESSURE: 66 MMHG | SYSTOLIC BLOOD PRESSURE: 104 MMHG | OXYGEN SATURATION: 100 % | RESPIRATION RATE: 20 BRPM

## 2017-03-26 VITALS
HEART RATE: 94 BPM | DIASTOLIC BLOOD PRESSURE: 64 MMHG | OXYGEN SATURATION: 100 % | SYSTOLIC BLOOD PRESSURE: 116 MMHG | TEMPERATURE: 96.9 F | RESPIRATION RATE: 20 BRPM

## 2017-03-26 LAB
APTT BLD: 44 SEC (ref 24.3–30.1)
INR PPP: 1.7 RATIO
INR PPP: 1.8 RATIO
PROTHROMBIN TIME: 19.3 SEC (ref 9.8–11.6)
PROTHROMBIN TIME: 20.7 SEC (ref 9.8–11.6)

## 2017-03-26 RX ADMIN — CEFEPIME SCH MLS/HR: 1 INJECTION, POWDER, FOR SOLUTION INTRAMUSCULAR; INTRAVENOUS at 17:11

## 2017-03-26 RX ADMIN — SODIUM CHLORIDE, PRESERVATIVE FREE SCH ML: 5 INJECTION INTRAVENOUS at 21:03

## 2017-03-26 RX ADMIN — CEFEPIME SCH MLS/HR: 1 INJECTION, POWDER, FOR SOLUTION INTRAMUSCULAR; INTRAVENOUS at 02:59

## 2017-03-26 RX ADMIN — DICYCLOMINE HYDROCHLORIDE SCH MG: 20 TABLET ORAL at 07:48

## 2017-03-26 RX ADMIN — ACETAMINOPHEN PRN MG: 325 TABLET ORAL at 09:22

## 2017-03-26 RX ADMIN — PANTOPRAZOLE SCH MG: 40 TABLET, DELAYED RELEASE ORAL at 21:03

## 2017-03-26 RX ADMIN — CARVEDILOL SCH MG: 3.12 TABLET, FILM COATED ORAL at 21:03

## 2017-03-26 RX ADMIN — ACETAMINOPHEN PRN MG: 325 TABLET ORAL at 21:03

## 2017-03-26 RX ADMIN — SODIUM CHLORIDE, PRESERVATIVE FREE SCH ML: 5 INJECTION INTRAVENOUS at 07:48

## 2017-03-26 RX ADMIN — DOCUSATE CALCIUM SCH MG: 240 CAPSULE, LIQUID FILLED ORAL at 21:00

## 2017-03-26 RX ADMIN — OXYCODONE HYDROCHLORIDE AND ACETAMINOPHEN PRN TAB: 5; 325 TABLET ORAL at 21:09

## 2017-03-26 RX ADMIN — CEFEPIME SCH MLS/HR: 1 INJECTION, POWDER, FOR SOLUTION INTRAMUSCULAR; INTRAVENOUS at 09:17

## 2017-03-26 RX ADMIN — CARVEDILOL SCH MG: 3.12 TABLET, FILM COATED ORAL at 09:16

## 2017-03-26 RX ADMIN — LISINOPRIL SCH MG: 5 TABLET ORAL at 09:16

## 2017-03-26 RX ADMIN — DICYCLOMINE HYDROCHLORIDE SCH MG: 20 TABLET ORAL at 13:12

## 2017-03-26 RX ADMIN — OXYTOCIN SCH MLS/HR: 10 INJECTION, SOLUTION INTRAMUSCULAR; INTRAVENOUS at 09:18

## 2017-03-26 RX ADMIN — HEPARIN SODIUM SCH MLS/HR: 10000 INJECTION, SOLUTION INTRAVENOUS at 11:43

## 2017-03-26 RX ADMIN — DICYCLOMINE HYDROCHLORIDE SCH MG: 20 TABLET ORAL at 17:11

## 2017-03-26 RX ADMIN — OXYCODONE HYDROCHLORIDE AND ACETAMINOPHEN PRN TAB: 5; 325 TABLET ORAL at 17:18

## 2017-03-26 RX ADMIN — ACETAMINOPHEN PRN MG: 325 TABLET ORAL at 05:36

## 2017-03-26 RX ADMIN — WARFARIN SODIUM SCH MG: 6 TABLET ORAL at 15:37

## 2017-03-26 NOTE — HHI.PR
Subjective


Remarks


Follow-up sepsis/right empyema


03/24/17-patient seen and examined, denies any chest pain or shortness of 

breath.  INR 1.7


03/25/17-patient seen and examined, INR still 1.7.  Patient threatening to 

leave AMA last night however now states he will stay hospital.  Currently 

vitals stable and denies any chest pain or shortness of breath


03/26/17-patient seen and examined, stable and no acute event overnight.  INR 

still subtherapeutic at 1.7.  Patient stated he can't afford to have his INR 

monitor after his discharge





Objective


Vitals





 Vital Signs








  Date Time  Temp Pulse Resp B/P Pulse Ox O2 Delivery O2 Flow Rate FiO2


 


3/26/17 00:00 97.5 87 20 114/69 99   


 


3/25/17 20:00 98.2 93 22 106/68 100   


 


3/25/17 16:00 96.8 88 20 113/69 100   


 


3/25/17 13:37   18     


 


3/25/17 12:00 98.7 95 20 97/61 94   








 I/O








 3/25/17 3/25/17 3/25/17 3/26/17 3/26/17 3/26/17





 07:00 15:00 23:00 07:00 15:00 23:00


 


Intake Total 240 ml 800 ml 1010 ml 395 ml  


 


Output Total 1300 ml  1025 ml 1325 ml  


 


Balance -1060 ml 800 ml -15 ml -930 ml  


 


      


 


Intake Oral 240 ml 800 ml 240 ml 240 ml  


 


IV Total   770 ml 155 ml  


 


Output Urine Total 1300 ml  1025 ml 1325 ml  


 


# Voids  4    


 


# Bowel Movements 0 0 3 0  








Result Diagram:  


3/25/17 0346                                                                   

             3/23/17 0442





Objective Remarks


GENERAL: NAD


SKIN: Warm and dry.


HEAD: Normocephalic.


EYES: No scleral icterus. No injection or drainage. 


NECK: Supple, trachea midline. No JVD or lymphadenopathy.


CARDIOVASCULAR: Regular rate and rhythm without murmurs, gallops, or rubs. 


RESPIRATORY: Breath sounds equal bilaterally. No accessory muscle use.


GASTROINTESTINAL: Abdomen soft, non-tender, nondistended. 


MUSCULOSKELETAL: No cyanosis, or edema. 


BACK: Nontender without obvious deformity. No CVA tenderness.





Procedures


3/12- MARY CARMEN- no vegetations, no thrombus


3/14- VAT


EGD





A/P


Problem List:  


(1) Sepsis


ICD Code:  A41.9


Status:  Acute


(2) H/O mechanical aortic valve replacement


ICD Code:  Z95.2


Status:  Acute


(3) Empyema of right pleural space


ICD Code:  J86.9


Status:  Acute


(4) Subtherapeutic international normalized ratio (INR)


ICD Code:  R79.1


Status:  Acute


Assessment and Plan


61-year-old man with





1. Severe sepsis: Resolved.  Secondary to Right Empyema status post VATS 3/13, 

removal of pleural abscess and decortication, Chest tube removed . No need for 

Oxygen.  Multiple negative blood cultures. MARY CARMEN negative for vegetations. ID 

recommends to Discharge on Levaquin 750 daily + clindamycin 450 mg TID for 2 

more weeks at least  (thru 4/4).. 





2. Cardiomyopathy LVEF 35-40% on Beta Blockers and retry low dose of ACE 

inhibitors


3. Status pos Mechanical Aortic Valve Replacement continue Coumadin and Heparin 

bridging for 


    INR between 2.5 and 3.5 will need to continue follow up as outpatient.  INR 

subtherapeutic 1.7 today 03/26/17


4. Anxiety on Ativan PRN


5. Odynophagia/Dysphagia with solid food, Status post EGD Cork screw esophagus, 

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


    biopsy with reactive/chemical gastropathy, anti-reflux therapy continue PPI 

and Dicyclomine 20 TID





DVT Prophylaxis: Heparin drip and Coumadin





Problem Qualifiers





(1) Sepsis:  


Qualified Code:  A41.9 - Sepsis, due to unspecified organism





Jeremy Gilman MD Mar 26, 2017 10:23

## 2017-03-27 VITALS
DIASTOLIC BLOOD PRESSURE: 56 MMHG | HEART RATE: 87 BPM | TEMPERATURE: 97.9 F | RESPIRATION RATE: 16 BRPM | SYSTOLIC BLOOD PRESSURE: 94 MMHG | OXYGEN SATURATION: 98 %

## 2017-03-27 VITALS
HEART RATE: 85 BPM | OXYGEN SATURATION: 100 % | DIASTOLIC BLOOD PRESSURE: 64 MMHG | SYSTOLIC BLOOD PRESSURE: 94 MMHG | TEMPERATURE: 97.8 F | RESPIRATION RATE: 18 BRPM

## 2017-03-27 VITALS
DIASTOLIC BLOOD PRESSURE: 57 MMHG | OXYGEN SATURATION: 96 % | RESPIRATION RATE: 18 BRPM | HEART RATE: 18 BPM | TEMPERATURE: 97.3 F | SYSTOLIC BLOOD PRESSURE: 92 MMHG

## 2017-03-27 LAB
APTT BLD: 63.4 SEC (ref 24.3–30.1)
INR PPP: 2.2 RATIO
PROTHROMBIN TIME: 25.4 SEC (ref 9.8–11.6)

## 2017-03-27 RX ADMIN — ACETAMINOPHEN PRN MG: 325 TABLET ORAL at 01:47

## 2017-03-27 RX ADMIN — DICYCLOMINE HYDROCHLORIDE SCH MG: 20 TABLET ORAL at 12:58

## 2017-03-27 RX ADMIN — LISINOPRIL SCH MG: 5 TABLET ORAL at 08:21

## 2017-03-27 RX ADMIN — HEPARIN SODIUM SCH MLS/HR: 10000 INJECTION, SOLUTION INTRAVENOUS at 10:26

## 2017-03-27 RX ADMIN — CEFEPIME SCH MLS/HR: 1 INJECTION, POWDER, FOR SOLUTION INTRAMUSCULAR; INTRAVENOUS at 01:47

## 2017-03-27 RX ADMIN — DICYCLOMINE HYDROCHLORIDE SCH MG: 20 TABLET ORAL at 08:17

## 2017-03-27 RX ADMIN — CARVEDILOL SCH MG: 3.12 TABLET, FILM COATED ORAL at 08:21

## 2017-03-27 RX ADMIN — CEFEPIME SCH MLS/HR: 1 INJECTION, POWDER, FOR SOLUTION INTRAMUSCULAR; INTRAVENOUS at 10:22

## 2017-03-27 RX ADMIN — SODIUM CHLORIDE, PRESERVATIVE FREE SCH ML: 5 INJECTION INTRAVENOUS at 08:22

## 2017-03-27 NOTE — HHI.PR
Addendum to Inpatient Note


Addendum Reason:  Additional Documentation


Additional Information


d/w :


ID plan per  last note.


Will sign off please call back if any change in clinical condition or questions.








Sharron Horowitz MD Mar 27, 2017 12:55

## 2017-03-27 NOTE — HHI.DS
__________________________________________________





Discharge Summary


Admission Date


Mar 8, 2017 at 14:54


Discharge Date:  Mar 27, 2017


Admitting Diagnosis


pneumonia, sepsis, lactic acidosis, vomiting and diarrhea





(1) Sepsis


ICD Code:  A41.9


(2) H/O mechanical aortic valve replacement


ICD Code:  Z95.2


(3) Empyema of right pleural space


ICD Code:  J86.9


(4) Subtherapeutic international normalized ratio (INR)


ICD Code:  R79.1


Procedures


3/12- MARY CARMEN- no vegetations, no thrombus


3/14- VAT


EGD


Brief History - From Admission


Patient is a very pleasant 61-year-old male with history of mechanical aortic 

valve replacement in 2005 who presented to the emergency room complaining of 

about 4 weeks Duration of nausea vomiting diarrhea intermittently on and off.  

Lasting for 5 days at a time and will be okay for to 3 days then recurred.  

Patient describes stools as liquid brown nonbloody.  For the past 5 days now 

has been having productive cough off white sputum associated with body Monday, 

nausea, diarrhea which prompted patient to come in here and admitted. 





On admission, with elevated WBC, elevated lactic acid level


CBC/BMP:  


3/25/17 0346                                                                   

             3/23/17 0442





Significant Findings





Laboratory Tests








Test 3/24/17 3/24/17 3/24/17 3/25/17





 10:57 14:59 20:10 03:46


 


Prothrombin Time 18.7 SEC 18.5 SEC 18.7 SEC 19.1 SEC





 (9.8-11.6) (9.8-11.6) (9.8-11.6) (9.8-11.6)


 


Activated Partial 55.5 SEC 49.3 SEC 46.8 SEC 50.2 SEC





Thromboplast Time (24.3-30.1) (24.3-30.1) (24.3-30.1) (24.3-30.1)


 


Red Blood Count    3.69 MIL/MM3





    (4.50-5.90)


 


Hemoglobin    11.1 GM/DL





    (13.0-17.0)


 


Hematocrit    33.6 %





    (39.0-51.0)


 


    





Test 3/26/17 3/26/17 3/27/17 





 03:58 10:00 06:06 


 


Prothrombin Time 19.3 SEC 20.7 SEC 25.4 SEC 





 (9.8-11.6) (9.8-11.6) (9.8-11.6) 


 


Activated Partial 44.0 SEC  63.4 SEC 





Thromboplast Time (24.3-30.1)  (24.3-30.1) 








Imaging





Last Impressions








Chest X-Ray 3/18/17 0600 Signed





Impressions: 





 Service Date/Time:  Saturday, March 18, 2017 03:09 - CONCLUSION:  1. 





 Postoperative median sternotomy and aortic valve replacement. Mild right 

basilar 





 airspace disease and pleural thickening similar to prior study. No new 





 infiltrate.     Alvarez Murillo MD 


 


Barium Swallow X-Ray 3/16/17 0000 Signed





Impressions: 





 Service Date/Time:  Thursday, March 16, 2017 16:23 - CONCLUSION:  Corkscrew 





 appearance of the mid to distal esophagus concerning for spasm.     Jonathan Moreno MD 


 


Chest CT 3/8/17 1642 Signed





Impressions: 





 Service Date/Time:  Wednesday, March 8, 2017 16:48 - CONCLUSION:  1. Large 





 loculated effusion at the right base measuring 13 x 9 CM. 2. Aortic root 





 dilatation measuring 4.5 CM     Donnell Vinecnt MD 








PE at Discharge


GENERAL: NAD


SKIN: Warm and dry.


HEAD: Normocephalic.


EYES: No scleral icterus. No injection or drainage. 


NECK: Supple, trachea midline. No JVD or lymphadenopathy.


CARDIOVASCULAR: Regular rate and rhythm without murmurs, gallops, or rubs. 


RESPIRATORY: Breath sounds equal bilaterally. No accessory muscle use.


GASTROINTESTINAL: Abdomen soft, non-tender, nondistended. 


MUSCULOSKELETAL: No cyanosis, or edema. 


BACK: Nontender without obvious deformity. No CVA tenderness.





Hospital Course


1. Severe sepsis: Resolved.  Secondary to Right Empyema status post VATS 3/13, 

removal of pleural abscess and decortication, Chest tube removed . No need for 

Oxygen.  Multiple negative blood cultures. MARY CARMEN negative for vegetations. ID 

recommends to Discharge on Levaquin 750 daily + clindamycin 450 mg TID for 2 

more weeks at least  (thru 4/4).. 





2. Cardiomyopathy LVEF 35-40% on Beta Blockers and retry low dose of ACE 

inhibitors


3. Status pos Mechanical Aortic Valve Replacement continue Coumadin and Heparin 

bridging for 


    INR between 2.5 and 3.5 will need to continue follow up as outpatient.  INR 

subtherapeutic 1.8  03/27/17


4. Anxiety on Ativan PRN


5. Odynophagia/Dysphagia with solid food, Status post EGD Cork screw esophagus, 

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


    biopsy with reactive/chemical gastropathy, anti-reflux therapy continue PPI 

and Dicyclomine 20 TID





DVT Prophylaxis: Heparin drip and Coumadin


Pt Condition on Discharge:  Stable


Discharge Disposition:  Discharge Home


Discharge Time:  <= 30 minutes


Discharge Instructions


DIET: Follow Instructions for:  Heart Healthy Diet


Activities you can perform:  Regular-No Restrictions


Follow up Referrals:  


Gastroenterology - 2 Weeks @ [facility]


PCP Follow-up - 1 Week


Surgical





New Orders:  


PT/INR - 2-3 Days





New Medications:  


Clindamycin (Clindamycin) 150 Mg Cap


450 MG PO Q8HR Infection #108 Ref 0 CAP


Enoxaparin Inj (Lovenox Inj) 60 Mg/0.6 Ml Syr


60 MG SQ BID Blood Clot Prevention #8 Ref 0 SYRINGE


Lactobacillus Acidophilus (Lactinex) 1 Chew


1 TAB CHEW DAILY Nutritional Supplement #30 Ref 0 TAB


Levofloxacin (Levaquin) 750 Mg Tab


750 MG PO DAILY sto date 4/4 Infection #12 Ref 0 TAB


Oxycodone-Acetaminophen (Oxycodone-Acetaminophen) 5-325 mg Tab


1 TAB PO Q3H PRN PAIN SCALE 3 TO 5 #10 TAB


Warfarin (Coumadin) 4 Mg Tab


8 MG PO DAILY@16 Prevent Blood Clot #30 TAB


 


Discontinued Medications:  


Warfarin (Coumadin) 2 Mg Tab


2 MG PO DAILY Prevent Blood Clot Ref 0 TAB











Jeremy Gilman MD Mar 27, 2017 10:12

## 2017-03-27 NOTE — HHI.PR
Subjective


Remarks


Follow-up sepsis/right empyema


03/24/17-patient seen and examined, denies any chest pain or shortness of 

breath.  INR 1.7


03/25/17-patient seen and examined, INR still 1.7.  Patient threatening to 

leave AMA last night however now states he will stay hospital.  Currently 

vitals stable and denies any chest pain or shortness of breath


03/26/17-patient seen and examined, stable and no acute event overnight.  INR 

still subtherapeutic at 1.7.  Patient stated he can't afford to have his INR 

monitor after his discharge


03/27/17-patient seen and examined, INR 1.8.  No acute event overnight and 

afebrile.





Objective


Vitals





 Vital Signs








  Date Time  Temp Pulse Resp B/P Pulse Ox O2 Delivery O2 Flow Rate FiO2


 


3/27/17 08:00 97.9 87 16 94/56 98   


 


3/27/17 00:00 97.8 85 18 94/64 100   


 


3/26/17 20:00 99.6 101 20 104/66 100   


 


3/26/17 18:08   18     


 


3/26/17 16:00 96.9 94 20 116/64 100   


 


3/26/17 12:00 96.4 101 20 105/65 100   


 


3/26/17 10:22   18     








 I/O








 3/26/17 3/26/17 3/26/17 3/27/17 3/27/17 3/27/17





 07:00 15:00 23:00 07:00 15:00 23:00


 


Intake Total 395 ml 1200 ml 687 ml 397 ml  


 


Output Total 1325 ml 1400 ml 750 ml 375 ml  


 


Balance -930 ml -200 ml -63 ml 22 ml  


 


      


 


Intake Oral 240 ml 1200 ml 480 ml 240 ml  


 


IV Total 155 ml  207 ml 157 ml  


 


Output Urine Total 1325 ml 1400 ml 750 ml 375 ml  


 


# Bowel Movements 0 0 0 0  








Result Diagram:  


3/25/17 0346                                                                   

             3/23/17 0442





Objective Remarks


GENERAL: NAD


SKIN: Warm and dry.


HEAD: Normocephalic.


EYES: No scleral icterus. No injection or drainage. 


NECK: Supple, trachea midline. No JVD or lymphadenopathy.


CARDIOVASCULAR: Regular rate and rhythm without murmurs, gallops, or rubs. 


RESPIRATORY: Breath sounds equal bilaterally. No accessory muscle use.


GASTROINTESTINAL: Abdomen soft, non-tender, nondistended. 


MUSCULOSKELETAL: No cyanosis, or edema. 


BACK: Nontender without obvious deformity. No CVA tenderness.





Procedures


3/12- MARY CARMEN- no vegetations, no thrombus


3/14- VAT


EGD





A/P


Problem List:  


(1) Sepsis


ICD Code:  A41.9


Status:  Acute


(2) H/O mechanical aortic valve replacement


ICD Code:  Z95.2


Status:  Acute


(3) Empyema of right pleural space


ICD Code:  J86.9


Status:  Acute


(4) Subtherapeutic international normalized ratio (INR)


ICD Code:  R79.1


Status:  Acute


Assessment and Plan


61-year-old man with





1. Severe sepsis: Resolved.  Secondary to Right Empyema status post VATS 3/13, 

removal of pleural abscess and decortication, Chest tube removed . No need for 

Oxygen.  Multiple negative blood cultures. MARY CARMEN negative for vegetations. ID 

recommends to Discharge on Levaquin 750 daily + clindamycin 450 mg TID for 2 

more weeks at least  (thru 4/4).. 





2. Cardiomyopathy LVEF 35-40% on Beta Blockers and retry low dose of ACE 

inhibitors


3. Status pos Mechanical Aortic Valve Replacement continue Coumadin and Heparin 

bridging for 


    INR between 2.5 and 3.5 will need to continue follow up as outpatient.  INR 

subtherapeutic 1.8 today 03/27/17.  Will discharge patient home on Lovenox 

bridge with Coumadin


4. Anxiety on Ativan PRN


5. Odynophagia/Dysphagia with solid food, Status post EGD Cork screw esophagus, 

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


    biopsy with reactive/chemical gastropathy, anti-reflux therapy continue PPI 

and Dicyclomine 20 TID





DVT Prophylaxis: Heparin drip and Coumadin





Problem Qualifiers





(1) Sepsis:  


Qualified Code:  A41.9 - Sepsis, due to unspecified organism





Jeremy Gilman MD Mar 27, 2017 09:47

## 2017-05-06 ENCOUNTER — HOSPITAL ENCOUNTER (INPATIENT)
Dept: HOSPITAL 17 - NEPC | Age: 62
LOS: 10 days | Discharge: HOME | DRG: 86 | End: 2017-05-16
Attending: HOSPITALIST | Admitting: INTERNAL MEDICINE
Payer: COMMERCIAL

## 2017-05-06 VITALS
HEART RATE: 88 BPM | SYSTOLIC BLOOD PRESSURE: 114 MMHG | OXYGEN SATURATION: 99 % | RESPIRATION RATE: 15 BRPM | TEMPERATURE: 98.8 F | DIASTOLIC BLOOD PRESSURE: 66 MMHG

## 2017-05-06 VITALS
DIASTOLIC BLOOD PRESSURE: 69 MMHG | RESPIRATION RATE: 16 BRPM | HEART RATE: 91 BPM | OXYGEN SATURATION: 99 % | SYSTOLIC BLOOD PRESSURE: 124 MMHG

## 2017-05-06 VITALS
OXYGEN SATURATION: 100 % | TEMPERATURE: 98.8 F | RESPIRATION RATE: 15 BRPM | DIASTOLIC BLOOD PRESSURE: 65 MMHG | SYSTOLIC BLOOD PRESSURE: 106 MMHG | HEART RATE: 122 BPM

## 2017-05-06 VITALS
DIASTOLIC BLOOD PRESSURE: 67 MMHG | SYSTOLIC BLOOD PRESSURE: 106 MMHG | OXYGEN SATURATION: 100 % | HEART RATE: 103 BPM | RESPIRATION RATE: 16 BRPM

## 2017-05-06 VITALS
RESPIRATION RATE: 16 BRPM | SYSTOLIC BLOOD PRESSURE: 156 MMHG | HEART RATE: 92 BPM | OXYGEN SATURATION: 96 % | DIASTOLIC BLOOD PRESSURE: 92 MMHG

## 2017-05-06 VITALS
HEART RATE: 106 BPM | DIASTOLIC BLOOD PRESSURE: 70 MMHG | OXYGEN SATURATION: 98 % | SYSTOLIC BLOOD PRESSURE: 107 MMHG | RESPIRATION RATE: 16 BRPM

## 2017-05-06 VITALS — BODY MASS INDEX: 19.76 KG/M2 | HEIGHT: 69 IN | WEIGHT: 133.38 LBS

## 2017-05-06 VITALS
HEART RATE: 102 BPM | SYSTOLIC BLOOD PRESSURE: 102 MMHG | DIASTOLIC BLOOD PRESSURE: 67 MMHG | OXYGEN SATURATION: 98 % | RESPIRATION RATE: 16 BRPM

## 2017-05-06 VITALS
TEMPERATURE: 98.9 F | SYSTOLIC BLOOD PRESSURE: 116 MMHG | OXYGEN SATURATION: 99 % | DIASTOLIC BLOOD PRESSURE: 74 MMHG | RESPIRATION RATE: 25 BRPM | HEART RATE: 92 BPM

## 2017-05-06 VITALS — OXYGEN SATURATION: 98 % | RESPIRATION RATE: 18 BRPM

## 2017-05-06 DIAGNOSIS — E86.0: ICD-10-CM

## 2017-05-06 DIAGNOSIS — R55: ICD-10-CM
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
## 2017-05-06 DIAGNOSIS — Z91.81: ICD-10-CM

## 2017-05-06 DIAGNOSIS — S01.81XA: ICD-10-CM

## 2017-05-06 DIAGNOSIS — R79.1: ICD-10-CM

## 2017-05-06 DIAGNOSIS — Z79.01: ICD-10-CM
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
## 2017-05-06 DIAGNOSIS — Z91.19: ICD-10-CM

## 2017-05-06 DIAGNOSIS — S06.6X0A: ICD-10-CM

## 2017-05-06 DIAGNOSIS — Z72.0: ICD-10-CM

## 2017-05-06 DIAGNOSIS — S06.5X0A: Primary | ICD-10-CM

## 2017-05-06 DIAGNOSIS — Z91.14: ICD-10-CM

## 2017-05-06 DIAGNOSIS — E87.6: ICD-10-CM

## 2017-05-06 DIAGNOSIS — Z95.2: ICD-10-CM

## 2017-05-06 DIAGNOSIS — E87.2: ICD-10-CM

## 2017-05-06 DIAGNOSIS — F10.239: ICD-10-CM

## 2017-05-06 DIAGNOSIS — R29.6: ICD-10-CM

## 2017-05-06 DIAGNOSIS — J90: ICD-10-CM

## 2017-05-06 DIAGNOSIS — T45.515A: ICD-10-CM

## 2017-05-06 DIAGNOSIS — D69.59: ICD-10-CM

## 2017-05-06 LAB
ALP SERPL-CCNC: 121 U/L (ref 45–117)
ALT SERPL-CCNC: 35 U/L (ref 12–78)
ANION GAP SERPL CALC-SCNC: 14 MEQ/L (ref 5–15)
APTT BLD: 30.1 SEC (ref 24.3–30.1)
AST SERPL-CCNC: 103 U/L (ref 15–37)
BASOPHILS # BLD AUTO: 0 TH/MM3 (ref 0–0.2)
BASOPHILS NFR BLD: 0.3 % (ref 0–2)
BILIRUB SERPL-MCNC: 1.1 MG/DL (ref 0.2–1)
BUN SERPL-MCNC: 6 MG/DL (ref 7–18)
CHLORIDE SERPL-SCNC: 95 MEQ/L (ref 98–107)
CK MB SERPL-MCNC: 12.1 NG/ML (ref 0.5–3.6)
CK MB SERPL-MCNC: 9.9 NG/ML (ref 0.5–3.6)
CK SERPL-CCNC: 1018 U/L (ref 39–308)
CK SERPL-CCNC: 906 U/L (ref 39–308)
COLOR UR: YELLOW
COMMENT (UR): (no result)
CULTURE IF INDICATED: (no result)
EOSINOPHIL # BLD: 0 TH/MM3 (ref 0–0.4)
EOSINOPHIL NFR BLD: 0 % (ref 0–4)
ERYTHROCYTE [DISTWIDTH] IN BLOOD BY AUTOMATED COUNT: 16.4 % (ref 11.6–17.2)
GFR SERPLBLD BASED ON 1.73 SQ M-ARVRAT: 98 ML/MIN (ref 89–?)
GLUCOSE UR STRIP-MCNC: (no result) MG/DL
HCO3 BLD-SCNC: 22.7 MEQ/L (ref 21–32)
HCT VFR BLD CALC: 27.7 % (ref 39–51)
HEMO FLAGS: (no result)
HGB UR QL STRIP: (no result)
INR PPP: 1.2 RATIO
KETONES UR STRIP-MCNC: (no result) MG/DL
LACTIC ACID GHOST: (no result)
LYMPHOCYTES # BLD AUTO: 0.6 TH/MM3 (ref 1–4.8)
LYMPHOCYTES NFR BLD AUTO: 6.8 % (ref 9–44)
MAGNESIUM SERPL-MCNC: 1.7 MG/DL (ref 1.5–2.5)
MCH RBC QN AUTO: 35.3 PG (ref 27–34)
MCHC RBC AUTO-ENTMCNC: 34.9 % (ref 32–36)
MCV RBC AUTO: 101.3 FL (ref 80–100)
MONOCYTES NFR BLD: 9.4 % (ref 0–8)
MUCOUS THREADS #/AREA URNS LPF: (no result) /LPF
NEUTROPHILS # BLD AUTO: 7 TH/MM3 (ref 1.8–7.7)
NEUTROPHILS NFR BLD AUTO: 83.5 % (ref 16–70)
NITRITE UR QL STRIP: (no result)
PLAT MORPH BLD: NORMAL
PLATELET # BLD: 35 TH/MM3 (ref 150–450)
PLATELET BLD QL SMEAR: (no result)
POTASSIUM SERPL-SCNC: 4 MEQ/L (ref 3.5–5.1)
PROTHROMBIN TIME: 13.5 SEC (ref 9.8–11.6)
RBC # BLD AUTO: 2.73 MIL/MM3 (ref 4.5–5.9)
SCAN/DIFF: (no result)
SODIUM SERPL-SCNC: 132 MEQ/L (ref 136–145)
SP GR UR STRIP: 1.01 (ref 1–1.03)
WBC # BLD AUTO: 8.4 TH/MM3 (ref 4–11)

## 2017-05-06 PROCEDURE — 82140 ASSAY OF AMMONIA: CPT

## 2017-05-06 PROCEDURE — 90714 TD VACC NO PRESV 7 YRS+ IM: CPT

## 2017-05-06 PROCEDURE — 72125 CT NECK SPINE W/O DYE: CPT

## 2017-05-06 PROCEDURE — 70486 CT MAXILLOFACIAL W/O DYE: CPT

## 2017-05-06 PROCEDURE — 85610 PROTHROMBIN TIME: CPT

## 2017-05-06 PROCEDURE — 96361 HYDRATE IV INFUSION ADD-ON: CPT

## 2017-05-06 PROCEDURE — 87641 MR-STAPH DNA AMP PROBE: CPT

## 2017-05-06 PROCEDURE — 96375 TX/PRO/DX INJ NEW DRUG ADDON: CPT

## 2017-05-06 PROCEDURE — 80053 COMPREHEN METABOLIC PANEL: CPT

## 2017-05-06 PROCEDURE — 85049 AUTOMATED PLATELET COUNT: CPT

## 2017-05-06 PROCEDURE — 83735 ASSAY OF MAGNESIUM: CPT

## 2017-05-06 PROCEDURE — 84100 ASSAY OF PHOSPHORUS: CPT

## 2017-05-06 PROCEDURE — 84484 ASSAY OF TROPONIN QUANT: CPT

## 2017-05-06 PROCEDURE — 96374 THER/PROPH/DIAG INJ IV PUSH: CPT

## 2017-05-06 PROCEDURE — 86900 BLOOD TYPING SEROLOGIC ABO: CPT

## 2017-05-06 PROCEDURE — 36430 TRANSFUSION BLD/BLD COMPNT: CPT

## 2017-05-06 PROCEDURE — 86850 RBC ANTIBODY SCREEN: CPT

## 2017-05-06 PROCEDURE — 80307 DRUG TEST PRSMV CHEM ANLYZR: CPT

## 2017-05-06 PROCEDURE — 86902 BLOOD TYPE ANTIGEN DONOR EA: CPT

## 2017-05-06 PROCEDURE — 84132 ASSAY OF SERUM POTASSIUM: CPT

## 2017-05-06 PROCEDURE — 70450 CT HEAD/BRAIN W/O DYE: CPT

## 2017-05-06 PROCEDURE — 85730 THROMBOPLASTIN TIME PARTIAL: CPT

## 2017-05-06 PROCEDURE — 82552 ASSAY OF CPK IN BLOOD: CPT

## 2017-05-06 PROCEDURE — 85025 COMPLETE CBC W/AUTO DIFF WBC: CPT

## 2017-05-06 PROCEDURE — 86077 PHYS BLOOD BANK SERV XMATCH: CPT

## 2017-05-06 PROCEDURE — 86922 COMPATIBILITY TEST ANTIGLOB: CPT

## 2017-05-06 PROCEDURE — 86920 COMPATIBILITY TEST SPIN: CPT

## 2017-05-06 PROCEDURE — 93005 ELECTROCARDIOGRAM TRACING: CPT

## 2017-05-06 PROCEDURE — 86901 BLOOD TYPING SEROLOGIC RH(D): CPT

## 2017-05-06 PROCEDURE — 83605 ASSAY OF LACTIC ACID: CPT

## 2017-05-06 PROCEDURE — 83880 ASSAY OF NATRIURETIC PEPTIDE: CPT

## 2017-05-06 PROCEDURE — 85014 HEMATOCRIT: CPT

## 2017-05-06 PROCEDURE — 86870 RBC ANTIBODY IDENTIFICATION: CPT

## 2017-05-06 PROCEDURE — 71260 CT THORAX DX C+: CPT

## 2017-05-06 PROCEDURE — 81001 URINALYSIS AUTO W/SCOPE: CPT

## 2017-05-06 PROCEDURE — 80048 BASIC METABOLIC PNL TOTAL CA: CPT

## 2017-05-06 PROCEDURE — 82550 ASSAY OF CK (CPK): CPT

## 2017-05-06 PROCEDURE — 71010: CPT

## 2017-05-06 PROCEDURE — 90471 IMMUNIZATION ADMIN: CPT

## 2017-05-06 PROCEDURE — P9035 PLATELET PHERES LEUKOREDUCED: HCPCS

## 2017-05-06 PROCEDURE — 87040 BLOOD CULTURE FOR BACTERIA: CPT

## 2017-05-06 PROCEDURE — 30233R1 TRANSFUSION OF NONAUTOLOGOUS PLATELETS INTO PERIPHERAL VEIN, PERCUTANEOUS APPROACH: ICD-10-PCS | Performed by: INTERNAL MEDICINE

## 2017-05-06 PROCEDURE — 85018 HEMOGLOBIN: CPT

## 2017-05-06 RX ADMIN — PHENYTOIN SODIUM SCH MLS/HR: 50 INJECTION INTRAMUSCULAR; INTRAVENOUS at 21:47

## 2017-05-06 RX ADMIN — FAMOTIDINE SCH MG: 10 INJECTION, SOLUTION INTRAVENOUS at 21:48

## 2017-05-06 RX ADMIN — Medication SCH ML: at 21:00

## 2017-05-06 RX ADMIN — DOCUSATE SODIUM SCH MG: 100 CAPSULE, LIQUID FILLED ORAL at 21:48

## 2017-05-06 NOTE — RADRPT
EXAM DATE/TIME:  05/06/2017 14:59 

 

[facility] COMPARISON:     

CHEST SINGLE AP, March 18, 2017, 3:09.

 

                     

INDICATIONS :     

Syncopal episode. Patient fell last night.

                     

 

MEDICAL HISTORY :     

None.          

 

SURGICAL HISTORY :        

Valve replacement.

 

ENCOUNTER:     

Initial                                        

 

ACUITY:     

2 days      

 

PAIN SCORE:     

0/10

 

LOCATION:     

Bilateral chest 

 

FINDINGS:     

A single view of the chest demonstrates postoperative median sternotomy and aortic valve replacement.
 Tortuous aorta. Parenchymal scarring right lung base with decrease in opacity compared with March 18
.

 

CONCLUSION:     

1. Scarring right lung base improved from March 18. Left lung remains clear. Postoperative median nafisa
rnotomy and valve replacement. 

 

 

 

 Alvarez Murillo MD on May 06, 2017 at 15:11           

Board Certified Radiologist.

 This report was verified electronically.

## 2017-05-06 NOTE — EKG
Date Performed: 05/06/2017       Time Performed: 14:45:41

 

PTAGE:      61 years

 

EKG:      SINUS TACHYCARDIA LEFT ANTERIOR FASCICULAR BLOCK ST DEVIATION AND MODERATE T-WAVE ABNORMALI
TY, CONSIDER LATERAL ISCHEMIA ABNORMAL ECG 

 

NO PREVIOUS TRACING            

 

DOCTOR:   Carina Barnes  Interpretating Date/Time  05/06/2017 21:45:54

## 2017-05-06 NOTE — HHI.HP
HPI


Service


Critical Care Medicine


Primary Care Physician


No Primary Care Physician


Admission Diagnosis


subdural/subar hemorrhage, lactic acidosis, dehydration, pleural eff


Diagnosis:  


Travel History


International Travel<30 Days:  No


Contact w/Intl Traveler <30 Da:  No


Traveled to Known Affected Are:  No


History of Present Illness


61 year old male with history of chronic alcohol use presents to the emergency 

department for evaluation of head injury after syncopal episode.  He states he 

has had multiple syncopal episodes over the past week, last time last night at 8

:30pm.  Patient has large amount of dried blood to left forehead with 

erythematous left eye.  He is on Coumadin.  In the emergency department his 

alcohol level was found to be 92.  Patient has refused facial laceration 

sutures.  However he agrees to be admitted to ICU.





Review of Systems


Constitutional:  COMPLAINS OF: Fatigue,  DENIES: Diaphoretic episodes, Fever, 

Weight gain, Weight loss, Chills, Dizziness, Change in appetite, Night Sweats


Endocrine:  DENIES: Heat/cold intolerance, Polydipsia, Polyuria, Polyphagia


Eyes:  DENIES: Blurred vision, Diplopia, Eye inflammation, Eye pain, Vision loss

, Photosensitivity, Double Vision


Ears, nose, mouth, throat:  DENIES: Tinnitus, Hearing loss, Vertigo, Nasal 

discharge, Oral lesions, Throat pain, Hoarseness, Ear Pain, Running Nose, 

Epistaxis, Sinus Pain, Toothache, Odynophagia


Respiratory:  DENIES: Apneas, Cough, Snoring, Wheezing, Hemoptysis, Sputum 

production, Shortness of breath


Cardiovascular:  COMPLAINS OF: Syncope,  DENIES: Chest pain, Palpitations, 

Dyspnea on Exertion, PND, Lower Extremity Edema, Orthopnea, Claudication


Genitourinary:  DENIES: Sexual dysfunction, Urinary frequency, Urinary 

incontinence, Urgency, Hematuria, Dysuria, Nocturia, Penile Discharge, 

Testicular Pain, Testicular Swelling


Musculoskeletal:  DENIES: Joint pain, Muscle aches, Stiffness, Joint Swelling, 

Back pain, Neck pain


Integumentary:  DENIES: Abnormal pigmentation, Nail changes, Pruritus, Rash


Hematologic/lymphatic:  DENIES: Bruising, Lymphadenopathy


Immunologic/allergic:  DENIES: Eczema, Urticaria


Neurologic:  DENIES: Abnormal gait, Headache, Localized weakness, Paresthesias, 

Seizures, Speech Problems, Tremor, Poor Balance


Psychiatric:  DENIES: Anxiety, Confusion, Mood changes, Depression, 

Hallucinations, Agitation, Suicidal Ideation, Homicidal Ideation, Delusions





Past Family Social History


Allergies:  


Coded Allergies:  


     No Known Allergies (Unverified , 5/6/17)


Past Medical History


Aortic valve mechanical replacement in 2005


Coagulopathy on Coumadin


History of noncompliance


Past Surgical History


Open heart surgery with aortic valve replacement in 2005


Reported Medications





Reported Meds & Active Scripts


Active


Reported


Warfarin 2.5 Mg Tab 2.5 Mg PO DAILY


Active Ordered Medications





Current Medications








 Medications


  (Trade)  Dose


 Ordered  Sig/Marcia


 Route


 PRN Reason  Start Time


 Stop Time Status Last Admin


Dose Admin


 


 Sodium Chloride  250 ml @ 


 15 mls/hr  ONCE  ONCE


 IV


   5/6/17 18:00


 5/7/17 10:39   


 


 


 Sodium Chloride


  (NS 1000 ml Inj)  1,000 ml @ 


 84 mls/hr  [id number]


 IV


   5/6/17 20:00


    5/6/17 21:47


 


 


 Sodium Chloride


  (NS Flush)  2 ml  UNSCH  PRN


 .XX


 FLUSH AFTER USING IV ACCESS  5/6/17 20:00


     


 


 


 Sodium Chloride


  (NS Flush)  2 ml  BID


 .XX


   5/6/17 21:00


     


 


 


 Acetaminophen


  (Tylenol)  650 mg  Q6H  PRN


 PO


 PAIN 1-10 AND/OR FEVER >101F  5/6/17 20:00


     


 


 


 Morphine Sulfate


  (Morphine Inj)  2 mg  Q2H  PRN


 IV


 PAIN SCALE 6 TO 10  5/6/17 20:00


    5/7/17 01:02


 


 


 Famotidine


  (Pepcid Inj)  20 mg  Q12HR


 IV PUSH


   5/6/17 21:00


    5/6/17 21:48


 


 


 Lorazepam


  (Ativan Inj)  1 mg  Q1H  PRN


 IV


 Agitation/Sedation  5/6/17 20:00


     


 


 


 Docusate Sodium


  (Colace)  100 mg  BID


 PO


   5/6/17 21:00


    5/6/17 21:48


 


 


 Zolpidem Tartrate


  (Ambien)  5 mg  HS  PRN


 PO


 INSOMNIA  5/6/17 20:00


     


 


 


 Miscellaneous


 Information  1  Q361D


 XX


   5/6/17 20:00


    5/6/17 20:00


 


 


 Chlorhexidine


 Gluconate


  (Chlorhexidine


 2% Cloth)  3 pack


 Taper  DAILY@04


 TOP


   5/7/17 04:00


 5/3/18 03:59  5/7/17 03:36


 


 


 Chlorhexidine


 Gluconate


  (Chlorhexidine


 2% Cloth)  3 pack  UNSCH  PRN


 [facility] [place]


 HYGIENIC CARE  5/6/17 20:00


     


 


 


 Flumazenil


  (Romazicon Inj)  0.2 mg  Q1M  PRN


 IV PUSH


 SEE LABEL COMMENTS  5/6/17 23:15


     


 


 


 Lorazepam


  (Ativan)  1 mg  Q4H  PRN


 PO


 CIWA 8 - 10  5/6/17 23:15


     


 


 


 Lorazepam


  (Ativan Inj)  1 mg  Q4H  PRN


 IV PUSH


 CIWA 8 - 10  5/6/17 23:15


     


 


 


 Lorazepam


  (Ativan)  2 mg  Q2H  PRN


 PO


 CIWA 11-14  5/6/17 23:15


     


 


 


 Lorazepam


  (Ativan Inj)  2 mg  Q2H  PRN


 IV PUSH


 CIWA 11-14  5/6/17 23:15


    5/7/17 01:02


 


 


 Lorazepam


  (Ativan Inj)  2 mg  Q1H  PRN


 IV PUSH


 CIWA 15-20  5/6/17 23:15


     


 


 


 Lorazepam 2 mg  2 mg  Q15M  PRN


 IV PUSH


 CIWA > 20  5/6/17 23:15


     


 


 


 Potassium Chloride  100 ml @ 


 50 mls/hr  Q2H  PRN


 IV


 For Potassium 2.8 - 3.2 mEq/L  5/6/17 23:15


     


 


 


 Potassium Chloride


  (KCl 20 Meq


 Premix Inj)  100 ml @ 


 50 mls/hr  Q2H  PRN


 IV


 For Potassium 2.8 - 3.2 mEq/L  5/6/17 23:15


     


 


 


 Potassium Bicarb/


 Potassium


 Chloride 50 meq  50 meq  UNSCH  PRN


 PO


 For Potassium 3.3 - 3.5 mEq/L  5/6/17 23:15


     


 


 


 Potassium Chloride  100 ml @ 


 25 mls/hr  UNSCH  PRN


 IV


 For Potassium 3.3 - 3.5 mEq/L  5/6/17 23:15


     


 


 


 Potassium Chloride  100 ml @ 


 50 mls/hr  Q2H  PRN


 IV


 For Potassium 3.3 - 3.5 mEq/L  5/6/17 23:15


     


 


 


 Magnesium Sulfate/


 Sodium Chloride


  (Magnesium


 Sulfate Inj/NS


 Inj)  100 ml @ 


 50 mls/hr  UNSCH  PRN


 IV


 For Magnesium 0.9 - 1.1 mg/dL  5/6/17 23:15


     


 


 


 Magnesium Oxide


 800 mg  800 mg  UNSCH  PRN


 PO


 For Magnesium 1.2 - 1.6 mg/dL  5/6/17 23:15


     


 


 


 Magnesium Sulfate/


 Sodium Chloride


  (Magnesium


 Sulfate Inj/NS


 Inj)  100 ml @ 


 50 mls/hr  UNSCH  PRN


 IV


 For Magnesium 1.2 - 1.6 mg/dL  5/6/17 23:15


     


 


 


 Potassium


 Phosphate 2000 mg  2,000 mg  Q4H  PRN


 PO


 For Phosphorus < 2.5 mg/dL  5/6/17 23:15


     


 


 


 Sodium Phosphate/


 Sodium Chloride


  (Sodium


 Phosphate Inj/NS


 250 ml Inj)  250 ml @ 


 42 mls/hr  UNSCH  PRN


 IV


 For Phosphorus < 2.5 mg/dL  5/6/17 23:15


     


 


 


 Potassium


 Phosphate


  (K-Phos)  2,000 mg  UNSCH  PRN


 PO/TUBE


 SEE LABEL COMMENTS  5/6/17 23:15


     


 


 


 Chlordiazepoxide


  (Librium)  50 mg


 Taper  Q8H


 PO


   5/7/17 00:00


 5/13/17 23:59  5/7/17 00:54


 








Family History


Noncontributory


Social History


Smoker quit 20 years ago


Occasional beer


Denies any substance abuse





Physical Exam


Vital Signs





 Vital Signs








  Date Time  Temp Pulse Resp B/P Pulse Ox O2 Delivery O2 Flow Rate FiO2


 


5/6/17 19:18  101 15  98 Room Air  


 


5/6/17 19:15  102 16 102/67 98 Room Air  


 


5/6/17 18:00  103 16 106/67 100 Room Air  


 


5/6/17 15:30  92 16 156/92 96 Room Air  


 


5/6/17 15:00  106 16 107/70 98 Room Air  


 


5/6/17 14:45  112 21  100 Room Air  


 


5/6/17 14:40   18  98 Room Air  


 


5/6/17 14:40      Room Air  


 


5/6/17 14:20 98.8 122 15 106/65 100   








Physical Exam


GENERAL: Well-nourished, well-developed patient.  In no acute distress.  Dried 

blood over his face and chest.


SKIN: Warm and dry.


HEAD: Normocephalic.


EYES: No scleral icterus. No injection or drainage. 


NECK: Supple, trachea midline. No JVD or lymphadenopathy.


CARDIOVASCULAR: Regular rate and rhythm without murmurs, gallops, or rubs. 


RESPIRATORY: Breath sounds equal bilaterally. No accessory muscle use.


GASTROINTESTINAL: Abdomen soft, non-tender, nondistended. 


MUSCULOSKELETAL: No cyanosis, or edema. 


BACK: Nontender without obvious deformity. No CVA tenderness.


EXTREMITIES: No clubbing cyanosis or edema


Laboratory





Laboratory Tests








Test 5/6/17 5/6/17 5/6/17





 15:10 16:30 18:30


 


White Blood Count 8.4   


 


Red Blood Count 2.73   


 


Hemoglobin 9.7   


 


Hematocrit 27.7   


 


Mean Corpuscular Volume 101.3   


 


Mean Corpuscular Hemoglobin 35.3   


 


Mean Corpuscular Hemoglobin 34.9   





Concent   


 


Red Cell Distribution Width 16.4   


 


Platelet Count 35   


 


Mean Platelet Volume 9.5   


 


Neutrophils (%) (Auto) 83.5   


 


Lymphocytes (%) (Auto) 6.8   


 


Monocytes (%) (Auto) 9.4   


 


Eosinophils (%) (Auto) 0.0   


 


Basophils (%) (Auto) 0.3   


 


Neutrophils # (Auto) 7.0   


 


Lymphocytes # (Auto) 0.6   


 


Monocytes # (Auto) 0.8   


 


Eosinophils # (Auto) 0.0   


 


Basophils # (Auto) 0.0   


 


CBC Comment AUTO DIFF   


 


Differential Comment AUTO DIFF  





 CONFIRMED  


 


Platelet Estimate LOW   


 


Platelet Morphology Comment NORMAL   


 


Prothrombin Time 13.5   


 


Prothromb Time International 1.2   





Ratio   


 


Activated Partial 30.1   





Thromboplast Time   


 


Sodium Level 132   


 


Potassium Level 4.0   


 


Chloride Level 95   


 


Carbon Dioxide Level 22.7   


 


Anion Gap 14   


 


Blood Urea Nitrogen 6   


 


Creatinine 0.80   


 


Estimat Glomerular Filtration 98   





Rate   


 


Random Glucose 90   


 


Lactic Acid Level 4.4   2.9 


 


Calcium Level 8.0   


 


Magnesium Level 1.7   


 


Total Bilirubin 1.1   


 


Aspartate Amino Transf 103   





(AST/SGOT)   


 


Alanine Aminotransferase 35   





(ALT/SGPT)   


 


Alkaline Phosphatase 121   


 


Total Creatine Kinase 1018   


 


Creatine Kinase MB 12.1   


 


Creatine Kinase MB % 1.2   


 


Troponin I 0.04   


 


B-Type Natriuretic Peptide 112   


 


Total Protein 6.1   


 


Albumin 3.1   


 


Ethyl Alcohol Level 92   


 


Urine Color  YELLOW  


 


Urine Turbidity  CLEAR  


 


Urine pH  5.5  


 


Urine Specific Gravity  1.013  


 


Urine Protein  TRACE  


 


Urine Glucose (UA)  NEG  


 


Urine Ketones  NEG  


 


Urine Occult Blood  NEG  


 


Urine Nitrite  NEG  


 


Urine Bilirubin  NEG  


 


Urine Urobilinogen  LESS THAN 2.0  


 


Urine Leukocyte Esterase  NEG  


 


Urine RBC  1  


 


Urine WBC  1  


 


Urine Mucus  FEW  


 


Microscopic Urinalysis Comment  CULT NOT 





  INDICATED 














 Date/Time Procedure Status





Source Growth 


 


 5/6/17 15:15 Aerobic Blood Culture Received





Blood Peripheral Pending 





 5/6/17 15:15 Anaerobic Blood Culture Received





Blood Peripheral Pending 








Result Diagram:  


5/6/17 1510                                                                    

            5/6/17 1510





Imaging





Last 24 hours Impressions








Maxillofacial CT 5/6/17 1446 Signed





Impressions: 





 Service Date/Time:  Saturday, May 6, 2017 16:58 - CONCLUSION:  1. Left frontal 





 scalp laceration and facial soft tissue swelling. No acute bony abnormalities.

   





   Alvarez Murillo MD 


 


Head CT 5/6/17 1446 Signed





Impressions: 





 Service Date/Time:  Saturday, May 6, 2017 16:58 - CONCLUSION:  1. Bilateral 





 subdural hematomas measuring up to about 11 mm on the right and 5 mm on the 





 left. No significant midline shift. Trace subarachnoid hemorrhage on the 

right. 





 No hydrocephalus. 2. Left frontal scalp laceration and scalp swelling and 





 hematoma.     Alvarez Murillo MD 


 


Chest X-Ray 5/6/17 1446 Signed





Impressions: 





 Service Date/Time:  Saturday, May 6, 2017 14:59 - CONCLUSION:  1. Scarring 

right 





 lung base improved from March 18. Left lung remains clear. Postoperative 

median 





 sternotomy and valve replacement.      Alvarez Murillo MD 


 


Chest CT 5/6/17 1446 Signed





Impressions: 





 Service Date/Time:  Saturday, May 6, 2017 17:11 - CONCLUSION:  1. Decrease in 





 size of loculated right pleural effusion since March 8. Residual loculated 

fluid 





 in the lower right hemithorax measures about 2.1 cm in thickness associated 

with 





 pleural thickening and old unfused right lower rib fracture. 2. Postoperative 





 median sternotomy and aortic valve replacement. Stable aortic root dilatation 

to 





 4.5 cm with no pneumothorax.     Alvarez Murillo MD 


 


Cervical Spine CT 5/6/17 1446 Signed





Impressions: 





 Service Date/Time:  Saturday, May 6, 2017 16:58 - CONCLUSION:  1. No acute 





 findings. Moderate degenerative disc disease.     Alvarez Murillo MD 











Assessment and Plan


Assessment and Plan


Subdural hematoma


Subarachnoid traumatic bleed


- Platelet transfusion


- Repeat CT a.m.


- Management per neurosurgery


- No surgical intervention at this time


- Admit to ICU


- Neuro checks per unit protocol





Thrombocytopenia


- Likely due to alcohol abuse


- Transfuse platelets to keep platelet count above 100





Coagulopathy


- Iatrogenic/Coumadin


- No anticoagulation until neurosurgically cleared





Alcohol use disorder


- CIWA protocol


- Librium taper





DVT GI prophylaxis


- Teds SCDs


- No pharmacological DVT prophylaxis due to ICH


- Pepcid





Critical Care:


The total critical care time was 35 minutes. Time to perform other separately 

billable procedures was not included in the critical care time.








Robbin Layton MD May 6, 2017 20:13

## 2017-05-06 NOTE — PD
HPI


Chief Complaint:  Syncope/Near-Syncope


Time Seen by Provider:  14:40


Travel History


International Travel<30 days:  No


Contact w/Intl Traveler<30days:  No


Traveled to known affect area:  No





History of Present Illness


HPI


This is a 61-year-old male with history of mechanical aortic valve replacement, 

on Coumadin, presents for evaluation of multiple syncopal events.  The patient 

reports over the past 2-3 weeks he has had essentially generalized weakness and 

decreased appetite, vomiting.  For the past week he has had worsening symptoms.

  He reports that he has had syncopal episodes 3 times over the past week.  He 

reports that when he stands he feels lightheaded and passes out.  Most recently 

this happened last night at 8 PM when he was getting up from the couch.  At 

that time he struck his head on the ground and sustained significant ecchymosis 

on his face and forehead with a laceration to the left forehead.  He is 

complaining of a mild headache and facial pain.  He denies any chest pain, 

shortness of breath, neck or back pain, diarrhea, abdominal pain, fevers or 

chills.  The patient was admitted here on March 27 for empyema of the right 

pleural space with sepsis.  He denies any drug or alcohol use.  He has no other 

complaints at this time.





PFSH


Past Medical History


Hx Anticoagulant Therapy:  Yes (coumadin)


Cancer:  No


Cardiovascular Problems:  Yes (OPEN HEART SX)


Diminished Hearing:  No


Endocrine:  No


Genitourinary:  No


Immune Disorder:  No


Neurologic:  No


Psychiatric:  No


Reproductive:  Yes


Respiratory:  No





Past Surgical History


Body Medical Devices:  mitral valve


Cardiac Surgery:  Yes (ARTIFICAL MITRAL VALVE REPLACEMENT )





Social History


Alcohol Use:  No


Tobacco Use:  No


Substance Use:  No





Allergies-Medications


(Allergen,Severity, Reaction):  


Coded Allergies:  


     No Known Allergies (Unverified , 5/6/17)


Reported Meds & Prescriptions





Reported Meds & Active Scripts


Active


Reported


Warfarin 2.5 Mg Tab 2.5 Mg PO DAILY








Review of Systems


Except as stated in HPI:  all other systems reviewed are Neg





Physical Exam


Narrative


GENERAL: This is a disheveled-appearing male who is in no acute distress.  He 

has dry blood matted on his face and head.


SKIN: Warm and dry.  There is large area of ecchymosis to left side of the face

, forehead with a  laceration to the left forehead, 4 cm in length.  There is a 

linear area of ecchymosis on the right lower chest wall


HEAD: Atraumatic. Normocephalic. 


EYES: Pupils equal and round. No scleral icterus. No injection or drainage. 


ENT: No nasal bleeding or discharge.  Mucous membranes pink and moist.


NECK: Trachea midline. No JVD. 


CARDIOVASCULAR: Regular rate and rhythm.  No murmur appreciated.


RESPIRATORY: No accessory muscle use. Clear to auscultation. Breath sounds 

equal bilaterally. 


GASTROINTESTINAL: Abdomen soft, non-tender, nondistended. Hepatic and splenic 

margins not palpable. 


MUSCULOSKELETAL: No obvious deformities. No clubbing.  No cyanosis.  No edema. 


NEUROLOGICAL: Awake and alert. No obvious cranial nerve deficits.  Motor 

grossly within normal limits. Normal speech.


PSYCHIATRIC: Appropriate mood and affect; insight and judgment normal.





Data


Data


Last Documented VS





Vital Signs








  Date Time  Temp Pulse Resp B/P Pulse Ox O2 Delivery O2 Flow Rate FiO2


 


5/6/17 18:00  103 16 106/67 100 Room Air  


 


5/6/17 14:20 98.8       








Orders





 Chest, Single Ap (5/6/17 14:46)


Ct Brain W/O Iv Contrast(Rout) (5/6/17 14:46)


Ct Cerv Spine W/O Contrast (5/6/17 14:46)


Ct Thorax/ Chest W Iv Contrast (5/6/17 14:46)


Ct Facial Bones W/O Iv Cont (5/6/17 14:46)


Apply Cervical Collar (5/6/17 14:46)


Iv Access Insert/Monitor (5/6/17 14:46)


Ecg Monitoring (5/6/17 14:46)


Oximetry (5/6/17 14:46)


Oxygen Administration (5/6/17 14:46)


Electrocardiogram (5/6/17 14:46)


Complete Blood Count With Diff (5/6/17 14:46)


Comprehensive Metabolic Panel (5/6/17 14:46)


Magnesium (Mg) (5/6/17 14:46)


B-Type Natriuretic Peptide (5/6/17 14:46)


Ckmb (Isoenzyme) Profile (5/6/17 14:46)


Troponin I (5/6/17 14:46)


Act Partial Throm Time (Ptt) (5/6/17 14:46)


Prothrombin Time / Inr (Pt) (5/6/17 14:46)


Ondansetron Inj (Zofran Inj) (5/6/17 15:00)


Sodium Chlor 0.9% 1000 Ml Inj (Ns 1000 M (5/6/17 14:46)


Lactic Acid Sepsis Protocol (5/6/17 14:46)


Blood Culture (5/6/17 14:46)


Tetanus/Diphtheria Tox Adult (Tetanus/Di (5/6/17 15:00)


Lidocai-Epi 1%-1:100,000 Inj (Xylocaine- (5/6/17 15:00)


Sodium Chlor 0.9% 1000 Ml Inj (Ns 1000 M (5/6/17 16:23)


Urinalysis - C+S If Indicated (5/6/17 16:27)


Wound Care (5/6/17 16:27)


Alcohol (Ethanol) (5/6/17 16:34)


CKMB (5/6/17 15:10)


CKMB% (5/6/17 15:10)


Iohexol 350 Inj (Omnipaque 350 Inj) (5/6/17 17:25)


Type And Screen (5/6/17 17:59)


Platelet Pheresis (5/6/17 17:59)


Blood Product Administration .UPON TRANSFUSION (5/6/17 17:59)


Sodium Chlor 0.9% 250 Ml Inj (Ns 250 Ml (5/6/17 18:00)


Vancomycin Inj (Vancomycin Inj) (5/6/17 18:15)


Piperacil-Tazo 3.375 Gm Premix (Zosyn 3. (5/6/17 18:15)


Consult Neurosurgery (5/6/17 )


Morphine Inj (Morphine Inj) (5/6/17 18:30)


(Hub Use Only)Inp Phy Cons/Ref (5/6/17 )


Admit Order (Ed Use Only) (5/6/17 18:37)





Labs





 Laboratory Tests








Test 5/6/17 5/6/17





 15:10 16:30


 


White Blood Count 8.4 TH/MM3 


 


Red Blood Count 2.73 MIL/MM3 


 


Hemoglobin 9.7 GM/DL 


 


Hematocrit 27.7 % 


 


Mean Corpuscular Volume 101.3 FL 


 


Mean Corpuscular Hemoglobin 35.3 PG 


 


Mean Corpuscular Hemoglobin 34.9 % 





Concent  


 


Red Cell Distribution Width 16.4 % 


 


Platelet Count 35 TH/MM3 


 


Mean Platelet Volume 9.5 FL 


 


Neutrophils (%) (Auto) 83.5 % 


 


Lymphocytes (%) (Auto) 6.8 % 


 


Monocytes (%) (Auto) 9.4 % 


 


Eosinophils (%) (Auto) 0.0 % 


 


Basophils (%) (Auto) 0.3 % 


 


Neutrophils # (Auto) 7.0 TH/MM3 


 


Lymphocytes # (Auto) 0.6 TH/MM3 


 


Monocytes # (Auto) 0.8 TH/MM3 


 


Eosinophils # (Auto) 0.0 TH/MM3 


 


Basophils # (Auto) 0.0 TH/MM3 


 


CBC Comment AUTO DIFF  


 


Differential Comment AUTO DIFF 





 CONFIRMED 


 


Platelet Estimate LOW  


 


Platelet Morphology Comment NORMAL  


 


Prothrombin Time 13.5 SEC 


 


Prothromb Time International 1.2 RATIO 





Ratio  


 


Activated Partial 30.1 SEC 





Thromboplast Time  


 


Sodium Level 132 MEQ/L 


 


Potassium Level 4.0 MEQ/L 


 


Chloride Level 95 MEQ/L 


 


Carbon Dioxide Level 22.7 MEQ/L 


 


Anion Gap 14 MEQ/L 


 


Blood Urea Nitrogen 6 MG/DL 


 


Creatinine 0.80 MG/DL 


 


Estimat Glomerular Filtration 98 ML/MIN 





Rate  


 


Random Glucose 90 MG/DL 


 


Lactic Acid Level 4.4 mmol/L 


 


Calcium Level 8.0 MG/DL 


 


Magnesium Level 1.7 MG/DL 


 


Total Bilirubin 1.1 MG/DL 


 


Aspartate Amino Transf 103 U/L 





(AST/SGOT)  


 


Alanine Aminotransferase 35 U/L 





(ALT/SGPT)  


 


Alkaline Phosphatase 121 U/L 


 


Total Creatine Kinase 1018 U/L 


 


Creatine Kinase MB 12.1 NG/ML 


 


Creatine Kinase MB % 1.2 % 


 


Troponin I 0.04 NG/ML 


 


B-Type Natriuretic Peptide 112 PG/ML 


 


Total Protein 6.1 GM/DL 


 


Albumin 3.1 GM/DL 


 


Ethyl Alcohol Level 92 MG/DL 


 


Urine Color  YELLOW 


 


Urine Turbidity  CLEAR 


 


Urine pH  5.5 


 


Urine Specific Gravity  1.013 


 


Urine Protein  TRACE mg/dL


 


Urine Glucose (UA)  NEG mg/dL


 


Urine Ketones  NEG mg/dL


 


Urine Occult Blood  NEG 


 


Urine Nitrite  NEG 


 


Urine Bilirubin  NEG 


 


Urine Urobilinogen  LESS THAN 2.0





  MG/DL


 


Urine Leukocyte Esterase  NEG 


 


Urine RBC  1 /hpf


 


Urine WBC  1 /hpf


 


Urine Mucus  FEW /lpf


 


Microscopic Urinalysis Comment  CULT NOT





  INDICATED











MDM


Medical Decision Making


Medical Screen Exam Complete:  Yes


Emergency Medical Condition:  Yes


Medical Record Reviewed:  Yes


Interpretation(s)


EKG sinus rhythm, T-wave inversions noted in the lateral waves, unchanged from 

previous in March 2017.





CONCLUSION:     


1. Decrease in size of loculated right pleural effusion since March 8. Residual 

loculated fluid in the lower right hemithorax measures about 2.1 cm in 

thickness associated with pleural thickening and old unfused right lower rib 

fracture.


2. Postoperative median sternotomy and aortic valve replacement. Stable aortic 

root dilatation to 4.5 cm with no pneumothorax.


Differential Diagnosis


Dehydration, gastroenteritis, electrolyte abnormality, supratherapeutic INR, 

intracranial hemorrhage, subdural hemorrhage, facial fracture, arrhythmia, 

symptomatic anemia, sepsis


Narrative Course


This is a 61-year-old male who for 3 weeks is been experiencing decreased 

appetite, generalized weakness, vomiting.  For the past week he has been 

experiencing lightheadedness when attempting to ambulate with 3 associated 

syncopal episodes, most recently last night at 8 PM.  Last night he fell and 

struck his head against the ground and he now has significant ecchymosis on the 

face and forehead.  He also has a 4 cm deep and gaping laceration to the left 

forehead.  He is tachycardic initially on examination.  He is alert and oriented

, answering questions properly.  He also has bruising to the right chest wall.  

Plan is for CT imaging the brain, face, neck, thorax.  Basic lab work, IV fluids

, EKG initiated.  Multiple times were made at obtaining consent for laceration 

repair and the patient is refusing.  I discussed the risks of this decision 

including increased risk of infection, sepsis, death, increased scarring and 

verbalizes understanding but continues to decline suture repair.  He requests 

Neosporin and bandages.


The patient's lab work is been reviewed.  He has thrombocytopenia with platelet 

count 35 which is lower than his baseline.  He has a hemoglobin of 9.7.  

Subtherapeutic INR, he admits that he has not been taking his Coumadin.  Lactic 

acidosis with lactic acid of 4.4.  Myositis with creatinine of 1018.  Alcohol 

level mildly elevated at 92.  Initially the patient said that he did not drink 

alcohol ever upon further questioning he admits to drinking "5-6 beers a day" 

and then upon additional questioning he admits to drinking 8 beers yesterday as 

well as 2 this morning.  He is clinically sober and once again competent to 

refuse laceration repair despite multiple attempts at convincing him of the 

importance of it and potential risks.  Urinalysis unremarkable.  The patient's 

CT of the brain reveals bilateral subdural hematomas, subarachnoid hemorrhage.  

Therefore the patient will be given platelet transfusion.  Discussed the case 

with on-call neurosurgeon Dr. Grace who recommends platelet transfusion, 

admission to critical care, consultation to himself.  CT of the thorax reveals 

residual loculated fluid in the right hemithorax.  The patient will be started 

on broad-spectrum antibiotics.  The patient will be admitted.  Discussed with 

critical care physician Dr. Layton who is agreeable.





Diagnosis





 Primary Impression:  


 Subdural hematoma


 Additional Impressions:  


 Subarachnoid hemorrhage


 Thrombocytopenia


 Lactic acidosis


 Pleural effusion


 Dehydration


 Syncope


 Qualified Code:  R55 - Syncope, unspecified syncope type


 Elevated CK





Admitting Information


Admitting Physician Requests:  Admit








Norman Shaikh May 6, 2017 14:55

## 2017-05-06 NOTE — PD
Data


Data


Last Documented VS





Vital Signs








  Date Time  Temp Pulse Resp B/P Pulse Ox O2 Delivery O2 Flow Rate FiO2


 


5/6/17 18:00  103 16 106/67 100 Room Air  


 


5/6/17 14:20 98.8       








Orders





 Chest, Single Ap (5/6/17 14:46)


Ct Brain W/O Iv Contrast(Rout) (5/6/17 14:46)


Ct Cerv Spine W/O Contrast (5/6/17 14:46)


Ct Thorax/ Chest W Iv Contrast (5/6/17 14:46)


Ct Facial Bones W/O Iv Cont (5/6/17 14:46)


Apply Cervical Collar (5/6/17 14:46)


Iv Access Insert/Monitor (5/6/17 14:46)


Ecg Monitoring (5/6/17 14:46)


Oximetry (5/6/17 14:46)


Oxygen Administration (5/6/17 14:46)


Electrocardiogram (5/6/17 14:46)


Complete Blood Count With Diff (5/6/17 14:46)


Comprehensive Metabolic Panel (5/6/17 14:46)


Magnesium (Mg) (5/6/17 14:46)


B-Type Natriuretic Peptide (5/6/17 14:46)


Ckmb (Isoenzyme) Profile (5/6/17 14:46)


Troponin I (5/6/17 14:46)


Act Partial Throm Time (Ptt) (5/6/17 14:46)


Prothrombin Time / Inr (Pt) (5/6/17 14:46)


Ondansetron Inj (Zofran Inj) (5/6/17 15:00)


Sodium Chlor 0.9% 1000 Ml Inj (Ns 1000 M (5/6/17 14:46)


Lactic Acid Sepsis Protocol (5/6/17 14:46)


Blood Culture (5/6/17 14:46)


Tetanus/Diphtheria Tox Adult (Tetanus/Di (5/6/17 15:00)


Lidocai-Epi 1%-1:100,000 Inj (Xylocaine- (5/6/17 15:00)


Sodium Chlor 0.9% 1000 Ml Inj (Ns 1000 M (5/6/17 16:23)


Urinalysis - C+S If Indicated (5/6/17 16:27)


Wound Care (5/6/17 16:27)


Alcohol (Ethanol) (5/6/17 16:34)


CKMB (5/6/17 15:10)


CKMB% (5/6/17 15:10)


Iohexol 350 Inj (Omnipaque 350 Inj) (5/6/17 17:25)


Type And Screen (5/6/17 17:59)


Platelet Pheresis (5/6/17 17:59)


Blood Product Administration .UPON TRANSFUSION (5/6/17 17:59)


Sodium Chlor 0.9% 250 Ml Inj (Ns 250 Ml (5/6/17 18:00)


Vancomycin Inj (Vancomycin Inj) (5/6/17 18:15)


Piperacil-Tazo 3.375 Gm Premix (Zosyn 3. (5/6/17 18:15)


Consult Neurosurgery (5/6/17 )


Morphine Inj (Morphine Inj) (5/6/17 18:30)


(Hub Use Only)Inp Phy Cons/Ref (5/6/17 )


Admit Order (Ed Use Only) (5/6/17 18:37)





Labs





 Laboratory Tests








Test 5/6/17 5/6/17 5/6/17





 15:10 16:30 18:30


 


White Blood Count 8.4 TH/MM3  


 


Red Blood Count 2.73 MIL/MM3  


 


Hemoglobin 9.7 GM/DL  


 


Hematocrit 27.7 %  


 


Mean Corpuscular Volume 101.3 FL  


 


Mean Corpuscular Hemoglobin 35.3 PG  


 


Mean Corpuscular Hemoglobin 34.9 %  





Concent   


 


Red Cell Distribution Width 16.4 %  


 


Platelet Count 35 TH/MM3  


 


Mean Platelet Volume 9.5 FL  


 


Neutrophils (%) (Auto) 83.5 %  


 


Lymphocytes (%) (Auto) 6.8 %  


 


Monocytes (%) (Auto) 9.4 %  


 


Eosinophils (%) (Auto) 0.0 %  


 


Basophils (%) (Auto) 0.3 %  


 


Neutrophils # (Auto) 7.0 TH/MM3  


 


Lymphocytes # (Auto) 0.6 TH/MM3  


 


Monocytes # (Auto) 0.8 TH/MM3  


 


Eosinophils # (Auto) 0.0 TH/MM3  


 


Basophils # (Auto) 0.0 TH/MM3  


 


CBC Comment AUTO DIFF   


 


Differential Comment AUTO DIFF  





 CONFIRMED  


 


Platelet Estimate LOW   


 


Platelet Morphology Comment NORMAL   


 


Prothrombin Time 13.5 SEC  


 


Prothromb Time International 1.2 RATIO  





Ratio   


 


Activated Partial 30.1 SEC  





Thromboplast Time   


 


Sodium Level 132 MEQ/L  


 


Potassium Level 4.0 MEQ/L  


 


Chloride Level 95 MEQ/L  


 


Carbon Dioxide Level 22.7 MEQ/L  


 


Anion Gap 14 MEQ/L  


 


Blood Urea Nitrogen 6 MG/DL  


 


Creatinine 0.80 MG/DL  


 


Estimat Glomerular Filtration 98 ML/MIN  





Rate   


 


Random Glucose 90 MG/DL  


 


Lactic Acid Level 4.4 mmol/L  2.9 mmol/L


 


Calcium Level 8.0 MG/DL  


 


Magnesium Level 1.7 MG/DL  


 


Total Bilirubin 1.1 MG/DL  


 


Aspartate Amino Transf 103 U/L  





(AST/SGOT)   


 


Alanine Aminotransferase 35 U/L  





(ALT/SGPT)   


 


Alkaline Phosphatase 121 U/L  


 


Total Creatine Kinase 1018 U/L  


 


Creatine Kinase MB 12.1 NG/ML  


 


Creatine Kinase MB % 1.2 %  


 


Troponin I 0.04 NG/ML  


 


B-Type Natriuretic Peptide 112 PG/ML  


 


Total Protein 6.1 GM/DL  


 


Albumin 3.1 GM/DL  


 


Ethyl Alcohol Level 92 MG/DL  


 


Urine Color  YELLOW  


 


Urine Turbidity  CLEAR  


 


Urine pH  5.5  


 


Urine Specific Gravity  1.013  


 


Urine Protein  TRACE mg/dL 


 


Urine Glucose (UA)  NEG mg/dL 


 


Urine Ketones  NEG mg/dL 


 


Urine Occult Blood  NEG  


 


Urine Nitrite  NEG  


 


Urine Bilirubin  NEG  


 


Urine Urobilinogen  LESS THAN 2.0 





  MG/DL 


 


Urine Leukocyte Esterase  NEG  


 


Urine RBC  1 /hpf 


 


Urine WBC  1 /hpf 


 


Urine Mucus  FEW /lpf 


 


Microscopic Urinalysis Comment  CULT NOT 





  INDICATED 











MDM


Supervised Visit with RAJIV:  No


Narrative Course


I, Dr. Romero], have reviewed the advance practice practitioner's documentation 

and am in agreement, met with the patient face to face, made the diagnosis, and 

the medical decision making was done by me.  





*My assessment and Findings: 


I agree with the documentation by RAJIV has above.  





GENERAL:  WD, WN in nad.  Pleasant.  Disheveled.  Obivous lac to left side of 

forehead.


SKIN: Warm and dry.  Starburst lac to left forehead.  Scabbed, healing.


HEAD: Atraumatic. Normocephalic. 


EYES: Pupils equal and round. No scleral icterus. No injection or drainage. 


ENT: No nasal bleeding or discharge.  Mucous membranes pink and moist.


NECK: Trachea midline. No JVD. 


CARDIOVASCULAR: Regular rate and rhythm.  


RESPIRATORY: No accessory muscle use. Clear to auscultation. Breath sounds 

equal bilaterally. 


GASTROINTESTINAL: Abdomen soft, non-tender, nondistended. Hepatic and splenic 

margins not palpable. 


MUSCULOSKELETAL: Extremities without clubbing, cyanosis, or edema. No obvious 

deformities. 


NEUROLOGICAL: Awake and alert. CN 2-12 intact and non-focal.  5/5 strength in 

all four extremities.  Cerebellar testing normal.  Oriented x 4.  Coarse tremor 

of bilateral upper extremities.


PSYCHIATRIC: Appropriate mood and affect; insight and judgment normal.  

Understands r/b/c/a discussions and can participate in medical decision making.








Patient does have a starburst laceration of the left forehead which is old and 

scabbed, patient had a fall yesterday.  He has had multiple falls.  Denies 

history of drinking but does have an alcohol level of 90.  CT head shows acute 

subdural hematomas bilaterally worse on the right.  He is alert and awake and 

oriented and quite pleasant.





Is thrombocytopenic and platelets are being transfused.  Per Dr. Grace want to 

keep his platelet count at 100,000+.  Will be admitted to the surgical ICU.  

Patient is adamantly refused repair of his laceration even after discussion of 

infection and scarring possibility.





Patient does have an elevated lactic acid and mildly tachycardic.  Given his 

well appearance i think this is more from dehydration.No significant source of 

fever was identified until he had a CAT scan of his chest which did show 

pleural effusion.  He was be started on vancomycin and Zosyn.  He does not 

appear septic at this time.  He has not had any symptoms of pneumonia.  He does 

have a history of empyema and pleural effusion.  Fluid boluses in process.


Procedures


**Procedure Narrative**


Aggregate critical care time was 30 minutes. Time to perform other separately 

billable procedures was not  


included in the critical care time. My time did not include minutes spent 

treating any other patients simultaneously or on  


activities that did not directly contribute to the patient's treatment.  





The services I provided to this patient were to treat and/or prevent clinically 

significant deterioration that could result  


in: Death, disability, organ failure.





I provided critical care services requiring my management, as noted below:


Chart data review, documentation time, medication orders and management, vital 

sign assessments/reviewing monitor data,  


ordering and reviewing lab tests, ordering and interpreting/reviewing x-rays 

and diagnostic studies, care of the patient  


and discussion of the patient with the admitting physicians.





Diagnosis





 Primary Impression:  


 Subdural hematoma


 Additional Impressions:  


 Subarachnoid hemorrhage


 Pleural effusion


 Syncope


 Qualified Code:  R55 - Syncope, unspecified syncope type


 Thrombocytopenia


 Elevated CK


 Dehydration


 Lactic acidosis





Admitting Information


Admitting Physician Requests:  Admit


Condition:  Stable








Hao Romero MD May 6, 2017 18:34

## 2017-05-06 NOTE — PD
Physical Exam


Date Seen by Provider:  May 6, 2017


Time Seen by Provider:  14:31


Narrative


61 year old male presents to the emergency department for evaluation of head 

injury after syncopal episode.  He states he has had multiple syncopal episodes 

over the past week, last time last night at 8:30pm.  Patient has large amount 

of dried blood to left forehead with erythematous left eye.  He is on Coumadin.





Vital signs reviewed.  Patient waiting bed placement.





Data


Data


Last Documented VS





Vital Signs








  Date Time  Temp Pulse Resp B/P Pulse Ox O2 Delivery O2 Flow Rate FiO2


 


5/6/17 14:20 98.8 122 15 106/65 100   











MDM


Supervised Visit with RAJIV:  Saritha Kaplan May 6, 2017 14:32

## 2017-05-06 NOTE — RADRPT
EXAM DATE/TIME:  05/06/2017 16:58 

 

[facility] COMPARISON:     

No previous studies available for comparison.

 

 

INDICATIONS :     

Syncopal episode last night, laceration to left forehead.

                      

 

RADIATION DOSE:     

21.37 CTDIvol (mGy) 

 

 

 

MEDICAL HISTORY :     

None  

 

SURGICAL HISTORY :      

None. 

 

ENCOUNTER:      

Initial

 

ACUITY:      

1 day

 

PAIN SCALE:      

0/10

 

LOCATION:       

Bilateral  neck

 

TECHNIQUE:     

Volumetric scanning of the cervical spine was performed. Multiplanar reconstructions in the sagittal,
 coronal and oblique axial planes were performed.   Using automated exposure control and adjustment o
f the mA and/or kV according to patient size, radiation dose was kept as low as reasonably achievable
 to obtain optimal diagnostic quality images. 

 

FINDINGS:     

There is moderate degenerative disc disease and facet arthropathy. No acute fracture. No prevertebral
 soft tissue swelling. No significant bony central canal stenosis. Multilevel mild foraminal encroach
ment.

 

CONCLUSION:     

1. No acute findings. Moderate degenerative disc disease.

 

 

 

 Alvarez Murillo MD on May 06, 2017 at 18:01           

Board Certified Radiologist.

 This report was verified electronically.

## 2017-05-06 NOTE — RADRPT
EXAM DATE/TIME:  05/06/2017 16:58 

 

[facility] COMPARISON:     

No previous studies available for comparison.

 

 

INDICATIONS :     

Syncopal episode last night, laceration to left forehead.

                      

 

RADIATION DOSE:     

64.25 CTDIvol (mGy) 

 

 

MEDICAL HISTORY :     

None  

 

SURGICAL HISTORY :      

None. 

 

ENCOUNTER:      

Initial

 

ACUITY:      

1 day

 

PAIN SCORE:      

4/10

 

LOCATION:       

Left  forehead

 

TECHNIQUE:     

Volumetric scanning of the facial bones was performed.  Using automated exposure control and adjustme
nt of the mA and/or kV according to patient size, radiation dose was kept as low as reasonably achiev
able to obtain optimal diagnostic quality images. 

 

FINDINGS:     

There is left-sided scalp laceration and left frontal and facial swelling. No displaced fractures are
 identified the facial bones. Paranasal sinuses are clear.

 

CONCLUSION:     

1. Left frontal scalp laceration and facial soft tissue swelling. No acute bony abnormalities.

 

 

 

 Alvarez Murillo MD on May 06, 2017 at 18:06           

Board Certified Radiologist.

 This report was verified electronically.

## 2017-05-06 NOTE — RADRPT
EXAM DATE/TIME:  05/06/2017 16:58 

 

[facility] COMPARISON:     

No previous studies available for comparison.

 

 

INDICATIONS :     

Syncopal episode last night, laceration to left forehead.

                      

 

RADIATION DOSE:     

63.04 CTDIvol (mGy) 

 

 

 

MEDICAL HISTORY :     

None  

 

SURGICAL HISTORY :       

mitral valve replacement

 

ENCOUNTER:      

Initial

 

ACUITY:      

1 day

 

PAIN SCALE:      

4/10

 

LOCATION:       

Left frontal head

 

TECHNIQUE:     

Multiple contiguous axial images were obtained of the head.  Using automated exposure control and adj
ustment of the mA and/or kV according to patient size, radiation dose was kept as low as reasonably a
chievable to obtain optimal diagnostic quality images. 

 

FINDINGS:     

There is a right-sided subdural hematoma measuring up to about 11 mm in thickness. There is some sulc
al effacement on the right side but no significant midline shift at the current time. There is a smal
l amount of interhemispheric subdural hemorrhage as well. Probable trace subarachnoid hemorrhage also
 present over the right convexity.

 

There is also a small left-sided subdural hematoma in the left frontoparietal region measuring up to 
about 5 mm in thickness.

 

No acute bony abnormalities are identified.

 

CONCLUSION:     

1. Bilateral subdural hematomas measuring up to about 11 mm on the right and 5 mm on the left. No sig
nificant midline shift. Trace subarachnoid hemorrhage on the right. No hydrocephalus.

2. Left frontal scalp laceration and scalp swelling and hematoma.

 

 

 

 Alvarez Murillo MD on May 06, 2017 at 17:57           

Board Certified Radiologist.

 This report was verified electronically.

## 2017-05-07 VITALS
RESPIRATION RATE: 28 BRPM | DIASTOLIC BLOOD PRESSURE: 73 MMHG | OXYGEN SATURATION: 98 % | HEART RATE: 100 BPM | TEMPERATURE: 98.8 F | SYSTOLIC BLOOD PRESSURE: 132 MMHG

## 2017-05-07 VITALS
HEART RATE: 94 BPM | RESPIRATION RATE: 18 BRPM | SYSTOLIC BLOOD PRESSURE: 114 MMHG | DIASTOLIC BLOOD PRESSURE: 66 MMHG | OXYGEN SATURATION: 99 %

## 2017-05-07 VITALS
DIASTOLIC BLOOD PRESSURE: 54 MMHG | OXYGEN SATURATION: 100 % | RESPIRATION RATE: 16 BRPM | TEMPERATURE: 98.6 F | HEART RATE: 77 BPM | SYSTOLIC BLOOD PRESSURE: 101 MMHG

## 2017-05-07 VITALS
DIASTOLIC BLOOD PRESSURE: 62 MMHG | TEMPERATURE: 97.7 F | SYSTOLIC BLOOD PRESSURE: 117 MMHG | OXYGEN SATURATION: 97 % | RESPIRATION RATE: 14 BRPM | HEART RATE: 84 BPM

## 2017-05-07 VITALS
TEMPERATURE: 98.5 F | DIASTOLIC BLOOD PRESSURE: 66 MMHG | HEART RATE: 82 BPM | RESPIRATION RATE: 13 BRPM | OXYGEN SATURATION: 100 % | SYSTOLIC BLOOD PRESSURE: 121 MMHG

## 2017-05-07 VITALS
HEART RATE: 104 BPM | OXYGEN SATURATION: 100 % | SYSTOLIC BLOOD PRESSURE: 135 MMHG | RESPIRATION RATE: 28 BRPM | DIASTOLIC BLOOD PRESSURE: 76 MMHG | TEMPERATURE: 98.7 F

## 2017-05-07 VITALS
SYSTOLIC BLOOD PRESSURE: 127 MMHG | HEART RATE: 96 BPM | TEMPERATURE: 98.7 F | RESPIRATION RATE: 23 BRPM | OXYGEN SATURATION: 100 % | DIASTOLIC BLOOD PRESSURE: 71 MMHG

## 2017-05-07 VITALS — HEART RATE: 102 BPM

## 2017-05-07 VITALS — HEART RATE: 94 BPM

## 2017-05-07 VITALS — HEART RATE: 95 BPM

## 2017-05-07 VITALS — HEART RATE: 84 BPM

## 2017-05-07 VITALS
TEMPERATURE: 98.2 F | HEART RATE: 99 BPM | OXYGEN SATURATION: 98 % | SYSTOLIC BLOOD PRESSURE: 101 MMHG | RESPIRATION RATE: 24 BRPM | DIASTOLIC BLOOD PRESSURE: 65 MMHG

## 2017-05-07 VITALS
SYSTOLIC BLOOD PRESSURE: 126 MMHG | OXYGEN SATURATION: 100 % | RESPIRATION RATE: 21 BRPM | DIASTOLIC BLOOD PRESSURE: 70 MMHG | TEMPERATURE: 98.4 F | HEART RATE: 90 BPM

## 2017-05-07 VITALS — HEART RATE: 78 BPM

## 2017-05-07 LAB
ALP SERPL-CCNC: 98 U/L (ref 45–117)
ALT SERPL-CCNC: 28 U/L (ref 12–78)
ANION GAP SERPL CALC-SCNC: 8 MEQ/L (ref 5–15)
AST SERPL-CCNC: 77 U/L (ref 15–37)
BASOPHILS # BLD AUTO: 0 TH/MM3 (ref 0–0.2)
BASOPHILS NFR BLD: 0.4 % (ref 0–2)
BILIRUB SERPL-MCNC: 1.2 MG/DL (ref 0.2–1)
BUN SERPL-MCNC: 6 MG/DL (ref 7–18)
CHLORIDE SERPL-SCNC: 103 MEQ/L (ref 98–107)
EOSINOPHIL # BLD: 0 TH/MM3 (ref 0–0.4)
EOSINOPHIL NFR BLD: 0.5 % (ref 0–4)
ERYTHROCYTE [DISTWIDTH] IN BLOOD BY AUTOMATED COUNT: 16.5 % (ref 11.6–17.2)
GFR SERPLBLD BASED ON 1.73 SQ M-ARVRAT: 129 ML/MIN (ref 89–?)
HCO3 BLD-SCNC: 28 MEQ/L (ref 21–32)
HCT VFR BLD CALC: 23.1 % (ref 39–51)
HEMO FLAGS: (no result)
LYMPHOCYTES # BLD AUTO: 0.7 TH/MM3 (ref 1–4.8)
LYMPHOCYTES NFR BLD AUTO: 22.4 % (ref 9–44)
MAGNESIUM SERPL-MCNC: 1.8 MG/DL (ref 1.5–2.5)
MCH RBC QN AUTO: 34.7 PG (ref 27–34)
MCHC RBC AUTO-ENTMCNC: 33.2 % (ref 32–36)
MCV RBC AUTO: 104.6 FL (ref 80–100)
MONOCYTES NFR BLD: 9.7 % (ref 0–8)
NEUTROPHILS # BLD AUTO: 2.1 TH/MM3 (ref 1.8–7.7)
NEUTROPHILS NFR BLD AUTO: 67 % (ref 16–70)
PLAT MORPH BLD: NORMAL
PLATELET # BLD: 44 TH/MM3 (ref 150–450)
PLATELET BLD QL SMEAR: (no result)
POTASSIUM SERPL-SCNC: 3.4 MEQ/L (ref 3.5–5.1)
RBC # BLD AUTO: 2.21 MIL/MM3 (ref 4.5–5.9)
SCAN/DIFF: (no result)
SODIUM SERPL-SCNC: 139 MEQ/L (ref 136–145)
WBC # BLD AUTO: 3.1 TH/MM3 (ref 4–11)

## 2017-05-07 RX ADMIN — MORPHINE SULFATE PRN MG: 2 INJECTION, SOLUTION INTRAMUSCULAR; INTRAVENOUS at 01:02

## 2017-05-07 RX ADMIN — DOCUSATE SODIUM SCH MG: 100 CAPSULE, LIQUID FILLED ORAL at 07:46

## 2017-05-07 RX ADMIN — CHLORHEXIDINE GLUCONATE SCH PACK: 500 CLOTH TOPICAL at 03:36

## 2017-05-07 RX ADMIN — MORPHINE SULFATE PRN MG: 2 INJECTION, SOLUTION INTRAMUSCULAR; INTRAVENOUS at 07:46

## 2017-05-07 RX ADMIN — FAMOTIDINE SCH MG: 10 INJECTION, SOLUTION INTRAVENOUS at 07:46

## 2017-05-07 RX ADMIN — Medication SCH ML: at 21:00

## 2017-05-07 RX ADMIN — PHENYTOIN SODIUM SCH MLS/HR: 50 INJECTION INTRAMUSCULAR; INTRAVENOUS at 04:28

## 2017-05-07 RX ADMIN — Medication SCH ML: at 09:00

## 2017-05-07 RX ADMIN — DOCUSATE SODIUM SCH MG: 100 CAPSULE, LIQUID FILLED ORAL at 21:00

## 2017-05-07 RX ADMIN — FAMOTIDINE SCH MG: 10 INJECTION, SOLUTION INTRAVENOUS at 20:08

## 2017-05-07 RX ADMIN — DOCUSATE SODIUM SCH MG: 100 CAPSULE, LIQUID FILLED ORAL at 20:08

## 2017-05-07 RX ADMIN — PHENYTOIN SODIUM SCH MLS/HR: 50 INJECTION INTRAMUSCULAR; INTRAVENOUS at 19:50

## 2017-05-07 RX ADMIN — NICOTINE SCH PATCH: 21 PATCH, EXTENDED RELEASE TOPICAL at 12:38

## 2017-05-07 RX ADMIN — MORPHINE SULFATE PRN MG: 2 INJECTION, SOLUTION INTRAMUSCULAR; INTRAVENOUS at 20:08

## 2017-05-07 NOTE — PD.CONS
__________________________________________________





HPI


Service


NS


Consult Requested By


Dr Layton


Reason for Consult


Subdural hematoma


Primary Care Physician


No Primary Care Physician


History of Present Illness


This is a 61 year old male with history of chronic alcohol use presents to the 

emergency department for evaluation of head injury after syncopal episode.  He 

states he has had multiple syncopal episodes over the past week, last time last 

night at 8:30pm.  No tonic-clonic movements noted.  No tongue biting.  No 

incontinence of stool or urine.  Patient has large amount of dried blood to 

left forehead with erythematous left eye.  He has a large frontal laceration He 

is on Coumadin.  In the emergency department his alcohol level was found to be 

92.  He refused facial laceration sutures.  Denies any focal motor weakness.  

He denies any sensory loss.  No problems controlling his bladder by Wood.  CT 

of the brain showed bilateral subdural hematomas.  Neurosurgical consultation 

was requested





Review of Systems





Constitutional:  COMPLAINS OF: Fatigue,  DENIES: Diaphoretic episodes, Fever, 

Weight gain, Weight loss, Chills, Dizziness, Change in appetite, Night Sweats


Endocrine:  DENIES: Heat/cold intolerance, Polydipsia, Polyuria, Polyphagia


Eyes:  DENIES: Blurred vision, Diplopia, Eye inflammation, Eye pain, Vision loss

, Photosensitivity, Double Vision


Ears, nose, mouth, throat:  DENIES: Tinnitus, Hearing loss, Vertigo, Nasal 

discharge, Oral lesions, Throat pain, Hoarseness, Ear Pain, Running Nose, 

Epistaxis, Sinus Pain, Toothache, Odynophagia


Respiratory:  DENIES: Apneas, Cough, Snoring, Wheezing, Hemoptysis, Sputum 

production, Shortness of breath


Cardiovascular:  COMPLAINS OF: Syncope,  DENIES: Chest pain, Palpitations, 

Dyspnea on Exertion, PND, Lower Extremity Edema, Orthopnea, Claudication


Genitourinary:  DENIES: Sexual dysfunction, Urinary frequency, Urinary 

incontinence, Urgency, Hematuria, Dysuria, Nocturia, Penile Discharge, 

Testicular Pain, Testicular Swelling


Musculoskeletal:  DENIES: Joint pain, Muscle aches, Stiffness, Joint Swelling, 

Back pain, Neck pain


Integumentary:  DENIES: Abnormal pigmentation, Nail changes, Pruritus, Rash


Hematologic/lymphatic:  DENIES: Bruising, Lymphadenopathy


Immunologic/allergic:  DENIES: Eczema, Urticaria


Neurologic:  DENIES: Abnormal gait, Headache, Localized weakness, Paresthesias, 

Seizures, Speech Problems, Tremor, Poor Balance


Psychiatric:  DENIES: Anxiety, Confusion, Mood changes, Depression, 

Hallucinations, Agitation, Suicidal Ideation, Homicidal Ideation, Delusions





 PFSH 


Past Family Social History


Allergies:  


Coded Allergies:  


     No Known Allergies (Unverified , 5/6/17)





Past Family Social History


Allergies:  


Coded Allergies:  


     No Known Allergies (Unverified , 5/6/17)


Past Medical History





Aortic valve mechanical replacement in 2005


Coagulopathy on Coumadin


History of noncompliance





Past Surgical History


Open heart surgery with aortic valve replacement in 2005


Reported Medications





Warfarin 2.5 Mg Tab 2.5 Mg PO DAILY


Active Ordered Medications





Current Medications








 Medications


  (Trade)  Dose


 Ordered  Sig/Marcia


 Route


 PRN Reason  Start Time


 Stop Time Status Last Admin


Dose Admin


 


 Sodium Chloride  250 ml @ 


 15 mls/hr  ONCE  ONCE


 IV


   5/6/17 18:00


 5/7/17 10:39   


 


 


 Sodium Chloride


  (NS 1000 ml Inj)  1,000 ml @ 


 84 mls/hr  [id number]


 IV


   5/6/17 20:00


    5/6/17 21:47


 


 


 Sodium Chloride


  (NS Flush)  2 ml  UNSCH  PRN


 .XX


 FLUSH AFTER USING IV ACCESS  5/6/17 20:00


     


 


 


 Sodium Chloride


  (NS Flush)  2 ml  BID


 .XX


   5/6/17 21:00


     


 


 


 Acetaminophen


  (Tylenol)  650 mg  Q6H  PRN


 PO


 PAIN 1-10 AND/OR FEVER >101F  5/6/17 20:00


     


 


 


 Morphine Sulfate


  (Morphine Inj)  2 mg  Q2H  PRN


 IV


 PAIN SCALE 6 TO 10  5/6/17 20:00


    5/7/17 01:02


 


 


 Famotidine


  (Pepcid Inj)  20 mg  Q12HR


 IV PUSH


   5/6/17 21:00


    5/6/17 21:48


 


 


 Lorazepam


  (Ativan Inj)  1 mg  Q1H  PRN


 IV


 Agitation/Sedation  5/6/17 20:00


     


 


 


 Docusate Sodium


  (Colace)  100 mg  BID


 PO


   5/6/17 21:00


    5/6/17 21:48


 


 


 Zolpidem Tartrate


  (Ambien)  5 mg  HS  PRN


 PO


 INSOMNIA  5/6/17 20:00


     


 


 


 Miscellaneous


 Information  1  Q361D


 XX


   5/6/17 20:00


    5/6/17 20:00


 


 


 Chlorhexidine


 Gluconate


  (Chlorhexidine


 2% Cloth)  3 pack


 Taper  DAILY@04


 TOP


   5/7/17 04:00


 5/3/18 03:59  5/7/17 03:36


 


 


 Chlorhexidine


 Gluconate


  (Chlorhexidine


 2% Cloth)  3 pack  UNSCH  PRN


 TOP


 HYGIENIC CARE  5/6/17 20:00


     


 


 


 Flumazenil


  (Romazicon Inj)  0.2 mg  Q1M  PRN


 IV PUSH


 SEE LABEL COMMENTS  5/6/17 23:15


     


 


 


 Lorazepam


  (Ativan)  1 mg  Q4H  PRN


 PO


 CIWA 8 - 10  5/6/17 23:15


     


 


 


 Lorazepam


  (Ativan Inj)  1 mg  Q4H  PRN


 IV PUSH


 CIWA 8 - 10  5/6/17 23:15


     


 


 


 Lorazepam


  (Ativan)  2 mg  Q2H  PRN


 PO


 CIWA 11-14  5/6/17 23:15


     


 


 


 Lorazepam


  (Ativan Inj)  2 mg  Q2H  PRN


 IV PUSH


 CIWA 11-14  5/6/17 23:15


    5/7/17 01:02


 


 


 Lorazepam


  (Ativan Inj)  2 mg  Q1H  PRN


 IV PUSH


 CIWA 15-20  5/6/17 23:15


     


 


 


 Lorazepam 2 mg  2 mg  Q15M  PRN


 IV PUSH


 CIWA > 20  5/6/17 23:15


     


 


 


 Potassium Chloride  100 ml @ 


 50 mls/hr  Q2H  PRN


 IV


 For Potassium 2.8 - 3.2 mEq/L  5/6/17 23:15


     


 


 


 Potassium Chloride


  (KCl 20 Meq


 Premix Inj)  100 ml @ 


 50 mls/hr  Q2H  PRN


 IV


 For Potassium 2.8 - 3.2 mEq/L  5/6/17 23:15


     


 


 


 Potassium Bicarb/


 Potassium


 Chloride 50 meq  50 meq  UNSCH  PRN


 PO


 For Potassium 3.3 - 3.5 mEq/L  5/6/17 23:15


     


 


 


 Potassium Chloride  100 ml @ 


 25 mls/hr  UNSCH  PRN


 IV


 For Potassium 3.3 - 3.5 mEq/L  5/6/17 23:15


     


 


 


 Potassium Chloride  100 ml @ 


 50 mls/hr  Q2H  PRN


 IV


 For Potassium 3.3 - 3.5 mEq/L  5/6/17 23:15


     


 


 


 Magnesium Sulfate/


 Sodium Chloride


  (Magnesium


 Sulfate Inj/NS


 Inj)  100 ml @ 


 50 mls/hr  UNSCH  PRN


 IV


 For Magnesium 0.9 - 1.1 mg/dL  5/6/17 23:15


     


 


 


 Magnesium Oxide


 800 mg  800 mg  UNSCH  PRN


 PO


 For Magnesium 1.2 - 1.6 mg/dL  5/6/17 23:15


     


 


 


 Magnesium Sulfate/


 Sodium Chloride


  (Magnesium


 Sulfate Inj/NS


 Inj)  100 ml @ 


 50 mls/hr  UNSCH  PRN


 IV


 For Magnesium 1.2 - 1.6 mg/dL  5/6/17 23:15


     


 


 


 Potassium


 Phosphate 2000 mg  2,000 mg  Q4H  PRN


 PO


 For Phosphorus < 2.5 mg/dL  5/6/17 23:15


     


 


 


 Sodium Phosphate/


 Sodium Chloride


  (Sodium


 Phosphate Inj/NS


 250 ml Inj)  250 ml @ 


 42 mls/hr  UNSCH  PRN


 IV


 For Phosphorus < 2.5 mg/dL  5/6/17 23:15


     


 


 


 Potassium


 Phosphate


  (K-Phos)  2,000 mg  UNSCH  PRN


 PO/TUBE


 SEE LABEL COMMENTS  5/6/17 23:15


     


 


 


 Chlordiazepoxide


  (Librium)  50 mg


 Taper  Q8H


 PO


   5/7/17 00:00


 5/13/17 23:59  5/7/17 00:54


 








Family History





Noncontributory





Social History


Prior Smoker quit 20 years ago


Occasional beer


Denies any substance abuse





Physical Exam


Vital Signs





 Vital Signs








  Date Time  Temp Pulse Resp B/P Pulse Ox O2 Delivery O2 Flow Rate FiO2


 


5/7/17 10:00  94      


 


5/7/17 08:00  78      


 


5/7/17 08:00 98.5 82 13 121/66 100   


 


5/7/17 07:00      Room Air  


 


5/7/17 06:00  78      


 


5/7/17 04:00  77      


 


5/7/17 04:00 98.6 77 16 101/54 100   


 


5/7/17 02:00  84      


 


5/7/17 00:35      Nasal Cannula  100


 


5/7/17 00:35  90      


 


5/7/17 00:35 98.4 90 21 126/70 100   


 


5/7/17 00:19  94 18 114/66 99 Room Air  


 


5/6/17 23:48 98.8 88 15 114/66 99 Room Air  


 


5/6/17 23:38 98.9 92 25 116/74 99 Room Air  


 


5/6/17 23:18  91 16 124/69 99 Room Air  


 


5/6/17 19:18  101 15  98 Room Air  


 


5/6/17 19:15  102 16 102/67 98 Room Air  


 


5/6/17 18:00  103 16 106/67 100 Room Air  


 


5/6/17 15:30  92 16 156/92 96 Room Air  


 


5/6/17 15:00  106 16 107/70 98 Room Air  


 


5/6/17 14:45  112 21  100 Room Air  


 


5/6/17 14:40   18  98 Room Air  


 


5/6/17 14:40      Room Air  


 


5/6/17 14:20 98.8 122 15 106/65 100   








Physical Exam


The patient is alert, awake and oriented to time, place and person. Speech is 

fluent. GCS 15. Frontal laceration, unrepaired





Cranial nerve examination demonstrates the pupils to be equal, round, and 

reactive to light. Extra-ocular movements are intact. Facial motor and sensory 

function are normal and symmetrical. Gross hearing is intact, bilaterally. The 

uvula is midline and elevates symmetrically with the soft palate. 

Sternocleidomastoid and trapezius muscles have normal and symmetrical strength. 

Other cranial nerves are intact. 





Neck is soft and supple. Cervical spine has a full range of motion in anterior 

flexion, extension, lateral bending, and rotation without pain. There is no 

tenderness to palpation to the spinous processes or paraspinal muscles.  





Muscle testing reveals normal bulk and tone overall without rigidity, spasticity

, fasciculations, or atrophy. Muscle strength is 5/5 in all muscle groups of 

both upper extremities including deltoid, biceps, triceps, brachioradialis, 

wrist extension and . In the lower extremities, strength is 5/5 in both 

iliopsoas, quadriceps, hamstrings, plantar flexion, dorsiflexion, and extensor 

hallicus longus.  





Sensory examination is intact to light touch and sharp/dull discrimination in 

both the upper and lower extremities, symmetrically.  





Deep tendon reflexes are 2+ and symmetrical in the biceps, triceps, and 

brachioradialis, bilaterally, in the upper extremities. In the lower extremities

, the patellar and Achilles are 2+, bilaterally.  There is a bilateral plantar 

flexion response. Hoffmanns sign is negative. There is no clonus or other 

abnormal reflexes noted. 





Cerebellar examination is intact to finger-to-nose test, rapid rhythmic 

alternating motion. There is no dysmetria, dysdiadochokinesia, truncal ataxia, 

or tremor.


Laboratory





Laboratory Tests








Test 5/6/17 5/6/17 5/6/17 5/6/17





 15:10 16:30 18:30 21:05


 


White Blood Count 8.4    


 


Red Blood Count 2.73    


 


Hemoglobin 9.7    


 


Hematocrit 27.7    


 


Mean Corpuscular Volume 101.3    


 


Mean Corpuscular Hemoglobin 35.3    


 


Mean Corpuscular Hemoglobin 34.9    





Concent    


 


Red Cell Distribution Width 16.4    


 


Platelet Count 35    


 


Mean Platelet Volume 9.5    


 


Neutrophils (%) (Auto) 83.5    


 


Lymphocytes (%) (Auto) 6.8    


 


Monocytes (%) (Auto) 9.4    


 


Eosinophils (%) (Auto) 0.0    


 


Basophils (%) (Auto) 0.3    


 


Neutrophils # (Auto) 7.0    


 


Lymphocytes # (Auto) 0.6    


 


Monocytes # (Auto) 0.8    


 


Eosinophils # (Auto) 0.0    


 


Basophils # (Auto) 0.0    


 


CBC Comment AUTO DIFF    


 


Differential Comment AUTO DIFF   





 CONFIRMED   


 


Platelet Estimate LOW    


 


Platelet Morphology Comment NORMAL    


 


Prothrombin Time 13.5    


 


Prothromb Time International 1.2    





Ratio    


 


Activated Partial 30.1    





Thromboplast Time    


 


Sodium Level 132    


 


Potassium Level 4.0    


 


Chloride Level 95    


 


Carbon Dioxide Level 22.7    


 


Anion Gap 14    


 


Blood Urea Nitrogen 6    


 


Creatinine 0.80    


 


Estimat Glomerular Filtration 98    





Rate    


 


Random Glucose 90    


 


Lactic Acid Level 4.4   2.9  


 


Calcium Level 8.0    


 


Magnesium Level 1.7    


 


Total Bilirubin 1.1    


 


Aspartate Amino Transf 103    





(AST/SGOT)    


 


Alanine Aminotransferase 35    





(ALT/SGPT)    


 


Alkaline Phosphatase 121    


 


Total Creatine Kinase 1018    906 


 


Creatine Kinase MB 12.1    9.9 


 


Creatine Kinase MB % 1.2    1.1 


 


Troponin I 0.04    


 


B-Type Natriuretic Peptide 112    


 


Total Protein 6.1    


 


Albumin 3.1    


 


Ethyl Alcohol Level 92    


 


Urine Color  YELLOW   


 


Urine Turbidity  CLEAR   


 


Urine pH  5.5   


 


Urine Specific Gravity  1.013   


 


Urine Protein  TRACE   


 


Urine Glucose (UA)  NEG   


 


Urine Ketones  NEG   


 


Urine Occult Blood  NEG   


 


Urine Nitrite  NEG   


 


Urine Bilirubin  NEG   


 


Urine Urobilinogen  LESS THAN 2.0   


 


Urine Leukocyte Esterase  NEG   


 


Urine RBC  1   


 


Urine WBC  1   


 


Urine Mucus  FEW   


 


Microscopic Urinalysis Comment  CULT NOT  





  INDICATED  


 


Blood Type   A NEGATIVE  


 


Antibody Screen   POSITIVE  


 


Crossmatch   Leukocyte-Reduced 





   Red Blood 





   Cells 


 


Blood Bank Comment     


 


Phosphorus Level    2.4 


 


Ammonia    LESS THAN 10 


 


    





Test 5/6/17 5/7/17 5/7/17 





 22:19 00:45 04:54 


 


Antibody Identification Anti-K    


 


Nasal Screen MRSA (PCR)  MRSA NOT  





  DETECTED  


 


White Blood Count   3.1  


 


Red Blood Count   2.21  


 


Hemoglobin   7.7  


 


Hematocrit   23.1  


 


Mean Corpuscular Volume   104.6  


 


Mean Corpuscular Hemoglobin   34.7  


 


Mean Corpuscular Hemoglobin   33.2  





Concent    


 


Red Cell Distribution Width   16.5  


 


Platelet Count   44  


 


Mean Platelet Volume   8.5  


 


Neutrophils (%) (Auto)   67.0  


 


Lymphocytes (%) (Auto)   22.4  


 


Monocytes (%) (Auto)   9.7  


 


Eosinophils (%) (Auto)   0.5  


 


Basophils (%) (Auto)   0.4  


 


Neutrophils # (Auto)   2.1  


 


Lymphocytes # (Auto)   0.7  


 


Monocytes # (Auto)   0.3  


 


Eosinophils # (Auto)   0.0  


 


Basophils # (Auto)   0.0  


 


CBC Comment   AUTO DIFF  


 


Differential Comment   AUTO DIFF 





   CONFIRMED 


 


Platelet Estimate   LOW  


 


Platelet Morphology Comment   NORMAL  


 


Sodium Level   139  


 


Potassium Level   3.4  


 


Chloride Level   103  


 


Carbon Dioxide Level   28.0  


 


Anion Gap   8  


 


Blood Urea Nitrogen   6  


 


Creatinine   0.63  


 


Estimat Glomerular Filtration   129  





Rate    


 


Random Glucose   63  


 


Calcium Level   7.9  


 


Phosphorus Level   2.4  


 


Magnesium Level   1.8  


 


Total Bilirubin   1.2  


 


Aspartate Amino Transf   77  





(AST/SGOT)    


 


Alanine Aminotransferase   28  





(ALT/SGPT)    


 


Alkaline Phosphatase   98  


 


Total Protein   5.3  


 


Albumin   2.6  














 Date/Time Procedure Status





Source Growth 


 


 5/6/17 15:15 Aerobic Blood Culture - Preliminary Resulted





Blood Peripheral NO GROWTH IN 1 DAY 





 5/6/17 15:15 Anaerobic Blood Culture - Preliminary Resulted





Blood Peripheral NO GROWTH IN 1 DAY 








Result Diagram:  


5/7/17 0454                                                                    

            5/7/17 0454





Imaging








Last 24 hours Impressions








Maxillofacial CT 5/6/17 1446 Signed





Impressions: 





 Service Date/Time:  Saturday, May 6, 2017 16:58 - CONCLUSION:  1. Left frontal 





 scalp laceration and facial soft tissue swelling. No acute bony abnormalities.

   





   Alvarez Murillo MD 


 


Head CT 5/6/17 1446 Signed





Impressions: 





 Service Date/Time:  Saturday, May 6, 2017 16:58 - CONCLUSION:  1. Bilateral 





 subdural hematomas measuring up to about 11 mm on the right and 5 mm on the 





 left. No significant midline shift. Trace subarachnoid hemorrhage on the 

right. 





 No hydrocephalus. 2. Left frontal scalp laceration and scalp swelling and 





 hematoma.     Alvarez Murillo MD 


 


Chest X-Ray 5/6/17 1446 Signed





Impressions: 





 Service Date/Time:  Saturday, May 6, 2017 14:59 - CONCLUSION:  1. Scarring 

right 





 lung base improved from March 18. Left lung remains clear. Postoperative 

median 





 sternotomy and valve replacement.      Alvarez Murillo MD 


 


Chest CT 5/6/17 1446 Signed





Impressions: 





 Service Date/Time:  Saturday, May 6, 2017 17:11 - CONCLUSION:  1. Decrease in 





 size of loculated right pleural effusion since March 8. Residual loculated 

fluid 





 in the lower right hemithorax measures about 2.1 cm in thickness associated 

with 





 pleural thickening and old unfused right lower rib fracture. 2. Postoperative 





 median sternotomy and aortic valve replacement. Stable aortic root dilatation 

to 





 4.5 cm with no pneumothorax.     Alvarez Murillo MD 


 


Cervical Spine CT 5/6/17 1446 Signed





Impressions: 





 Service Date/Time:  Saturday, May 6, 2017 16:58 - CONCLUSION:  1. No acute 





 findings. Moderate degenerative disc disease.     Alvarez Murillo MD 











Attending Statement





I reviewed his clinical and radiological studies. neuro checks in a serial 

fashion. Placement of ICP monitor is not indicated at this time. Non surgical 

management. Folllow up CT in AM





Syncope workup.  Echocardiogram, carotid Dupklex, Holter. Consult neurology





Respiratory. pulmonary toilette, nasotracheal suction, and breathing treatments 

with nebulizers. 








Thrombocytopenia. Likely due to alcohol abuse Transfuse platelets to keep 

platelet count above 100


Platelet transfusion. Repeat CT a.m.





Coagulopathy.  Iatrogenic due to Coumadin. No anticoagulation for now





Alcohol use disorder. Counseled.  [facility] protocol


- Librium taper





PT and OT eval





Nutrition. Oral diet





Right hip diaslocation. Consult orthopedics





Alcohol intoxication IV fluid hydration. Librium





Renal.  monitor closely urine output, BUN and creatinine





Endocrine. Monitor serial Acu checks and SSI for tight control





ID monitor for signs of infection





Protonix for stress ulcer prophylaxis





Brian hose and SCD's for DVT prophylaxis











Wilder Grace MD May 7, 2017 11:35

## 2017-05-07 NOTE — HHI.CCPN
Subjective


Remarks/Hospital Course


61 year old male with history of chronic alcohol use presents to the emergency 

department for evaluation of head injury after syncopal episode.  He states he 

has had multiple syncopal episodes over the past week, last time last night at 8

:30pm.  Patient has large amount of dried blood to left forehead with 

erythematous left eye.  He is on Coumadin.  In the emergency department his 

alcohol level was found to be 92.  Patient has refused facial laceration 

sutures.  However he agrees to be admitted to ICU.





05/07: More alert but refusing most orders. Will transfuse additional platelets 

and start nicoderm patch. Anticipate ETOH wihdrawal and may need to go staright 

to precedex.





Objective





 Vital Signs








  Date Time  Temp Pulse Resp B/P Pulse Ox O2 Delivery O2 Flow Rate FiO2


 


5/7/17 10:00  94      


 


5/7/17 08:00 98.5  13 121/66 100   


 


5/7/17 07:00      Room Air  


 


5/7/17 00:35        100








 Intake and Output








 5/6/17 5/6/17 5/7/17





 08:00 16:00 00:00


 


Output Total   600 ml


 


Balance   -600 ml








Result Diagram:  


5/7/17 0454                                                                    

            5/7/17 0454





Imaging





Last 24 hours Impressions








Maxillofacial CT 5/6/17 1446 Signed





Impressions: 





 Service Date/Time:  Saturday, May 6, 2017 16:58 - CONCLUSION:  1. Left frontal 





 scalp laceration and facial soft tissue swelling. No acute bony abnormalities.

   





   Alvarez Murillo MD 


 


Head CT 5/6/17 1446 Signed





Impressions: 





 Service Date/Time:  Saturday, May 6, 2017 16:58 - CONCLUSION:  1. Bilateral 





 subdural hematomas measuring up to about 11 mm on the right and 5 mm on the 





 left. No significant midline shift. Trace subarachnoid hemorrhage on the 

right. 





 No hydrocephalus. 2. Left frontal scalp laceration and scalp swelling and 





 hematoma.     Alvarez Murillo MD 


 


Chest X-Ray 5/6/17 1446 Signed





Impressions: 





 Service Date/Time:  Saturday, May 6, 2017 14:59 - CONCLUSION:  1. Scarring 

right 





 lung base improved from March 18. Left lung remains clear. Postoperative 

median 





 sternotomy and valve replacement.      Alvarez Murillo MD 


 


Chest CT 5/6/17 1446 Signed





Impressions: 





 Service Date/Time:  Saturday, May 6, 2017 17:11 - CONCLUSION:  1. Decrease in 





 size of loculated right pleural effusion since March 8. Residual loculated 

fluid 





 in the lower right hemithorax measures about 2.1 cm in thickness associated 

with 





 pleural thickening and old unfused right lower rib fracture. 2. Postoperative 





 median sternotomy and aortic valve replacement. Stable aortic root dilatation 

to 





 4.5 cm with no pneumothorax.     Alvarez Murillo MD 


 


Cervical Spine CT 5/6/17 1446 Signed





Impressions: 





 Service Date/Time:  Saturday, May 6, 2017 16:58 - CONCLUSION:  1. No acute 





 findings. Moderate degenerative disc disease.     Alvarez Murillo MD 








Objective Remarks


GENERAL:  Mildly agitated.


SKIN: Warm and dry.


HEAD: Normocephalic.


EYES: No scleral icterus. No injection or drainage. 


NECK: Supple, trachea midline. Airway widely patent. No stridor.


CARDIOVASCULAR: Regular rate and rhythm without murmurs, gallops, or rubs. No 

JVD.


RESPIRATORY: Breath sounds equal bilaterally. No accessory muscle use. Few 

mobile secretions.


GASTROINTESTINAL: Abdomen soft, non-tender, nondistended. BS active.


MUSCULOSKELETAL: No cyanosis, or edema. Well perfused.


BACK: Nontender without obvious deformity. No CVA tenderness.


EXTREMITIES: No clubbing cyanosis or edema


NEURO: Moves 4 limbs with purpose. Protects airway.





A/P


Assessment and Plan


Subdural hematoma


Subarachnoid traumatic bleed


- Platelet transfusion


- Repeat CT a.m.


- Management per neurosurgery


- No surgical intervention at this time


- Admit to ICU


- Neuro checks per unit protocol





Thrombocytopenia


- Likely due to alcohol abuse


- Transfuse platelets to keep platelet count above 100





Coagulopathy


- Iatrogenic/Coumadin


- No anticoagulation until neurosurgically cleared





Alcohol use disorder


- CIWA protocol


- Librium taper





DVT GI prophylaxis


- Teds SCDs


- No pharmacological DVT prophylaxis due to ICH


- Pepcid





Overall impression: Care complicated by withdrawal symptoms. No neurological 

deterioration.








Ruiz Caballero MD May 7, 2017 12:21

## 2017-05-08 VITALS
RESPIRATION RATE: 19 BRPM | HEART RATE: 75 BPM | DIASTOLIC BLOOD PRESSURE: 71 MMHG | TEMPERATURE: 98.1 F | SYSTOLIC BLOOD PRESSURE: 127 MMHG | OXYGEN SATURATION: 97 %

## 2017-05-08 VITALS
OXYGEN SATURATION: 94 % | TEMPERATURE: 98 F | HEART RATE: 72 BPM | SYSTOLIC BLOOD PRESSURE: 115 MMHG | DIASTOLIC BLOOD PRESSURE: 67 MMHG | RESPIRATION RATE: 18 BRPM

## 2017-05-08 VITALS — HEART RATE: 84 BPM

## 2017-05-08 VITALS
OXYGEN SATURATION: 100 % | RESPIRATION RATE: 19 BRPM | TEMPERATURE: 96.7 F | SYSTOLIC BLOOD PRESSURE: 145 MMHG | DIASTOLIC BLOOD PRESSURE: 87 MMHG | HEART RATE: 96 BPM

## 2017-05-08 VITALS
RESPIRATION RATE: 14 BRPM | SYSTOLIC BLOOD PRESSURE: 135 MMHG | OXYGEN SATURATION: 96 % | HEART RATE: 56 BPM | DIASTOLIC BLOOD PRESSURE: 70 MMHG | TEMPERATURE: 97.9 F

## 2017-05-08 VITALS
HEART RATE: 67 BPM | RESPIRATION RATE: 8 BRPM | OXYGEN SATURATION: 100 % | DIASTOLIC BLOOD PRESSURE: 67 MMHG | SYSTOLIC BLOOD PRESSURE: 146 MMHG | TEMPERATURE: 97.7 F

## 2017-05-08 VITALS
OXYGEN SATURATION: 98 % | SYSTOLIC BLOOD PRESSURE: 124 MMHG | HEART RATE: 71 BPM | RESPIRATION RATE: 17 BRPM | DIASTOLIC BLOOD PRESSURE: 71 MMHG | TEMPERATURE: 97.5 F

## 2017-05-08 VITALS
RESPIRATION RATE: 16 BRPM | HEART RATE: 93 BPM | OXYGEN SATURATION: 100 % | TEMPERATURE: 98.2 F | DIASTOLIC BLOOD PRESSURE: 78 MMHG | SYSTOLIC BLOOD PRESSURE: 150 MMHG

## 2017-05-08 VITALS — HEART RATE: 72 BPM

## 2017-05-08 VITALS — HEART RATE: 98 BPM

## 2017-05-08 VITALS
SYSTOLIC BLOOD PRESSURE: 103 MMHG | OXYGEN SATURATION: 97 % | DIASTOLIC BLOOD PRESSURE: 63 MMHG | HEART RATE: 69 BPM | RESPIRATION RATE: 15 BRPM

## 2017-05-08 VITALS — HEART RATE: 63 BPM

## 2017-05-08 LAB
HCT VFR BLD CALC: 22.2 % (ref 39–51)
PLATELET # BLD: 92 TH/MM3 (ref 150–450)
REVIEW FLAG: (no result)

## 2017-05-08 RX ADMIN — PHENYTOIN SODIUM SCH MLS/HR: 50 INJECTION INTRAMUSCULAR; INTRAVENOUS at 09:47

## 2017-05-08 RX ADMIN — DOCUSATE SODIUM SCH MG: 100 CAPSULE, LIQUID FILLED ORAL at 21:20

## 2017-05-08 RX ADMIN — Medication SCH ML: at 09:46

## 2017-05-08 RX ADMIN — DEXMEDETOMIDINE HYDROCHLORIDE SCH MLS/HR: 100 INJECTION, SOLUTION, CONCENTRATE INTRAVENOUS at 06:48

## 2017-05-08 RX ADMIN — DOCUSATE SODIUM SCH MG: 100 CAPSULE, LIQUID FILLED ORAL at 09:00

## 2017-05-08 RX ADMIN — NICOTINE SCH PATCH: 21 PATCH, EXTENDED RELEASE TOPICAL at 09:45

## 2017-05-08 RX ADMIN — FAMOTIDINE SCH MG: 10 INJECTION, SOLUTION INTRAVENOUS at 09:44

## 2017-05-08 RX ADMIN — FAMOTIDINE SCH MG: 10 INJECTION, SOLUTION INTRAVENOUS at 21:20

## 2017-05-08 RX ADMIN — DEXMEDETOMIDINE HYDROCHLORIDE SCH MLS/HR: 100 INJECTION, SOLUTION, CONCENTRATE INTRAVENOUS at 09:45

## 2017-05-08 RX ADMIN — MORPHINE SULFATE PRN MG: 2 INJECTION, SOLUTION INTRAMUSCULAR; INTRAVENOUS at 01:17

## 2017-05-08 RX ADMIN — Medication SCH ML: at 21:00

## 2017-05-08 RX ADMIN — CHLORHEXIDINE GLUCONATE SCH PACK: 500 CLOTH TOPICAL at 01:53

## 2017-05-08 RX ADMIN — MORPHINE SULFATE PRN MG: 2 INJECTION, SOLUTION INTRAMUSCULAR; INTRAVENOUS at 04:51

## 2017-05-08 NOTE — HHI.NSPN
__________________________________________________ (Ashly Sanchez)





Note Status


Status:  Progress Note (Ashly Sanchez)





Interval History


Interval History


This is a 61 year old male with history of chronic alcohol use presents to the 

emergency department for evaluation of head injury after syncopal episode.  He 

states he has had multiple syncopal episodes over the past week, last time last 

night at 8:30pm.  No tonic-clonic movements noted.  No tongue biting.  No 

incontinence of stool or urine.  Patient has large amount of dried blood to 

left forehead with erythematous left eye.  He has a large frontal laceration He 

is on Coumadin.  In the emergency department his alcohol level was found to be 

92.  He refused facial laceration sutures.  Denies any focal motor weakness.  

He denies any sensory loss.  No problems controlling his bladder by Wood.  CT 

of the brain showed bilateral subdural hematomas.  Neurosurgical consultation 

was requested





5/8: currently mildly sedated on Precedex due to agitation trying to get out of 

bed, reports moves x 4 extremities.  (Ashly Sanchez)





Labs, Micro, & Vital Signs


Results











  Date Time  Temp Pulse Resp B/P Pulse Ox O2 Delivery O2 Flow Rate FiO2


 


5/8/17 08:00  75      


 


5/8/17 08:00 98.1 75 19 127/71 97   


 


5/8/17 07:15      Room Air  


 


5/8/17 06:00  84      


 


5/8/17 05:01   35     


 


5/8/17 04:00 96.7 95 19 145/87 100   


 


5/8/17 04:00  96      


 


5/8/17 02:00  98      


 


5/8/17 00:00  93      


 


5/8/17 00:00 98.2 93 16 150/78 100   


 


5/7/17 22:00  95      


 


5/7/17 20:52 98.8 100 28 132/73 98   


 


5/7/17 20:00  104      


 


5/7/17 20:00 98.7 104 28 135/76 100   


 


5/7/17 19:00     100 Room Air  


 


5/7/17 18:00  102      


 


5/7/17 16:00 98.7 96 23 127/71 100   


 


5/7/17 16:00  96      


 


5/7/17 14:28 98.2 99 24 101/65 98   


 


5/7/17 14:00  78      














 5/8/17





 07:00


 


Intake Total 3054 ml


 


Output Total 1900 ml


 


Balance 1154 ml








Constitutional





Vital Signs








  Date Time  Temp Pulse Resp B/P Pulse Ox O2 Delivery O2 Flow Rate FiO2


 


5/8/17 08:00  75      


 


5/8/17 08:00 98.1 75 19 127/71 97   


 


5/8/17 07:15      Room Air  


 


5/8/17 06:00  84      


 


5/8/17 05:01   35     


 


5/8/17 04:00 96.7 95 19 145/87 100   


 


5/8/17 04:00  96      


 


5/8/17 02:00  98      


 


5/8/17 00:00  93      


 


5/8/17 00:00 98.2 93 16 150/78 100   


 


5/7/17 22:00  95      


 


5/7/17 20:52 98.8 100 28 132/73 98   


 


5/7/17 20:00  104      


 


5/7/17 20:00 98.7 104 28 135/76 100   


 


5/7/17 19:00     100 Room Air  


 


5/7/17 18:00  102      


 


5/7/17 16:00 98.7 96 23 127/71 100   


 


5/7/17 16:00  96      


 


5/7/17 14:28 98.2 99 24 101/65 98   


 


5/7/17 14:00  78      














 5/8/17





 07:00


 


Intake Total 3054 ml


 


Output Total 1900 ml


 


Balance 1154 ml





 (Ashly Sanchez)





Review of Systems/Exam


Exam


Mr. Majano is sedated.  does not follow commands.  





Left forehead lac bandaged. 





Cranial nerve examination: pupils to be equal, round, and reactive to light.  

Facial motor function appears normal and symmetrical.  Face sensation, hearing, 

visual fields, and olfaction can not be assessed properly due to the patients 

condition. The patient has an intact corneal reflex and a gag reflex. 





Neck is soft and supple. 


  


Muscle testing reveals normal bulk and tone overall without rigidity, spasticity

, fasciculations, or atrophy.  Reports to move all four extremities. 


 


There is a bilateral plantar flexion response. Hoffmanns sign is negative.  





Cerebellar examination is limited due to the patient condition.  (Ashly Sanchez)





Medications


Current Medications





Current Medications








 Medications


  (Trade)  Dose


 Ordered  Sig/Marcia


 Route


 PRN Reason  Start Time


 Stop Time Status Last Admin


Dose Admin


 


 Sodium Chloride


  (NS 1000 ml Inj)  1,000 ml @ 


 84 mls/hr  [id number]


 IV


   5/6/17 20:00


    5/8/17 09:47


 


 


 Sodium Chloride


  (NS Flush)  2 ml  UNSCH  PRN


 .XX


 FLUSH AFTER USING IV ACCESS  5/6/17 20:00


     


 


 


 Sodium Chloride


  (NS Flush)  2 ml  BID


 .XX


   5/6/17 21:00


    5/8/17 09:46


 


 


 Acetaminophen


  (Tylenol)  650 mg  Q6H  PRN


 PO


 PAIN 1-10 AND/OR FEVER >101F  5/6/17 20:00


     


 


 


 Morphine Sulfate


  (Morphine Inj)  2 mg  Q2H  PRN


 IV


 PAIN SCALE 6 TO 10  5/6/17 20:00


    5/8/17 04:51


 


 


 Famotidine


  (Pepcid Inj)  20 mg  Q12HR


 IV PUSH


   5/6/17 21:00


    5/8/17 09:44


 


 


 Lorazepam


  (Ativan Inj)  1 mg  Q1H  PRN


 IV


 Agitation/Sedation  5/6/17 20:00


    5/8/17 00:16


 


 


 Docusate Sodium


  (Colace)  100 mg  BID


 PO


   5/6/17 21:00


    5/7/17 07:46


 


 


 Zolpidem Tartrate


  (Ambien)  5 mg  HS  PRN


 PO


 INSOMNIA  5/6/17 20:00


     


 


 


 Miscellaneous


 Information  1  Q361D


 XX


   5/6/17 20:00


    5/6/17 20:00


 


 


 Chlorhexidine


 Gluconate


  (Chlorhexidine


 2% Cloth)  3 pack


 Taper  DAILY@04


 TOP


   5/7/17 04:00


 5/3/18 03:59  5/8/17 01:53


 


 


 Chlorhexidine


 Gluconate


  (Chlorhexidine


 2% Cloth)  3 pack  UNSCH  PRN


 TOP


 HYGIENIC CARE  5/6/17 20:00


     


 


 


 Flumazenil


  (Romazicon Inj)  0.2 mg  Q1M  PRN


 IV PUSH


 SEE LABEL COMMENTS  5/6/17 23:15


     


 


 


 Lorazepam


  (Ativan)  1 mg  Q4H  PRN


 PO


 CIWA 8 - 10  5/6/17 23:15


     


 


 


 Lorazepam


  (Ativan Inj)  1 mg  Q4H  PRN


 IV PUSH


 CIWA 8 - 10  5/6/17 23:15


     


 


 


 Lorazepam


  (Ativan)  2 mg  Q2H  PRN


 PO


 CIWA 11-14  5/6/17 23:15


     


 


 


 Lorazepam


  (Ativan Inj)  2 mg  Q2H  PRN


 IV PUSH


 CIWA 11-14  5/6/17 23:15


    5/8/17 04:51


 


 


 Lorazepam


  (Ativan Inj)  2 mg  Q1H  PRN


 IV PUSH


 CIWA 15-20  5/6/17 23:15


     


 


 


 Lorazepam 2 mg  2 mg  Q15M  PRN


 IV PUSH


 CIWA > 20  5/6/17 23:15


    5/8/17 07:19


 


 


 Potassium Chloride  100 ml @ 


 50 mls/hr  Q2H  PRN


 IV


 For Potassium 2.8 - 3.2 mEq/L  5/6/17 23:15


     


 


 


 Potassium Chloride


  (KCl 20 Meq


 Premix Inj)  100 ml @ 


 50 mls/hr  Q2H  PRN


 IV


 For Potassium 2.8 - 3.2 mEq/L  5/6/17 23:15


     


 


 


 Potassium Bicarb/


 Potassium


 Chloride 50 meq  50 meq  UNSCH  PRN


 PO


 For Potassium 3.3 - 3.5 mEq/L  5/6/17 23:15


     


 


 


 Potassium Chloride  100 ml @ 


 25 mls/hr  UNSCH  PRN


 IV


 For Potassium 3.3 - 3.5 mEq/L  5/6/17 23:15


     


 


 


 Potassium Chloride  100 ml @ 


 50 mls/hr  Q2H  PRN


 IV


 For Potassium 3.3 - 3.5 mEq/L  5/6/17 23:15


    5/7/17 07:48


 


 


 Magnesium Sulfate/


 Sodium Chloride


  (Magnesium


 Sulfate Inj/NS


 Inj)  100 ml @ 


 50 mls/hr  UNSCH  PRN


 IV


 For Magnesium 0.9 - 1.1 mg/dL  5/6/17 23:15


     


 


 


 Magnesium Oxide


 800 mg  800 mg  UNSCH  PRN


 PO


 For Magnesium 1.2 - 1.6 mg/dL  5/6/17 23:15


     


 


 


 Magnesium Sulfate/


 Sodium Chloride


  (Magnesium


 Sulfate Inj/NS


 Inj)  100 ml @ 


 50 mls/hr  UNSCH  PRN


 IV


 For Magnesium 1.2 - 1.6 mg/dL  5/6/17 23:15


     


 


 


 Potassium


 Phosphate 2000 mg  2,000 mg  Q4H  PRN


 PO


 For Phosphorus < 2.5 mg/dL  5/6/17 23:15


     


 


 


 Sodium Phosphate/


 Sodium Chloride


  (Sodium


 Phosphate Inj/NS


 250 ml Inj)  250 ml @ 


 42 mls/hr  UNSCH  PRN


 IV


 For Phosphorus < 2.5 mg/dL  5/6/17 23:15


     


 


 


 Potassium


 Phosphate


  (K-Phos)  2,000 mg  UNSCH  PRN


 PO/TUBE


 SEE LABEL COMMENTS  5/6/17 23:15


     


 


 


 Chlordiazepoxide


  (Librium)  25 mg


 Taper  Q8H


 PO


   5/7/17 00:00


 5/13/17 23:59  5/8/17 09:45


 


 


 Nicotine


  (Habitrol 21 Mg


 Patch.24 Hr)  1 patch  DAILY


 T-DERMAL


   5/7/17 13:00


    5/8/17 09:45


 


 


 Miscellaneous


 Information 1  1  DAILY


 T-DERMAL


   5/8/17 09:00


    5/8/17 09:00


 


 


 Dexmedetomidine


 HCl/Sodium


 Chloride


  (Precedex Inj/NS


 Inj)  52 ml @ 0


 mls/hr  TITRATE


 IV


   5/8/17 06:45


    5/8/17 09:45


 





 (Ashly Snachez)





Medical Decision Making


MDM Remarks


60 y/o male TBI, b/l subdural hematoma


mild increase right SDH without midline shift per f/u CT H 5/8 (Ashly Sanchez)





Plan


Plan Remarks


cont nonsurgical mgt


cont serial neuro check


nonchemical dvt prophylaxis


protonix for stress ulcer proph (Ashly Sanchez)





Attending Statement


The exam, history, and the medical decision-making described in the above note 

were completed with the assistance of the mid-level provider. I reviewed and 

agree with the findings presented.  I attest that I had a face-to-face 

encounter with the patient on the same day, and personally performed and 

documented my assessment and findings in the medical record. (Wilder Grace MD)








Ashly Sanchez May 8, 2017 13:52


Wilder Grace MD May 9, 2017 21:42

## 2017-05-08 NOTE — RADRPT
EXAM DATE/TIME:  05/08/2017 11:29 

 

[facility] COMPARISON:     

CT BRAIN W/O CONTRAST, May 06, 2017, 16:58.

 

 

INDICATIONS :     

History of subdural last several hours more lethargic.

                      

 

RADIATION DOSE:     

56.36 CTDIvol (mGy) 

 

 

 

MEDICAL HISTORY :     

Non-responsive.  Subdural

 

SURGICAL HISTORY :      

CABG 

 

ENCOUNTER:      

Initial

 

ACUITY:      

1 day

 

PAIN SCALE:      

Non-responsive

 

LOCATION:       

Bilateral cranial 

 

TECHNIQUE:     

Multiple contiguous axial images were obtained of the head.  Using automated exposure control and adj
ustment of the mA and/or kV according to patient size, radiation dose was kept as low as reasonably a
chievable to obtain optimal diagnostic quality images. 

 

FINDINGS:     

There is enlargement of the right subdural with acute blood products evident.  Subdural now measures 
1.2 cm.  There is very small amount of parafalcine blood as well.

There is no midline shift

The left hemisphere is unremarkable.  Ventricular size is appropriate.

 

CONCLUSION:     

Minimal enlargement of the right subdural with new acute blood products.  There is no midline shift.

 

 

 

 Carlos Carrillo MD FACR on May 08, 2017 at 11:50           

Board Certified Radiologist.

 This report was verified electronically.

## 2017-05-08 NOTE — HHI.CCPN
Subjective


Remarks/Hospital Course


61 year old male with history of chronic alcohol use presents to the emergency 

department for evaluation of head injury after syncopal episode.  He states he 

has had multiple syncopal episodes over the past week, last time last night at 8

:30pm.  Patient has large amount of dried blood to left forehead with 

erythematous left eye.  He is on Coumadin.  In the emergency department his 

alcohol level was found to be 92.  Patient has refused facial laceration 

sutures.  However he agrees to be admitted to ICU.





05/07: More alert but refusing most orders. Will transfuse additional platelets 

and start nicoderm patch. Anticipate ETOH wihdrawal and may need to go straight 

to precedex.





05/08: Increasingly agitated overnight.  Now requiring sitter.  Pulling out his 

IVs.  Oriented to person only.  Will start Precedex, repeat CT head





Objective





 Vital Signs








  Date Time  Temp Pulse Resp B/P Pulse Ox O2 Delivery O2 Flow Rate FiO2


 


5/8/17 06:00  84      


 


5/8/17 05:01   35     


 


5/8/17 04:00 96.7   145/87 100   


 


5/7/17 19:00      Room Air  


 


5/7/17 00:35        100








 Intake and Output








 5/7/17 5/7/17 5/8/17





 08:00 16:00 00:00


 


Intake Total 952 ml 1439 ml 905 ml


 


Output Total  800 ml 200 ml


 


Balance 952 ml 639 ml 705 ml








Result Diagram:  


5/8/17 0032                                                                    

            5/7/17 0454





Imaging





Last 24 hours Impressions








Maxillofacial CT 5/6/17 1446 Signed





Impressions: 





 Service Date/Time:  Saturday, May 6, 2017 16:58 - CONCLUSION:  1. Left frontal 





 scalp laceration and facial soft tissue swelling. No acute bony abnormalities.

   





   Alvarez Murillo MD 


 


Head CT 5/6/17 1446 Signed





Impressions: 





 Service Date/Time:  Saturday, May 6, 2017 16:58 - CONCLUSION:  1. Bilateral 





 subdural hematomas measuring up to about 11 mm on the right and 5 mm on the 





 left. No significant midline shift. Trace subarachnoid hemorrhage on the 

right. 





 No hydrocephalus. 2. Left frontal scalp laceration and scalp swelling and 





 hematoma.     Alvarez Murillo MD 


 


Chest X-Ray 5/6/17 1446 Signed





Impressions: 





 Service Date/Time:  Saturday, May 6, 2017 14:59 - CONCLUSION:  1. Scarring 

right 





 lung base improved from March 18. Left lung remains clear. Postoperative 

median 





 sternotomy and valve replacement.      Alvarez Murillo MD 


 


Chest CT 5/6/17 1446 Signed





Impressions: 





 Service Date/Time:  Saturday, May 6, 2017 17:11 - CONCLUSION:  1. Decrease in 





 size of loculated right pleural effusion since March 8. Residual loculated 

fluid 





 in the lower right hemithorax measures about 2.1 cm in thickness associated 

with 





 pleural thickening and old unfused right lower rib fracture. 2. Postoperative 





 median sternotomy and aortic valve replacement. Stable aortic root dilatation 

to 





 4.5 cm with no pneumothorax.     Alvarez Murillo MD 


 


Cervical Spine CT 5/6/17 1446 Signed





Impressions: 





 Service Date/Time:  Saturday, May 6, 2017 16:58 - CONCLUSION:  1. No acute 





 findings. Moderate degenerative disc disease.     Alvarez Murillo MD 








Objective Remarks


GENERAL:  Agitated, tremulous.  Oriented to person only


SKIN: Warm and dry.


HEAD: Circumstantial dressing for scalp laceration


EYES: No scleral icterus.  Left conjunctiva injection 


NECK: Supple, trachea midline. Airway widely patent. No stridor.


CARDIOVASCULAR: Regular rate and rhythm without murmurs, gallops, or rubs. No 

JVD.


RESPIRATORY: Breath sounds equal bilaterally. No accessory muscle use. Few 

mobile secretions.


GASTROINTESTINAL: Abdomen soft, non-tender, nondistended. BS active.


MUSCULOSKELETAL: No cyanosis, or edema. Well perfused.


BACK: Nontender without obvious deformity. No CVA tenderness.


EXTREMITIES: No clubbing cyanosis or edema


NEURO: Agitated, tremulous, oriented to person only. Moves 4 limbs with 

purpose. Protects airway.





A/P


Assessment and Plan


NEURO:


Bilateral Subdural hematoma


Subarachnoid traumatic bleed


Alcohol withdrawal


Scalp laceration


- Platelet transfusion, keep >80


- Repeat CT head today


- ICH Management per neurosurgery Dr. Grace


- No surgical intervention at this time


- Apparently patient refused scalp laceration repair


- Neuro checks per unit protocol


- Start Precedex for alcohol withdrawal, continue CIWA protocol


- Thiamine 100 mg IV daily





RESP:


- Aggressive pulmonary toilet


- DuoNeb every 2 hours when necessary





CVS: 


- Monitor heart rate blood pressure


- Keep systolic blood pressure less than 150





GI:


- NPO except meds


- Speech for swallow eval





HEME:


Thrombocytopenia


Coagulopathy


- Iatrogenic/Coumadin


- No anticoagulation until neurosurgically cleared


- Likely due to alcohol abuse


- Transfuse platelets to keep platelet count above 80





Endo:


- Electrolyte replacement per protocol





ID:


- Monitor for infection





DVT GI prophylaxis


- Teds SCDs


- No pharmacological DVT prophylaxis due to ICH


- Pepcid





Overall impression: Care complicated by withdrawal symptoms. Now started on 

Precedex.


CCT 30 MIN








Morales Castillo MD May 8, 2017 07:11

## 2017-05-09 VITALS
TEMPERATURE: 97.8 F | DIASTOLIC BLOOD PRESSURE: 68 MMHG | RESPIRATION RATE: 14 BRPM | HEART RATE: 75 BPM | SYSTOLIC BLOOD PRESSURE: 112 MMHG | OXYGEN SATURATION: 96 %

## 2017-05-09 VITALS
RESPIRATION RATE: 19 BRPM | DIASTOLIC BLOOD PRESSURE: 77 MMHG | TEMPERATURE: 97.9 F | HEART RATE: 70 BPM | OXYGEN SATURATION: 99 % | SYSTOLIC BLOOD PRESSURE: 137 MMHG

## 2017-05-09 VITALS
OXYGEN SATURATION: 97 % | HEART RATE: 76 BPM | SYSTOLIC BLOOD PRESSURE: 148 MMHG | DIASTOLIC BLOOD PRESSURE: 70 MMHG | RESPIRATION RATE: 26 BRPM | TEMPERATURE: 97.7 F

## 2017-05-09 VITALS
TEMPERATURE: 98 F | OXYGEN SATURATION: 96 % | RESPIRATION RATE: 27 BRPM | HEART RATE: 84 BPM | DIASTOLIC BLOOD PRESSURE: 62 MMHG | SYSTOLIC BLOOD PRESSURE: 106 MMHG

## 2017-05-09 VITALS — HEART RATE: 92 BPM

## 2017-05-09 VITALS
HEART RATE: 76 BPM | SYSTOLIC BLOOD PRESSURE: 156 MMHG | RESPIRATION RATE: 20 BRPM | TEMPERATURE: 98.5 F | OXYGEN SATURATION: 99 % | DIASTOLIC BLOOD PRESSURE: 68 MMHG

## 2017-05-09 VITALS
OXYGEN SATURATION: 100 % | TEMPERATURE: 98.2 F | SYSTOLIC BLOOD PRESSURE: 122 MMHG | RESPIRATION RATE: 20 BRPM | HEART RATE: 69 BPM | DIASTOLIC BLOOD PRESSURE: 72 MMHG

## 2017-05-09 VITALS — HEART RATE: 86 BPM

## 2017-05-09 VITALS — HEART RATE: 73 BPM

## 2017-05-09 VITALS — HEART RATE: 89 BPM

## 2017-05-09 LAB
ALP SERPL-CCNC: 90 U/L (ref 45–117)
ALT SERPL-CCNC: 32 U/L (ref 12–78)
ANION GAP SERPL CALC-SCNC: 14 MEQ/L (ref 5–15)
AST SERPL-CCNC: 61 U/L (ref 15–37)
BASOPHILS # BLD AUTO: 0 TH/MM3 (ref 0–0.2)
BASOPHILS NFR BLD: 0.7 % (ref 0–2)
BILIRUB SERPL-MCNC: 0.9 MG/DL (ref 0.2–1)
BUN SERPL-MCNC: 5 MG/DL (ref 7–18)
CHLORIDE SERPL-SCNC: 108 MEQ/L (ref 98–107)
EOSINOPHIL # BLD: 0.1 TH/MM3 (ref 0–0.4)
EOSINOPHIL NFR BLD: 2.1 % (ref 0–4)
ERYTHROCYTE [DISTWIDTH] IN BLOOD BY AUTOMATED COUNT: 15.4 % (ref 11.6–17.2)
GFR SERPLBLD BASED ON 1.73 SQ M-ARVRAT: 139 ML/MIN (ref 89–?)
HCO3 BLD-SCNC: 22.2 MEQ/L (ref 21–32)
HCT VFR BLD CALC: 25.3 % (ref 39–51)
HEMO FLAGS: (no result)
LYMPHOCYTES # BLD AUTO: 0.6 TH/MM3 (ref 1–4.8)
LYMPHOCYTES NFR BLD AUTO: 10.2 % (ref 9–44)
MAGNESIUM SERPL-MCNC: 1.8 MG/DL (ref 1.5–2.5)
MCH RBC QN AUTO: 35.4 PG (ref 27–34)
MCHC RBC AUTO-ENTMCNC: 33.7 % (ref 32–36)
MCV RBC AUTO: 105 FL (ref 80–100)
MONOCYTES NFR BLD: 9 % (ref 0–8)
NEUTROPHILS # BLD AUTO: 4.3 TH/MM3 (ref 1.8–7.7)
NEUTROPHILS NFR BLD AUTO: 78 % (ref 16–70)
PLAT MORPH BLD: NORMAL
PLATELET # BLD: 99 TH/MM3 (ref 150–450)
PLATELET BLD QL SMEAR: (no result)
POTASSIUM SERPL-SCNC: 3.1 MEQ/L (ref 3.5–5.1)
RBC # BLD AUTO: 2.41 MIL/MM3 (ref 4.5–5.9)
SCAN/DIFF: (no result)
SODIUM SERPL-SCNC: 144 MEQ/L (ref 136–145)
WBC # BLD AUTO: 5.5 TH/MM3 (ref 4–11)

## 2017-05-09 RX ADMIN — DEXMEDETOMIDINE HYDROCHLORIDE SCH MLS/HR: 100 INJECTION, SOLUTION, CONCENTRATE INTRAVENOUS at 10:14

## 2017-05-09 RX ADMIN — DOCUSATE SODIUM SCH MG: 100 CAPSULE, LIQUID FILLED ORAL at 10:50

## 2017-05-09 RX ADMIN — POTASSIUM CHLORIDE PRN MLS/HR: 200 INJECTION, SOLUTION INTRAVENOUS at 10:21

## 2017-05-09 RX ADMIN — FAMOTIDINE SCH MG: 10 INJECTION, SOLUTION INTRAVENOUS at 09:09

## 2017-05-09 RX ADMIN — Medication SCH ML: at 09:00

## 2017-05-09 RX ADMIN — PHENYTOIN SODIUM SCH MLS/HR: 50 INJECTION INTRAMUSCULAR; INTRAVENOUS at 05:41

## 2017-05-09 RX ADMIN — DEXMEDETOMIDINE HYDROCHLORIDE SCH MLS/HR: 100 INJECTION, SOLUTION, CONCENTRATE INTRAVENOUS at 01:01

## 2017-05-09 RX ADMIN — CHLORHEXIDINE GLUCONATE SCH PACK: 500 CLOTH TOPICAL at 04:00

## 2017-05-09 RX ADMIN — POTASSIUM CHLORIDE PRN MLS/HR: 200 INJECTION, SOLUTION INTRAVENOUS at 15:25

## 2017-05-09 RX ADMIN — DOCUSATE SODIUM SCH MG: 100 CAPSULE, LIQUID FILLED ORAL at 21:27

## 2017-05-09 RX ADMIN — POTASSIUM CHLORIDE PRN MLS/HR: 200 INJECTION, SOLUTION INTRAVENOUS at 05:41

## 2017-05-09 RX ADMIN — ACETAMINOPHEN PRN MG: 325 TABLET ORAL at 19:05

## 2017-05-09 RX ADMIN — NICOTINE SCH PATCH: 21 PATCH, EXTENDED RELEASE TOPICAL at 09:09

## 2017-05-09 RX ADMIN — FAMOTIDINE SCH MG: 10 INJECTION, SOLUTION INTRAVENOUS at 21:28

## 2017-05-09 RX ADMIN — POTASSIUM CHLORIDE PRN MLS/HR: 200 INJECTION, SOLUTION INTRAVENOUS at 08:22

## 2017-05-09 RX ADMIN — MORPHINE SULFATE PRN MG: 2 INJECTION, SOLUTION INTRAMUSCULAR; INTRAVENOUS at 23:08

## 2017-05-09 NOTE — HHI.CCPN
Subjective


Remarks/Hospital Course


61 year old male with history of chronic alcohol use presents to the emergency 

department for evaluation of head injury after syncopal episode.  He states he 

has had multiple syncopal episodes over the past week, last time last night at 8

:30pm.  Patient has large amount of dried blood to left forehead with 

erythematous left eye.  He is on Coumadin.  In the emergency department his 

alcohol level was found to be 92.  Patient has refused facial laceration 

sutures.  However he agrees to be admitted to ICU.





05/07: More alert but refusing most orders. Will transfuse additional platelets 

and start nicoderm patch. Anticipate ETOH wihdrawal and may need to go straight 

to precedex.





05/08: Increasingly agitated overnight.  Now requiring sitter.  Pulling out his 

IVs.  Oriented to person only.  Will start Precedex, repeat CT head





5/09: Placed on Precedex yesterday, appears calm, somnolent.  CT of the head 

right subdural slightly enlarged





Objective





 Vital Signs








  Date Time  Temp Pulse Resp B/P Pulse Ox O2 Delivery O2 Flow Rate FiO2


 


5/9/17 16:00  70      


 


5/9/17 16:00 97.9  19 137/77 99   


 


5/8/17 19:00      Room Air  


 


5/7/17 00:35        100








 Intake and Output








 5/8/17 5/8/17 5/9/17





 08:00 16:00 00:00


 


Intake Total 710 ml 77 ml 532 ml


 


Output Total 900 ml 850 ml 450 ml


 


Balance -190 ml -773 ml 82 ml








Result Diagram:  


5/9/17 0356                                                                    

            5/9/17 0356





Imaging





Last 24 hours Impressions








Maxillofacial CT 5/6/17 1446 Signed





Impressions: 





 Service Date/Time:  Saturday, May 6, 2017 16:58 - CONCLUSION:  1. Left frontal 





 scalp laceration and facial soft tissue swelling. No acute bony abnormalities.

   





   Alvarez Murillo MD 


 


Head CT 5/6/17 1446 Signed





Impressions: 





 Service Date/Time:  Saturday, May 6, 2017 16:58 - CONCLUSION:  1. Bilateral 





 subdural hematomas measuring up to about 11 mm on the right and 5 mm on the 





 left. No significant midline shift. Trace subarachnoid hemorrhage on the 

right. 





 No hydrocephalus. 2. Left frontal scalp laceration and scalp swelling and 





 hematoma.     Alvarez Murillo MD 


 


Chest X-Ray 5/6/17 1446 Signed





Impressions: 





 Service Date/Time:  Saturday, May 6, 2017 14:59 - CONCLUSION:  1. Scarring 

right 





 lung base improved from March 18. Left lung remains clear. Postoperative 

median 





 sternotomy and valve replacement.      Alvarez Murillo MD 


 


Chest CT 5/6/17 1446 Signed





Impressions: 





 Service Date/Time:  Saturday, May 6, 2017 17:11 - CONCLUSION:  1. Decrease in 





 size of loculated right pleural effusion since March 8. Residual loculated 

fluid 





 in the lower right hemithorax measures about 2.1 cm in thickness associated 

with 





 pleural thickening and old unfused right lower rib fracture. 2. Postoperative 





 median sternotomy and aortic valve replacement. Stable aortic root dilatation 

to 





 4.5 cm with no pneumothorax.     Alvarez Murillo MD 


 


Cervical Spine CT 5/6/17 1446 Signed





Impressions: 





 Service Date/Time:  Saturday, May 6, 2017 16:58 - CONCLUSION:  1. No acute 





 findings. Moderate degenerative disc disease.     Alvarez Murillo MD 








Objective Remarks


GENERAL: Somnolent on Precedex, slightly tremulous.  Oriented to person only


SKIN: Warm and dry.


HEAD: Circumstantial dressing for scalp laceration


EYES: No scleral icterus.  Left conjunctiva injection 


NECK: Supple, trachea midline. Airway widely patent. No stridor.


CARDIOVASCULAR: Regular rate and rhythm without murmurs, gallops, or rubs. No 

JVD.


RESPIRATORY: Breath sounds equal bilaterally. No accessory muscle use.


GASTROINTESTINAL: Abdomen soft, non-tender, nondistended. BS active.


MUSCULOSKELETAL: No cyanosis, or edema. Well perfused.


BACK: Nontender without obvious deformity. No CVA tenderness.


EXTREMITIES: No clubbing cyanosis or edema


NEURO: Agitated, tremulous, oriented to person only. Moves 4 limbs with 

purpose. Protecting airway.





A/P


Assessment and Plan


NEURO:


Bilateral Subdural hematoma


Traumatic Subarachnoid bleed


Alcohol withdrawal


Scalp laceration


- Platelet transfusion, keep >80


- Repeat CT head -very slight enlargement in right subdural hemorrhage


- ICH Management per neurosurgery Dr. Grace. No surgical intervention at this 

time


- Apparently patient refused scalp laceration repair


- Neuro checks per unit protocol


- Continue Precedex for alcohol withdrawal, continue CIWA protocol


- Thiamine 100 mg IV daily





RESP:


- Aggressive pulmonary toilet


- DuoNeb every 2 hours when necessary





CVS: 


- Monitor heart rate blood pressure


- Keep systolic blood pressure less than 150





GI:


- Diet per speech recommendation





HEME:


Thrombocytopenia


Coagulopathy


- Iatrogenic/Coumadin


- No anticoagulation until neurosurgically cleared


- Thrombocytopenia likely due to alcohol abuse


- Transfuse platelets to keep platelet count above 80





Endo:


- Electrolyte replacement per protocol





ID:


- Monitor for infection





DVT GI prophylaxis


- Teds SCDs


- No pharmacological DVT prophylaxis due to ICH


- Pepcid





Overall impression: Care complicated by withdrawal symptoms. Now on Precedex 

and CIWA protocol





Level 2








Morales Castillo MD May 9, 2017 17:10

## 2017-05-09 NOTE — HHI.NSPN
__________________________________________________ (Ashly Sanchez)





Note Status


Status:  Progress Note (Ashly Sanchez)





Interval History


Interval History


This is a 61 year old male with history of chronic alcohol use presents to the 

emergency department for evaluation of head injury after syncopal episode.  He 

states he has had multiple syncopal episodes over the past week, last time last 

night at 8:30pm.  No tonic-clonic movements noted.  No tongue biting.  No 

incontinence of stool or urine.  Patient has large amount of dried blood to 

left forehead with erythematous left eye.  He has a large frontal laceration He 

is on Coumadin.  In the emergency department his alcohol level was found to be 

92.  He refused facial laceration sutures.  Denies any focal motor weakness.  

He denies any sensory loss.  No problems controlling his bladder by Wood.  CT 

of the brain showed bilateral subdural hematomas.  Neurosurgical consultation 

was requested





5/8: currently mildly sedated on Precedex due to agitation trying to get out of 

bed, reports moves x 4 extremities. 


5/9: not cooperative with exam becomes combative, localizes and withdraws x 4 

extremities.  (Ashly Sanchez)





Labs, Micro, & Vital Signs


Results











  Date Time  Temp Pulse Resp B/P Pulse Ox O2 Delivery O2 Flow Rate FiO2


 


5/9/17 06:00  86      


 


5/9/17 04:00 98.0 84 27 106/62 96   


 


5/9/17 04:00  84      


 


5/9/17 02:00  89      


 


5/9/17 00:00 97.7 72 26 148/70 97   


 


5/9/17 00:00  76      


 


5/8/17 22:00  69 15 103/63 97   


 


5/8/17 22:00  69      


 


5/8/17 20:00  71      


 


5/8/17 20:00 97.5 71 17 124/71 98   


 


5/8/17 19:00 97.7 67 8 146/67 100   


 


5/8/17 19:00      Room Air  


 


5/8/17 18:00  63      


 


5/8/17 16:00 97.9 56 14 135/70 96   


 


5/8/17 16:00  56      


 


5/8/17 14:00  72      


 


5/8/17 12:00 98.0 72 18 115/67 94   


 


5/8/17 12:00  72      


 


5/8/17 10:00  72      














 5/9/17





 07:00


 


Intake Total 1133 ml


 


Output Total 1800 ml


 


Balance -667 ml








Constitutional





Vital Signs








  Date Time  Temp Pulse Resp B/P Pulse Ox O2 Delivery O2 Flow Rate FiO2


 


5/9/17 06:00  86      


 


5/9/17 04:00 98.0 84 27 106/62 96   


 


5/9/17 04:00  84      


 


5/9/17 02:00  89      


 


5/9/17 00:00 97.7 72 26 148/70 97   


 


5/9/17 00:00  76      


 


5/8/17 22:00  69 15 103/63 97   


 


5/8/17 22:00  69      


 


5/8/17 20:00  71      


 


5/8/17 20:00 97.5 71 17 124/71 98   


 


5/8/17 19:00 97.7 67 8 146/67 100   


 


5/8/17 19:00      Room Air  


 


5/8/17 18:00  63      


 


5/8/17 16:00 97.9 56 14 135/70 96   


 


5/8/17 16:00  56      


 


5/8/17 14:00  72      


 


5/8/17 12:00 98.0 72 18 115/67 94   


 


5/8/17 12:00  72      


 


5/8/17 10:00  72      














 5/9/17





 07:00


 


Intake Total 1133 ml


 


Output Total 1800 ml


 


Balance -667 ml





 (Ashly Sanchez)





Review of Systems/Exam


Exam


Mr. Majano becomes agitated and combative, not following commands, resisting 

eye opening.   





Left forehead lac bandaged. 





Cranial nerve examination: pupils right 2 mm, could not adequately assess left 

pupils as he resists exam.  Facial motor function appears normal and 

symmetrical.   


  


Motor: moves x 4 extremities


 


There is a bilateral plantar flexion response.  





Cerebellar examination is limited due to the patient condition.  (Ashly Sanchez)





Medications


Current Medications





Current Medications








 Medications


  (Trade)  Dose


 Ordered  Sig/Marcia


 Route


 PRN Reason  Start Time


 Stop Time Status Last Admin


Dose Admin


 


 Sodium Chloride


  (NS 1000 ml Inj)  1,000 ml @ 


 84 mls/hr  [id number]


 IV


   5/6/17 20:00


    5/9/17 05:41


 


 


 Sodium Chloride


  (NS Flush)  2 ml  UNSCH  PRN


 .XX


 FLUSH AFTER USING IV ACCESS  5/6/17 20:00


     


 


 


 Sodium Chloride


  (NS Flush)  2 ml  BID


 .XX


   5/6/17 21:00


    5/8/17 21:00


 


 


 Acetaminophen


  (Tylenol)  650 mg  Q6H  PRN


 PO


 PAIN 1-10 AND/OR FEVER >101F  5/6/17 20:00


     


 


 


 Morphine Sulfate


  (Morphine Inj)  2 mg  Q2H  PRN


 IV


 PAIN SCALE 6 TO 10  5/6/17 20:00


    5/8/17 04:51


 


 


 Famotidine


  (Pepcid Inj)  20 mg  Q12HR


 IV PUSH


   5/6/17 21:00


    5/8/17 21:20


 


 


 Lorazepam


  (Ativan Inj)  1 mg  Q1H  PRN


 IV


 Agitation/Sedation  5/6/17 20:00


    5/8/17 00:16


 


 


 Docusate Sodium


  (Colace)  100 mg  BID


 PO


   5/6/17 21:00


    5/8/17 21:20


 


 


 Zolpidem Tartrate


  (Ambien)  5 mg  HS  PRN


 PO


 INSOMNIA  5/6/17 20:00


     


 


 


 Miscellaneous


 Information  1  Q361D


 XX


   5/6/17 20:00


    5/6/17 20:00


 


 


 Chlorhexidine


 Gluconate


  (Chlorhexidine


 2% Cloth)  3 pack


 Taper  DAILY@04


 TOP


   5/7/17 04:00


 5/3/18 03:59  5/9/17 04:00


 


 


 Chlorhexidine


 Gluconate


  (Chlorhexidine


 2% Cloth)  3 pack  UNSCH  PRN


 TOP


 HYGIENIC CARE  5/6/17 20:00


     


 


 


 Flumazenil


  (Romazicon Inj)  0.2 mg  Q1M  PRN


 IV PUSH


 SEE LABEL COMMENTS  5/6/17 23:15


     


 


 


 Lorazepam


  (Ativan)  1 mg  Q4H  PRN


 PO


 CIWA 8 - 10  5/6/17 23:15


     


 


 


 Lorazepam


  (Ativan Inj)  1 mg  Q4H  PRN


 IV PUSH


 CIWA 8 - 10  5/6/17 23:15


     


 


 


 Lorazepam


  (Ativan)  2 mg  Q2H  PRN


 PO


 CIWA 11-14  5/6/17 23:15


     


 


 


 Lorazepam


  (Ativan Inj)  2 mg  Q2H  PRN


 IV PUSH


 CIWA 11-14  5/6/17 23:15


    5/8/17 04:51


 


 


 Lorazepam


  (Ativan Inj)  2 mg  Q1H  PRN


 IV PUSH


 CIWA 15-20  5/6/17 23:15


     


 


 


 Lorazepam 2 mg  2 mg  Q15M  PRN


 IV PUSH


 CIWA > 20  5/6/17 23:15


    5/8/17 07:19


 


 


 Potassium Chloride  100 ml @ 


 50 mls/hr  Q2H  PRN


 IV


 For Potassium 2.8 - 3.2 mEq/L  5/6/17 23:15


     


 


 


 Potassium Chloride


  (KCl 20 Meq


 Premix Inj)  100 ml @ 


 50 mls/hr  Q2H  PRN


 IV


 For Potassium 2.8 - 3.2 mEq/L  5/6/17 23:15


    5/9/17 08:22


 


 


 Potassium Bicarb/


 Potassium


 Chloride 50 meq  50 meq  UNSCH  PRN


 PO


 For Potassium 3.3 - 3.5 mEq/L  5/6/17 23:15


     


 


 


 Potassium Chloride  100 ml @ 


 25 mls/hr  UNSCH  PRN


 IV


 For Potassium 3.3 - 3.5 mEq/L  5/6/17 23:15


     


 


 


 Potassium Chloride  100 ml @ 


 50 mls/hr  Q2H  PRN


 IV


 For Potassium 3.3 - 3.5 mEq/L  5/6/17 23:15


    5/7/17 07:48


 


 


 Magnesium Sulfate/


 Sodium Chloride


  (Magnesium


 Sulfate Inj/NS


 Inj)  100 ml @ 


 50 mls/hr  UNSCH  PRN


 IV


 For Magnesium 0.9 - 1.1 mg/dL  5/6/17 23:15


     


 


 


 Magnesium Oxide


 800 mg  800 mg  UNSCH  PRN


 PO


 For Magnesium 1.2 - 1.6 mg/dL  5/6/17 23:15


     


 


 


 Magnesium Sulfate/


 Sodium Chloride


  (Magnesium


 Sulfate Inj/NS


 Inj)  100 ml @ 


 50 mls/hr  UNSCH  PRN


 IV


 For Magnesium 1.2 - 1.6 mg/dL  5/6/17 23:15


     


 


 


 Potassium


 Phosphate 2000 mg  2,000 mg  Q4H  PRN


 PO


 For Phosphorus < 2.5 mg/dL  5/6/17 23:15


     


 


 


 Sodium Phosphate/


 Sodium Chloride


  (Sodium


 Phosphate Inj/NS


 250 ml Inj)  250 ml @ 


 42 mls/hr  UNSCH  PRN


 IV


 For Phosphorus < 2.5 mg/dL  5/6/17 23:15


     


 


 


 Potassium


 Phosphate


  (K-Phos)  2,000 mg  UNSCH  PRN


 PO/TUBE


 SEE LABEL COMMENTS  5/6/17 23:15


     


 


 


 Chlordiazepoxide


  (Librium)  25 mg


 Taper  Q8H


 PO


   5/7/17 00:00


 5/13/17 23:59  5/8/17 09:45


 


 


 Nicotine


  (Habitrol 21 Mg


 Patch.24 Hr)  1 patch  DAILY


 T-DERMAL


   5/7/17 13:00


    5/8/17 09:45


 


 


 Miscellaneous


 Information 1  1  DAILY


 T-DERMAL


   5/8/17 09:00


    5/8/17 09:00


 


 


 Dexmedetomidine


 HCl/Sodium


 Chloride


  (Precedex Inj/NS


 Inj)  52 ml @ 0


 mls/hr  TITRATE


 IV


   5/8/17 06:45


    5/9/17 01:01


 





 (Ashly Sanchez)





Medical Decision Making


MDM Remarks


60 y/o male TBI, b/l subdural hematoma


mild increase right SDH without midline shift per f/u CT H 5/8 (Ashly Sanchez)





Plan


Plan Remarks


cont nonsurgical mgt


cont serial neuro check


nonchemical dvt prophylaxis due to ICH


protonix for stress ulcer proph (Ashly Sanchez)





Attending Statement


The exam, history, and the medical decision-making described in the above note 

were completed with the assistance of the mid-level provider. I reviewed and 

agree with the findings presented.  I attest that I had a face-to-face 

encounter with the patient on the same day, and personally performed and 

documented my assessment and findings in the medical record. (Wilder Grace MD)








Ashly Sanchez May 9, 2017 08:49


Wilder Grace MD May 9, 2017 21:45

## 2017-05-10 VITALS
SYSTOLIC BLOOD PRESSURE: 105 MMHG | HEART RATE: 88 BPM | TEMPERATURE: 97.9 F | DIASTOLIC BLOOD PRESSURE: 63 MMHG | OXYGEN SATURATION: 99 % | RESPIRATION RATE: 20 BRPM

## 2017-05-10 VITALS
OXYGEN SATURATION: 99 % | TEMPERATURE: 98 F | DIASTOLIC BLOOD PRESSURE: 71 MMHG | RESPIRATION RATE: 18 BRPM | HEART RATE: 95 BPM | SYSTOLIC BLOOD PRESSURE: 123 MMHG

## 2017-05-10 VITALS
TEMPERATURE: 97.8 F | DIASTOLIC BLOOD PRESSURE: 67 MMHG | HEART RATE: 82 BPM | OXYGEN SATURATION: 100 % | RESPIRATION RATE: 19 BRPM | SYSTOLIC BLOOD PRESSURE: 130 MMHG

## 2017-05-10 VITALS
HEART RATE: 92 BPM | SYSTOLIC BLOOD PRESSURE: 133 MMHG | OXYGEN SATURATION: 99 % | DIASTOLIC BLOOD PRESSURE: 75 MMHG | TEMPERATURE: 97.9 F | RESPIRATION RATE: 21 BRPM

## 2017-05-10 VITALS
OXYGEN SATURATION: 99 % | RESPIRATION RATE: 16 BRPM | TEMPERATURE: 98.4 F | SYSTOLIC BLOOD PRESSURE: 136 MMHG | DIASTOLIC BLOOD PRESSURE: 78 MMHG | HEART RATE: 87 BPM

## 2017-05-10 VITALS — HEART RATE: 102 BPM

## 2017-05-10 VITALS
DIASTOLIC BLOOD PRESSURE: 69 MMHG | OXYGEN SATURATION: 100 % | RESPIRATION RATE: 18 BRPM | SYSTOLIC BLOOD PRESSURE: 129 MMHG | HEART RATE: 98 BPM | TEMPERATURE: 98.4 F

## 2017-05-10 VITALS — HEART RATE: 88 BPM

## 2017-05-10 VITALS — HEART RATE: 95 BPM

## 2017-05-10 RX ADMIN — DOCUSATE SODIUM SCH MG: 100 CAPSULE, LIQUID FILLED ORAL at 21:00

## 2017-05-10 RX ADMIN — DOCUSATE SODIUM SCH MG: 100 CAPSULE, LIQUID FILLED ORAL at 09:00

## 2017-05-10 RX ADMIN — PHENYTOIN SODIUM SCH MLS/HR: 50 INJECTION INTRAMUSCULAR; INTRAVENOUS at 00:53

## 2017-05-10 RX ADMIN — CHLORHEXIDINE GLUCONATE SCH PACK: 500 CLOTH TOPICAL at 05:06

## 2017-05-10 RX ADMIN — NICOTINE SCH PATCH: 21 PATCH, EXTENDED RELEASE TOPICAL at 09:39

## 2017-05-10 RX ADMIN — Medication SCH ML: at 21:02

## 2017-05-10 RX ADMIN — Medication SCH ML: at 00:53

## 2017-05-10 RX ADMIN — Medication SCH ML: at 17:31

## 2017-05-10 NOTE — HHI.NSPN
__________________________________________________ (Ashly Sanchez)





Note Status


Status:  Progress Note (Ashly Sanchez)





Interval History


Interval History


This is a 61 year old male with history of chronic alcohol use presents to the 

emergency department for evaluation of head injury after syncopal episode.  He 

states he has had multiple syncopal episodes over the past week, last time last 

night at 8:30pm.  No tonic-clonic movements noted.  No tongue biting.  No 

incontinence of stool or urine.  Patient has large amount of dried blood to 

left forehead with erythematous left eye.  He has a large frontal laceration He 

is on Coumadin.  In the emergency department his alcohol level was found to be 

92.  He refused facial laceration sutures.  Denies any focal motor weakness.  

He denies any sensory loss.  No problems controlling his bladder by Wood.  CT 

of the brain showed bilateral subdural hematomas.  Neurosurgical consultation 

was requested





5/8: currently mildly sedated on Precedex due to agitation trying to get out of 

bed, reports moves x 4 extremities. 


5/9: not cooperative with exam becomes combative, localizes and withdraws x 4 

extremities. 


5/10: clinically better today, alert, eating breakfast, pleasant and 

cooperative.  (Ashly Sanchez)





Labs, Micro, & Vital Signs


Results











  Date Time  Temp Pulse Resp B/P Pulse Ox O2 Delivery O2 Flow Rate FiO2


 


5/10/17 10:11  88      


 


5/10/17 08:00 98.4 98 18 129/69 100   


 


5/10/17 04:00 97.8 82 19 130/67 100   


 


5/10/17 04:00  82      


 


5/10/17 02:00  88      


 


5/10/17 00:00 98.0 95 18 123/71 99   


 


5/10/17 00:00  95      


 


5/9/17 22:00  92      


 


5/9/17 20:00 98.5 76 20 156/68 99   


 


5/9/17 20:00  76      


 


5/9/17 19:00      Room Air  


 


5/9/17 16:00  70      


 


5/9/17 16:00 97.9 69 19 137/77 99   


 


5/9/17 12:00 98.2 69 20 122/72 100   














 5/10/17





 07:00


 


Intake Total 2017 ml


 


Output Total 700 ml


 


Balance 1317 ml








Constitutional





Vital Signs








  Date Time  Temp Pulse Resp B/P Pulse Ox O2 Delivery O2 Flow Rate FiO2


 


5/10/17 10:11  88      


 


5/10/17 08:00 98.4 98 18 129/69 100   


 


5/10/17 04:00 97.8 82 19 130/67 100   


 


5/10/17 04:00  82      


 


5/10/17 02:00  88      


 


5/10/17 00:00 98.0 95 18 123/71 99   


 


5/10/17 00:00  95      


 


5/9/17 22:00  92      


 


5/9/17 20:00 98.5 76 20 156/68 99   


 


5/9/17 20:00  76      


 


5/9/17 19:00      Room Air  


 


5/9/17 16:00  70      


 


5/9/17 16:00 97.9 69 19 137/77 99   


 


5/9/17 12:00 98.2 69 20 122/72 100   














 5/10/17





 07:00


 


Intake Total 2017 ml


 


Output Total 700 ml


 


Balance 1317 ml





 (Ashly Sanchez)





Review of Systems/Exam


Exam


Mr. Majano is alert, pleasant, eating breakfast, follows commands. 





Left forehead lac bandaged. 





Cranial nerve examination: pupils equal, gross eoms intact.  Facial motor 

function appears normal and symmetrical.   


  


Motor: moves x 4 extremities well


 


Bilateral plantar flexion response.  


  (Ashly Sanchez)


Exam


Mr. Majano is alert, awake, comfortable. He follows commands. GCS 15





Cranial nerve examination: pupils equal, gross eoms intact.  Facial motor 

function appears normal and symmetrical.   





Neck soft, supple





Left forehead lac bandaged.  No focal motor deficits


 


Sensory intact to pinprick in all 4 extremities


 


Bilateral plantar flexion response.  DTRs are symmetrical





Cerebellar examination limited but unremarkable (Wilder Grace MD)





Medications


Current Medications





Current Medications








 Medications


  (Trade)  Dose


 Ordered  Sig/Marcia


 Route


 PRN Reason  Start Time


 Stop Time Status Last Admin


Dose Admin


 


 Sodium Chloride


  (NS Flush)  2 ml  UNSCH  PRN


 .XX


 FLUSH AFTER USING IV ACCESS  5/6/17 20:00


     


 


 


 Sodium Chloride


  (NS Flush)  2 ml  BID


 .XX


   5/6/17 21:00


    5/10/17 00:53


 


 


 Acetaminophen


  (Tylenol)  650 mg  Q6H  PRN


 PO


 PAIN 1-10 AND/OR FEVER >101F  5/6/17 20:00


    5/9/17 19:05


 


 


 Morphine Sulfate


  (Morphine Inj)  2 mg  Q2H  PRN


 IV


 PAIN SCALE 6 TO 10  5/6/17 20:00


    5/9/17 23:08


 


 


 Lorazepam


  (Ativan Inj)  1 mg  Q1H  PRN


 IV


 Agitation/Sedation  5/6/17 20:00


    5/8/17 00:16


 


 


 Docusate Sodium


  (Colace)  100 mg  BID


 PO


   5/6/17 21:00


    5/10/17 09:00


 


 


 Zolpidem Tartrate


  (Ambien)  5 mg  HS  PRN


 PO


 INSOMNIA  5/6/17 20:00


     


 


 


 Miscellaneous


 Information  1  Q361D


 XX


   5/6/17 20:00


    5/6/17 20:00


 


 


 Chlorhexidine


 Gluconate


  (Chlorhexidine


 2% Cloth)  3 pack


 Taper  DAILY@04


 TOP


   5/7/17 04:00


 5/3/18 03:59  5/10/17 05:06


 


 


 Chlorhexidine


 Gluconate


  (Chlorhexidine


 2% Cloth)  3 pack  UNSCH  PRN


 TOP


 HYGIENIC CARE  5/6/17 20:00


     


 


 


 Flumazenil


  (Romazicon Inj)  0.2 mg  Q1M  PRN


 IV PUSH


 SEE LABEL COMMENTS  5/6/17 23:15


     


 


 


 Lorazepam


  (Ativan)  1 mg  Q4H  PRN


 PO


 CIWA 8 - 10  5/6/17 23:15


     


 


 


 Lorazepam


  (Ativan Inj)  1 mg  Q4H  PRN


 IV PUSH


 CIWA 8 - 10  5/6/17 23:15


     


 


 


 Lorazepam


  (Ativan)  2 mg  Q2H  PRN


 PO


 CIWA 11-14  5/6/17 23:15


     


 


 


 Lorazepam


  (Ativan Inj)  2 mg  Q2H  PRN


 IV PUSH


 CIWA 11-14  5/6/17 23:15


    5/8/17 04:51


 


 


 Lorazepam


  (Ativan Inj)  2 mg  Q1H  PRN


 IV PUSH


 CIWA 15-20  5/6/17 23:15


     


 


 


 Lorazepam 2 mg  2 mg  Q15M  PRN


 IV PUSH


 CIWA > 20  5/6/17 23:15


    5/8/17 07:19


 


 


 Potassium Chloride  100 ml @ 


 50 mls/hr  Q2H  PRN


 IV


 For Potassium 2.8 - 3.2 mEq/L  5/6/17 23:15


     


 


 


 Potassium Chloride


  (KCl 20 Meq


 Premix Inj)  100 ml @ 


 50 mls/hr  Q2H  PRN


 IV


 For Potassium 2.8 - 3.2 mEq/L  5/6/17 23:15


    5/9/17 15:25


 


 


 Potassium Bicarb/


 Potassium


 Chloride 50 meq  50 meq  UNSCH  PRN


 PO


 For Potassium 3.3 - 3.5 mEq/L  5/6/17 23:15


     


 


 


 Potassium Chloride  100 ml @ 


 25 mls/hr  UNSCH  PRN


 IV


 For Potassium 3.3 - 3.5 mEq/L  5/6/17 23:15


     


 


 


 Potassium Chloride  100 ml @ 


 50 mls/hr  Q2H  PRN


 IV


 For Potassium 3.3 - 3.5 mEq/L  5/6/17 23:15


    5/7/17 07:48


 


 


 Magnesium Sulfate/


 Sodium Chloride


  (Magnesium


 Sulfate Inj/NS


 Inj)  100 ml @ 


 50 mls/hr  UNSCH  PRN


 IV


 For Magnesium 0.9 - 1.1 mg/dL  5/6/17 23:15


     


 


 


 Magnesium Oxide


 800 mg  800 mg  UNSCH  PRN


 PO


 For Magnesium 1.2 - 1.6 mg/dL  5/6/17 23:15


     


 


 


 Magnesium Sulfate/


 Sodium Chloride


  (Magnesium


 Sulfate Inj/NS


 Inj)  100 ml @ 


 50 mls/hr  UNSCH  PRN


 IV


 For Magnesium 1.2 - 1.6 mg/dL  5/6/17 23:15


     


 


 


 Potassium


 Phosphate 2000 mg  2,000 mg  Q4H  PRN


 PO


 For Phosphorus < 2.5 mg/dL  5/6/17 23:15


     


 


 


 Sodium Phosphate/


 Sodium Chloride


  (Sodium


 Phosphate Inj/NS


 250 ml Inj)  250 ml @ 


 42 mls/hr  UNSCH  PRN


 IV


 For Phosphorus < 2.5 mg/dL  5/6/17 23:15


     


 


 


 Potassium


 Phosphate


  (K-Phos)  2,000 mg  UNSCH  PRN


 PO/TUBE


 SEE LABEL COMMENTS  5/6/17 23:15


     


 


 


 Nicotine


  (Habitrol 21 Mg


 Patch.24 Hr)  1 patch  DAILY


 T-DERMAL


   5/7/17 13:00


    5/10/17 09:39


 


 


 Miscellaneous


 Information  1  DAILY


 T-DERMAL


   5/8/17 09:00


    5/10/17 09:00


 


 


 Chlordiazepoxide


  (Librium)  10 mg  TID


 PO


   5/10/17 13:00


 5/12/17 09:14   


 





 (Ashly Sanchez)


Current Medications





 Current Medications


Ondansetron HCl 4 mg 4 mg ONCE  ONCE IVP  Last administered on 5/6/17at 15:13;  

Start 5/6/17 at 15:00;  Stop 5/6/17 at 15:01;  Status DC


Sodium Chloride (NS 1000 ml Inj) 1,000 ml @  1,000 mls/hr Q1H ONCE IV  Last 

administered on 5/6/17at 15:14;  Start 5/6/17 at 14:46;  Stop 5/6/17 at 15:45;  

Status DC


Tetanus/ Diphtheria Toxoids (Tetanus/ Diphtheria Tox Adult) 0.5 ml ONCE ONCE IM

  Last administered on 5/6/17at 15:15;  Start 5/6/17 at 15:00;  Stop 5/6/17 at 

15:01;  Status DC


Lidocaine/ Epinephrine 20 ml 20 ml ONCE  ONCE INFIL  Last administered on 5/6/ 17at 16:33;  Start 5/6/17 at 15:00;  Stop 5/6/17 at 15:01;  Status DC


Sodium Chloride (NS 1000 ml Inj) 1,000 ml @  1,000 mls/hr Q1H IV  Last 

administered on 5/6/17at 16:32;  Start 5/6/17 at 16:23;  Stop 5/6/17 at 17:22;  

Status DC


Iohexol 96 ml 96 ml STK-MED ONCE IV  Last administered on 5/6/17at 17:25;  

Start 5/6/17 at 17:25;  Stop 5/6/17 at 17:26;  Status DC


Sodium Chloride 250 ml @  15 mls/hr ONCE  ONCE IV  Last administered on 5/6/ 17at 18:00;  Start 5/6/17 at 18:00;  Stop 5/7/17 at 10:39;  Status DC


Vancomycin HCl 1000 mg/Sodium Chloride 250 ml @  250 mls/hr ONCE  ONCE IV  Last 

administered on 5/6/17at 18:43;  Start 5/6/17 at 18:15;  Stop 5/6/17 at 19:14;  

Status DC


Piperacillin Sod/ Tazobactam Sod (Zosyn 3.375 Gm Premix) 50 ml @  100 mls/hr 

ONCE  ONCE IV  Last administered on 5/6/17at 18:42;  Start 5/6/17 at 18:15;  

Stop 5/6/17 at 18:44;  Status DC


Morphine Sulfate 2 mg 2 mg ONCE  ONCE IV PUSH  Last administered on 5/6/17at 18:

42;  Start 5/6/17 at 18:30;  Stop 5/6/17 at 18:31;  Status DC


Sodium Chloride (NS 1000 ml Inj) 1,000 ml @  84 mls/hr [id number] IV  Last 

administered on 5/10/17at 00:53;  Start 5/6/17 at 20:00;  Stop 5/10/17 at 09:15

;  Status DC


Sodium Chloride (NS Flush) 2 ml UNSCH  PRN .XX FLUSH AFTER USING IV ACCESS;  

Start 5/6/17 at 20:00


Sodium Chloride (NS Flush) 2 ml BID .XX  Last administered on 5/10/17at 00:53;  

Start 5/6/17 at 21:00


Acetaminophen (Tylenol) 650 mg Q6H  PRN PO PAIN 1-10 AND/OR FEVER >101F Last 

administered on 5/9/17at 19:05;  Start 5/6/17 at 20:00


Morphine Sulfate (Morphine Inj) 2 mg Q2H  PRN IV PAIN SCALE 6 TO 10 Last 

administered on 5/9/17at 23:08;  Start 5/6/17 at 20:00


Famotidine (Pepcid Inj) 20 mg Q12HR IV PUSH  Last administered on 5/9/17at 21:28

;  Start 5/6/17 at 21:00;  Stop 5/10/17 at 09:15;  Status DC


Lorazepam (Ativan Inj) 1 mg Q1H  PRN IV Agitation/Sedation Last administered on 

5/8/17at 00:16;  Start 5/6/17 at 20:00


Docusate Sodium (Colace) 100 mg BID PO  Last administered on 5/10/17at 09:00;  

Start 5/6/17 at 21:00


Zolpidem Tartrate (Ambien) 5 mg HS  PRN PO INSOMNIA;  Start 5/6/17 at 20:00


Albuterol/ Ipratropium (Duoneb Neb) 1 ampule Q2HR NEB  PRN INH WHEEZING;  Start 

5/6/17 at 20:00


Miscellaneous Information 1 Q361D XX  Last administered on 5/6/17at 20:00;  

Start 5/6/17 at 20:00


Chlorhexidine Gluconate (Chlorhexidine 2% Cloth) 3 pack Taper DAILY@04 TOP  

Last administered on 5/10/17at 05:06;  Start 5/7/17 at 04:00;  Stop 5/3/18 at 03

:59


Chlorhexidine Gluconate (Chlorhexidine 2% Cloth) 3 pack UNSCH  PRN TOP HYGIENIC 

CARE;  Start 5/6/17 at 20:00


Flumazenil (Romazicon Inj) 0.2 mg Q1M  PRN IV PUSH SEE LABEL COMMENTS;  Start 5/ 6/17 at 23:15


Lorazepam (Ativan) 1 mg Q4H  PRN PO CIWA 8 - 10 Last administered on 5/10/17at 

11:23;  Start 5/6/17 at 23:15


Lorazepam (Ativan Inj) 1 mg Q4H  PRN IV PUSH CIWA 8 - 10;  Start 5/6/17 at 23:15


Lorazepam (Ativan) 2 mg Q2H  PRN PO CIWA 11-14;  Start 5/6/17 at 23:15


Lorazepam (Ativan Inj) 2 mg Q2H  PRN IV PUSH CIWA 11-14 Last administered on 5/8 /17at 04:51;  Start 5/6/17 at 23:15


Lorazepam (Ativan Inj) 2 mg Q1H  PRN IV PUSH CIWA 15-20;  Start 5/6/17 at 23:15


Lorazepam 2 mg 2 mg Q15M  PRN IV PUSH CIWA > 20 Last administered on 5/8/17at 07

:19;  Start 5/6/17 at 23:15


Potassium Chloride 100 ml @  50 mls/hr Q2H  PRN IV For Potassium 2.8 - 3.2 mEq/L

;  Start 5/6/17 at 23:15


Potassium Chloride (KCl 20 Meq Premix Inj) 100 ml @  50 mls/hr Q2H  PRN IV For 

Potassium 2.8 - 3.2 mEq/L Last administered on 5/9/17at 15:25;  Start 5/6/17 at 

23:15


Potassium Bicarb/ Potassium Chloride 50 meq 50 meq UNSCH  PRN PO For Potassium 

3.3 - 3.5 mEq/L;  Start 5/6/17 at 23:15


Potassium Chloride 100 ml @  25 mls/hr UNSCH  PRN IV For Potassium 3.3 - 3.5 mEq

/L;  Start 5/6/17 at 23:15


Potassium Chloride 100 ml @  50 mls/hr Q2H  PRN IV For Potassium 3.3 - 3.5 mEq/

L Last administered on 5/7/17at 07:48;  Start 5/6/17 at 23:15


Magnesium Sulfate/ Sodium Chloride (Magnesium Sulfate Inj/NS Inj) 100 ml @  50 

mls/hr UNSCH  PRN IV For Magnesium 0.9 - 1.1 mg/dL;  Start 5/6/17 at 23:15


Magnesium Oxide 800 mg 800 mg UNSCH  PRN PO For Magnesium 1.2 - 1.6 mg/dL;  

Start 5/6/17 at 23:15


Magnesium Sulfate/ Sodium Chloride (Magnesium Sulfate Inj/NS Inj) 100 ml @  50 

mls/hr UNSCH  PRN IV For Magnesium 1.2 - 1.6 mg/dL;  Start 5/6/17 at 23:15


Potassium Phosphate 2000 mg 2,000 mg Q4H  PRN PO For Phosphorus < 2.5 mg/dL;  

Start 5/6/17 at 23:15


Sodium Phosphate/ Sodium Chloride (Sodium Phosphate Inj/ ml Inj) 250 ml @

  42 mls/hr UNSCH  PRN IV For Phosphorus < 2.5 mg/dL;  Start 5/6/17 at 23:15


Potassium Phosphate (K-Phos) 2,000 mg UNSCH  PRN PO/TUBE SEE LABEL COMMENTS;  

Start 5/6/17 at 23:15


Chlordiazepoxide (Librium) 10 mg Taper Q8H PO  Last administered on 5/8/17at 09:

45;  Start 5/7/17 at 00:00;  Stop 5/10/17 at 09:15;  Status DC


Nicotine (Habitrol 21 Mg Patch.24 Hr) 1 patch DAILY T-DERMAL  Last administered 

on 5/10/17at 09:39;  Start 5/7/17 at 13:00


Miscellaneous Information 1 1 DAILY T-DERMAL  Last administered on 5/10/17at 09:

00;  Start 5/8/17 at 09:00


Dexmedetomidine HCl 50 ml @ 0 mls/hr TITRATE IV ;  Start 5/8/17 at 06:45;  

Status Cancel


Dexmedetomidine HCl/Sodium Chloride (Precedex Inj/NS Inj) 52 ml @ 0 mls/hr 

TITRATE IV  Last administered on 5/9/17at 10:14;  Start 5/8/17 at 06:45;  Stop 5

/10/17 at 09:15;  Status DC


Chlordiazepoxide (Librium) 10 mg TID PO  Last administered on 5/10/17at 12:35;  

Start 5/10/17 at 13:00;  Stop 5/12/17 at 09:14 (Wilder Grace MD)





Medical Decision Making


MDM Remarks


62 y/o male TBI, b/l subdural hematoma


mild increase right SDH without midline shift per f/u CT H 5/8


clinically better, neuro stable (Ashly Sanchez)


MDM Remarks





Last Impressions








Head CT 5/8/17 0800 Signed





Impressions: 





 Service Date/Time:  Monday, May 8, 2017 11:29 - CONCLUSION:  Minimal 

enlargement 





 of the right subdural with new acute blood products.  There is no midline 

shift. 





     Carlos Carrillo MD  FACR


 


Maxillofacial CT 5/6/17 1446 Signed





Impressions: 





 Service Date/Time:  Saturday, May 6, 2017 16:58 - CONCLUSION:  1. Left frontal 





 scalp laceration and facial soft tissue swelling. No acute bony abnormalities.

   





   Alvarez Murillo MD 


 


Chest X-Ray 5/6/17 1446 Signed





Impressions: 





 Service Date/Time:  Saturday, May 6, 2017 14:59 - CONCLUSION:  1. Scarring 

right 





 lung base improved from March 18. Left lung remains clear. Postoperative 

median 





 sternotomy and valve replacement.      Alvarez Murillo MD 


 


Chest CT 5/6/17 1446 Signed





Impressions: 





 Service Date/Time:  Saturday, May 6, 2017 17:11 - CONCLUSION:  1. Decrease in 





 size of loculated right pleural effusion since March 8. Residual loculated 

fluid 





 in the lower right hemithorax measures about 2.1 cm in thickness associated 

with 





 pleural thickening and old unfused right lower rib fracture. 2. Postoperative 





 median sternotomy and aortic valve replacement. Stable aortic root dilatation 

to 





 4.5 cm with no pneumothorax.     Alvarez Murillo MD 


 


Cervical Spine CT 5/6/17 1446 Signed





Impressions: 





 Service Date/Time:  Saturday, May 6, 2017 16:58 - CONCLUSION:  1. No acute 





 findings. Moderate degenerative disc disease.     Alvarez Murillo MD 





 (Wilder Grace MD)





Plan


Plan Remarks


cont nonsurgical mgt,


cont neuro check


ok to transfer out of unit 


dw pt to avoid further head injuries (Ashly Sanchez)





Attending Statement


Continue neuro checks and serial fashion Non surgical management. Folllow up CT 

in AM





Respiratory.  Continue pulmonary toilette, nasotracheal suction, and breathing 

treatments with nebulizers. 





PT and OT 





Nutrition.  Continue Oral diet





Renal.  Continue to monitor closely urine output, BUN and creatinine





Endocrine.  Continue to Monitor serial Acu checks and SSI for tight control





ID continue to monitor for signs of infection





Continue Protonix for stress ulcer prophylaxis





Continue Brian hose and SCD's for DVT prophylaxis (Wilder Grace MD)








Ashly Sanchez May 10, 2017 10:22


Wilder Grace MD May 10, 2017 14:25

## 2017-05-10 NOTE — PD.TRANSFR
Transfer Summary


Admission Date


May 6, 2017 at 18:40


Transfer Date:  May 10, 2017


Admitting Diagnosis


subdural/subar hemorrhage, lactic acidosis, dehydration, pleural eff


Diagnoses:  


(1) Subdural hematoma


Diagnosis:  Principal





(2) Subarachnoid hemorrhage


Diagnosis:  Principal





(3) Dehydration


Diagnosis:  Principal





(4) Alcohol withdrawal


Diagnosis:  Principal





(5) Alcohol dependence


Diagnosis:  Secondary





Transfer Summary/Subjective


61 year old male with history of chronic alcohol use presents to the emergency 

department for evaluation of head injury after syncopal episode.  He states he 

has had multiple syncopal episodes over the past week, last time last night at 8

:30pm.  Patient has large amount of dried blood to left forehead with 

erythematous left eye.  He is on Coumadin.  In the emergency department his 

alcohol level was found to be 92.  Patient has refused facial laceration 

sutures.  However he agrees to be admitted to ICU.





05/07: More alert but refusing most orders. Will transfuse additional platelets 

and start NicoDerm patch. Anticipate ETOH withdrawal and may need to go 

straight to Precedex.





05/08: Increasingly agitated overnight.  Now requiring sitter.  Pulling out his 

IVs.  Oriented to person only.  Will start Precedex, repeat CT head





05/09: Placed on Precedex yesterday, appears calm, somnolent.  CT of the head 

right subdural slightly enlarged





05/10: Patient is oriented to person and place.  Alcohol withdrawal symptoms 

much improved.  Neurological exam improving following commands





Objective





 Vital Signs








  Date Time  Temp Pulse Resp B/P Pulse Ox O2 Delivery O2 Flow Rate FiO2


 


5/10/17 04:00 97.8 82 19 130/67 100   


 


5/9/17 19:00      Room Air  


 


5/7/17 00:35        100








 Intake and Output








 5/9/17 5/9/17 5/10/17





 08:00 16:00 00:00


 


Intake Total 524 ml 913 ml 1104 ml


 


Output Total 500 ml  700 ml


 


Balance 24 ml 913 ml 404 ml








Result Diagram:  


5/9/17 0356                                                                    

            5/9/17 0356





Imaging





Last 24 hours Impressions








Maxillofacial CT 5/6/17 1446 Signed





Impressions: 





 Service Date/Time:  Saturday, May 6, 2017 16:58 - CONCLUSION:  1. Left frontal 





 scalp laceration and facial soft tissue swelling. No acute bony abnormalities.

   





   Alvarez Murillo MD 


 


Head CT 5/6/17 1446 Signed





Impressions: 





 Service Date/Time:  Saturday, May 6, 2017 16:58 - CONCLUSION:  1. Bilateral 





 subdural hematomas measuring up to about 11 mm on the right and 5 mm on the 





 left. No significant midline shift. Trace subarachnoid hemorrhage on the 

right. 





 No hydrocephalus. 2. Left frontal scalp laceration and scalp swelling and 





 hematoma.     Alvarez Murillo MD 


 


Chest X-Ray 5/6/17 1446 Signed





Impressions: 





 Service Date/Time:  Saturday, May 6, 2017 14:59 - CONCLUSION:  1. Scarring 

right 





 lung base improved from March 18. Left lung remains clear. Postoperative 

median 





 sternotomy and valve replacement.      Alvarez Murillo MD 


 


Chest CT 5/6/17 1446 Signed





Impressions: 





 Service Date/Time:  Saturday, May 6, 2017 17:11 - CONCLUSION:  1. Decrease in 





 size of loculated right pleural effusion since March 8. Residual loculated 

fluid 





 in the lower right hemithorax measures about 2.1 cm in thickness associated 

with 





 pleural thickening and old unfused right lower rib fracture. 2. Postoperative 





 median sternotomy and aortic valve replacement. Stable aortic root dilatation 

to 





 4.5 cm with no pneumothorax.     Alvarez Murillo MD 


 


Cervical Spine CT 5/6/17 1446 Signed





Impressions: 





 Service Date/Time:  Saturday, May 6, 2017 16:58 - CONCLUSION:  1. No acute 





 findings. Moderate degenerative disc disease.     Alvarez Murillo MD 








Objective Remarks


GENERAL: Alert awake today slightly tremulous oriented to person and place


SKIN: Warm and dry.


HEAD: Circumstantial dressing for scalp laceration


EYES: No scleral icterus.  Left conjunctiva injection 


NECK: Supple, trachea midline. Airway widely patent. No stridor.


CARDIOVASCULAR: Regular rate and rhythm without murmurs, gallops, or rubs. No 

JVD.


RESPIRATORY: Breath sounds equal bilaterally. No accessory muscle use.


GASTROINTESTINAL: Abdomen soft, non-tender, nondistended. BS active.


MUSCULOSKELETAL: No cyanosis, or edema. Well perfused.


BACK: Nontender without obvious deformity. No CVA tenderness.


EXTREMITIES: No clubbing cyanosis or edema


NEURO: Slightly tremulous, oriented to person only. Moves 4 limbs with purpose. 

Protecting airway.





A/P


Assessment and Plan


NEURO:


Bilateral Subdural hematoma


Traumatic Subarachnoid bleed


Alcohol withdrawal


Scalp laceration


- Platelet transfusion to keep >80


- Repeat CT head -very slight enlargement in right subdural hemorrhage


- ICH Management per neurosurgery Dr. Grace. No surgical intervention at this 

time


- Apparently patient refused scalp laceration repair


- Neuro checks per unit protocol


- Continue CIWA protocol


- Thiamine 100 mg IV daily





RESP:


- Aggressive pulmonary toilet


- DuoNeb every 2 hours when necessary





CVS: 


- Monitor heart rate blood pressure


- Keep systolic blood pressure less than 150





GI:


- Diet per speech recommendation





HEME:


Thrombocytopenia


Coagulopathy


- Iatrogenic/Coumadin


- No anticoagulation until neurosurgically cleared


- Thrombocytopenia likely due to alcohol abuse


- Transfuse platelets to keep platelet count above 80





Endo:


- Electrolyte replacement per protocol





ID:


- Monitor for infection





DVT GI prophylaxis


- Teds SCDs


- No pharmacological DVT prophylaxis due to ICH


- Pepcid





Level 2


Clinically improving. Transfer to  with [facility] consult. D/W Morales Gonzalez MD May 10, 2017 09:12

## 2017-05-11 VITALS
OXYGEN SATURATION: 98 % | SYSTOLIC BLOOD PRESSURE: 136 MMHG | HEART RATE: 74 BPM | DIASTOLIC BLOOD PRESSURE: 80 MMHG | TEMPERATURE: 98.7 F | RESPIRATION RATE: 22 BRPM

## 2017-05-11 VITALS
SYSTOLIC BLOOD PRESSURE: 142 MMHG | OXYGEN SATURATION: 100 % | HEART RATE: 81 BPM | DIASTOLIC BLOOD PRESSURE: 87 MMHG | RESPIRATION RATE: 20 BRPM | TEMPERATURE: 97.4 F

## 2017-05-11 VITALS
RESPIRATION RATE: 20 BRPM | SYSTOLIC BLOOD PRESSURE: 109 MMHG | OXYGEN SATURATION: 100 % | TEMPERATURE: 96.9 F | HEART RATE: 87 BPM | DIASTOLIC BLOOD PRESSURE: 71 MMHG

## 2017-05-11 VITALS
RESPIRATION RATE: 18 BRPM | SYSTOLIC BLOOD PRESSURE: 116 MMHG | DIASTOLIC BLOOD PRESSURE: 72 MMHG | TEMPERATURE: 98.3 F | HEART RATE: 68 BPM | OXYGEN SATURATION: 99 %

## 2017-05-11 VITALS
OXYGEN SATURATION: 94 % | SYSTOLIC BLOOD PRESSURE: 137 MMHG | HEART RATE: 94 BPM | TEMPERATURE: 96.8 F | DIASTOLIC BLOOD PRESSURE: 74 MMHG | RESPIRATION RATE: 20 BRPM

## 2017-05-11 VITALS
SYSTOLIC BLOOD PRESSURE: 136 MMHG | TEMPERATURE: 97.2 F | DIASTOLIC BLOOD PRESSURE: 70 MMHG | HEART RATE: 73 BPM | OXYGEN SATURATION: 98 % | RESPIRATION RATE: 22 BRPM

## 2017-05-11 VITALS — HEART RATE: 85 BPM

## 2017-05-11 RX ADMIN — DOCUSATE SODIUM SCH MG: 100 CAPSULE, LIQUID FILLED ORAL at 20:24

## 2017-05-11 RX ADMIN — Medication SCH ML: at 08:47

## 2017-05-11 RX ADMIN — ZOLPIDEM TARTRATE PRN MG: 5 TABLET, COATED ORAL at 22:30

## 2017-05-11 RX ADMIN — NICOTINE SCH PATCH: 21 PATCH, EXTENDED RELEASE TOPICAL at 08:48

## 2017-05-11 RX ADMIN — DOCUSATE SODIUM SCH MG: 100 CAPSULE, LIQUID FILLED ORAL at 08:48

## 2017-05-11 RX ADMIN — Medication SCH ML: at 21:00

## 2017-05-11 NOTE — HHI.PR
Subjective


Remarks


Follow-up bilateral subdural hematoma


05/11/17-patient seen and examined, unsteady as patient was trying to get out 

of bed.  Otherwise no other issues





Objective


Vitals





 Vital Signs








  Date Time  Temp Pulse Resp B/P Pulse Ox O2 Delivery O2 Flow Rate FiO2


 


5/11/17 11:11  85      


 


5/11/17 07:42 96.9 87 20 109/71 100   


 


5/11/17 04:00 98.7 74 22 136/80 98   


 


5/11/17 00:00 97.2 73 22 136/70 98   


 


5/10/17 23:40  102      


 


5/10/17 20:00 97.9 92 21 133/75 99   


 


5/10/17 20:00  92      


 


5/10/17 19:00      Room Air  


 


5/10/17 18:06      Room Air  


 


5/10/17 16:23 98.4 87 16 136/78 99   


 


5/10/17 14:28  95      


 


5/10/17 12:00 97.9 88 20 105/63 99   








 I/O








 5/10/17 5/10/17 5/10/17 5/11/17 5/11/17 5/11/17





 07:00 15:00 23:00 07:00 15:00 23:00


 


Intake Total   530 ml   


 


Output Total  650 ml 325 ml 1350 ml  


 


Balance  -650 ml 205 ml -1350 ml  


 


      


 


Intake Oral   200 ml   


 


IV Total   330 ml   


 


Output Urine Total  650 ml 325 ml 1350 ml  


 


# Bowel Movements  2    








Result Diagram:  


5/9/17 0356                                                                    

            5/9/17 0356





Imaging





Last Impressions








Head CT 5/8/17 0800 Signed





Impressions: 





 Service Date/Time:  Monday, May 8, 2017 11:29 - CONCLUSION:  Minimal 

enlargement 





 of the right subdural with new acute blood products.  There is no midline 

shift. 





     Carlos Carrillo MD  FACR


 


Maxillofacial CT 5/6/17 1446 Signed





Impressions: 





 Service Date/Time:  Saturday, May 6, 2017 16:58 - CONCLUSION:  1. Left frontal 





 scalp laceration and facial soft tissue swelling. No acute bony abnormalities.

   





   Alvarez Murillo MD 


 


Chest X-Ray 5/6/17 1446 Signed





Impressions: 





 Service Date/Time:  Saturday, May 6, 2017 14:59 - CONCLUSION:  1. Scarring 

right 





 lung base improved from March 18. Left lung remains clear. Postoperative 

median 





 sternotomy and valve replacement.      Alvarez Murillo MD 


 


Chest CT 5/6/17 1446 Signed





Impressions: 





 Service Date/Time:  Saturday, May 6, 2017 17:11 - CONCLUSION:  1. Decrease in 





 size of loculated right pleural effusion since March 8. Residual loculated 

fluid 





 in the lower right hemithorax measures about 2.1 cm in thickness associated 

with 





 pleural thickening and old unfused right lower rib fracture. 2. Postoperative 





 median sternotomy and aortic valve replacement. Stable aortic root dilatation 

to 





 4.5 cm with no pneumothorax.     Alvarez Murillo MD 


 


Cervical Spine CT 5/6/17 1446 Signed





Impressions: 





 Service Date/Time:  Saturday, May 6, 2017 16:58 - CONCLUSION:  1. No acute 





 findings. Moderate degenerative disc disease.     Alvarez Murillo MD 








Objective Remarks


GENERAL: NAD


SKIN: Warm and dry.


HEAD: Normocephalic.


EYES: No scleral icterus. No injection or drainage. 


NECK: Supple, trachea midline. No JVD or lymphadenopathy.


CARDIOVASCULAR: Regular rate and rhythm without murmurs, gallops, or rubs. 


RESPIRATORY: Breath sounds equal bilaterally. No accessory muscle use.


GASTROINTESTINAL: Abdomen soft, non-tender, nondistended. 


MUSCULOSKELETAL: No cyanosis, or edema. 


BACK: Nontender without obvious deformity. No CVA tenderness.








A/P


Problem List:  


(1) Subdural hematoma


ICD Code:  I62.00


Status:  Acute


(2) Subarachnoid hemorrhage


ICD Code:  I60.9


Status:  Acute


(3) Dehydration


ICD Code:  E86.0


Status:  Acute


(4) Alcohol withdrawal


ICD Code:  F10.239


Status:  Acute


(5) Alcohol dependence


ICD Code:  F10.20


Status:  Acute


Assessment and Plan


61-year-old man with





Bilateral Subdural hematoma


Traumatic Subarachnoid bleed


Alcohol withdrawal


Scalp laceration


- Platelet transfusion to keep >80


- Repeat CT head -very slight enlargement in right subdural hemorrhage


- ICH Management per neurosurgery Dr. Grace. No surgical intervention at this 

time


- Apparently patient refused scalp laceration repair


- Neuro checks per unit protocol


- Continue CIWA protocol


- Thiamine 100 mg IV daily





Thrombocytopenia


Coagulopathy


- Iatrogenic/Coumadin


- No anticoagulation until neurosurgically cleared


- Thrombocytopenia likely due to alcohol abuse


- Transfuse platelets to keep platelet count above 80





Tobacco abuse


Continue nicotine patch





DVT GI prophylaxis


- Teds SCDs


- No pharmacological DVT prophylaxis due to ICH


- Jeremy Khan MD May 11, 2017 11:56

## 2017-05-11 NOTE — HHI.NSPN
__________________________________________________ (Ashly Sanchez)





Note Status


Status:  Progress Note (Ashly Sanchez)





Interval History


Interval History


This is a 61 year old male with history of chronic alcohol use presents to the 

emergency department for evaluation of head injury after syncopal episode.  He 

states he has had multiple syncopal episodes over the past week, last time last 

night at 8:30pm.  No tonic-clonic movements noted.  No tongue biting.  No 

incontinence of stool or urine.  Patient has large amount of dried blood to 

left forehead with erythematous left eye.  He has a large frontal laceration He 

is on Coumadin.  In the emergency department his alcohol level was found to be 

92.  He refused facial laceration sutures.  Denies any focal motor weakness.  

He denies any sensory loss.  No problems controlling his bladder by Wood.  CT 

of the brain showed bilateral subdural hematomas.  Neurosurgical consultation 

was requested





5/8: currently mildly sedated on Precedex due to agitation trying to get out of 

bed, reports moves x 4 extremities. 


5/9: not cooperative with exam becomes combative, localizes and withdraws x 4 

extremities. 


5/10: clinically better today, alert, eating breakfast, pleasant and 

cooperative. 


5/11: transferred to , doing well, neuro stable (Ashly Sanchez)





Labs, Micro, & Vital Signs


Results











  Date Time  Temp Pulse Resp B/P Pulse Ox O2 Delivery O2 Flow Rate FiO2


 


5/11/17 07:42 96.9 87 20 109/71 100   


 


5/11/17 04:00 98.7 74 22 136/80 98   


 


5/11/17 00:00 97.2 73 22 136/70 98   


 


5/10/17 23:40  102      


 


5/10/17 20:00 97.9 92 21 133/75 99   


 


5/10/17 20:00  92      


 


5/10/17 19:00      Room Air  


 


5/10/17 18:06      Room Air  


 


5/10/17 16:23 98.4 87 16 136/78 99   


 


5/10/17 14:28  95      


 


5/10/17 12:00 97.9 88 20 105/63 99   














 5/11/17





 07:00


 


Intake Total 530 ml


 


Output Total 2325 ml


 


Balance -1795 ml








Constitutional





Vital Signs








  Date Time  Temp Pulse Resp B/P Pulse Ox O2 Delivery O2 Flow Rate FiO2


 


5/11/17 07:42 96.9 87 20 109/71 100   


 


5/11/17 04:00 98.7 74 22 136/80 98   


 


5/11/17 00:00 97.2 73 22 136/70 98   


 


5/10/17 23:40  102      


 


5/10/17 20:00 97.9 92 21 133/75 99   


 


5/10/17 20:00  92      


 


5/10/17 19:00      Room Air  


 


5/10/17 18:06      Room Air  


 


5/10/17 16:23 98.4 87 16 136/78 99   


 


5/10/17 14:28  95      


 


5/10/17 12:00 97.9 88 20 105/63 99   














 5/11/17





 07:00


 


Intake Total 530 ml


 


Output Total 2325 ml


 


Balance -1795 ml





 (Ashly Sanchez)





Review of Systems/Exam


Exam


Mr. Majano is alert, comfortable. He follows commands.  





Cranial nerve examination: pupils equal, gross eoms intact.  Facial motor 

function appears normal and symmetrical.   





Neck soft, supple





Left forehead lac bandaged.  





Motor: moves all four extremities well 





Sensory reports intact to light touch x 4 extremities


 


Bilateral plantar flexion response.  DTRs are symmetrical





Cerebellar examination limited but unremarkable (Ashly Sanchez)





Medications


Current Medications





Current Medications








 Medications


  (Trade)  Dose


 Ordered  Sig/Marcia


 Route


 PRN Reason  Start Time


 Stop Time Status Last Admin


Dose Admin


 


 Sodium Chloride


  (NS Flush)  2 ml  UNSCH  PRN


 .XX


 FLUSH AFTER USING IV ACCESS  5/6/17 20:00


     


 


 


 Sodium Chloride


  (NS Flush)  2 ml  BID


 .XX


   5/6/17 21:00


    5/11/17 08:47


 


 


 Acetaminophen


  (Tylenol)  650 mg  Q6H  PRN


 PO


 PAIN 1-10 AND/OR FEVER >101F  5/6/17 20:00


    5/9/17 19:05


 


 


 Morphine Sulfate


  (Morphine Inj)  2 mg  Q2H  PRN


 IV


 PAIN SCALE 6 TO 10  5/6/17 20:00


    5/9/17 23:08


 


 


 Lorazepam


  (Ativan Inj)  1 mg  Q1H  PRN


 IV


 Agitation/Sedation  5/6/17 20:00


    5/8/17 00:16


 


 


 Docusate Sodium


  (Colace)  100 mg  BID


 PO


   5/6/17 21:00


    5/10/17 09:00


 


 


 Zolpidem Tartrate


  (Ambien)  5 mg  HS  PRN


 PO


 INSOMNIA  5/6/17 20:00


     


 


 


 Flumazenil


  (Romazicon Inj)  0.2 mg  Q1M  PRN


 IV PUSH


 SEE LABEL COMMENTS  5/6/17 23:15


     


 


 


 Lorazepam


  (Ativan)  1 mg  Q4H  PRN


 PO


 CIWA 8 - 10  5/6/17 23:15


    5/10/17 11:23


 


 


 Lorazepam


  (Ativan Inj)  1 mg  Q4H  PRN


 IV PUSH


 CIWA 8 - 10  5/6/17 23:15


     


 


 


 Lorazepam


  (Ativan)  2 mg  Q2H  PRN


 PO


 CIWA 11-14  5/6/17 23:15


    5/11/17 08:48


 


 


 Lorazepam


  (Ativan Inj)  2 mg  Q2H  PRN


 IV PUSH


 CIWA 11-14  5/6/17 23:15


    5/8/17 04:51


 


 


 Lorazepam


  (Ativan Inj)  2 mg  Q1H  PRN


 IV PUSH


 CIWA 15-20  5/6/17 23:15


     


 


 


 Lorazepam


  (Ativan Inj)  2 mg  Q15M  PRN


 IV PUSH


 CIWA > 20  5/6/17 23:15


    5/8/17 07:19


 


 


 Nicotine


  (Habitrol 21 Mg


 Patch.24 Hr)  1 patch  DAILY


 T-DERMAL


   5/7/17 13:00


    5/11/17 08:48


 


 


 Miscellaneous


 Information  1  DAILY


 T-DERMAL


   5/8/17 09:00


    5/10/17 09:00


 


 


 Chlordiazepoxide


  (Librium)  10 mg  TID


 PO


   5/10/17 13:00


 5/12/17 09:14  5/11/17 08:48


 





 (Ashly Sanchez)





Medical Decision Making


MDM Remarks


60 y/o male TBI, b/l subdural hematoma


mild increase right SDH without midline shift per f/u CT H 5/8


clinically better, stable (Ashly Sanchez)





Plan


Plan Remarks


cont nonsurgical mgt,


cont neuro check


ok to transfer out of unit 


dw pt to avoid further head injuries (Ashly Sanchez)





Attending Statement


Continue neuro checks in a serial fashion.  





Respiratory. pulmonary toilette, nasotracheal suction, and breathing treatments 

with nebulizers. 





PT and OT 





Nutrition. Oral diet





Endocrine. Monitor serial Acu checks and SSI for tight control





ID monitor for signs of infection





GERD. Protonix for stress ulcer prophylaxis





Brian hose and SCD's for DVT prophylaxis








The exam, history, and the medical decision-making described in the above note 

were completed with the assistance of the mid-level provider. I reviewed and 

agree with the findings presented.  I attest that I had a face-to-face 

encounter with the patient on the same day, and personally performed and 

documented my assessment and findings in the medical record. (Wilder Grace MD)








Ashly Sanchez May 11, 2017 11:06


Wilder Grace MD May 21, 2017 20:26

## 2017-05-12 VITALS
OXYGEN SATURATION: 94 % | SYSTOLIC BLOOD PRESSURE: 150 MMHG | HEART RATE: 101 BPM | TEMPERATURE: 98.5 F | RESPIRATION RATE: 20 BRPM | DIASTOLIC BLOOD PRESSURE: 87 MMHG

## 2017-05-12 VITALS
TEMPERATURE: 97.8 F | SYSTOLIC BLOOD PRESSURE: 105 MMHG | OXYGEN SATURATION: 98 % | RESPIRATION RATE: 21 BRPM | DIASTOLIC BLOOD PRESSURE: 66 MMHG | HEART RATE: 86 BPM

## 2017-05-12 VITALS
SYSTOLIC BLOOD PRESSURE: 103 MMHG | HEART RATE: 95 BPM | TEMPERATURE: 97.3 F | DIASTOLIC BLOOD PRESSURE: 59 MMHG | RESPIRATION RATE: 18 BRPM | OXYGEN SATURATION: 99 %

## 2017-05-12 VITALS
RESPIRATION RATE: 18 BRPM | OXYGEN SATURATION: 97 % | DIASTOLIC BLOOD PRESSURE: 77 MMHG | SYSTOLIC BLOOD PRESSURE: 120 MMHG | TEMPERATURE: 97.2 F | HEART RATE: 82 BPM

## 2017-05-12 VITALS
OXYGEN SATURATION: 98 % | RESPIRATION RATE: 18 BRPM | DIASTOLIC BLOOD PRESSURE: 85 MMHG | SYSTOLIC BLOOD PRESSURE: 135 MMHG | HEART RATE: 94 BPM | TEMPERATURE: 97.7 F

## 2017-05-12 VITALS
HEART RATE: 82 BPM | DIASTOLIC BLOOD PRESSURE: 84 MMHG | TEMPERATURE: 95.9 F | RESPIRATION RATE: 20 BRPM | OXYGEN SATURATION: 100 % | SYSTOLIC BLOOD PRESSURE: 145 MMHG

## 2017-05-12 VITALS — HEART RATE: 85 BPM

## 2017-05-12 VITALS — HEART RATE: 105 BPM

## 2017-05-12 LAB
ANION GAP SERPL CALC-SCNC: 9 MEQ/L (ref 5–15)
BASOPHILS # BLD AUTO: 0 TH/MM3 (ref 0–0.2)
BASOPHILS NFR BLD: 0.6 % (ref 0–2)
BUN SERPL-MCNC: 4 MG/DL (ref 7–18)
CHLORIDE SERPL-SCNC: 106 MEQ/L (ref 98–107)
EOSINOPHIL # BLD: 0 TH/MM3 (ref 0–0.4)
EOSINOPHIL NFR BLD: 0.8 % (ref 0–4)
ERYTHROCYTE [DISTWIDTH] IN BLOOD BY AUTOMATED COUNT: 15.2 % (ref 11.6–17.2)
GFR SERPLBLD BASED ON 1.73 SQ M-ARVRAT: 134 ML/MIN (ref 89–?)
HCO3 BLD-SCNC: 29.2 MEQ/L (ref 21–32)
HCT VFR BLD CALC: 24.7 % (ref 39–51)
HEMO FLAGS: (no result)
LYMPHOCYTES # BLD AUTO: 0.7 TH/MM3 (ref 1–4.8)
LYMPHOCYTES NFR BLD AUTO: 16.6 % (ref 9–44)
MCH RBC QN AUTO: 36.1 PG (ref 27–34)
MCHC RBC AUTO-ENTMCNC: 35.1 % (ref 32–36)
MCV RBC AUTO: 102.9 FL (ref 80–100)
MONOCYTES NFR BLD: 18.1 % (ref 0–8)
NEUTROPHILS # BLD AUTO: 2.8 TH/MM3 (ref 1.8–7.7)
NEUTROPHILS NFR BLD AUTO: 63.9 % (ref 16–70)
PLATELET # BLD: 124 TH/MM3 (ref 150–450)
POTASSIUM SERPL-SCNC: 2.7 MEQ/L (ref 3.5–5.1)
RBC # BLD AUTO: 2.4 MIL/MM3 (ref 4.5–5.9)
SODIUM SERPL-SCNC: 144 MEQ/L (ref 136–145)
WBC # BLD AUTO: 4.3 TH/MM3 (ref 4–11)

## 2017-05-12 RX ADMIN — DOCUSATE SODIUM SCH MG: 100 CAPSULE, LIQUID FILLED ORAL at 08:44

## 2017-05-12 RX ADMIN — Medication PRN ML: at 02:05

## 2017-05-12 RX ADMIN — NICOTINE SCH PATCH: 21 PATCH, EXTENDED RELEASE TOPICAL at 08:44

## 2017-05-12 RX ADMIN — DOCUSATE SODIUM SCH MG: 100 CAPSULE, LIQUID FILLED ORAL at 21:00

## 2017-05-12 RX ADMIN — Medication PRN ML: at 03:49

## 2017-05-12 RX ADMIN — Medication SCH ML: at 08:44

## 2017-05-12 RX ADMIN — Medication PRN ML: at 05:52

## 2017-05-12 NOTE — HHI.PR
Subjective


Remarks


Follow-up bilateral subdural hematoma


05/11/17-patient seen and examined, unsteady as patient was trying to get out 

of bed.  Otherwise no other issues


05/12/17-patient seen and examined, resting.  Potassium 2.7.  Afebrile and 

vitals stable.





Objective


Vitals





 Vital Signs








  Date Time  Temp Pulse Resp B/P Pulse Ox O2 Delivery O2 Flow Rate FiO2


 


5/12/17 08:00 97.7 94 18 135/85 98   


 


5/12/17 04:00 95.9 82 20 145/84 100   


 


5/12/17 00:00 98.5 101 20 150/87 94   


 


5/11/17 20:00 96.8 94 20 137/74 94   


 


5/11/17 16:19 98.3 68 18 116/72 99   


 


5/11/17 11:58 97.4 81 20 142/87 100   


 


5/11/17 11:11  85      








 I/O








 5/11/17 5/11/17 5/11/17 5/12/17 5/12/17 5/12/17





 07:00 15:00 23:00 07:00 15:00 23:00


 


Intake Total  360 ml 240 ml 60 ml  


 


Output Total 1350 ml 200 ml    


 


Balance -1350 ml 160 ml 240 ml 60 ml  


 


      


 


Intake Oral  360 ml 240 ml 60 ml  


 


Output Urine Total 1350 ml 200 ml    


 


# Voids  1 2 2  


 


# Bowel Movements   0 0  








Result Diagram:  


5/12/17 0718 5/12/17 0718





Objective Remarks


GENERAL: NAD


SKIN: Warm and dry.


HEAD: Normocephalic.bandage covering head


EYES: No scleral icterus. No injection or drainage. 


NECK: Supple, trachea midline. No JVD or lymphadenopathy.


CARDIOVASCULAR: Regular rate and rhythm without murmurs, gallops, or rubs. 


RESPIRATORY: Breath sounds equal bilaterally. No accessory muscle use.


GASTROINTESTINAL: Abdomen soft, non-tender, nondistended. 


MUSCULOSKELETAL: No cyanosis, or edema. 


BACK: Nontender without obvious deformity. No CVA tenderness.








A/P


Problem List:  


(1) Subdural hematoma


ICD Code:  I62.00


Status:  Acute


(2) Subarachnoid hemorrhage


ICD Code:  I60.9


Status:  Acute


(3) Dehydration


ICD Code:  E86.0


Status:  Acute


(4) Alcohol withdrawal


ICD Code:  F10.239


Status:  Acute


(5) Alcohol dependence


ICD Code:  F10.20


Status:  Acute


Assessment and Plan


61-year-old man with





Bilateral Subdural hematoma


Traumatic Subarachnoid bleed


Alcohol withdrawal


Scalp laceration


- Platelet transfusion to keep >80


- Repeat CT head -very slight enlargement in right subdural hemorrhage


- ICH Management per neurosurgery Dr. Grace. No surgical intervention at this 

time


- Apparently patient refused scalp laceration repair


- Neuro checks per unit protocol


- Continue CIWA protocol


- Thiamine 100 mg IV daily





Thrombocytopenia


Coagulopathy


- Iatrogenic/Coumadin


- No anticoagulation until neurosurgically cleared


- Thrombocytopenia likely due to alcohol abuse


- Transfuse platelets to keep platelet count above 80





Hypokalemia


Give potassium 60 mEq 1 now and monitor BMP





Tobacco abuse


Continue nicotine patch





DVT GI prophylaxis


- Teds SCDs


- No pharmacological DVT prophylaxis due to ICH


- Jeremy Khan MD May 12, 2017 10:28

## 2017-05-12 NOTE — HHI.NSPN
__________________________________________________





History


Chief Complaint:  TBI


Interval History


This is a 61 year old male with history of chronic alcohol use presents to the 

emergency department for evaluation of head injury after syncopal episode.  He 

states he has had multiple syncopal episodes over the past week, last time last 

night at 8:30pm.  No tonic-clonic movements noted.  No tongue biting.  No 

incontinence of stool or urine.  Patient has large amount of dried blood to 

left forehead with erythematous left eye.  He has a large frontal laceration He 

is on Coumadin.  In the emergency department his alcohol level was found to be 

92.  He refused facial laceration sutures.  Denies any focal motor weakness.  

He denies any sensory loss.  No problems controlling his bladder by Wood.  CT 

of the brain showed bilateral subdural hematomas.  Neurosurgical consultation 

was requested





5/8: currently mildly sedated on Precedex due to agitation trying to get out of 

bed, reports moves x 4 extremities. 


5/9: not cooperative with exam becomes combative, localizes and withdraws x 4 

extremities. 


5/10: clinically better today, alert, eating breakfast, pleasant and 

cooperative. 


5/11: transferred to , doing well, neuro stable


5/12: Patient lethargic but is arousable and follows some simple commands with 

persistence.  He denies headaches but does not verbalize much.


System Review Comments


Not able to obtain given clinical condition.





Exam


Results





 Vital Signs








  Date Time  Temp Pulse Resp B/P Pulse Ox O2 Delivery O2 Flow Rate FiO2


 


5/12/17 08:00 97.7 94 18 135/85 98   


 


5/10/17 19:00      Room Air  


 


5/10/17 18:06        








 Intake and Output








 5/11/17 5/11/17 5/12/17





 08:00 16:00 00:00


 


Intake Total  360 ml 240 ml


 


Output Total 1350 ml 200 ml 


 


Balance -1350 ml 160 ml 240 ml








Physical Examination


Resp:  CTA bilaterally


Heart:  NSR no murmurs


Abd:  soft positive bs


Skin:  head bandaged abrasions forehead.


Muscle:   and moves toes with persistence.


Neuro:  Patient opens eyes to stimulation.  With persistence patient follows 

simple commands.  Pupils are 3 mm bilaterally.  Patient is lethargic but 

following commands and denies headaches although isn't verbalizing much.


Lab, Micro, Other Results





Last Impressions








Head CT 5/8/17 0800 Signed





Impressions: 





 Service Date/Time:  Monday, May 8, 2017 11:29 - CONCLUSION:  Minimal 

enlargement 





 of the right subdural with new acute blood products.  There is no midline 

shift. 





     Carlos Carrillo MD  FACR


 


Maxillofacial CT 5/6/17 1446 Signed





Impressions: 





 Service Date/Time:  Saturday, May 6, 2017 16:58 - CONCLUSION:  1. Left frontal 





 scalp laceration and facial soft tissue swelling. No acute bony abnormalities.

   





   Alvarez Murillo MD 


 


Chest X-Ray 5/6/17 1446 Signed





Impressions: 





 Service Date/Time:  Saturday, May 6, 2017 14:59 - CONCLUSION:  1. Scarring 

right 





 lung base improved from March 18. Left lung remains clear. Postoperative 

median 





 sternotomy and valve replacement.      Alvarez Murillo MD 


 


Chest CT 5/6/17 1446 Signed





Impressions: 





 Service Date/Time:  Saturday, May 6, 2017 17:11 - CONCLUSION:  1. Decrease in 





 size of loculated right pleural effusion since March 8. Residual loculated 

fluid 





 in the lower right hemithorax measures about 2.1 cm in thickness associated 

with 





 pleural thickening and old unfused right lower rib fracture. 2. Postoperative 





 median sternotomy and aortic valve replacement. Stable aortic root dilatation 

to 





 4.5 cm with no pneumothorax.     Alvarez Murillo MD 


 


Cervical Spine CT 5/6/17 1446 Signed





Impressions: 





 Service Date/Time:  Saturday, May 6, 2017 16:58 - CONCLUSION:  1. No acute 





 findings. Moderate degenerative disc disease.     Alvarez Murillo MD 








Laboratory Tests








Test 5/12/17





 07:18


 


White Blood Count 4.3 TH/MM3


 


Red Blood Count 2.40 MIL/MM3


 


Hemoglobin 8.7 GM/DL


 


Hematocrit 24.7 %


 


Mean Corpuscular Volume 102.9 FL


 


Mean Corpuscular Hemoglobin 36.1 PG


 


Mean Corpuscular Hemoglobin 35.1 %





Concent 


 


Red Cell Distribution Width 15.2 %


 


Platelet Count 124 TH/MM3


 


Mean Platelet Volume 8.3 FL


 


Neutrophils (%) (Auto) 63.9 %


 


Lymphocytes (%) (Auto) 16.6 %


 


Monocytes (%) (Auto) 18.1 %


 


Eosinophils (%) (Auto) 0.8 %


 


Basophils (%) (Auto) 0.6 %


 


Neutrophils # (Auto) 2.8 TH/MM3


 


Lymphocytes # (Auto) 0.7 TH/MM3


 


Monocytes # (Auto) 0.8 TH/MM3


 


Eosinophils # (Auto) 0.0 TH/MM3


 


Basophils # (Auto) 0.0 TH/MM3


 


CBC Comment DIFF FINAL 


 


Differential Comment  


 


Sodium Level 144 MEQ/L


 


Potassium Level 2.7 MEQ/L


 


Chloride Level 106 MEQ/L


 


Carbon Dioxide Level 29.2 MEQ/L


 


Anion Gap 9 MEQ/L


 


Blood Urea Nitrogen 4 MG/DL


 


Creatinine 0.61 MG/DL


 


Estimat Glomerular Filtration 134 ML/MIN





Rate 


 


Random Glucose 89 MG/DL


 


Calcium Level 8.9 MG/DL














 5/11/17 5/11/17 5/12/17





 15:00 23:00 07:00


 


Intake Total 360 ml 240 ml 60 ml


 


Output Total 200 ml  


 


Balance 160 ml 240 ml 60 ml


 


   


 


Intake Oral 360 ml 240 ml 60 ml


 


Output Urine Total 200 ml  


 


# Voids 1 2 2


 


# Bowel Movements  0 0











Medical Decision Making


Impression and Plan


A:  60 y/o male TBI, b/l subdural hematoma


mild increase right SDH without midline shift per f/u CT H 5/8


clinically stable





 Plan 


Plan


Plan Remarks


cont nonsurgical mgt,


cont neuro check








Jeremy Lin May 12, 2017 14:02

## 2017-05-13 VITALS
HEART RATE: 91 BPM | RESPIRATION RATE: 19 BRPM | SYSTOLIC BLOOD PRESSURE: 116 MMHG | OXYGEN SATURATION: 96 % | DIASTOLIC BLOOD PRESSURE: 65 MMHG | TEMPERATURE: 97.1 F

## 2017-05-13 VITALS
OXYGEN SATURATION: 99 % | DIASTOLIC BLOOD PRESSURE: 56 MMHG | SYSTOLIC BLOOD PRESSURE: 97 MMHG | RESPIRATION RATE: 18 BRPM | TEMPERATURE: 97.2 F | HEART RATE: 86 BPM

## 2017-05-13 VITALS
TEMPERATURE: 97.2 F | DIASTOLIC BLOOD PRESSURE: 51 MMHG | RESPIRATION RATE: 18 BRPM | OXYGEN SATURATION: 97 % | HEART RATE: 63 BPM | SYSTOLIC BLOOD PRESSURE: 107 MMHG

## 2017-05-13 VITALS
RESPIRATION RATE: 18 BRPM | SYSTOLIC BLOOD PRESSURE: 106 MMHG | HEART RATE: 89 BPM | DIASTOLIC BLOOD PRESSURE: 56 MMHG | TEMPERATURE: 97.4 F | OXYGEN SATURATION: 96 %

## 2017-05-13 VITALS
DIASTOLIC BLOOD PRESSURE: 58 MMHG | TEMPERATURE: 96.6 F | OXYGEN SATURATION: 96 % | RESPIRATION RATE: 16 BRPM | HEART RATE: 74 BPM | SYSTOLIC BLOOD PRESSURE: 92 MMHG

## 2017-05-13 VITALS
OXYGEN SATURATION: 95 % | HEART RATE: 102 BPM | RESPIRATION RATE: 20 BRPM | TEMPERATURE: 97.1 F | DIASTOLIC BLOOD PRESSURE: 70 MMHG | SYSTOLIC BLOOD PRESSURE: 109 MMHG

## 2017-05-13 VITALS — HEART RATE: 93 BPM

## 2017-05-13 LAB
ANION GAP SERPL CALC-SCNC: 9 MEQ/L (ref 5–15)
BUN SERPL-MCNC: 5 MG/DL (ref 7–18)
CHLORIDE SERPL-SCNC: 108 MEQ/L (ref 98–107)
GFR SERPLBLD BASED ON 1.73 SQ M-ARVRAT: 139 ML/MIN (ref 89–?)
HCO3 BLD-SCNC: 25.1 MEQ/L (ref 21–32)
POTASSIUM SERPL-SCNC: 3.3 MEQ/L (ref 3.5–5.1)
SODIUM SERPL-SCNC: 142 MEQ/L (ref 136–145)

## 2017-05-13 RX ADMIN — DOCUSATE SODIUM SCH MG: 100 CAPSULE, LIQUID FILLED ORAL at 21:00

## 2017-05-13 RX ADMIN — DOCUSATE SODIUM SCH MG: 100 CAPSULE, LIQUID FILLED ORAL at 09:02

## 2017-05-13 RX ADMIN — NICOTINE SCH PATCH: 21 PATCH, EXTENDED RELEASE TOPICAL at 09:02

## 2017-05-13 RX ADMIN — Medication SCH ML: at 00:58

## 2017-05-13 RX ADMIN — Medication SCH ML: at 09:00

## 2017-05-13 NOTE — HHI.PR
Subjective


Remarks


Follow-up bilateral subdural hematoma


05/11/17-patient seen and examined, unsteady as patient was trying to get out 

of bed.  Otherwise no other issues


05/12/17-patient seen and examined, resting.  Potassium 2.7.  Afebrile and 

vitals stable.


05/13/17-patient seen and examined, no acute event overnight, stable.





Objective


Vitals





 Vital Signs








  Date Time  Temp Pulse Resp B/P Pulse Ox O2 Delivery O2 Flow Rate FiO2


 


5/13/17 08:00  91      


 


5/13/17 08:00 97.1 93 19 116/65 96   


 


5/13/17 04:00 97.2 63 18 107/51 97   


 


5/13/17 00:00 97.2 86 18 97/56 99   


 


5/12/17 20:00 97.3 95 18 103/59 99   


 


5/12/17 18:00  105      


 


5/12/17 16:16  85      


 


5/12/17 16:00 97.8 86 21 105/66 98   


 


5/12/17 12:00 97.2 82 18 120/77 97   








 I/O








 5/12/17 5/12/17 5/12/17 5/13/17 5/13/17 5/13/17





 07:00 15:00 23:00 07:00 15:00 23:00


 


Intake Total 60 ml 120 ml    


 


Balance 60 ml 120 ml    


 


      


 


Intake Oral 60 ml 120 ml    


 


# Voids 2 3  4  


 


# Bowel Movements 0   2  








Result Diagram:  


5/12/17 0718                                                                   

             5/13/17 0745





Objective Remarks


GENERAL: NAD


SKIN: Warm and dry.


HEAD: Normocephalic.bandage covering head


EYES: No scleral icterus. No injection or drainage. 


NECK: Supple, trachea midline. No JVD or lymphadenopathy.


CARDIOVASCULAR: Regular rate and rhythm without murmurs, gallops, or rubs. 


RESPIRATORY: Breath sounds equal bilaterally. No accessory muscle use.


GASTROINTESTINAL: Abdomen soft, non-tender, nondistended. 


MUSCULOSKELETAL: No cyanosis, or edema. 


BACK: Nontender without obvious deformity. No CVA tenderness.








A/P


Problem List:  


(1) Subdural hematoma


ICD Code:  I62.00


Status:  Acute


(2) Subarachnoid hemorrhage


ICD Code:  I60.9


Status:  Acute


(3) Dehydration


ICD Code:  E86.0


Status:  Acute


(4) Alcohol withdrawal


ICD Code:  F10.239


Status:  Acute


(5) Alcohol dependence


ICD Code:  F10.20


Status:  Acute


Assessment and Plan


61-year-old man with





Bilateral Subdural hematoma


Traumatic Subarachnoid bleed


Alcohol withdrawal


Scalp laceration


- Repeat CT head -very slight enlargement in right subdural hemorrhage


- ICH Management per neurosurgery Dr. Grace. No surgical intervention at this 

time


- Apparently patient refused scalp laceration repair


- Neuro checks per unit protocol


- d/c CIWA protocol


- Thiamine 100 mg IV daily





Thrombocytopenia


Coagulopathy


- Iatrogenic/Coumadin


- No anticoagulation until neurosurgically cleared


- Thrombocytopenia likely due to alcohol abuse


- Transfuse platelets to keep platelet count above 80





Hypokalemia


Give again potassium 60 mEq 1 now and monitor K at 5PM





Tobacco abuse


Continue nicotine patch





DVT GI prophylaxis


- Teds SCDs


- No pharmacological DVT prophylaxis due to ICH


- Jeremy Khan MD May 13, 2017 11:53

## 2017-05-13 NOTE — HHI.NSPN
__________________________________________________





History


Chief Complaint:  TBI


Interval History


This is a 61 year old male with history of chronic alcohol use presents to the 

emergency department for evaluation of head injury after syncopal episode.  He 

states he has had multiple syncopal episodes over the past week, last time last 

night at 8:30pm.  No tonic-clonic movements noted.  No tongue biting.  No 

incontinence of stool or urine.  Patient has large amount of dried blood to 

left forehead with erythematous left eye.  He has a large frontal laceration He 

is on Coumadin.  In the emergency department his alcohol level was found to be 

92.  He refused facial laceration sutures.  Denies any focal motor weakness.  

He denies any sensory loss.  No problems controlling his bladder by Wood.  CT 

of the brain showed bilateral subdural hematomas.  Neurosurgical consultation 

was requested





5/8: currently mildly sedated on Precedex due to agitation trying to get out of 

bed, reports moves x 4 extremities. 


5/9: not cooperative with exam becomes combative, localizes and withdraws x 4 

extremities. 


5/10: clinically better today, alert, eating breakfast, pleasant and 

cooperative. 


5/11: transferred to , doing well, neuro stable


5/12: Patient lethargic but is arousable and follows some simple commands with 

persistence.  He denies headaches but does not verbalize much.


5/13:  Patient much more awake today.  He complains of intermittent headaches 

all over.  No nausea or vomiting.  Complains of periods of fatigue.


Review of Systems


General:  Negative for: fever, chills, insomnia


Respiratory:  Negative for: shortness of breath, cough, sputum


Cardiovascular:  Negative for: chest pain


Gastrointestinal:  Negative for: nausea, vomitting, diarrhea, constipation





Exam


Results





 Vital Signs








  Date Time  Temp Pulse Resp B/P Pulse Ox O2 Delivery O2 Flow Rate FiO2


 


5/13/17 08:00 97.1 93 19 116/65 96   


 


5/10/17 19:00      Room Air  


 


5/10/17 18:06        








 Intake and Output








 5/12/17 5/12/17 5/13/17





 08:00 16:00 00:00


 


Intake Total 60 ml 120 ml 


 


Balance 60 ml 120 ml 








Physical Examination


Resp:  CTA bilaterally


Heart:  NSR no murmurs


Abd:  soft positive bs


Skin:  head bandaged abrasions forehead clean and dry.


Muscle:   and moves toes more easily today..


Neuro:  Patient awake more alert today.  His eyes are open.  Pupils are 3 mm 

bilaterally.  Speech is clear and appropriate.  He follows commands well.


Lab, Micro, Other Results





Last Impressions








Head CT 5/8/17 0800 Signed





Impressions: 





 Service Date/Time:  Monday, May 8, 2017 11:29 - CONCLUSION:  Minimal 

enlargement 





 of the right subdural with new acute blood products.  There is no midline 

shift. 





     Carlos Carrillo MD  FACR


 


Maxillofacial CT 5/6/17 1446 Signed





Impressions: 





 Service Date/Time:  Saturday, May 6, 2017 16:58 - CONCLUSION:  1. Left frontal 





 scalp laceration and facial soft tissue swelling. No acute bony abnormalities.

   





   Alvarez Murillo MD 


 


Chest X-Ray 5/6/17 1446 Signed





Impressions: 





 Service Date/Time:  Saturday, May 6, 2017 14:59 - CONCLUSION:  1. Scarring 

right 





 lung base improved from March 18. Left lung remains clear. Postoperative 

median 





 sternotomy and valve replacement.      Alvarez Murillo MD 


 


Chest CT 5/6/17 1446 Signed





Impressions: 





 Service Date/Time:  Saturday, May 6, 2017 17:11 - CONCLUSION:  1. Decrease in 





 size of loculated right pleural effusion since March 8. Residual loculated 

fluid 





 in the lower right hemithorax measures about 2.1 cm in thickness associated 

with 





 pleural thickening and old unfused right lower rib fracture. 2. Postoperative 





 median sternotomy and aortic valve replacement. Stable aortic root dilatation 

to 





 4.5 cm with no pneumothorax.     Alvarez Murillo MD 


 


Cervical Spine CT 5/6/17 1446 Signed





Impressions: 





 Service Date/Time:  Saturday, May 6, 2017 16:58 - CONCLUSION:  1. No acute 





 findings. Moderate degenerative disc disease.     Alvarez Murillo MD 








Laboratory Tests








Test 5/13/17





 07:45


 


Sodium Level 142 MEQ/L


 


Potassium Level 3.3 MEQ/L


 


Chloride Level 108 MEQ/L


 


Carbon Dioxide Level 25.1 MEQ/L


 


Anion Gap 9 MEQ/L


 


Blood Urea Nitrogen 5 MG/DL


 


Creatinine 0.59 MG/DL


 


Estimat Glomerular Filtration 139 ML/MIN





Rate 


 


Random Glucose 89 MG/DL


 


Calcium Level 8.6 MG/DL














 5/12/17 5/12/17 5/13/17





 15:00 23:00 07:00


 


Intake Total 120 ml  


 


Balance 120 ml  


 


   


 


Intake Oral 120 ml  


 


# Voids 3  4


 


# Bowel Movements   2











Medical Decision Making


Impression and Plan


A:  62 y/o male TBI, b/l subdural hematoma


mild increase right SDH without midline shift per f/u CT H 5/8


clinically improving





 Plan 


Plan


Plan Remarks


cont nonsurgical mgt,


cont neuro check








Jeremy Lin May 13, 2017 09:49

## 2017-05-14 VITALS
DIASTOLIC BLOOD PRESSURE: 62 MMHG | OXYGEN SATURATION: 96 % | TEMPERATURE: 98.5 F | SYSTOLIC BLOOD PRESSURE: 97 MMHG | RESPIRATION RATE: 20 BRPM | HEART RATE: 96 BPM

## 2017-05-14 VITALS
OXYGEN SATURATION: 98 % | TEMPERATURE: 96.6 F | HEART RATE: 79 BPM | RESPIRATION RATE: 18 BRPM | SYSTOLIC BLOOD PRESSURE: 101 MMHG | DIASTOLIC BLOOD PRESSURE: 63 MMHG

## 2017-05-14 VITALS
HEART RATE: 89 BPM | DIASTOLIC BLOOD PRESSURE: 67 MMHG | RESPIRATION RATE: 17 BRPM | TEMPERATURE: 97.4 F | OXYGEN SATURATION: 98 % | SYSTOLIC BLOOD PRESSURE: 98 MMHG

## 2017-05-14 VITALS
HEART RATE: 91 BPM | RESPIRATION RATE: 20 BRPM | OXYGEN SATURATION: 96 % | TEMPERATURE: 99.1 F | SYSTOLIC BLOOD PRESSURE: 102 MMHG | DIASTOLIC BLOOD PRESSURE: 62 MMHG

## 2017-05-14 VITALS
RESPIRATION RATE: 16 BRPM | SYSTOLIC BLOOD PRESSURE: 118 MMHG | HEART RATE: 86 BPM | OXYGEN SATURATION: 96 % | DIASTOLIC BLOOD PRESSURE: 73 MMHG | TEMPERATURE: 97.1 F

## 2017-05-14 VITALS
RESPIRATION RATE: 20 BRPM | HEART RATE: 91 BPM | DIASTOLIC BLOOD PRESSURE: 68 MMHG | TEMPERATURE: 97.8 F | OXYGEN SATURATION: 96 % | SYSTOLIC BLOOD PRESSURE: 103 MMHG

## 2017-05-14 VITALS — HEART RATE: 91 BPM

## 2017-05-14 RX ADMIN — ZOLPIDEM TARTRATE PRN MG: 5 TABLET, COATED ORAL at 00:06

## 2017-05-14 RX ADMIN — Medication SCH ML: at 07:57

## 2017-05-14 RX ADMIN — NICOTINE SCH PATCH: 21 PATCH, EXTENDED RELEASE TOPICAL at 07:58

## 2017-05-14 RX ADMIN — Medication SCH ML: at 20:17

## 2017-05-14 RX ADMIN — ACETAMINOPHEN PRN MG: 325 TABLET ORAL at 00:07

## 2017-05-14 RX ADMIN — ACETAMINOPHEN PRN MG: 325 TABLET ORAL at 08:00

## 2017-05-14 RX ADMIN — ZOLPIDEM TARTRATE PRN MG: 5 TABLET, COATED ORAL at 23:20

## 2017-05-14 RX ADMIN — DOCUSATE SODIUM SCH MG: 100 CAPSULE, LIQUID FILLED ORAL at 07:58

## 2017-05-14 RX ADMIN — Medication SCH ML: at 00:32

## 2017-05-14 RX ADMIN — DOCUSATE SODIUM SCH MG: 100 CAPSULE, LIQUID FILLED ORAL at 20:17

## 2017-05-14 NOTE — HHI.NSPN
__________________________________________________





History


Chief Complaint:  TBI


Interval History


This is a 61 year old male with history of chronic alcohol use presents to the 

emergency department for evaluation of head injury after syncopal episode.  He 

states he has had multiple syncopal episodes over the past week, last time last 

night at 8:30pm.  No tonic-clonic movements noted.  No tongue biting.  No 

incontinence of stool or urine.  Patient has large amount of dried blood to 

left forehead with erythematous left eye.  He has a large frontal laceration He 

is on Coumadin.  In the emergency department his alcohol level was found to be 

92.  He refused facial laceration sutures.  Denies any focal motor weakness.  

He denies any sensory loss.  No problems controlling his bladder by Wood.  CT 

of the brain showed bilateral subdural hematomas.  Neurosurgical consultation 

was requested





5/8: currently mildly sedated on Precedex due to agitation trying to get out of 

bed, reports moves x 4 extremities. 


5/9: not cooperative with exam becomes combative, localizes and withdraws x 4 

extremities. 


5/10: clinically better today, alert, eating breakfast, pleasant and 

cooperative. 


5/11: transferred to , doing well, neuro stable


5/12: Patient lethargic but is arousable and follows some simple commands with 

persistence.  He denies headaches but does not verbalize much.


5/13:  Patient much more awake today.  He complains of intermittent headaches 

all over.  No nausea or vomiting.  Complains of periods of fatigue.


5/14:  Patient awake and alert.  He complains of headache at the vertex.  No 

nausea vomiting.  Denies any paresthesias.


Review of Systems


General:  Negative for: fever, chills, insomnia


Respiratory:  Negative for: shortness of breath, cough, sputum


Cardiovascular:  Negative for: chest pain


Gastrointestinal:  Negative for: nausea, vomitting, diarrhea, constipation





Exam


Results





 Vital Signs








  Date Time  Temp Pulse Resp B/P Pulse Ox O2 Delivery O2 Flow Rate FiO2


 


5/14/17 08:23  91      


 


5/14/17 08:00 97.1  16 118/73 96   


 


5/10/17 19:00      Room Air  


 


5/10/17 18:06        








 Intake and Output








 5/13/17 5/13/17 5/14/17





 08:00 16:00 00:00


 


Intake Total   240 ml


 


Balance   240 ml








Physical Examination


Resp:  CTA bilaterally


Heart:  NSR no murmurs


Abd:  soft positive bs


Skin:  head bandaged abrasions forehead clean and dry.


Muscle:   and moves toes more easily.


Neuro:  Patient continues to be more awake and alert.  His eyes are open.  

Pupils are 3 mm bilaterally.  Speech is clear and appropriate.  He follows 

commands well.


Lab, Micro, Other Results





Laboratory Tests








Test 5/13/17





 16:55


 


Potassium Level 3.5 MEQ/L














 5/13/17 5/13/17 5/14/17





 15:00 23:00 07:00


 


Intake Total  240 ml 120 ml


 


Output Total   550 ml


 


Balance  240 ml -430 ml


 


   


 


Intake Oral  240 ml 120 ml


 


Output Urine Total   550 ml


 


# Voids  1 


 


# Bowel Movements  1 0











Medical Decision Making


Impression and Plan


A:  60 y/o male TBI, b/l subdural hematoma


mild increase right SDH without midline shift per f/u CT H 5/8


clinically improving





 Plan 


Plan


Plan Remarks


cont nonsurgical mgt,


cont neuro check








Jeremy Lin May 14, 2017 09:57

## 2017-05-14 NOTE — HHI.PR
Subjective


Remarks


Follow-up bilateral subdural hematoma


05/11/17-patient seen and examined, unsteady as patient was trying to get out 

of bed.  Otherwise no other issues


05/12/17-patient seen and examined, resting.  Potassium 2.7.  Afebrile and 

vitals stable.


05/13/17-patient seen and examined, no acute event overnight, stable.


05/14/17-patient seen and examined; having breakfast and denies any 

complication.  No acute event overnight.





Objective


Vitals





 Vital Signs








  Date Time  Temp Pulse Resp B/P Pulse Ox O2 Delivery O2 Flow Rate FiO2


 


5/14/17 08:23  91      


 


5/14/17 08:22  91      


 


5/14/17 08:00 97.1 86 16 118/73 96   


 


5/14/17 04:00 99.1 91 20 102/62 96   


 


5/14/17 00:00 97.8 91 20 103/68 96   


 


5/13/17 20:00 97.1 102 20 109/70 95   


 


5/13/17 16:00 97.4 89 18 106/56 96   


 


5/13/17 12:00 96.6       


 


5/13/17 12:00 98.1 74 16 92/58 96   








 I/O








 5/13/17 5/13/17 5/13/17 5/14/17 5/14/17 5/14/17





 07:00 15:00 23:00 07:00 15:00 23:00


 


Intake Total   240 ml 120 ml  


 


Output Total    550 ml  


 


Balance   240 ml -430 ml  


 


      


 


Intake Oral   240 ml 120 ml  


 


Output Urine Total    550 ml  


 


# Voids 4  1   


 


# Bowel Movements 2  1 0  








Result Diagram:  


5/12/17 0718                                                                   

             5/13/17 1655





Objective Remarks


GENERAL: NAD and having breakfast


SKIN: Warm and dry.


HEAD: Normocephalic.bandage covering head


EYES: No scleral icterus. No injection or drainage. 


NECK: Supple, trachea midline. No JVD or lymphadenopathy.


CARDIOVASCULAR: Regular rate and rhythm without murmurs, gallops, or rubs. 


RESPIRATORY: Breath sounds equal bilaterally. No accessory muscle use.


GASTROINTESTINAL: Abdomen soft, non-tender, nondistended. 


MUSCULOSKELETAL: No cyanosis, or edema. 


BACK: Nontender without obvious deformity. No CVA tenderness.








A/P


Problem List:  


(1) Subdural hematoma


ICD Code:  I62.00


Status:  Acute


(2) Subarachnoid hemorrhage


ICD Code:  I60.9


Status:  Acute


(3) Dehydration


ICD Code:  E86.0


Status:  Acute


(4) Alcohol withdrawal


ICD Code:  F10.239


Status:  Acute


(5) Alcohol dependence


ICD Code:  F10.20


Status:  Acute


Assessment and Plan


61-year-old man with





Bilateral Subdural hematoma


Traumatic Subarachnoid bleed


Alcohol withdrawal


Scalp laceration


- Repeat CT head -very slight enlargement in right subdural hemorrhage


- ICH Management per neurosurgery Dr. Grace. No surgical intervention at this 

time


- Apparently patient refused scalp laceration repair


- Neuro checks per unit protocol


- Thiamine 100 mg daily





Thrombocytopenia


Coagulopathy


- Iatrogenic/Coumadin


- No anticoagulation until neurosurgically cleared


- Thrombocytopenia likely due to alcohol abuse


- Transfuse platelets to keep platelet count above 80





Hypokalemia: Resolved





Tobacco abuse


Continue nicotine patch





DVT GI prophylaxis


- Teds SCDs


- No pharmacological DVT prophylaxis due to ICH


- Jeremy Khan MD May 14, 2017 09:43

## 2017-05-15 VITALS
RESPIRATION RATE: 18 BRPM | HEART RATE: 87 BPM | SYSTOLIC BLOOD PRESSURE: 101 MMHG | OXYGEN SATURATION: 93 % | DIASTOLIC BLOOD PRESSURE: 62 MMHG | TEMPERATURE: 97.6 F

## 2017-05-15 VITALS
HEART RATE: 87 BPM | RESPIRATION RATE: 17 BRPM | OXYGEN SATURATION: 94 % | TEMPERATURE: 97.3 F | SYSTOLIC BLOOD PRESSURE: 94 MMHG | DIASTOLIC BLOOD PRESSURE: 64 MMHG

## 2017-05-15 VITALS
TEMPERATURE: 97.9 F | OXYGEN SATURATION: 96 % | SYSTOLIC BLOOD PRESSURE: 98 MMHG | HEART RATE: 95 BPM | DIASTOLIC BLOOD PRESSURE: 59 MMHG | RESPIRATION RATE: 20 BRPM

## 2017-05-15 VITALS — HEART RATE: 89 BPM

## 2017-05-15 VITALS
DIASTOLIC BLOOD PRESSURE: 59 MMHG | RESPIRATION RATE: 18 BRPM | TEMPERATURE: 96.8 F | HEART RATE: 84 BPM | OXYGEN SATURATION: 96 % | SYSTOLIC BLOOD PRESSURE: 81 MMHG

## 2017-05-15 VITALS
OXYGEN SATURATION: 98 % | RESPIRATION RATE: 20 BRPM | SYSTOLIC BLOOD PRESSURE: 90 MMHG | DIASTOLIC BLOOD PRESSURE: 64 MMHG | TEMPERATURE: 97.4 F | HEART RATE: 93 BPM

## 2017-05-15 VITALS — HEART RATE: 88 BPM

## 2017-05-15 VITALS
RESPIRATION RATE: 20 BRPM | DIASTOLIC BLOOD PRESSURE: 56 MMHG | TEMPERATURE: 97.6 F | OXYGEN SATURATION: 96 % | SYSTOLIC BLOOD PRESSURE: 92 MMHG | HEART RATE: 89 BPM

## 2017-05-15 VITALS — HEART RATE: 100 BPM

## 2017-05-15 RX ADMIN — MORPHINE SULFATE PRN MG: 2 INJECTION, SOLUTION INTRAMUSCULAR; INTRAVENOUS at 17:18

## 2017-05-15 RX ADMIN — NICOTINE SCH PATCH: 21 PATCH, EXTENDED RELEASE TOPICAL at 08:55

## 2017-05-15 RX ADMIN — Medication SCH MG: at 08:54

## 2017-05-15 RX ADMIN — Medication SCH ML: at 21:05

## 2017-05-15 RX ADMIN — MORPHINE SULFATE PRN MG: 2 INJECTION, SOLUTION INTRAMUSCULAR; INTRAVENOUS at 11:02

## 2017-05-15 RX ADMIN — DOCUSATE SODIUM SCH MG: 100 CAPSULE, LIQUID FILLED ORAL at 21:00

## 2017-05-15 RX ADMIN — Medication SCH ML: at 08:55

## 2017-05-15 RX ADMIN — DOCUSATE SODIUM SCH MG: 100 CAPSULE, LIQUID FILLED ORAL at 08:56

## 2017-05-15 RX ADMIN — ACETAMINOPHEN PRN MG: 325 TABLET ORAL at 16:25

## 2017-05-15 NOTE — HHI.PR
Subjective


Remarks


Follow-up bilateral subdural hematoma


05/11/17-patient seen and examined, unsteady as patient was trying to get out 

of bed.  Otherwise no other issues


05/12/17-patient seen and examined, resting.  Potassium 2.7.  Afebrile and 

vitals stable.


05/13/17-patient seen and examined, no acute event overnight, stable.


05/14/17-patient seen and examined; having breakfast and denies any 

complication.  No acute event overnight.


05/15/17patient seen and examined, alert and oriented 3.  No acute event 

overnight





Objective


Vitals





 Vital Signs








  Date Time  Temp Pulse Resp B/P Pulse Ox O2 Delivery O2 Flow Rate FiO2


 


5/15/17 09:46  89      


 


5/15/17 08:00 97.6 87 18 101/62 93   


 


5/15/17 04:00 97.4 93 20 90/64 98   


 


5/15/17 02:01  100      


 


5/15/17 00:00 97.9 95 20 98/59 96   


 


5/14/17 20:00 98.5 96 20 97/62 96   


 


5/14/17 16:00 96.6 79 18 101/63 98   


 


5/14/17 12:00 97.4 89 17 98/67 98   








 I/O








 5/14/17 5/14/17 5/14/17 5/15/17 5/15/17 5/15/17





 07:00 15:00 23:00 07:00 15:00 23:00


 


Intake Total 120 ml  240 ml 60 ml  


 


Output Total 550 ml  350 ml 300 ml  


 


Balance -430 ml  -110 ml -240 ml  


 


      


 


Intake Oral 120 ml  240 ml 60 ml  


 


Output Urine Total 550 ml  350 ml 300 ml  


 


# Voids   1   


 


# Bowel Movements 0  0 0  








Result Diagram:  


5/12/17 0718                                                                   

             5/13/17 1655





Imaging





Last Impressions








Head CT 5/8/17 0800 Signed





Impressions: 





 Service Date/Time:  Monday, May 8, 2017 11:29 - CONCLUSION:  Minimal 

enlargement 





 of the right subdural with new acute blood products.  There is no midline 

shift. 





     Carlos Carrillo MD  FACR


 


Maxillofacial CT 5/6/17 1446 Signed





Impressions: 





 Service Date/Time:  Saturday, May 6, 2017 16:58 - CONCLUSION:  1. Left frontal 





 scalp laceration and facial soft tissue swelling. No acute bony abnormalities.

   





   Alvraez Murillo MD 


 


Chest X-Ray 5/6/17 1446 Signed





Impressions: 





 Service Date/Time:  Saturday, May 6, 2017 14:59 - CONCLUSION:  1. Scarring 

right 





 lung base improved from March 18. Left lung remains clear. Postoperative 

median 





 sternotomy and valve replacement.      Alvarez Murillo MD 


 


Chest CT 5/6/17 1446 Signed





Impressions: 





 Service Date/Time:  Saturday, May 6, 2017 17:11 - CONCLUSION:  1. Decrease in 





 size of loculated right pleural effusion since March 8. Residual loculated 

fluid 





 in the lower right hemithorax measures about 2.1 cm in thickness associated 

with 





 pleural thickening and old unfused right lower rib fracture. 2. Postoperative 





 median sternotomy and aortic valve replacement. Stable aortic root dilatation 

to 





 4.5 cm with no pneumothorax.     Alvarez Murillo MD 


 


Cervical Spine CT 5/6/17 1446 Signed





Impressions: 





 Service Date/Time:  Saturday, May 6, 2017 16:58 - CONCLUSION:  1. No acute 





 findings. Moderate degenerative disc disease.     Alvarez Murillo MD 








Objective Remarks


GENERAL: NAD 


SKIN: Warm and dry.


HEAD: Normocephalic.  Resolving head wound


EYES: No scleral icterus. No injection or drainage. 


NECK: Supple, trachea midline. No JVD or lymphadenopathy.


CARDIOVASCULAR: Regular rate and rhythm without murmurs, gallops, or rubs. 


RESPIRATORY: Breath sounds equal bilaterally. No accessory muscle use.


GASTROINTESTINAL: Abdomen soft, non-tender, nondistended. 


MUSCULOSKELETAL: No cyanosis, or edema. 


BACK: Nontender without obvious deformity. No CVA tenderness.





Procedures


None





A/P


Problem List:  


(1) Subdural hematoma


ICD Code:  I62.00


Status:  Acute


(2) Subarachnoid hemorrhage


ICD Code:  I60.9


Status:  Acute


(3) Dehydration


ICD Code:  E86.0


Status:  Acute


(4) Alcohol withdrawal


ICD Code:  F10.239


Status:  Acute


(5) Alcohol dependence


ICD Code:  F10.20


Status:  Acute


Assessment and Plan


61-year-old man with





Bilateral Subdural hematoma


Traumatic Subarachnoid bleed


Alcohol withdrawal-resolved


Scalp laceration-improving


- Repeat CT head -very slight enlargement in right subdural hemorrhage


- ICH Management per neurosurgery Dr. Grace. No surgical intervention at this 

time


- Apparently patient refused scalp laceration repair


- Neuro checks per unit protocol


- Thiamine 100 mg daily





Thrombocytopenia


Coagulopathy


- Iatrogenic/Coumadin


- No anticoagulation until neurosurgically cleared.  Neurosurgery to decide 

when to resume Coumadin


- Thrombocytopenia likely due to alcohol abuse


- Transfuse platelets to keep platelet count above 80





Hypokalemia: Resolved





Tobacco abuse


Continue nicotine patch





DVT GI prophylaxis


- Teds SCDs


- No pharmacological DVT prophylaxis due to ICH


- Jeremy Khan MD May 15, 2017 09:58

## 2017-05-15 NOTE — HHI.NSPN
__________________________________________________ (Sid Walton)





Note Status


Status:  Progress Note (IsabelleSid CARPENTER)





Interval History


Interval History


This is a 61 year old male with history of chronic alcohol use presents to the 

emergency department for evaluation of head injury after syncopal episode.  He 

states he has had multiple syncopal episodes over the past week, last time last 

night at 8:30pm.  No tonic-clonic movements noted.  No tongue biting.  No 

incontinence of stool or urine.  Patient has large amount of dried blood to 

left forehead with erythematous left eye.  He has a large frontal laceration He 

is on Coumadin.  In the emergency department his alcohol level was found to be 

92.  He refused facial laceration sutures.  Denies any focal motor weakness.  

He denies any sensory loss.  No problems controlling his bladder by Wood.  CT 

of the brain showed bilateral subdural hematomas.  Neurosurgical consultation 

was requested





5/8: currently mildly sedated on Precedex due to agitation trying to get out of 

bed, reports moves x 4 extremities. 


5/9: not cooperative with exam becomes combative, localizes and withdraws x 4 

extremities. 


5/10: clinically better today, alert, eating breakfast, pleasant and 

cooperative. 


5/11: transferred to , doing well, neuro stable


5/12: Patient lethargic but is arousable and follows some simple commands with 

persistence.  He denies headaches but does not verbalize much.


5/13:  Patient much more awake today.  He complains of intermittent headaches 

all over.  No nausea or vomiting.  Complains of periods of fatigue.


5/14:  Patient awake and alert.  He complains of headache at the vertex.  No 

nausea vomiting.  Denies any paresthesias.


5/15: Patient awake & alert. His only complaint is a headache. (Sid Walton)





Labs, Micro, & Vital Signs


Constitutional





Vital Signs








  Date Time  Temp Pulse Resp B/P Pulse Ox O2 Delivery O2 Flow Rate FiO2


 


5/15/17 12:00 96.8 84 18 81/59 96   


 


5/15/17 09:46  89      


 


5/15/17 08:00 97.6 87 18 101/62 93   


 


5/15/17 04:00 97.4 93 20 90/64 98   


 


5/15/17 02:01  100      


 


5/15/17 00:00 97.9 95 20 98/59 96   


 


5/14/17 20:00 98.5 96 20 97/62 96   


 


5/14/17 16:00 96.6 79 18 101/63 98   














 5/15/17





 07:00


 


Intake Total 300 ml


 


Output Total 650 ml


 


Balance -350 ml





 (Sid Walton)





Review of Systems/Exam


ROS


General: Patient denies any fever or chills.


HEENT: Patient has some pain to the face but denies any facial numbness.


Neck: Patient denies any neck pain.  


Respiratory: Patient denies any shortness of breath or productive cough.


Cardiovascular:  Patient denies any chest pain, palpitations or irregular heart 

beat. 


Gastrointestinal: Patient denies any abdominal pain, nausea, vomiting or bowel 

icontinence. 


Genitourinary: Patient denies any bladder incontinence. 


Musculoskeletal: Patient denies any arm or leg pain or weakness. 


Neurological: Patient does have a headache. He denies any dizziness, numbness 

or tingling.


Exam


General: NAD, readily interacts, normal affect. 


HEENT: Left forehead laceration mildly TTP w/o any evident drainage, erythema 

or streaking. 


Resp: CTAB w/o W/R/R, equal excursion, non-laboured, on RA. 


Heart: S1S2 w/RRR w/o M/G/R, radial & pedal pulses 2+ bilaterally, cap refill < 

2 sec, no pedal edema. 


GI: Abdomen, soft, nontender, positive bowel sounds. 


MS: REINOSO, no evident deformities, ecchymosis to BUE. 


Neuro: AAOx3. Speech clear & appropriate. Sensation grossly intact to light 

touch to all extremities. Motor strength 5/5 to all major flexion & extension 

muscle groups.  (Sid Walton)





Medications


Current Medications





 Current Medications








 Medications


  (Trade)  Dose


 Ordered  Sig/Marcia


 Route  Start Time


 Stop Time Status Last Admin


 


  (NS Flush)  2 ml  UNSCH  PRN


 .XX  5/6/17 20:00


    5/12/17 05:52


 


 


  (NS Flush)  2 ml  BID


 .XX  5/6/17 21:00


    5/15/17 08:55


 


 


  (Tylenol)  650 mg  Q6H  PRN


 PO  5/6/17 20:00


    5/14/17 08:00


 


 


  (Morphine Inj)  2 mg  Q2H  PRN


 IV  5/6/17 20:00


    5/15/17 11:02


 


 


  (Ativan Inj)  1 mg  Q1H  PRN


 IV  5/6/17 20:00


    5/8/17 00:16


 


 


  (Colace)  100 mg  BID


 PO  5/6/17 21:00


    5/14/17 07:58


 


 


  (Ambien)  5 mg  HS  PRN


 PO  5/6/17 20:00


    5/14/17 23:20


 


 


  (Romazicon Inj)  0.2 mg  Q1M  PRN


 IV PUSH  5/6/17 23:15


     


 


 


  (Ativan)  1 mg  Q4H  PRN


 PO  5/6/17 23:15


    5/10/17 11:23


 


 


  (Ativan Inj)  1 mg  Q4H  PRN


 IV PUSH  5/6/17 23:15


     


 


 


  (Ativan)  2 mg  Q2H  PRN


 PO  5/6/17 23:15


    5/12/17 08:44


 


 


  (Ativan Inj)  2 mg  Q2H  PRN


 IV PUSH  5/6/17 23:15


    5/12/17 05:51


 


 


  (Ativan Inj)  2 mg  Q1H  PRN


 IV PUSH  5/6/17 23:15


     


 


 


  (Ativan Inj)  2 mg  Q15M  PRN


 IV PUSH  5/6/17 23:15


    5/8/17 07:19


 


 


  (Habitrol 21 Mg


 Patch.24 Hr)  1 patch  DAILY


 T-DERMAL  5/7/17 13:00


    5/15/17 08:55


 


 


 Miscellaneous


 Information  1  DAILY


 T-DERMAL  5/8/17 09:00


    5/15/17 08:55


 


 


  (Vitamin B1)  100 mg  DAILY


 PO  5/15/17 09:00


    5/15/17 08:54


 





 (Sid Walton)





Medical Decision Making


MDM Remarks


Impression:


60 y/o male TBI, b/l subdural hematoma


mild increase right SDH without midline shift per f/u CT H 5/8


Stable neurological exam, appears at baseline  (Sid Walton)





Plan


Plan Remarks


Cont nonsurgical mgt


Cont neuro check


PT/OT


Mobilise w/assistance


Patient should be able to be discharged in AM  (Sid Walton)





Attending Statement


I have personally seen and examined the patient on 5/15/17.  Pertinent 

documentation and study results have been reviewed by the undersigned.  I have 

personally developed the treatment plan and performed medical decision making.


Agree with findings, exam, and treatment plan as noted above.


He remains awake and alert and oriented, conversant.  Speech clear and 

appropriate


No focal extremity or cranial nerve deficit


Stable from neurosurgical standpoint


May be discharged on 5/17/17 from neurosurgery standpoint. (Pk Hollingsworth MD)








Sid Walton May 15, 2017 15:41


Pk Hollingsworth MD May 16, 2017 00:41

## 2017-05-16 VITALS
SYSTOLIC BLOOD PRESSURE: 104 MMHG | OXYGEN SATURATION: 98 % | RESPIRATION RATE: 18 BRPM | TEMPERATURE: 98.1 F | HEART RATE: 85 BPM | DIASTOLIC BLOOD PRESSURE: 63 MMHG

## 2017-05-16 VITALS
DIASTOLIC BLOOD PRESSURE: 71 MMHG | TEMPERATURE: 97.8 F | SYSTOLIC BLOOD PRESSURE: 111 MMHG | OXYGEN SATURATION: 97 % | HEART RATE: 85 BPM | RESPIRATION RATE: 18 BRPM

## 2017-05-16 VITALS
TEMPERATURE: 98.4 F | SYSTOLIC BLOOD PRESSURE: 120 MMHG | OXYGEN SATURATION: 96 % | HEART RATE: 95 BPM | RESPIRATION RATE: 18 BRPM | DIASTOLIC BLOOD PRESSURE: 73 MMHG

## 2017-05-16 VITALS
OXYGEN SATURATION: 98 % | SYSTOLIC BLOOD PRESSURE: 104 MMHG | RESPIRATION RATE: 18 BRPM | TEMPERATURE: 97.8 F | HEART RATE: 88 BPM

## 2017-05-16 VITALS
SYSTOLIC BLOOD PRESSURE: 113 MMHG | RESPIRATION RATE: 20 BRPM | DIASTOLIC BLOOD PRESSURE: 58 MMHG | HEART RATE: 81 BPM | TEMPERATURE: 98 F | OXYGEN SATURATION: 97 %

## 2017-05-16 RX ADMIN — Medication SCH MG: at 09:44

## 2017-05-16 RX ADMIN — DOCUSATE SODIUM SCH MG: 100 CAPSULE, LIQUID FILLED ORAL at 09:44

## 2017-05-16 RX ADMIN — NICOTINE SCH PATCH: 21 PATCH, EXTENDED RELEASE TOPICAL at 09:46

## 2017-05-16 RX ADMIN — Medication SCH ML: at 09:00

## 2017-05-16 NOTE — HHI.PR
Subjective


Remarks


Follow-up bilateral subdural hematoma


05/11/17-patient seen and examined, unsteady as patient was trying to get out 

of bed.  Otherwise no other issues


05/12/17-patient seen and examined, resting.  Potassium 2.7.  Afebrile and 

vitals stable.


05/13/17-patient seen and examined, no acute event overnight, stable.


05/14/17-patient seen and examined; having breakfast and denies any 

complication.  No acute event overnight.


05/15/17patient seen and examined, alert and oriented 3.  No acute event 

overnight


05/16/17-patient seen and examined, stable and states he would like to be 

discharged today.  No acute event overnight.  Taking by mouth without any 

competition nausea and vomiting.





Objective


Vitals





 Vital Signs








  Date Time  Temp Pulse Resp B/P Pulse Ox O2 Delivery O2 Flow Rate FiO2


 


5/16/17 08:00 97.8 74 18 104/ 98   


 


5/16/17 05:35 98.1 85 18 104/63 98   


 


5/16/17 00:00 98.0 81 20 113/58 97   


 


5/15/17 20:00 97.6 89 20 92/56 96   


 


5/15/17 19:30  88      


 


5/15/17 16:00 97.3 87 17 94/64 94   


 


5/15/17 12:00 96.8 84 18 81/59 96   


 


5/15/17 09:46  89      








 I/O








 5/15/17 5/15/17 5/15/17 5/16/17 5/16/17 5/16/17





 07:00 15:00 23:00 07:00 15:00 23:00


 


Intake Total 60 ml     


 


Output Total 300 ml  450 ml 200 ml  


 


Balance -240 ml  -450 ml -200 ml  


 


      


 


Intake Oral 60 ml     


 


Output Urine Total 300 ml  450 ml 200 ml  


 


# Voids  4    


 


# Bowel Movements 0  1 0  








Result Diagram:  


5/12/17 0718                                                                   

             5/13/17 1655





Imaging





Last Impressions








Head CT 5/8/17 0800 Signed





Impressions: 





 Service Date/Time:  Monday, May 8, 2017 11:29 - CONCLUSION:  Minimal 

enlargement 





 of the right subdural with new acute blood products.  There is no midline 

shift. 





     Carlos Carrillo MD  FACR


 


Maxillofacial CT 5/6/17 1446 Signed





Impressions: 





 Service Date/Time:  Saturday, May 6, 2017 16:58 - CONCLUSION:  1. Left frontal 





 scalp laceration and facial soft tissue swelling. No acute bony abnormalities.

   





   Alvarez Murillo MD 


 


Chest X-Ray 5/6/17 1446 Signed





Impressions: 





 Service Date/Time:  Saturday, May 6, 2017 14:59 - CONCLUSION:  1. Scarring 

right 





 lung base improved from March 18. Left lung remains clear. Postoperative 

median 





 sternotomy and valve replacement.      Alvarez Murillo MD 


 


Chest CT 5/6/17 1446 Signed





Impressions: 





 Service Date/Time:  Saturday, May 6, 2017 17:11 - CONCLUSION:  1. Decrease in 





 size of loculated right pleural effusion since March 8. Residual loculated 

fluid 





 in the lower right hemithorax measures about 2.1 cm in thickness associated 

with 





 pleural thickening and old unfused right lower rib fracture. 2. Postoperative 





 median sternotomy and aortic valve replacement. Stable aortic root dilatation 

to 





 4.5 cm with no pneumothorax.     Alvarez Murillo MD 


 


Cervical Spine CT 5/6/17 1446 Signed





Impressions: 





 Service Date/Time:  Saturday, May 6, 2017 16:58 - CONCLUSION:  1. No acute 





 findings. Moderate degenerative disc disease.     Alvarez Murillo MD 








Objective Remarks


GENERAL: NAD 


SKIN: Warm and dry.


HEAD: Normocephalic.  Resolving head wound


EYES: No scleral icterus. No injection or drainage. 


NECK: Supple, trachea midline. No JVD or lymphadenopathy.


CARDIOVASCULAR: Regular rate and rhythm without murmurs, gallops, or rubs. 


RESPIRATORY: Breath sounds equal bilaterally. No accessory muscle use.


GASTROINTESTINAL: Abdomen soft, non-tender, nondistended. 


MUSCULOSKELETAL: No cyanosis, or edema. 


BACK: Nontender without obvious deformity. No CVA tenderness.





Procedures


None





A/P


Problem List:  


(1) Subdural hematoma


ICD Code:  I62.00


Status:  Acute


(2) Subarachnoid hemorrhage


ICD Code:  I60.9


Status:  Acute


(3) Dehydration


ICD Code:  E86.0


Status:  Acute


(4) Alcohol withdrawal


ICD Code:  F10.239


Status:  Acute


(5) Alcohol dependence


ICD Code:  F10.20


Status:  Acute


Assessment and Plan


61-year-old man with





Bilateral Subdural hematoma


Traumatic Subarachnoid bleed


Alcohol withdrawal-resolved


Scalp laceration-improving


- Repeat CT head -very slight enlargement in right subdural hemorrhage


- ICH Management per neurosurgery Dr. Sanjay. No surgical intervention at this 

time


- Apparently patient refused scalp laceration repair


- Thiamine 100 mg daily


-Neurosurgery to okay WHEN to resume Coumadin





Thrombocytopenia


Coagulopathy


- Iatrogenic/Coumadin


- No anticoagulation until neurosurgically cleared.  Neurosurgery to decide 

when to resume Coumadin


- Thrombocytopenia likely due to alcohol abuse


- Transfuse platelets to keep platelet count above 80





Hypokalemia: Resolved





Tobacco abuse


Continue nicotine patch





DVT GI prophylaxis


- Teds SCDs


- No pharmacological DVT prophylaxis due to ICH


- Jeremy Khan MD May 16, 2017 09:12

## 2017-05-16 NOTE — HHI.NSPN
__________________________________________________





Note Status


Status:  Progress Note





Interval History


Interval History


This is a 61 year old male with history of chronic alcohol use presents to the 

emergency department for evaluation of head injury after syncopal episode.  He 

states he has had multiple syncopal episodes over the past week, last time last 

night at 8:30pm.  No tonic-clonic movements noted.  No tongue biting.  No 

incontinence of stool or urine.  Patient has large amount of dried blood to 

left forehead with erythematous left eye.  He has a large frontal laceration He 

is on Coumadin.  In the emergency department his alcohol level was found to be 

92.  He refused facial laceration sutures.  Denies any focal motor weakness.  

He denies any sensory loss.  No problems controlling his bladder by Wood.  CT 

of the brain showed bilateral subdural hematomas.  Neurosurgical consultation 

was requested





5/8: currently mildly sedated on Precedex due to agitation trying to get out of 

bed, reports moves x 4 extremities. 


5/9: not cooperative with exam becomes combative, localizes and withdraws x 4 

extremities. 


5/10: clinically better today, alert, eating breakfast, pleasant and 

cooperative. 


5/11: transferred to , doing well, neuro stable


5/12: Patient lethargic but is arousable and follows some simple commands with 

persistence.  He denies headaches but does not verbalize much.


5/13:  Patient much more awake today.  He complains of intermittent headaches 

all over.  No nausea or vomiting.  Complains of periods of fatigue.


5/14:  Patient awake and alert.  He complains of headache at the vertex.  No 

nausea vomiting.  Denies any paresthesias.


5/15: Patient awake & alert. His only complaint is a headache


5.16. Neurologically stable. No focal deficits. Mild headaches





Labs, Micro, & Vital Signs


Results











  Date Time  Temp Pulse Resp B/P Pulse Ox O2 Delivery O2 Flow Rate FiO2


 


5/16/17 11:56 97.8 85 18 111/71 97   


 


5/16/17 08:00 97.8 74 18 104/ 98   


 


5/16/17 05:35 98.1 85 18 104/63 98   


 


5/16/17 00:00 98.0 81 20 113/58 97   


 


5/15/17 20:00 97.6 89 20 92/56 96   


 


5/15/17 19:30  88      


 


5/15/17 16:00 97.3 87 17 94/64 94   














 5/16/17





 07:00


 


Output Total 650 ml


 


Balance -650 ml








Constitutional





Vital Signs








  Date Time  Temp Pulse Resp B/P Pulse Ox O2 Delivery O2 Flow Rate FiO2


 


5/16/17 11:56 97.8 85 18 111/71 97   


 


5/16/17 08:00 97.8 74 18 104/ 98   


 


5/16/17 05:35 98.1 85 18 104/63 98   


 


5/16/17 00:00 98.0 81 20 113/58 97   


 


5/15/17 20:00 97.6 89 20 92/56 96   


 


5/15/17 19:30  88      


 


5/15/17 16:00 97.3 87 17 94/64 94   














 5/16/17





 07:00


 


Output Total 650 ml


 


Balance -650 ml











Review of Systems/Exam


Exam


Mr Majano is alert, awake and oriented to time, place and person.  Speech is 

fluent.





Cranial nerve examination: pupils to be equal, round and reactive to light.  

Extra-ocular movements are intact.


Facial motor and sensory function are normal and symmetrical.  Gross hearing 

appears intact.


Sternocleidomastoid and trapezius muscles are symmetrical.  Other cranial 

nerves are intact.





Neck is soft and supple with a good range of motion without pain.





Muscle strength is normal in all muscle groups of both upper and lower 

extremities.





Sensory examination is intact to light touch and pin prick in both the upper 

and lower extremities.





Deep tendon reflexes are symmetrical in both upper and lower extremities.  

There is a bilateral plantar flexion response.





Cerebellar examination is unremarkable, without deficits.





Medical Decision Making


[facility] Remarks





Last Impressions








Head CT 5/8/17 0800 Signed





Impressions: 





 Service Date/Time:  Monday, May 8, 2017 11:29 - CONCLUSION:  Minimal 

enlargement 





 of the right subdural with new acute blood products.  There is no midline 

shift. 





     Carlos Carrillo MD  FACR


 


Maxillofacial CT 5/6/17 6816 Signed





Impressions: 





 Service Date/Time:  Saturday, May 6, 2017 16:58 - CONCLUSION:  1. Left frontal 





 scalp laceration and facial soft tissue swelling. No acute bony abnormalities.

   





   Alvarez Murillo MD 


 


Chest X-Ray 5/6/17 1446 Signed





Impressions: 





 Service Date/Time:  Saturday, May 6, 2017 14:59 - CONCLUSION:  1. Scarring 

right 





 lung base improved from March 18. Left lung remains clear. Postoperative 

median 





 sternotomy and valve replacement.      Alvarez Murillo MD 


 


Chest CT 5/6/17 1446 Signed





Impressions: 





 Service Date/Time:  Saturday, May 6, 2017 17:11 - CONCLUSION:  1. Decrease in 





 size of loculated right pleural effusion since March 8. Residual loculated 

fluid 





 in the lower right hemithorax measures about 2.1 cm in thickness associated 

with 





 pleural thickening and old unfused right lower rib fracture. 2. Postoperative 





 median sternotomy and aortic valve replacement. Stable aortic root dilatation 

to 





 4.5 cm with no pneumothorax.     Alvarez Murillo MD 


 


Cervical Spine CT 5/6/17 1446 Signed





Impressions: 





 Service Date/Time:  Saturday, May 6, 2017 16:58 - CONCLUSION:  1. No acute 





 findings. Moderate degenerative disc disease.     Alvarez Murillo MD 











Attending Statement


Neuro. Stable per neurosurgery for discharge





Respiratory.  pulmonary toilette, nasotracheal suction, and breathing 

treatments with nebulizers. 





PT and OT eval





Nutrition. NPO





Renal.  monitor closely urine output, BUN and creatinine





Endocrine. Monitor serial Acu checks and SSI for tight control





ID monitor for signs of infection





Protonix for stress ulcer prophylaxis





Brian hose and SCD's for DVT prophylaxis








Wilder Grace MD May 16, 2017 14:57

## 2017-05-16 NOTE — HHI.DS
__________________________________________________





Discharge Summary


Admission Date


May 6, 2017 at 18:40


Discharge Date:  May 16, 2017


Admitting Diagnosis


subdural/subar hemorrhage, lactic acidosis, dehydration, pleural eff





(1) Subdural hematoma


ICD Code:  I62.00


(2) Subarachnoid hemorrhage


ICD Code:  I60.9


(3) Dehydration


ICD Code:  E86.0


(4) Alcohol withdrawal


ICD Code:  F10.239


(5) Alcohol dependence


ICD Code:  F10.20


Procedures


None


Brief History - From Admission


61 year old male with history of chronic alcohol use presents to the emergency 

department for evaluation of head injury after syncopal episode.  He states he 

has had multiple syncopal episodes over the past week, last time last night at 8

:30pm.  Patient has large amount of dried blood to left forehead with 

erythematous left eye.  He is on Coumadin.  In the emergency department his 

alcohol level was found to be 92.  Patient has refused facial laceration 

sutures.  However he agrees to be admitted to ICU.


CBC/BMP:  


5/12/17 0718                                                                   

             5/13/17 1655





PE at Discharge


GENERAL: NAD 


SKIN: Warm and dry.


HEAD: Normocephalic.  Resolving head wound


EYES: No scleral icterus. No injection or drainage. 


NECK: Supple, trachea midline. No JVD or lymphadenopathy.


CARDIOVASCULAR: Regular rate and rhythm without murmurs, gallops, or rubs. 


RESPIRATORY: Breath sounds equal bilaterally. No accessory muscle use.


GASTROINTESTINAL: Abdomen soft, non-tender, nondistended. 


MUSCULOSKELETAL: No cyanosis, or edema. 


BACK: Nontender without obvious deformity. No CVA tenderness.





Transfer Summary


61 year old male with history of chronic alcohol use presents to the emergency 

department for evaluation of head injury after syncopal episode.  He states he 

has had multiple syncopal episodes over the past week, last time last night at 8

:30pm.  Patient has large amount of dried blood to left forehead with 

erythematous left eye.  He is on Coumadin.  In the emergency department his 

alcohol level was found to be 92.  Patient has refused facial laceration 

sutures.  However he agrees to be admitted to ICU.





05/07: More alert but refusing most orders. Will transfuse additional platelets 

and start NicoDerm patch. Anticipate ETOH withdrawal and may need to go 

straight to Precedex.





05/08: Increasingly agitated overnight.  Now requiring sitter.  Pulling out his 

IVs.  Oriented to person only.  Will start Precedex, repeat CT head





05/09: Placed on Precedex yesterday, appears calm, somnolent.  CT of the head 

right subdural slightly enlarged





05/10: Patient is oriented to person and place.  Alcohol withdrawal symptoms 

much improved.  Neurological exam improving following commands


Hospital Course


Patient admitted secondary to traumatic brain injury was found to have 

bilateral subdural hematoma as well as scalp laceration for which neurosurgery 

was consulted however patient's refused scalp laceration repair and was treated 

conservatively.  He was also treated for alcohol withdrawal and placed on CIWA 

protocol and Rally pack was started.  Coumadin was held.  All electrolyte 

abnormalities including hypokalemia were replaced accordingly.  DVT and GI 

prophylaxis were provided.  PT was consulted.  Prior to discharge patient's 

condition improved and vital remained stable.





Pt Condition on Discharge:  Stable


Discharge Disposition:  Discharge Home


Discharge Time:  <= 30 minutes


Discharge Instructions


DIET: Follow Instructions for:  Heart Healthy Diet


Speech Therapy-Diet Recommends:  Mechanical Soft, Nectar Thickened Liquids


Activities you can perform:  Regular-No Restrictions


Follow up Referrals:  


PCP Follow-up - 1 Week





New Orders:  


PT/INR - 2-3 Days





New Medications:  


Walker with Front Wheels (Walker with Front Wheels) 1 Mis Mis


1 EA .ROUTE AS DIRECTED #1 Ref 0 EA


Thiamine (Vitamin B-1) 100 Mg Tab


100 MG PO DAILY Alcohol Detox #30 TAB


 


Continued Medications:  


Warfarin (Warfarin) 2.5 Mg Tab


2.5 MG PO DAILY Blood Clot Prevention #30 Ref 0 TAB











Jeremy Gilman MD May 16, 2017 09:15

## 2018-03-05 ENCOUNTER — HOSPITAL ENCOUNTER (INPATIENT)
Dept: HOSPITAL 17 - NEPE | Age: 63
LOS: 13 days | Discharge: HOME | DRG: 897 | End: 2018-03-18
Attending: HOSPITALIST | Admitting: HOSPITALIST
Payer: MEDICAID

## 2018-03-05 VITALS
RESPIRATION RATE: 15 BRPM | SYSTOLIC BLOOD PRESSURE: 133 MMHG | TEMPERATURE: 99.3 F | OXYGEN SATURATION: 99 % | HEART RATE: 129 BPM | DIASTOLIC BLOOD PRESSURE: 68 MMHG

## 2018-03-05 VITALS
DIASTOLIC BLOOD PRESSURE: 57 MMHG | OXYGEN SATURATION: 100 % | SYSTOLIC BLOOD PRESSURE: 107 MMHG | TEMPERATURE: 98.4 F | HEART RATE: 100 BPM | RESPIRATION RATE: 20 BRPM

## 2018-03-05 VITALS
OXYGEN SATURATION: 100 % | SYSTOLIC BLOOD PRESSURE: 94 MMHG | RESPIRATION RATE: 16 BRPM | HEART RATE: 105 BPM | DIASTOLIC BLOOD PRESSURE: 57 MMHG

## 2018-03-05 VITALS
OXYGEN SATURATION: 98 % | RESPIRATION RATE: 16 BRPM | TEMPERATURE: 97.8 F | HEART RATE: 104 BPM | DIASTOLIC BLOOD PRESSURE: 63 MMHG | SYSTOLIC BLOOD PRESSURE: 117 MMHG

## 2018-03-05 VITALS — HEART RATE: 106 BPM

## 2018-03-05 VITALS
OXYGEN SATURATION: 100 % | DIASTOLIC BLOOD PRESSURE: 61 MMHG | HEART RATE: 121 BPM | SYSTOLIC BLOOD PRESSURE: 142 MMHG | RESPIRATION RATE: 20 BRPM

## 2018-03-05 VITALS — WEIGHT: 142.2 LBS | BODY MASS INDEX: 20.36 KG/M2 | HEIGHT: 70 IN

## 2018-03-05 VITALS — HEART RATE: 123 BPM

## 2018-03-05 DIAGNOSIS — S01.81XA: ICD-10-CM

## 2018-03-05 DIAGNOSIS — E83.51: ICD-10-CM

## 2018-03-05 DIAGNOSIS — F17.210: ICD-10-CM

## 2018-03-05 DIAGNOSIS — R26.81: ICD-10-CM
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
## 2018-03-05 DIAGNOSIS — B35.6: ICD-10-CM

## 2018-03-05 DIAGNOSIS — S00.03XA: ICD-10-CM

## 2018-03-05 DIAGNOSIS — I49.3: ICD-10-CM

## 2018-03-05 DIAGNOSIS — E87.1: ICD-10-CM
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
## 2018-03-05 DIAGNOSIS — Z95.2: ICD-10-CM

## 2018-03-05 DIAGNOSIS — L53.8: ICD-10-CM

## 2018-03-05 DIAGNOSIS — F10.239: Primary | ICD-10-CM

## 2018-03-05 DIAGNOSIS — D69.6: ICD-10-CM

## 2018-03-05 DIAGNOSIS — E87.6: ICD-10-CM
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
## 2018-03-05 LAB
ALBUMIN SERPL-MCNC: 2.3 GM/DL (ref 3.4–5)
ALP SERPL-CCNC: 63 U/L (ref 45–117)
ALT SERPL-CCNC: 67 U/L (ref 12–78)
AST SERPL-CCNC: 54 U/L (ref 15–37)
BACTERIA #/AREA URNS HPF: (no result) /HPF
BASOPHILS # BLD AUTO: 0 TH/MM3 (ref 0–0.2)
BASOPHILS NFR BLD: 0.3 % (ref 0–2)
BILIRUB SERPL-MCNC: 1.3 MG/DL (ref 0.2–1)
BUN SERPL-MCNC: 13 MG/DL (ref 7–18)
CALCIUM SERPL-MCNC: 7.9 MG/DL (ref 8.5–10.1)
CHLORIDE SERPL-SCNC: 96 MEQ/L (ref 98–107)
COLOR UR: YELLOW
CREAT SERPL-MCNC: 0.87 MG/DL (ref 0.6–1.3)
EOSINOPHIL # BLD: 0 TH/MM3 (ref 0–0.4)
EOSINOPHIL NFR BLD: 0 % (ref 0–4)
ERYTHROCYTE [DISTWIDTH] IN BLOOD BY AUTOMATED COUNT: 16.9 % (ref 11.6–17.2)
GFR SERPLBLD BASED ON 1.73 SQ M-ARVRAT: 88 ML/MIN (ref 89–?)
GLUCOSE SERPL-MCNC: 119 MG/DL (ref 74–106)
GLUCOSE UR STRIP-MCNC: (no result) MG/DL
HCO3 BLD-SCNC: 23.7 MEQ/L (ref 21–32)
HCT VFR BLD CALC: 31 % (ref 39–51)
HGB BLD-MCNC: 11.1 GM/DL (ref 13–17)
HGB UR QL STRIP: (no result)
INR PPP: 1.1 RATIO
KETONES UR STRIP-MCNC: 10 MG/DL
LYMPHOCYTES # BLD AUTO: 0.3 TH/MM3 (ref 1–4.8)
LYMPHOCYTES NFR BLD AUTO: 4.6 % (ref 9–44)
MCH RBC QN AUTO: 34 PG (ref 27–34)
MCHC RBC AUTO-ENTMCNC: 35.8 % (ref 32–36)
MCV RBC AUTO: 94.9 FL (ref 80–100)
MONOCYTE #: 1.2 TH/MM3 (ref 0–0.9)
MONOCYTES NFR BLD: 18.4 % (ref 0–8)
MUCOUS THREADS #/AREA URNS LPF: (no result) /LPF
NEUTROPHILS # BLD AUTO: 5 TH/MM3 (ref 1.8–7.7)
NEUTROPHILS NFR BLD AUTO: 76.7 % (ref 16–70)
NITRITE UR QL STRIP: (no result)
PLATELET # BLD: 115 TH/MM3 (ref 150–450)
PMV BLD AUTO: 9 FL (ref 7–11)
PROT SERPL-MCNC: 5.4 GM/DL (ref 6.4–8.2)
PROTHROMBIN TIME: 11.2 SEC (ref 9.8–11.6)
RBC # BLD AUTO: 3.27 MIL/MM3 (ref 4.5–5.9)
SODIUM SERPL-SCNC: 129 MEQ/L (ref 136–145)
SP GR UR STRIP: 1.02 (ref 1–1.03)
SQUAMOUS #/AREA URNS HPF: <1 /HPF (ref 0–5)
URINE LEUKOCYTE ESTERASE: (no result)
WBC # BLD AUTO: 6.5 TH/MM3 (ref 4–11)

## 2018-03-05 PROCEDURE — 87493 C DIFF AMPLIFIED PROBE: CPT

## 2018-03-05 PROCEDURE — 96374 THER/PROPH/DIAG INJ IV PUSH: CPT

## 2018-03-05 PROCEDURE — 83735 ASSAY OF MAGNESIUM: CPT

## 2018-03-05 PROCEDURE — 84443 ASSAY THYROID STIM HORMONE: CPT

## 2018-03-05 PROCEDURE — 83690 ASSAY OF LIPASE: CPT

## 2018-03-05 PROCEDURE — 36600 WITHDRAWAL OF ARTERIAL BLOOD: CPT

## 2018-03-05 PROCEDURE — 82607 VITAMIN B-12: CPT

## 2018-03-05 PROCEDURE — 94640 AIRWAY INHALATION TREATMENT: CPT

## 2018-03-05 PROCEDURE — 71045 X-RAY EXAM CHEST 1 VIEW: CPT

## 2018-03-05 PROCEDURE — 80069 RENAL FUNCTION PANEL: CPT

## 2018-03-05 PROCEDURE — 85025 COMPLETE CBC W/AUTO DIFF WBC: CPT

## 2018-03-05 PROCEDURE — 96375 TX/PRO/DX INJ NEW DRUG ADDON: CPT

## 2018-03-05 PROCEDURE — 81001 URINALYSIS AUTO W/SCOPE: CPT

## 2018-03-05 PROCEDURE — 96361 HYDRATE IV INFUSION ADD-ON: CPT

## 2018-03-05 PROCEDURE — 93005 ELECTROCARDIOGRAM TRACING: CPT

## 2018-03-05 PROCEDURE — 80307 DRUG TEST PRSMV CHEM ANLYZR: CPT

## 2018-03-05 PROCEDURE — 83036 HEMOGLOBIN GLYCOSYLATED A1C: CPT

## 2018-03-05 PROCEDURE — 80053 COMPREHEN METABOLIC PANEL: CPT

## 2018-03-05 PROCEDURE — 90714 TD VACC NO PRESV 7 YRS+ IM: CPT

## 2018-03-05 PROCEDURE — 87641 MR-STAPH DNA AMP PROBE: CPT

## 2018-03-05 PROCEDURE — 84100 ASSAY OF PHOSPHORUS: CPT

## 2018-03-05 PROCEDURE — 70450 CT HEAD/BRAIN W/O DYE: CPT

## 2018-03-05 PROCEDURE — G0378 HOSPITAL OBSERVATION PER HR: HCPCS

## 2018-03-05 PROCEDURE — 90471 IMMUNIZATION ADMIN: CPT

## 2018-03-05 PROCEDURE — 94664 DEMO&/EVAL PT USE INHALER: CPT

## 2018-03-05 PROCEDURE — 82805 BLOOD GASES W/O2 SATURATION: CPT

## 2018-03-05 PROCEDURE — 82552 ASSAY OF CPK IN BLOOD: CPT

## 2018-03-05 PROCEDURE — 85730 THROMBOPLASTIN TIME PARTIAL: CPT

## 2018-03-05 PROCEDURE — 83605 ASSAY OF LACTIC ACID: CPT

## 2018-03-05 PROCEDURE — 84439 ASSAY OF FREE THYROXINE: CPT

## 2018-03-05 PROCEDURE — 82140 ASSAY OF AMMONIA: CPT

## 2018-03-05 PROCEDURE — 82550 ASSAY OF CK (CPK): CPT

## 2018-03-05 PROCEDURE — 85610 PROTHROMBIN TIME: CPT

## 2018-03-05 PROCEDURE — 72190 X-RAY EXAM OF PELVIS: CPT

## 2018-03-05 RX ADMIN — STANDARDIZED SENNA CONCENTRATE AND DOCUSATE SODIUM SCH TAB: 8.6; 5 TABLET, FILM COATED ORAL at 21:00

## 2018-03-05 RX ADMIN — PHENYTOIN SODIUM SCH MLS/HR: 50 INJECTION INTRAMUSCULAR; INTRAVENOUS at 22:14

## 2018-03-05 RX ADMIN — Medication SCH ML: at 22:00

## 2018-03-05 NOTE — RADRPT
EXAM DATE/TIME:  03/05/2018 18:27 

 

[facility] COMPARISON:     

CHEST SINGLE AP, May 06, 2017, 14:59.

 

                     

INDICATIONS :     

Shortness of breath. 

                     

 

MEDICAL HISTORY :     

None.          

 

SURGICAL HISTORY :        

Mitral valve replacement. 

 

ENCOUNTER:     

Initial                                        

 

ACUITY:     

1 day      

 

PAIN SCORE:     

0/10

 

LOCATION:     

Bilateral chest 

 

FINDINGS:     

A single view of the chest demonstrates the lungs to be symmetrically aerated without evidence of mas
s, infiltrate or effusion.  No evidence of pneumothorax.  The cardiomediastinal contours are unremark
able with mild tortuosity descending thoracic aorta.  Osseous structures are intact.  Prior median st
ernotomy with discontinuity of the superior and inferior sternal wire sutures, similar to prior.

 

CONCLUSION:     

The lungs are clear.  No infiltrates seen.

 

 

 

 Luis Arita MD on March 05, 2018 at 19:04           

Board Certified Radiologist.

 This report was verified electronically.

## 2018-03-05 NOTE — PD
HPI


Chief Complaint:  General Weakness


Time Seen by Provider:  15:50


Travel History


International Travel<30 days:  No


Contact w/Intl Traveler<30days:  No


Traveled to known affect area:  No





History of Present Illness


HPI


61 YO M with PMH of mechanical AVR, SAH, chronic alcoholism presents to the ED 

for evaluation of weakness. Patient states that he drinks ~18 beers per day, 

stopped 3 days ago. He states that he had a fall onto terrazzo a few days ago. 

Endorses LOC, unknown period. On presentation he endorses palpitations, N/V. He 

denies headache, vision changes, dizziness, CP, abdominal pain, dysuria. He is 

unsure of his last tetanus immunization. He denies history of withdrawal 

seizure.





PFSH


Past Medical History


Hx Anticoagulant Therapy:  Yes (coumadin)


Cancer:  No


Cardiovascular Problems:  Yes


Diminished Hearing:  No


Endocrine:  No


Genitourinary:  No


Immune Disorder:  No


Implanted Vascular Access Dvce:  Yes


Neurologic:  No


Psychiatric:  No


Reproductive:  Yes


Respiratory:  No


Pregnant?:  Not Pregnant





Past Surgical History


Body Medical Devices:  mitral valve


Cardiac Surgery:  Yes (ARTIFICAL MITRAL VALVE REPLACEMENT )


Other Surgery:  Yes (OPEN HEART SURGERY)





Social History


Alcohol Use:  No


Tobacco Use:  No


Substance Use:  No





Allergies-Medications


(Allergen,Severity, Reaction):  


Coded Allergies:  


     No Known Allergies (Verified  Adverse Reaction, Unknown, 3/5/18)


Reported Meds & Prescriptions





Reported Meds & Active Scripts


Active


No Active Prescriptions or Reported Medications    








Review of Systems


Except as stated in HPI:  all other systems reviewed are Neg





Physical Exam


Narrative


GENERAL: Well-nourished, well-developed white male in no acute distress.  


SKIN: Warm and dry.  Large laceration over the right forehead. No active 

bleeding. Large ecchymosis over the buttocks and lower back. 


HEAD: Normocephalic.  Atraumatic.  + raccoon eyes. No bernal sign.  + 

tenderness to palpation of the skull.  No bony step-offs.  No malocclusion of 

the teeth.


EYES: No scleral icterus. No injection or drainage.  PERRLA.  EOMI.


ENT: Pearly gray tympanic membrane is bilaterally.  Nasal mucosa is moist.  

Oropharynx without erythema, edema or exudate. + tongue fasciculations


NECK: Supple, trachea midline. No JVD or lymphadenopathy. No midline tenderness 

to palpation.  Patient retains full, active, painless range of motion of the 

neck. 


CARDIOVASCULAR: Regular rate and rhythm without murmurs, gallops, or rubs.  2+ 

DP and radial pulses bilaterally.


RESPIRATORY: Breath sounds clear and equal bilaterally. No accessory muscle use.


GASTROINTESTINAL: Abdomen soft, non-tender, nondistended. + Bowel sounds


MUSCULOSKELETAL: No cyanosis, or edema.  No tenderness to palpation or 

limitations to range of motion of the joints of the upper and lower extremities 

bilaterally.


NEUROLOGICAL: Awake and alert. Cranial nerves II through XII intact.  Motor and 

sensory grossly within normal  limits. 5/5 muscle strength in all muscle 

groups.  Normal speech.  No pronator drift.


BACK: Nontender without obvious deformity. No CVA tenderness.  No midline 

tenderness.





Data


Data


Last Documented VS





Vital Signs








  Date Time  Temp Pulse Resp B/P (MAP) Pulse Ox O2 Delivery O2 Flow Rate FiO2


 


3/5/18 19:02  105 16 94/57 (69) 100 Room Air  


 


3/5/18 18:03 97.8       








Orders





 Orders


Complete Blood Count With Diff (3/5/18 15:52)


Comprehensive Metabolic Panel (3/5/18 15:52)


Lipase (3/5/18 15:52)


Lactic Acid (3/5/18 15:52)


Prothrombin Time / Inr (Pt) (3/5/18 15:52)


Act Partial Throm Time (Ptt) (3/5/18 15:52)


Urinalysis - C+S If Indicated (3/5/18 15:52)


Iv Access Insert/Monitor (3/5/18 15:52)


Ecg Monitoring (3/5/18 15:52)


Oximetry (3/5/18 15:52)


Ondansetron Inj (Zofran Inj) (3/5/18 16:00)


Sodium Chloride 0.9% Flush (Ns Flush) (3/5/18 16:00)


Electrocardiogram (3/5/18 15:52)


Alcohol Withdrawal Asmt-Ciwa ONCE (3/5/18 15:52)


Flumazenil Inj (Romazicon Inj) (3/5/18 16:00)


Lorazepam (Ativan) (3/5/18 16:00)


Lorazepam Inj (Ativan Inj) (3/5/18 16:00)


Lorazepam (Ativan) (3/5/18 16:00)


Lorazepam Inj (Ativan Inj) (3/5/18 16:00)


Ct Brain W/O Iv Contrast(Rout) (3/5/18 )


Tetanus/Diphtheria Tox Adult (Tetanus/Di (3/5/18 16:00)


Alcohol (Ethanol) (3/5/18 15:52)


Drug Screen, Random Urine (3/5/18 15:52)


Ammonia (3/5/18 15:58)


Sodium Chlor 0.9% 1000 Ml Inj (Ns 1000 M (3/5/18 17:30)


Chest, Single Ap (3/5/18 )


Electrocardiogram (3/5/18 )


Lactic Acid (3/5/18 18:06)


Sodium Chlor 0.9% 1000 Ml Inj (Ns 1000 M (3/5/18 19:00)


Lactic Acid (3/5/18 18:52)


Calcium Carbonate Chew (Tums Chew) (3/5/18 19:30)


Admit Order (Ed Use Only) (3/5/18 19:49)





Labs





Laboratory Tests








Test


  3/5/18


16:06 3/5/18


19:05 3/5/18


19:25


 


White Blood Count 6.5 TH/MM3   


 


Red Blood Count 3.27 MIL/MM3   


 


Hemoglobin 11.1 GM/DL   


 


Hematocrit 31.0 %   


 


Mean Corpuscular Volume 94.9 FL   


 


Mean Corpuscular Hemoglobin 34.0 PG   


 


Mean Corpuscular Hemoglobin


Concent 35.8 % 


  


  


 


 


Red Cell Distribution Width 16.9 %   


 


Platelet Count 115 TH/MM3   


 


Mean Platelet Volume 9.0 FL   


 


Neutrophils (%) (Auto) 76.7 %   


 


Lymphocytes (%) (Auto) 4.6 %   


 


Monocytes (%) (Auto) 18.4 %   


 


Eosinophils (%) (Auto) 0.0 %   


 


Basophils (%) (Auto) 0.3 %   


 


Neutrophils # (Auto) 5.0 TH/MM3   


 


Lymphocytes # (Auto) 0.3 TH/MM3   


 


Monocytes # (Auto) 1.2 TH/MM3   


 


Eosinophils # (Auto) 0.0 TH/MM3   


 


Basophils # (Auto) 0.0 TH/MM3   


 


CBC Comment DIFF FINAL   


 


Differential Comment    


 


Prothrombin Time 11.2 SEC   


 


Prothromb Time International


Ratio 1.1 RATIO 


  


  


 


 


Activated Partial


Thromboplast Time 28.9 SEC 


  


  


 


 


Urine Color YELLOW   


 


Urine Turbidity CLEAR   


 


Urine pH 6.0   


 


Urine Specific Gravity 1.022   


 


Urine Protein 30 mg/dL   


 


Urine Glucose (UA) NEG mg/dL   


 


Urine Ketones 10 mg/dL   


 


Urine Occult Blood NEG   


 


Urine Nitrite NEG   


 


Urine Bilirubin NEG   


 


Urine Urobilinogen 8.0 MG/DL   


 


Urine Leukocyte Esterase NEG   


 


Urine RBC


  LESS THAN 1


/hpf 


  


 


 


Urine WBC 1 /hpf   


 


Urine Squamous Epithelial


Cells <1 /hpf 


  


  


 


 


Urine Bacteria RARE /hpf   


 


Urine Mucus FEW /lpf   


 


Microscopic Urinalysis Comment


  CULT NOT


INDICATED 


  


 


 


Blood Urea Nitrogen 13 MG/DL   


 


Creatinine 0.87 MG/DL   


 


Random Glucose 119 MG/DL   


 


Total Protein 5.4 GM/DL   


 


Albumin 2.3 GM/DL   


 


Calcium Level 7.9 MG/DL   


 


Alkaline Phosphatase 63 U/L   


 


Aspartate Amino Transf


(AST/SGOT) 54 U/L 


  


  


 


 


Alanine Aminotransferase


(ALT/SGPT) 67 U/L 


  


  


 


 


Total Bilirubin 1.3 MG/DL   


 


Sodium Level 129 MEQ/L   


 


Potassium Level 4.2 MEQ/L   


 


Chloride Level 96 MEQ/L   


 


Carbon Dioxide Level 23.7 MEQ/L   


 


Anion Gap 9 MEQ/L   


 


Estimat Glomerular Filtration


Rate 88 ML/MIN 


  


  


 


 


Lactic Acid Level 2.3 mmol/L  1.7 mmol/L  1.6 mmol/L 


 


Ammonia 12 MCMOL/L   


 


Total Creatine Kinase 263 U/L   


 


Lipase 80 U/L   


 


Urine Opiates Screen NEG   


 


Urine Barbiturates Screen NEG   


 


Urine Amphetamines Screen NEG   


 


Urine Benzodiazepines Screen NEG   


 


Urine Cocaine Screen NEG   


 


Urine Cannabinoids Screen NEG   


 


Ethyl Alcohol Level


  LESS THAN 3


MG/DL 


  


 











MDM


Medical Decision Making


Medical Screen Exam Complete:  Yes


Emergency Medical Condition:  Yes


Differential Diagnosis


Acute alcohol withdrawal versus skull fracture versus ICH versus metabolic 

derangement versus other


Narrative Course


61 YO M with PMH of mechanical AVR, SAH, chronic alcoholism presents to the ED 

for evaluation of weakness. Patient states that he drinks ~18 beers per day, 

stopped 3 days ago. He states that he had a fall onto terrazzo a few days ago. 

Endorses LOC, unknown period. On presentation he endorses palpitations, N/V. He 

denies headache, vision changes, dizziness, CP, abdominal pain, dysuria. He is 

unsure of his last tetanus immunization. He denies history of withdrawal 

seizure.  Patient afebrile, pulse 129, /68, O2 saturation 99% on room air 

on presentation.  On physical exam this patient has a large laceration above 

the right eyebrow, raccoon eyes.  Positive tremulousness and tongue 

fasciculations.  No focal neuro deficits.  Chest CTAB.  Abdomen soft and 

nontender.  IV was established.  Patient was administered 2 mg Ativan, 4 mg 

Zofran, 1 L normal saline.  Tetanus immunization was updated.





3/5/18 16:06





CMP: Calcium 7.9.  Bilirubin 1.3.  AST 54.  ALT 67.


EKG rate 116, sinus tachycardia with PVCs.  No acute ST changes.  Reviewed by 

Dr. Alva. 


CXR: Lungs are clear.  No infiltrates seen.


CT brain: Small cephalhematoma over the right frontal bone.  Otherwise 

negative.  No fracture.  No acute intracranial process.


Lactic acid 2.3.


Ammonia 12.


UA: No culture indicated.


Alcohol less than 3








On recheck patient still tachycardic, /57.  He was administered a second 

IV NS bolus and 1 g calcium.  This is acute alcohol withdrawal with 

hypocalcemia and hyponatremia.  I discussed the case with Dr. Aguila who 

agrees to accept him to the medicine service.  Please see medicine notes for 

disposition.


Scripts


No Active Prescriptions or Reported Meds











Kaycee Bob Mar 5, 2018 16:39

## 2018-03-05 NOTE — HHI.HP
__________________________________________________





[facility]


Service


[facility]


Primary Care Physician


No Primary Care Physician


Admission Diagnosis





Hyponatremia


Diagnoses:  


Travel History


International Travel<30 Days:  No


Contact w/Intl Traveler <30 Da:  No


Traveled to Known Affected Are:  No


History of Present Illness


62-year-old male with a past medical history significant for aortic stenosis 

status post AVR and alcohol abuse presents to the emergency department for 

evaluation of weakness.  The patient reports he has been drinking approximately 

18 beers per day over the past 2 months because he was recently getting 

.  He reports approximately 6 days ago he fell while drunk hitting his 

head resulting in a jagged laceration of the forehead.  The patient reports his 

last drink was approximately 4 days ago.  He complains of being dizzy and 

tremulous.  The patient also reports falling approximately 3 days ago onto a 

concrete floor.  He states he fell flat on his back and does not know how long 

he was on the ground before.  He has significant erythema and skin breakdown on 

the bilateral buttocks and hamstring regions.  He denies any chest pain or 

shortness of breath.  Endorses weakness and dizziness.  Denies nausea/vomiting/

diarrhea.  Positive tremors.  No lateralizing signs/symptoms.  No cough or 

congestion.





Review of Systems


Except as stated in HPI:  all other systems reviewed are Neg





Past Family Social History


Past Medical History


History of aortic stenosis status post AVR


Past Surgical History


Aortic valve replacement (mechanical) in 


Reported Medications





Reported Meds & Active Scripts


Active


No Active Prescriptions or Reported Medications


Allergies:  


Coded Allergies:  


     No Known Allergies (Verified  Adverse Reaction, Unknown, 3/5/18)


Family History


Negative for CAD/DM


Social History


Smokes approximately one pack of cigarettes per week.  Alcohol as stated in 

history of present illness.  Last drink 4 days ago.  Denies marijuana or 

illicit drugs.





Physical Exam


Vital Signs





Vital Signs








  Date Time  Temp Pulse Resp B/P (MAP) Pulse Ox O2 Delivery O2 Flow Rate FiO2


 


3/5/18 21:15 98.4 100 20 107/57 (74) 100   


 


3/5/18 19:02  105 16 94/57 (69) 100 Room Air  


 


3/5/18 18:03 97.8 104 16 117/63 (81) 98 Room Air  


 


3/5/18 16:15   20  100 Room Air  


 


3/5/18 16:15  121 20 142/61 (88) 100 Room Air  


 


3/5/18 15:25 99.3 129 15 133/68 (89) 99   








Physical Exam


GENERAL: This is a well-nourished, well-developed patient, in no apparent 

distress.


SKIN: No rashes, ecchymoses or lesions. Cool and dry.


HEAD: Atraumatic. Normocephalic. No temporal or scalp tenderness.


EYES: Pupils equal round and reactive. Extraocular motions intact. No scleral 

icterus. No injection or drainage. 


ENT: Nose without bleeding, purulent drainage or septal hematoma. Throat 

without erythema, tonsillar hypertrophy or exudate. Uvula midline. Airway 

patent.


NECK: Trachea midline. No JVD or lymphadenopathy. Supple, nontender, no 

meningeal signs.


CARDIOVASCULAR: Regular rate and rhythm without murmurs, gallops, or rubs. 


RESPIRATORY: Clear to auscultation. Breath sounds equal bilaterally. No wheezes

, rales, or rhonchi.  


GASTROINTESTINAL: Abdomen soft, non-tender, nondistended. No hepato-splenomegaly

, or palpable masses. No guarding.


MUSCULOSKELETAL: Extremities without clubbing, cyanosis, or edema. No joint 

tenderness, effusion, or edema noted. No calf tenderness. Negative Homans sign 

bilaterally.


NEUROLOGICAL: Awake and alert. Cranial nerves II through XII intact.  Motor and 

sensory grossly within normal limits. Five out of 5 muscle strength in all 

muscle groups.  Normal speech.


Laboratory





Laboratory Tests








Test


  3/5/18


16:06 3/5/18


19:05 3/5/18


19:25


 


White Blood Count 6.5   


 


Red Blood Count 3.27   


 


Hemoglobin 11.1   


 


Hematocrit 31.0   


 


Mean Corpuscular Volume 94.9   


 


Mean Corpuscular Hemoglobin 34.0   


 


Mean Corpuscular Hemoglobin


Concent 35.8 


  


  


 


 


Red Cell Distribution Width 16.9   


 


Platelet Count 115   


 


Mean Platelet Volume 9.0   


 


Neutrophils (%) (Auto) 76.7   


 


Lymphocytes (%) (Auto) 4.6   


 


Monocytes (%) (Auto) 18.4   


 


Eosinophils (%) (Auto) 0.0   


 


Basophils (%) (Auto) 0.3   


 


Neutrophils # (Auto) 5.0   


 


Lymphocytes # (Auto) 0.3   


 


Monocytes # (Auto) 1.2   


 


Eosinophils # (Auto) 0.0   


 


Basophils # (Auto) 0.0   


 


CBC Comment DIFF FINAL   


 


Differential Comment    


 


Prothrombin Time 11.2   


 


Prothromb Time International


Ratio 1.1 


  


  


 


 


Activated Partial


Thromboplast Time 28.9 


  


  


 


 


Urine Color YELLOW   


 


Urine Turbidity CLEAR   


 


Urine pH 6.0   


 


Urine Specific Gravity 1.022   


 


Urine Protein 30   


 


Urine Glucose (UA) NEG   


 


Urine Ketones 10   


 


Urine Occult Blood NEG   


 


Urine Nitrite NEG   


 


Urine Bilirubin NEG   


 


Urine Urobilinogen 8.0   


 


Urine Leukocyte Esterase NEG   


 


Urine RBC LESS THAN 1   


 


Urine WBC 1   


 


Urine Squamous Epithelial


Cells <1 


  


  


 


 


Urine Bacteria RARE   


 


Urine Mucus FEW   


 


Microscopic Urinalysis Comment


  CULT NOT


INDICATED 


  


 


 


Blood Urea Nitrogen 13   


 


Creatinine 0.87   


 


Random Glucose 119   


 


Total Protein 5.4   


 


Albumin 2.3   


 


Calcium Level 7.9   


 


Alkaline Phosphatase 63   


 


Aspartate Amino Transf


(AST/SGOT) 54 


  


  


 


 


Alanine Aminotransferase


(ALT/SGPT) 67 


  


  


 


 


Total Bilirubin 1.3   


 


Sodium Level 129   


 


Potassium Level 4.2   


 


Chloride Level 96   


 


Carbon Dioxide Level 23.7   


 


Anion Gap 9   


 


Estimat Glomerular Filtration


Rate 88 


  


  


 


 


Lactic Acid Level 2.3  1.7  1.6 


 


Ammonia 12   


 


Total Creatine Kinase 263   


 


Lipase 80   


 


Urine Opiates Screen NEG   


 


Urine Barbiturates Screen NEG   


 


Urine Amphetamines Screen NEG   


 


Urine Benzodiazepines Screen NEG   


 


Urine Cocaine Screen NEG   


 


Urine Cannabinoids Screen NEG   


 


Ethyl Alcohol Level LESS THAN 3   








Result Diagram:  


3/5/18 1606                                                                    

            3/5/18 1606








Caprini VTE Risk Assessment


Caprini VTE Risk Assessment:  Mod/High Risk (score >= 2)


Caprini Risk Assessment Model











 Point Value = 1          Point Value = 2  Point Value = 3  Point Value = 5


 


Age 41-60


Minor surgery


BMI > 25 kg/m2


Swollen legs


Varicose veins


Pregnancy or postpartum


History of unexplained or recurrent


   spontaneous 


Oral contraceptives or hormone


   replacement


Sepsis (< 1 month)


Serious lung disease, including


   pneumonia (< 1 month)


Abnormal pulmonary function


Acute myocardial infarction


Congestive heart failure (< 1 month)


History of inflammatory bowel disease


Medical patient at bed rest Age 61-74


Arthroscopic surgery


Major open surgery (> 45 min)


Laparoscopic surgery (> 45 min)


Malignancy


Confined to bed (> 72 hours)


Immobilizing plaster cast


Central venous access Age >= 75


History of VTE


Family history of VTE


Factor V Leiden


Prothrombin 06723O


Lupus anticoagulant


Anticardiolipin antibodies


Elevated serum homocysteine


Heparin-induced thrombocytopenia


Other congenital or acquired


   thrombophilia Stroke (< 1 month)


Elective arthroplasty


Hip, pelvis, or leg fracture


Acute spinal cord injury (< 1 month)








Prophylaxis Regimen











   Total Risk


Factor Score Risk Level Prophylaxis Regimen


 


0-1      Low Early ambulation


 


2 Moderate Order ONE of the following:


*Sequential Compression Device (SCD)


*Heparin 5000 units SQ BID


 


3-4 Higher Order ONE of the following medications:


*Heparin 5000 units SQ TID


*Enoxaparin/Lovenox 40 mg SQ daily (WT < 150 kg, CrCl > 30 mL/min)


*Enoxaparin/Lovenox 30 mg SQ daily (WT < 150 kg, CrCl > 10-29 mL/min)


*Enoxaparin/Lovenox 30 mg SQ BID (WT < 150 kg, CrCl > 30 mL/min)


AND/OR


*Sequential Compression Device (SCD)


 


5 or more Highest Order ONE of the following medications:


*Heparin 5000 units SQ TID (Preferred with Epidurals)


*Enoxaparin/Lovenox 40 mg SQ daily (WT < 150 kg, CrCl > 30 mL/min)


*Enoxaparin/Lovenox 30 mg SQ daily (WT < 150 kg, CrCl > 10-29 mL/min)


*Enoxaparin/Lovenox 30 mg SQ BID (WT < 150 kg, CrCl > 30 mL/min)


AND


*Sequential Compression Device (SCD)











Assessment and Plan


Assessment and Plan


Assessment/plan:





1.  Symptomatic hyponatremia


Sodium 129


NS 


Fluid restriction


Repeat BMP in am





2.  Alcohol withdrawal


Thiamine/folate/multivitamins


[facility] protocol


Monitor for signs of withdrawal 





FEN


Regular diet with fluid restriction


Electrolytes: As above


Heparin


NS at 50 cc/hr











Farrah Aguila MD Mar 5, 2018 23:21

## 2018-03-05 NOTE — RADRPT
EXAM DATE/TIME:  03/05/2018 20:40 

 

[facility] COMPARISON:     

No previous studies available for comparison.

 

                     

INDICATIONS :     

Evaluate pelvis for trauma, fell

                     

 

MEDICAL HISTORY :     

Cardiovascular disease.          

 

SURGICAL HISTORY :        

Mitral valve replacement

 

ENCOUNTER:     

Initial                                        

 

ACUITY:     

3 days      

 

PAIN SCORE:     

2/10

 

LOCATION:     

Bilateral  Pelvis

 

FINDINGS:     

Examination of the pelvis demonstrates no evidence of fracture or dislocation.  Bony mineralization i
s normal.  There is no widening of the sacroiliac joints.  No foreign body is identified.

 

CONCLUSION:     

No evidence of recent bone injury.

 

 

 

 Luis Arita MD on March 05, 2018 at 21:38           

Board Certified Radiologist.

 This report was verified electronically.

## 2018-03-05 NOTE — RADRPT
EXAM DATE/TIME:  03/05/2018 16:36 

 

[facility] COMPARISON:     

CT BRAIN W/O CONTRAST, May 08, 2017, 11:29.

 

 

INDICATIONS :     

Trauma, multiple falls, laceration middle of forehead and two black eyes.

                      

 

RADIATION DOSE:     

56.35 CTDIvol (mGy) 

 

 

 

MEDICAL HISTORY :     

Cardiovascular disease.  

 

SURGICAL HISTORY :      

None. 

 

ENCOUNTER:      

Initial

 

ACUITY:      

3 days

 

PAIN SCALE:      

2/10

 

LOCATION:        

cranial 

 

TECHNIQUE:     

Multiple contiguous axial images were obtained of the head.  Using automated exposure control and adj
ustment of the mA and/or kV according to patient size, radiation dose was kept as low as reasonably a
chievable to obtain optimal diagnostic quality images.   DICOM format image data is available electro
nically for review and comparison.  

 

FINDINGS:     

 

CEREBRUM:     

The ventricles are normal for age.  No evidence of midline shift, mass lesion, hemorrhage or acute in
farction.  No extra-axial fluid collections are seen.

 

POSTERIOR FOSSA:     

The cerebellum and brainstem are intact.  The 4th ventricle is midline.  The cerebellopontine angle i
s unremarkable.

 

EXTRACRANIAL:     

The visualized portion of the orbits is intact.

 

SKULL:     

The calvaria is intact.  No evidence of skull fracture. Small cephalhematoma over the right frontal b
one

 

CONCLUSION:     

1. Small cephalhematoma over the right frontal bone.

2. Otherwise negative. No fracture. No acute intracranial process.

 

 

 

 Boyd العراقي MD on March 05, 2018 at 16:45           

Board Certified Radiologist.

 This report was verified electronically.

## 2018-03-06 VITALS
RESPIRATION RATE: 16 BRPM | TEMPERATURE: 98.7 F | OXYGEN SATURATION: 98 % | SYSTOLIC BLOOD PRESSURE: 103 MMHG | DIASTOLIC BLOOD PRESSURE: 63 MMHG | HEART RATE: 97 BPM

## 2018-03-06 VITALS — HEART RATE: 96 BPM

## 2018-03-06 VITALS
DIASTOLIC BLOOD PRESSURE: 54 MMHG | TEMPERATURE: 97.8 F | SYSTOLIC BLOOD PRESSURE: 93 MMHG | RESPIRATION RATE: 16 BRPM | HEART RATE: 82 BPM | OXYGEN SATURATION: 99 %

## 2018-03-06 VITALS — DIASTOLIC BLOOD PRESSURE: 58 MMHG | SYSTOLIC BLOOD PRESSURE: 89 MMHG

## 2018-03-06 VITALS
TEMPERATURE: 98 F | SYSTOLIC BLOOD PRESSURE: 108 MMHG | RESPIRATION RATE: 18 BRPM | OXYGEN SATURATION: 98 % | DIASTOLIC BLOOD PRESSURE: 65 MMHG | HEART RATE: 106 BPM

## 2018-03-06 VITALS — DIASTOLIC BLOOD PRESSURE: 61 MMHG | SYSTOLIC BLOOD PRESSURE: 98 MMHG | HEART RATE: 67 BPM

## 2018-03-06 VITALS
OXYGEN SATURATION: 93 % | DIASTOLIC BLOOD PRESSURE: 62 MMHG | RESPIRATION RATE: 20 BRPM | SYSTOLIC BLOOD PRESSURE: 100 MMHG | HEART RATE: 106 BPM

## 2018-03-06 VITALS
SYSTOLIC BLOOD PRESSURE: 103 MMHG | RESPIRATION RATE: 20 BRPM | HEART RATE: 97 BPM | DIASTOLIC BLOOD PRESSURE: 74 MMHG | OXYGEN SATURATION: 96 %

## 2018-03-06 VITALS
SYSTOLIC BLOOD PRESSURE: 89 MMHG | TEMPERATURE: 97.9 F | HEART RATE: 103 BPM | OXYGEN SATURATION: 96 % | DIASTOLIC BLOOD PRESSURE: 65 MMHG | RESPIRATION RATE: 18 BRPM

## 2018-03-06 VITALS — DIASTOLIC BLOOD PRESSURE: 64 MMHG | HEART RATE: 64 BPM | SYSTOLIC BLOOD PRESSURE: 92 MMHG | OXYGEN SATURATION: 88 %

## 2018-03-06 VITALS — HEART RATE: 97 BPM

## 2018-03-06 VITALS — HEART RATE: 102 BPM

## 2018-03-06 VITALS
RESPIRATION RATE: 17 BRPM | HEART RATE: 109 BPM | SYSTOLIC BLOOD PRESSURE: 115 MMHG | OXYGEN SATURATION: 100 % | DIASTOLIC BLOOD PRESSURE: 78 MMHG

## 2018-03-06 VITALS — HEART RATE: 111 BPM

## 2018-03-06 LAB
ALBUMIN SERPL-MCNC: 2.2 GM/DL (ref 3.4–5)
ALP SERPL-CCNC: 59 U/L (ref 45–117)
ALT SERPL-CCNC: 57 U/L (ref 12–78)
AST SERPL-CCNC: 45 U/L (ref 15–37)
BASOPHILS # BLD AUTO: 0 TH/MM3 (ref 0–0.2)
BASOPHILS NFR BLD: 0.3 % (ref 0–2)
BILIRUB SERPL-MCNC: 0.9 MG/DL (ref 0.2–1)
BUN SERPL-MCNC: 14 MG/DL (ref 7–18)
CALCIUM SERPL-MCNC: 7.9 MG/DL (ref 8.5–10.1)
CHLORIDE SERPL-SCNC: 99 MEQ/L (ref 98–107)
CREAT SERPL-MCNC: 0.81 MG/DL (ref 0.6–1.3)
EOSINOPHIL # BLD: 0 TH/MM3 (ref 0–0.4)
EOSINOPHIL NFR BLD: 0.5 % (ref 0–4)
ERYTHROCYTE [DISTWIDTH] IN BLOOD BY AUTOMATED COUNT: 16.7 % (ref 11.6–17.2)
GFR SERPLBLD BASED ON 1.73 SQ M-ARVRAT: 96 ML/MIN (ref 89–?)
GLUCOSE SERPL-MCNC: 87 MG/DL (ref 74–106)
HCO3 BLD-SCNC: 24.2 MEQ/L (ref 21–32)
HCT VFR BLD CALC: 29.1 % (ref 39–51)
HGB BLD-MCNC: 10.1 GM/DL (ref 13–17)
LYMPHOCYTES # BLD AUTO: 0.7 TH/MM3 (ref 1–4.8)
LYMPHOCYTES NFR BLD AUTO: 10.6 % (ref 9–44)
MCH RBC QN AUTO: 33.5 PG (ref 27–34)
MCHC RBC AUTO-ENTMCNC: 34.8 % (ref 32–36)
MCV RBC AUTO: 96.3 FL (ref 80–100)
MONOCYTE #: 1.2 TH/MM3 (ref 0–0.9)
MONOCYTES NFR BLD: 18.6 % (ref 0–8)
NEUTROPHILS # BLD AUTO: 4.6 TH/MM3 (ref 1.8–7.7)
NEUTROPHILS NFR BLD AUTO: 70 % (ref 16–70)
PLATELET # BLD: 100 TH/MM3 (ref 150–450)
PMV BLD AUTO: 9 FL (ref 7–11)
PROT SERPL-MCNC: 5.2 GM/DL (ref 6.4–8.2)
RBC # BLD AUTO: 3.02 MIL/MM3 (ref 4.5–5.9)
SODIUM SERPL-SCNC: 131 MEQ/L (ref 136–145)
WBC # BLD AUTO: 6.5 TH/MM3 (ref 4–11)

## 2018-03-06 RX ADMIN — Medication SCH ML: at 10:17

## 2018-03-06 RX ADMIN — FOLIC ACID SCH MG: 1 TABLET ORAL at 10:17

## 2018-03-06 RX ADMIN — CLOTRIMAZOLE AND BETAMETHASONE DIPROPIONATE SCH APPLIC: 10; .5 CREAM TOPICAL at 20:02

## 2018-03-06 RX ADMIN — PHENYTOIN SODIUM SCH MLS/HR: 50 INJECTION INTRAMUSCULAR; INTRAVENOUS at 14:50

## 2018-03-06 RX ADMIN — STANDARDIZED SENNA CONCENTRATE AND DOCUSATE SODIUM SCH TAB: 8.6; 5 TABLET, FILM COATED ORAL at 20:02

## 2018-03-06 RX ADMIN — Medication SCH ML: at 20:01

## 2018-03-06 RX ADMIN — Medication SCH MG: at 10:17

## 2018-03-06 RX ADMIN — CLOTRIMAZOLE AND BETAMETHASONE DIPROPIONATE SCH APPLIC: 10; .5 CREAM TOPICAL at 14:50

## 2018-03-06 RX ADMIN — STANDARDIZED SENNA CONCENTRATE AND DOCUSATE SODIUM SCH TAB: 8.6; 5 TABLET, FILM COATED ORAL at 10:17

## 2018-03-06 RX ADMIN — MULTIPLE VITAMINS W/ MINERALS TAB SCH TAB: TAB at 10:16

## 2018-03-06 RX ADMIN — BACITRACIN SCH APPLIC: 500 OINTMENT TOPICAL at 21:00

## 2018-03-06 NOTE — RADRPT
EXAM DATE/TIME:  03/06/2018 03:05 

 

[facility] COMPARISON:     

CHEST SINGLE AP, March 05, 2018, 18:27.

 

                     

INDICATIONS :     

Short of breath.

                     

 

MEDICAL HISTORY :     

None.          

 

SURGICAL HISTORY :        

Mitral valve replacement.

 

ENCOUNTER:     

Subsequent                                        

 

ACUITY:     

2 days      

 

PAIN SCORE:     

Non-responsive.

 

LOCATION:     

Bilateral chest 

 

FINDINGS:     

Portable AP view of the chest demonstrates a normal-sized cardiac silhouette. There has been prior me
anselmo sternotomy. The most superior and inferior sternotomy wire is fractured. No effusion, consolidat
ion, or pneumothorax. The bones and soft tissues demonstrate no acute finding.

 

CONCLUSION:     

No acute cardiopulmonary abnormality is identified.

 

 

 

 Jonathan Ramirez MD on March 06, 2018 at 3:37           

Board Certified Radiologist.

 This report was verified electronically.

## 2018-03-06 NOTE — HHI.PR
Subjective


Remarks


Follow-up alcohol withdrawal.  Patient became confused and agitated and 

required 4 doses of Ativan overnight.  He is lethargic easily arousable he is 

oriented to person and place.  He denies headache or neck pain.  Discussed with 

nurse





Objective


Vitals





Vital Signs








  Date Time  Temp Pulse Resp B/P (MAP) Pulse Ox O2 Delivery O2 Flow Rate FiO2


 


3/6/18 11:16 97.9 103 18 89/65 (73) 96   


 


3/6/18 08:09 97.8 82 16 93/54 (67) 99   


 


3/6/18 05:40  106 20 100/62 (75) 93   


 


3/6/18 05:29  67  98/61 (73)    


 


3/6/18 04:40  97      


 


3/6/18 03:22  97 20 103/74 (84) 96   


 


3/6/18 02:19  64  92/64 (73) 88   


 


3/6/18 02:12    89/58 (68)    


 


3/6/18 01:05 98.7 97 16 103/63 (76) 98   


 


3/5/18 23:19  106      


 


3/5/18 21:27  123      


 


3/5/18 21:27  123      


 


3/5/18 21:15 98.4 100 20 107/57 (74) 100   


 


3/5/18 19:02  105 16 94/57 (69) 100 Room Air  


 


3/5/18 18:03 97.8 104 16 117/63 (81) 98 Room Air  


 


3/5/18 16:15   20  100 Room Air  


 


3/5/18 16:15  121 20 142/61 (88) 100 Room Air  


 


3/5/18 15:25 99.3 129 15 133/68 (89) 99   














I/O      


 


 3/5/18 3/5/18 3/5/18 3/6/18 3/6/18 3/6/18





 07:00 15:00 23:00 07:00 15:00 23:00


 


Intake Total   1000 ml 750 ml  


 


Output Total    300 ml  


 


Balance   1000 ml 450 ml  


 


      


 


Intake Oral    750 ml  


 


IV Total   1000 ml   


 


Output Urine Total    300 ml  


 


# Voids    1  


 


# Bowel Movements    3  








Result Diagram:  


3/6/18 0206                                                                    

            3/6/18 0206





Imaging





Last Impressions








Chest X-Ray 3/6/18 0000 Signed





Impressions: 





 Service Date/Time:  Tuesday, March 6, 2018 03:05 - CONCLUSION:  No acute 





 cardiopulmonary abnormality is identified.     Jonathan Ramirez MD 


 


Pelvis X-Ray 3/5/18 0000 Signed





Impressions: 





 Service Date/Time:  Monday, March 5, 2018 20:40 - CONCLUSION:  No evidence of 





 recent bone injury.     Luis Arita MD 


 


Head CT 3/5/18 0000 Signed





Impressions: 





 Service Date/Time:  Monday, March 5, 2018 16:36 - CONCLUSION:  1. Small 





 cephalhematoma over the right frontal bone. 2. Otherwise negative. No 

fracture. 





 No acute intracranial process.     Boyd العراقي MD 








Objective Remarks


GENERAL: This is a well-nourished, well-developed patient, in no apparent 

distress.


SKIN: Scab over right forehead with bilateral raccoons eyes cool and dry.


EYES: Pupils equal round and reactive. Extraocular motions intact. No scleral 

icterus. No injection or drainage.  No tenderness over periorbital area or step-

off


NECK: Trachea midline. No JVD or lymphadenopathy. Supple, nontender, no 

meningeal signs.


CARDIOVASCULAR: Regular rate and rhythm without gallops, or rubs. 


RESPIRATORY: Clear to auscultation. Breath sounds equal bilaterally. No wheezes

, rales, or rhonchi.  


GASTROINTESTINAL: Abdomen soft, non-tender, nondistended. No guarding.


MUSCULOSKELETAL: Extremities without clubbing, cyanosis, or edema. No joint 

tenderness, effusion, or edema noted. No calf tenderness. Negative Homans sign 

bilaterally.  Tinea cruris


NEUROLOGICAL: Awake and alert. Cranial nerves II through XII intact.  Motor and 

sensory grossly within normal limits. Five out of 5 muscle strength in all 

muscle groups.  Normal speech.  Tremors noted


Procedures


none





A/P


Problem List:  


(1) Alcohol withdrawal


ICD Code:  F10.239 - Alcohol dependence with withdrawal, unspecified


Status:  Acute


Assessment and Plan


1.  Hyponatremia.  This is from beer potomania.  Improving.  Continue fluid 

restriction


2.  Alcohol withdrawal with impending DTs.  Continue CIWA protocol.  Patient 

counseled.  We will transfer to ICU for close monitoring


3.  Tinea cruris.  Lotrisone cream


4.  Chronic thrombocytopenia.  Monitor


5.  Cephalhematoma status post fall.  Monitor.  Neurochecks.


FEN


Regular diet with fluid restriction


Electrolytes: As above


Discontinue heparin for DVT prophylaxis secondary to closed head injury with 

thrombocytopenia


NS at 50 cc/hr











Buddy Nicholson MD Mar 6, 2018 13:54

## 2018-03-06 NOTE — PD.WCN.NOT
Wound Consult


Description:


Consult for WOUND MANAGEMENT of bilateral buttocks and hamstrings per Dr Aguila


Communicated with:


BAKARI Haile


Recommendation:


Apply bacitracin ointment in a thin layer to right forehead, right knee, right 

shin, and left scapula scabs (dried exudate).


Calazime skin protectant paste to bilateral buttocks, upper and inner thighs 

BID and PRN after each cleansing.


Continue to reposition patient every 2 hours to relieve pressure points.


Use only 1 disposable underpad for moisture (2 staggered is acceptable) while 

patient is on a Michelle mattress.


Additional Information:


Patient seen on [facility] for evaluation of bilateral buttocks. Recommendations 

are above. Patient was positioned to his right side for assessment of bilateral 

buttocks that present as bright red blanching erythema from buttocks to mid 

posterior thighs with peeling denuded skin indicating a moisture etiology. 

Calazime was re-applied at this time by writer. Patient was then turned to his 

left side for further Calazime application when a wound resembling the shape of 

a heart was noted to the right proximal medial thigh/groin measuring 2.5cm x 

3.1cm x <0.1cm of ~100% yellow dry tissue with jagged wound margins even with 

wound bed and blanching erythema noted to immediate periwound. Wound appears to 

be of moisture relation given the jagged wound margins, however wound is of 

unknown etiology at this point with the heart shape it resembles. There is no 

odor and no drainage. Also noted at this time is a wound on the left medial 

proximal thigh/groin measuring 0.4cm x 0.4cm x <0.1cm of ~100% yellow dry 

tissue with dry scaling wound margins and unremarkable periwound. Wound is 

without drainage and without odor. These wounds were covered in Calazime skin 

protectant paste. Once moisture related skin breakdown resolves, patient will 

be seen for re-evaluation of full thickness inner thigh wounds with shallow 

wounds beds.











[facility]gado [facility] Mar 6, 2018 18:27

## 2018-03-06 NOTE — EKG
Date Performed: 03/05/2018       Time Performed: 18:04:32

 

PTAGE:      62 years

 

EKG:      SINUS TACHYCARDIA WITH FREQUENT VENTRICULAR PREMATURE COMPLEXES LEFT ANTERIOR FASCICULAR BL
OCK ABNORMAL ECG Since the prior tracing, there has been no significant change 

 

 PREVIOUS TRACING            : 03/05/2018 16.13

 

DOCTOR:   Ti Melgar  Interpretating Date/Time  03/06/2018 19:02:52

## 2018-03-07 VITALS — RESPIRATION RATE: 21 BRPM | HEART RATE: 87 BPM | OXYGEN SATURATION: 100 %

## 2018-03-07 VITALS
RESPIRATION RATE: 26 BRPM | OXYGEN SATURATION: 100 % | DIASTOLIC BLOOD PRESSURE: 80 MMHG | SYSTOLIC BLOOD PRESSURE: 129 MMHG | HEART RATE: 96 BPM

## 2018-03-07 VITALS
RESPIRATION RATE: 24 BRPM | SYSTOLIC BLOOD PRESSURE: 149 MMHG | DIASTOLIC BLOOD PRESSURE: 84 MMHG | HEART RATE: 95 BPM | TEMPERATURE: 97.6 F | OXYGEN SATURATION: 100 %

## 2018-03-07 VITALS
HEART RATE: 84 BPM | SYSTOLIC BLOOD PRESSURE: 150 MMHG | OXYGEN SATURATION: 100 % | DIASTOLIC BLOOD PRESSURE: 80 MMHG | RESPIRATION RATE: 19 BRPM

## 2018-03-07 VITALS
HEART RATE: 100 BPM | TEMPERATURE: 99.4 F | SYSTOLIC BLOOD PRESSURE: 145 MMHG | RESPIRATION RATE: 31 BRPM | DIASTOLIC BLOOD PRESSURE: 79 MMHG | OXYGEN SATURATION: 100 %

## 2018-03-07 VITALS
DIASTOLIC BLOOD PRESSURE: 72 MMHG | OXYGEN SATURATION: 100 % | TEMPERATURE: 97.8 F | HEART RATE: 87 BPM | RESPIRATION RATE: 25 BRPM | SYSTOLIC BLOOD PRESSURE: 134 MMHG

## 2018-03-07 VITALS — SYSTOLIC BLOOD PRESSURE: 133 MMHG | HEART RATE: 93 BPM | DIASTOLIC BLOOD PRESSURE: 60 MMHG | RESPIRATION RATE: 26 BRPM

## 2018-03-07 VITALS — OXYGEN SATURATION: 100 % | HEART RATE: 83 BPM | RESPIRATION RATE: 25 BRPM

## 2018-03-07 VITALS
TEMPERATURE: 100.2 F | RESPIRATION RATE: 26 BRPM | HEART RATE: 98 BPM | SYSTOLIC BLOOD PRESSURE: 103 MMHG | DIASTOLIC BLOOD PRESSURE: 64 MMHG | OXYGEN SATURATION: 92 %

## 2018-03-07 VITALS
RESPIRATION RATE: 26 BRPM | TEMPERATURE: 97.7 F | SYSTOLIC BLOOD PRESSURE: 115 MMHG | DIASTOLIC BLOOD PRESSURE: 70 MMHG | HEART RATE: 89 BPM

## 2018-03-07 VITALS
RESPIRATION RATE: 22 BRPM | OXYGEN SATURATION: 100 % | DIASTOLIC BLOOD PRESSURE: 75 MMHG | HEART RATE: 80 BPM | SYSTOLIC BLOOD PRESSURE: 124 MMHG | TEMPERATURE: 99.4 F

## 2018-03-07 LAB
ALBUMIN SERPL-MCNC: 2.6 GM/DL (ref 3.4–5)
ALP SERPL-CCNC: 68 U/L (ref 45–117)
ALT SERPL-CCNC: 57 U/L (ref 12–78)
AST SERPL-CCNC: 42 U/L (ref 15–37)
BILIRUB SERPL-MCNC: 0.8 MG/DL (ref 0.2–1)
BUN SERPL-MCNC: 10 MG/DL (ref 7–18)
CALCIUM SERPL-MCNC: 7.9 MG/DL (ref 8.5–10.1)
CHLORIDE SERPL-SCNC: 104 MEQ/L (ref 98–107)
CREAT SERPL-MCNC: 0.71 MG/DL (ref 0.6–1.3)
GFR SERPLBLD BASED ON 1.73 SQ M-ARVRAT: 112 ML/MIN (ref 89–?)
GLUCOSE SERPL-MCNC: 82 MG/DL (ref 74–106)
HCO3 BLD-SCNC: 26.3 MEQ/L (ref 21–32)
MAGNESIUM SERPL-MCNC: 1.9 MG/DL (ref 1.5–2.5)
PROT SERPL-MCNC: 5.8 GM/DL (ref 6.4–8.2)
SODIUM SERPL-SCNC: 139 MEQ/L (ref 136–145)

## 2018-03-07 RX ADMIN — FOLIC ACID SCH MG: 1 TABLET ORAL at 09:57

## 2018-03-07 RX ADMIN — Medication SCH ML: at 09:58

## 2018-03-07 RX ADMIN — Medication SCH ML: at 20:19

## 2018-03-07 RX ADMIN — STANDARDIZED SENNA CONCENTRATE AND DOCUSATE SODIUM SCH TAB: 8.6; 5 TABLET, FILM COATED ORAL at 20:19

## 2018-03-07 RX ADMIN — PHENYTOIN SODIUM SCH MLS/HR: 50 INJECTION INTRAMUSCULAR; INTRAVENOUS at 10:58

## 2018-03-07 RX ADMIN — CLOTRIMAZOLE AND BETAMETHASONE DIPROPIONATE SCH APPLIC: 10; .5 CREAM TOPICAL at 09:57

## 2018-03-07 RX ADMIN — STANDARDIZED SENNA CONCENTRATE AND DOCUSATE SODIUM SCH TAB: 8.6; 5 TABLET, FILM COATED ORAL at 09:57

## 2018-03-07 RX ADMIN — CHLORHEXIDINE GLUCONATE SCH PACK: 500 CLOTH TOPICAL at 04:00

## 2018-03-07 RX ADMIN — BACITRACIN SCH APPLIC: 500 OINTMENT TOPICAL at 09:00

## 2018-03-07 RX ADMIN — Medication SCH MG: at 09:57

## 2018-03-07 RX ADMIN — SODIUM CHLORIDE SCH MLS/HR: 234 INJECTION INTRAMUSCULAR; INTRAVENOUS; SUBCUTANEOUS at 17:31

## 2018-03-07 RX ADMIN — BACITRACIN SCH APPLIC: 500 OINTMENT TOPICAL at 20:19

## 2018-03-07 RX ADMIN — MULTIPLE VITAMINS W/ MINERALS TAB SCH TAB: TAB at 09:57

## 2018-03-07 RX ADMIN — CLOTRIMAZOLE AND BETAMETHASONE DIPROPIONATE SCH APPLIC: 10; .5 CREAM TOPICAL at 20:19

## 2018-03-07 NOTE — HHI.PR
Subjective


Remarks





Patient seen this morning.  Confused.  Denies any chest pain or shortness of 

breath.





Objective





Vital Signs








  Date Time  Temp Pulse Resp B/P (MAP) Pulse Ox O2 Delivery O2 Flow Rate FiO2


 


3/7/18 12:00  87      


 


3/7/18 12:00 97.8 87 25 134/72 (92) 100   


 


3/7/18 11:00  93 26 133/60 (84)    


 


3/7/18 10:03  96 26 129/80 (96) 100   


 


3/7/18 10:00  83      


 


3/7/18 10:00  83 25  100   


 


3/7/18 09:02  84 19 150/80 (103) 100   


 


3/7/18 09:00  87 21  100   


 


3/7/18 08:00 97.7 89 26 115/70 (85)    


 


3/7/18 08:00  89      


 


3/7/18 04:00 99.4 80 22 124/75 (91) 100   


 


3/7/18 00:00 100.2 98 26 103/64 (77) 92   


 


3/6/18 20:00  111      














I/O      


 


 3/6/18 3/6/18 3/6/18 3/7/18 3/7/18 3/7/18





 07:00 15:00 23:00 07:00 15:00 23:00


 


Intake Total 750 ml  1296 ml 501 ml  


 


Output Total 300 ml  450 ml 750 ml  


 


Balance 450 ml  846 ml -249 ml  


 


      


 


Intake Oral 750 ml     


 


IV Total   1296 ml 501 ml  


 


Output Urine Total 300 ml  450 ml 750 ml  


 


# Voids 1     


 


# Bowel Movements 3  1 0  








Result Diagram:  


3/6/18 0206                                                                    

            3/7/18 0902





Imaging





Last Impressions








Chest X-Ray 3/6/18 0000 Signed





Impressions: 





 Service Date/Time:  Tuesday, March 6, 2018 03:05 - CONCLUSION:  No acute 





 cardiopulmonary abnormality is identified.     Jonathan Ramirez MD 


 


Pelvis X-Ray 3/5/18 0000 Signed





Impressions: 





 Service Date/Time:  Monday, March 5, 2018 20:40 - CONCLUSION:  No evidence of 





 recent bone injury.     Luis Arita MD 


 


Head CT 3/5/18 0000 Signed





Impressions: 





 Service Date/Time:  Monday, March 5, 2018 16:36 - CONCLUSION:  1. Small 





 cephalhematoma over the right frontal bone. 2. Otherwise negative. No 

fracture. 





 No acute intracranial process.     Boyd العراقي MD 








Objective Remarks


GENERAL: lying in bed.  Appears comfortable.  Oriented to place, not to date or 

year.


SKIN: Warm and dry.


HEAD: Normocephalic.


EYES: No scleral icterus. No injection or drainage. 


NECK: Supple, trachea midline. No JVD or lymphadenopathy.


CARDIOVASCULAR: Regular rate and rhythm without murmurs, gallops, or rubs. 


RESPIRATORY: Breath sounds equal bilaterally. No accessory muscle use.


GASTROINTESTINAL: Abdomen soft, non-tender, nondistended. 


MUSCULOSKELETAL: No cyanosis, or edema. 


BACK: Nontender without obvious deformity. No CVA tenderness.








A/P


Assessment and Plan





//Hyponatremia.  This is from beer potomania.  Improving.  Continue fluid 

restriction





//Alcohol withdrawal with impending DTs.  Continue CIWA protocol.  Patient 

counseled.  We will transfer to ICU for close monitoring


= Schedule Librium.  Transfer to floor.





//Hypokalemia.  3.4.  Mild.  Replace.  Monitor.





// Tinea cruris.  Continue Lotrisone cream





// Chronic thrombocytopenia.  Monitor





//Cephalhematoma status post fall.  Monitor.  Neurochecks.


-Patient seen by wound care.  Appreciate assistance





FEN


Regular diet with fluid restriction


Electrolytes: As above


SCDs


Discontinue heparin for DVT prophylaxis secondary to closed head injury with 

thrombocytopenia


1/2NS at 50 cc/hr


Discharge Planning


transfer to floor.  Patient still confused, but improving.











Aris Ga MD Mar 7, 2018 16:56

## 2018-03-07 NOTE — EKG
Date Performed: 2018       Time Performed: 16:13:21

 

PTAGE:      62 years

 

EKG:      SINUS TACHYCARDIA WITH FREQUENT VENTRICULAR PREMATURE COMPLEXES LEFT ANTERIOR FASCICULAR BL
OCK NONSPECIFIC T-WAVE ABNORMALITY ABNORMAL ECG Since 

 

 PREVIOUS TRACING            , no significant change noted PREVIOUS TRACIN2017 14.45

 

DOCTOR:   Ti Melgar  Interpretating Date/Time  2018 07:00:09

## 2018-03-08 VITALS
SYSTOLIC BLOOD PRESSURE: 95 MMHG | RESPIRATION RATE: 18 BRPM | OXYGEN SATURATION: 98 % | DIASTOLIC BLOOD PRESSURE: 63 MMHG | TEMPERATURE: 98.8 F | HEART RATE: 95 BPM

## 2018-03-08 VITALS
RESPIRATION RATE: 20 BRPM | TEMPERATURE: 97.3 F | SYSTOLIC BLOOD PRESSURE: 114 MMHG | OXYGEN SATURATION: 97 % | HEART RATE: 88 BPM | DIASTOLIC BLOOD PRESSURE: 59 MMHG

## 2018-03-08 VITALS
HEART RATE: 79 BPM | RESPIRATION RATE: 18 BRPM | DIASTOLIC BLOOD PRESSURE: 62 MMHG | OXYGEN SATURATION: 98 % | TEMPERATURE: 98.4 F | SYSTOLIC BLOOD PRESSURE: 140 MMHG

## 2018-03-08 VITALS
OXYGEN SATURATION: 97 % | SYSTOLIC BLOOD PRESSURE: 152 MMHG | TEMPERATURE: 98.6 F | DIASTOLIC BLOOD PRESSURE: 67 MMHG | RESPIRATION RATE: 21 BRPM | HEART RATE: 92 BPM

## 2018-03-08 VITALS
RESPIRATION RATE: 20 BRPM | SYSTOLIC BLOOD PRESSURE: 95 MMHG | TEMPERATURE: 99.6 F | OXYGEN SATURATION: 96 % | DIASTOLIC BLOOD PRESSURE: 59 MMHG | HEART RATE: 95 BPM

## 2018-03-08 VITALS
SYSTOLIC BLOOD PRESSURE: 116 MMHG | TEMPERATURE: 98.6 F | OXYGEN SATURATION: 94 % | RESPIRATION RATE: 20 BRPM | DIASTOLIC BLOOD PRESSURE: 57 MMHG | HEART RATE: 79 BPM

## 2018-03-08 LAB
ALBUMIN SERPL-MCNC: 2.3 GM/DL (ref 3.4–5)
BASOPHILS # BLD AUTO: 0.1 TH/MM3 (ref 0–0.2)
BASOPHILS NFR BLD: 1 % (ref 0–2)
BUN SERPL-MCNC: 9 MG/DL (ref 7–18)
CALCIUM SERPL-MCNC: 8 MG/DL (ref 8.5–10.1)
CHLORIDE SERPL-SCNC: 106 MEQ/L (ref 98–107)
CREAT SERPL-MCNC: 0.62 MG/DL (ref 0.6–1.3)
EOSINOPHIL # BLD: 0.1 TH/MM3 (ref 0–0.4)
EOSINOPHIL NFR BLD: 1.7 % (ref 0–4)
ERYTHROCYTE [DISTWIDTH] IN BLOOD BY AUTOMATED COUNT: 16.7 % (ref 11.6–17.2)
GFR SERPLBLD BASED ON 1.73 SQ M-ARVRAT: 131 ML/MIN (ref 89–?)
GLUCOSE SERPL-MCNC: 72 MG/DL (ref 74–106)
HCO3 BLD-SCNC: 25.1 MEQ/L (ref 21–32)
HCT VFR BLD CALC: 29.8 % (ref 39–51)
HGB BLD-MCNC: 10.5 GM/DL (ref 13–17)
LYMPHOCYTES # BLD AUTO: 0.8 TH/MM3 (ref 1–4.8)
LYMPHOCYTES NFR BLD AUTO: 15.5 % (ref 9–44)
MAGNESIUM SERPL-MCNC: 1.8 MG/DL (ref 1.5–2.5)
MCH RBC QN AUTO: 33.5 PG (ref 27–34)
MCHC RBC AUTO-ENTMCNC: 35.1 % (ref 32–36)
MCV RBC AUTO: 95.5 FL (ref 80–100)
MONOCYTE #: 0.9 TH/MM3 (ref 0–0.9)
MONOCYTES NFR BLD: 17.9 % (ref 0–8)
NEUTROPHILS # BLD AUTO: 3.1 TH/MM3 (ref 1.8–7.7)
NEUTROPHILS NFR BLD AUTO: 63.9 % (ref 16–70)
PHOSPHATE SERPL-MCNC: 3.7 MG/DL (ref 2.5–4.9)
PLATELET # BLD: 140 TH/MM3 (ref 150–450)
PMV BLD AUTO: 8.2 FL (ref 7–11)
RBC # BLD AUTO: 3.13 MIL/MM3 (ref 4.5–5.9)
SODIUM SERPL-SCNC: 140 MEQ/L (ref 136–145)
WBC # BLD AUTO: 4.9 TH/MM3 (ref 4–11)

## 2018-03-08 RX ADMIN — FOLIC ACID SCH MG: 1 TABLET ORAL at 08:17

## 2018-03-08 RX ADMIN — BACITRACIN SCH APPLIC: 500 OINTMENT TOPICAL at 08:18

## 2018-03-08 RX ADMIN — CHLORHEXIDINE GLUCONATE SCH PACK: 500 CLOTH TOPICAL at 04:00

## 2018-03-08 RX ADMIN — CLOTRIMAZOLE AND BETAMETHASONE DIPROPIONATE SCH APPLIC: 10; .5 CREAM TOPICAL at 08:20

## 2018-03-08 RX ADMIN — Medication SCH ML: at 20:52

## 2018-03-08 RX ADMIN — CLOTRIMAZOLE AND BETAMETHASONE DIPROPIONATE SCH APPLIC: 10; .5 CREAM TOPICAL at 20:53

## 2018-03-08 RX ADMIN — SODIUM CHLORIDE SCH MLS/HR: 234 INJECTION INTRAMUSCULAR; INTRAVENOUS; SUBCUTANEOUS at 11:24

## 2018-03-08 RX ADMIN — Medication SCH ML: at 08:18

## 2018-03-08 RX ADMIN — Medication SCH MG: at 08:17

## 2018-03-08 RX ADMIN — MULTIPLE VITAMINS W/ MINERALS TAB SCH TAB: TAB at 08:17

## 2018-03-08 RX ADMIN — STANDARDIZED SENNA CONCENTRATE AND DOCUSATE SODIUM SCH TAB: 8.6; 5 TABLET, FILM COATED ORAL at 20:52

## 2018-03-08 RX ADMIN — BACITRACIN SCH APPLIC: 500 OINTMENT TOPICAL at 20:53

## 2018-03-08 RX ADMIN — STANDARDIZED SENNA CONCENTRATE AND DOCUSATE SODIUM SCH TAB: 8.6; 5 TABLET, FILM COATED ORAL at 08:17

## 2018-03-08 NOTE — HHI.PR
Subjective


Remarks


The patient seems to be awake and alert.


Denies chest pain or shortness of breath.


Denies hallucinations.


Vital signs seem to be stable.





Objective


Vitals





Vital Signs








  Date Time  Temp Pulse Resp B/P (MAP) Pulse Ox O2 Delivery O2 Flow Rate FiO2


 


3/8/18 11:08 97.3 88 20 114/59 (77) 97   


 


3/8/18 08:28 98.4 79 18 140/62 (88) 98   


 


3/8/18 04:00 98.6 79 20 116/57 (76) 94   


 


3/8/18 00:00 98.6 92 21 152/67 (95) 97   


 


3/7/18 20:00 99.4 100 31 145/79 (101) 100   


 


3/7/18 20:00  100      


 


3/7/18 16:00  95      


 


3/7/18 16:00 97.6 95 24 149/84 (105) 100   














I/O      


 


 3/7/18 3/7/18 3/7/18 3/8/18 3/8/18 3/8/18





 07:00 15:00 23:00 07:00 15:00 23:00


 


Intake Total 501 ml  906 ml   


 


Output Total 750 ml  550 ml   


 


Balance -249 ml  356 ml   


 


      


 


Intake Oral   350 ml   


 


IV Total 501 ml  556 ml   


 


Output Urine Total 750 ml  550 ml   


 


# Voids   3   


 


# Bowel Movements 0     








Result Diagram:  


3/8/18 0633                                                                    

            3/8/18 0637





Imaging





Last Impressions








Chest X-Ray 3/6/18 0000 Signed





Impressions: 





 Service Date/Time:  Tuesday, March 6, 2018 03:05 - CONCLUSION:  No acute 





 cardiopulmonary abnormality is identified.     Jonathan Ramirez MD 


 


Pelvis X-Ray 3/5/18 0000 Signed





Impressions: 





 Service Date/Time:  Monday, March 5, 2018 20:40 - CONCLUSION:  No evidence of 





 recent bone injury.     uLis Arita MD 


 


Head CT 3/5/18 0000 Signed





Impressions: 





 Service Date/Time:  Monday, March 5, 2018 16:36 - CONCLUSION:  1. Small 





 cephalhematoma over the right frontal bone. 2. Otherwise negative. No 

fracture. 





 No acute intracranial process.     Boyd العراقي MD 








Objective Remarks


GENERAL: lying in bed.  Appears comfortable.  Oriented to place, not to date or 

year.


SKIN: Scab over right forehead with bilateral raccoons eyes cool and dry.


HEAD: Normocephalic.


EYES: No scleral icterus. No injection or drainage. 


NECK: Supple, trachea midline. No JVD or lymphadenopathy.


CARDIOVASCULAR: Regular rate and rhythm without murmurs, gallops, or rubs. 


RESPIRATORY: Breath sounds equal bilaterally. No accessory muscle use.


GASTROINTESTINAL: Abdomen soft, non-tender, nondistended. 


MUSCULOSKELETAL: No cyanosis, or edema. 


BACK: Nontender without obvious deformity. No CVA tenderness.


Neuro: Awake alert and oriented 3.  Motor and sensory to be grossly within 

normal limits.  3 out of 5 muscle strength in all muscle groups.  Normal 

speech.  No tremors noted.


Procedures


none


Medications and IVs





Current Medications








 Medications


  (Trade)  Dose


 Ordered  Sig/Marcia


 Route  Start Time


 Stop Time Status Last Admin


 


  (Romazicon Inj)  0.2 mg  Q1M  PRN


 IV PUSH  3/5/18 16:00


     


 


 


  (Ativan)  1 mg  Q4H  PRN


 PO  3/5/18 16:00


    3/5/18 22:09


 


 


  (Ativan Inj)  1 mg  Q4H  PRN


 IV PUSH  3/5/18 16:00


    3/7/18 17:01


 


 


  (Ativan)  2 mg  Q2H  PRN


 PO  3/5/18 16:00


    3/6/18 04:05


 


 


  (Ativan Inj)  2 mg  Q2H  PRN


 IV PUSH  3/5/18 16:00


    3/8/18 00:44


 


 


  (Ativan Inj)  2 mg  Q1H  PRN


 IV PUSH  3/5/18 16:00


    3/6/18 20:03


 


 


  (Ativan Inj)  2 mg  Q15M  PRN


 IV PUSH  3/5/18 16:00


    3/7/18 10:19


 


 


  (NS Flush)  2 ml  UNSCH  PRN


 IV FLUSH  3/5/18 20:30


     


 


 


  (NS Flush)  2 ml  BID


 IV FLUSH  3/5/18 21:00


    3/8/18 08:18


 


 


  (Zofran Inj)  4 mg  Q6H  PRN


 IVP  3/5/18 20:30


     


 


 


  (Narcan Inj)  0.4 mg  UNSCH  PRN


 IV PUSH  3/5/18 20:30


     


 


 


  (Carlee-Colace)  1 tab  BID


 PO  3/5/18 21:00


    3/8/18 08:17


 


 


  (Milk Of


 Magnesia Liq)  30 ml  Q12H  PRN


 PO  3/5/18 20:30


     


 


 


  (Senokot)  17.2 mg  Q12H  PRN


 PO  3/5/18 20:30


     


 


 


  (Dulcolax Supp)  10 mg  DAILY  PRN


 RECTAL  3/5/18 20:30


     


 


 


  (Lactulose Liq)  30 ml  DAILY  PRN


 PO  3/5/18 20:30


     


 


 


  (Folate)  1 mg  DAILY


 PO  3/6/18 09:00


 3/11/18 08:59  3/8/18 08:17


 


 


  (Vitamin B1)  100 mg  DAILY


 PO  3/6/18 09:00


    3/8/18 08:17


 


 


  (Theragran M Tab)  1 tab  DAILY


 PO  3/6/18 09:00


 3/11/18 08:59  3/8/18 08:17


 


 


  (Lotrisone Cream)  1 applic  Q12HR


 TOPICAL  3/6/18 10:45


    3/8/18 08:20


 


 


  (Baciguent Oint)  APPLY TO


 SCAB ON


 RIGHT KNEE  BID


 TOPICAL  3/6/18 21:00


    3/8/18 08:18


 


 


 Miscellaneous


 Information  Patient in


 critical care


 unit?  Ass...  [id number]


 .XX  3/6/18 20:45


     


 


 


  (Chlorhexidine


 2% Cloth)  3 pack  DAILY@04


 TOPICAL  3/7/18 04:00


 3/11/18 04:01  3/8/18 04:00


 


 


  (Chlorhexidine


 2% Cloth)  3 pack  UNSCH  PRN


 TOPICAL  3/6/18 20:45


 3/11/18 20:34   


 


 


  (Librium)  5 mg  TID


 PO  3/7/18 13:00


    3/8/18 11:23


 


 


 Potassium


 Chloride 10 meq/


 Sodium Chloride  1,005 ml @ 


 50 mls/hr  Q20H6M


 IV  3/7/18 18:00


    3/8/18 11:24


 








Urinary Catheter:  No


Vascular Central Line Catheter:  No





A/P


Problem List:  


(1) Alcohol withdrawal


ICD Code:  F10.239 - Alcohol dependence with withdrawal, unspecified


Status:  Acute


Plan:  Seems to be improving.


DC Librium.


Continue CIWA protocol.


Recommended alcohol cessation.





(2) Cephalhematoma


ICD Code:  P12.0 - Cephalhematoma due to birth injury


Plan:  Seen on CT of the head described above.  Otherwise no fractures seen on 

CT of the brain.


CT of the head negative for acute intracranial process.





(3) Hyponatremia


ICD Code:  E87.1 - Hypo-osmolality and hyponatremia


Plan:  Thought to be secondary to beer potomania.


Sodium is 140.


Discontinue fluid restriction.


Monitor BMP


Discontinue 1/2 NS.





(4) Hypokalemia


ICD Code:  E87.6 - Hypokalemia


Plan:  Likely due to poor oral intake.  Continue to replace and monitor BMP.





(5) Tinea cruris


ICD Code:  B35.6 - Tinea cruris


Plan:  On Lotrisone cream.





(6) Thrombocytopenia


ICD Code:  D69.6 - Thrombocytopenia, unspecified


Status:  Chronic


Plan:  Related to alcohol abuse.  Platelets trending up.


Continue to monitor platelets.





Assessment and Plan


DVT reflexes: SCDs.


Discharge Planning


Patient is very weak and will need rehab placement however does not have 

benefits.  Patient is unsafe to be discharged home at this time.  Continue to 

monitor on the medical floor.











Rohan Villagran MD Mar 8, 2018 15:45

## 2018-03-09 VITALS
HEART RATE: 96 BPM | RESPIRATION RATE: 18 BRPM | DIASTOLIC BLOOD PRESSURE: 62 MMHG | TEMPERATURE: 97.1 F | OXYGEN SATURATION: 94 % | SYSTOLIC BLOOD PRESSURE: 88 MMHG

## 2018-03-09 VITALS
RESPIRATION RATE: 18 BRPM | HEART RATE: 93 BPM | DIASTOLIC BLOOD PRESSURE: 60 MMHG | OXYGEN SATURATION: 94 % | SYSTOLIC BLOOD PRESSURE: 100 MMHG | TEMPERATURE: 98 F

## 2018-03-09 VITALS
HEART RATE: 92 BPM | OXYGEN SATURATION: 97 % | DIASTOLIC BLOOD PRESSURE: 63 MMHG | TEMPERATURE: 98.1 F | RESPIRATION RATE: 18 BRPM | SYSTOLIC BLOOD PRESSURE: 90 MMHG

## 2018-03-09 VITALS
DIASTOLIC BLOOD PRESSURE: 65 MMHG | SYSTOLIC BLOOD PRESSURE: 114 MMHG | HEART RATE: 91 BPM | TEMPERATURE: 98.8 F | OXYGEN SATURATION: 95 % | RESPIRATION RATE: 18 BRPM

## 2018-03-09 VITALS
TEMPERATURE: 98.1 F | HEART RATE: 86 BPM | DIASTOLIC BLOOD PRESSURE: 57 MMHG | SYSTOLIC BLOOD PRESSURE: 100 MMHG | OXYGEN SATURATION: 96 % | RESPIRATION RATE: 18 BRPM

## 2018-03-09 VITALS
HEART RATE: 88 BPM | RESPIRATION RATE: 18 BRPM | OXYGEN SATURATION: 96 % | DIASTOLIC BLOOD PRESSURE: 72 MMHG | SYSTOLIC BLOOD PRESSURE: 127 MMHG | TEMPERATURE: 98.5 F

## 2018-03-09 LAB
ALBUMIN SERPL-MCNC: 2.5 GM/DL (ref 3.4–5)
ALP SERPL-CCNC: 66 U/L (ref 45–117)
ALT SERPL-CCNC: 42 U/L (ref 12–78)
AST SERPL-CCNC: 25 U/L (ref 15–37)
BASOPHILS # BLD AUTO: 0.1 TH/MM3 (ref 0–0.2)
BASOPHILS NFR BLD: 0.7 % (ref 0–2)
BILIRUB SERPL-MCNC: 0.7 MG/DL (ref 0.2–1)
BUN SERPL-MCNC: 10 MG/DL (ref 7–18)
CALCIUM SERPL-MCNC: 8.3 MG/DL (ref 8.5–10.1)
CHLORIDE SERPL-SCNC: 104 MEQ/L (ref 98–107)
CREAT SERPL-MCNC: 0.75 MG/DL (ref 0.6–1.3)
EOSINOPHIL # BLD: 0.1 TH/MM3 (ref 0–0.4)
EOSINOPHIL NFR BLD: 0.9 % (ref 0–4)
ERYTHROCYTE [DISTWIDTH] IN BLOOD BY AUTOMATED COUNT: 16.7 % (ref 11.6–17.2)
GFR SERPLBLD BASED ON 1.73 SQ M-ARVRAT: 105 ML/MIN (ref 89–?)
GLUCOSE SERPL-MCNC: 93 MG/DL (ref 74–106)
HCO3 BLD-SCNC: 27.4 MEQ/L (ref 21–32)
HCT VFR BLD CALC: 34.9 % (ref 39–51)
HGB BLD-MCNC: 12.2 GM/DL (ref 13–17)
LYMPHOCYTES # BLD AUTO: 0.9 TH/MM3 (ref 1–4.8)
LYMPHOCYTES NFR BLD AUTO: 12.3 % (ref 9–44)
MAGNESIUM SERPL-MCNC: 1.7 MG/DL (ref 1.5–2.5)
MCH RBC QN AUTO: 33.5 PG (ref 27–34)
MCHC RBC AUTO-ENTMCNC: 35.1 % (ref 32–36)
MCV RBC AUTO: 95.5 FL (ref 80–100)
MONOCYTE #: 1.1 TH/MM3 (ref 0–0.9)
MONOCYTES NFR BLD: 15.8 % (ref 0–8)
NEUTROPHILS # BLD AUTO: 5 TH/MM3 (ref 1.8–7.7)
NEUTROPHILS NFR BLD AUTO: 70.3 % (ref 16–70)
PHOSPHATE SERPL-MCNC: 3.2 MG/DL (ref 2.5–4.9)
PLATELET # BLD: 171 TH/MM3 (ref 150–450)
PMV BLD AUTO: 8.5 FL (ref 7–11)
PROT SERPL-MCNC: 5.6 GM/DL (ref 6.4–8.2)
RBC # BLD AUTO: 3.66 MIL/MM3 (ref 4.5–5.9)
SODIUM SERPL-SCNC: 139 MEQ/L (ref 136–145)
WBC # BLD AUTO: 7.1 TH/MM3 (ref 4–11)

## 2018-03-09 RX ADMIN — STANDARDIZED SENNA CONCENTRATE AND DOCUSATE SODIUM SCH TAB: 8.6; 5 TABLET, FILM COATED ORAL at 20:21

## 2018-03-09 RX ADMIN — CHLORHEXIDINE GLUCONATE SCH PACK: 500 CLOTH TOPICAL at 04:00

## 2018-03-09 RX ADMIN — BACITRACIN SCH APPLIC: 500 OINTMENT TOPICAL at 20:22

## 2018-03-09 RX ADMIN — IPRATROPIUM BROMIDE AND ALBUTEROL SULFATE PRN AMPULE: .5; 3 SOLUTION RESPIRATORY (INHALATION) at 13:04

## 2018-03-09 RX ADMIN — Medication SCH MG: at 09:27

## 2018-03-09 RX ADMIN — STANDARDIZED SENNA CONCENTRATE AND DOCUSATE SODIUM SCH TAB: 8.6; 5 TABLET, FILM COATED ORAL at 09:27

## 2018-03-09 RX ADMIN — MULTIPLE VITAMINS W/ MINERALS TAB SCH TAB: TAB at 09:00

## 2018-03-09 RX ADMIN — BACITRACIN SCH APPLIC: 500 OINTMENT TOPICAL at 09:28

## 2018-03-09 RX ADMIN — ACETAMINOPHEN PRN MG: 325 TABLET ORAL at 12:37

## 2018-03-09 RX ADMIN — CLOTRIMAZOLE AND BETAMETHASONE DIPROPIONATE SCH APPLIC: 10; .5 CREAM TOPICAL at 09:28

## 2018-03-09 RX ADMIN — CLOTRIMAZOLE AND BETAMETHASONE DIPROPIONATE SCH APPLIC: 10; .5 CREAM TOPICAL at 20:22

## 2018-03-09 RX ADMIN — Medication SCH ML: at 20:21

## 2018-03-09 RX ADMIN — ACETAMINOPHEN PRN MG: 325 TABLET ORAL at 17:40

## 2018-03-09 RX ADMIN — FOLIC ACID SCH MG: 1 TABLET ORAL at 09:27

## 2018-03-09 RX ADMIN — Medication SCH ML: at 09:30

## 2018-03-09 NOTE — HHI.PR
Subjective


Remarks


62-year-old male with a past medical history significant for aortic stenosis 

status post AVR and alcohol abuse presents to the emergency department for 

evaluation of weakness.  The patient reports he has been drinking approximately 

18 beers per day over the past 2 months because he was recently getting 

.  He reports approximately 6 days ago he fell while drunk hitting his 

head resulting in a jagged laceration of the forehead.  The patient reports his 

last drink was approximately 4 days ago.  He complains of being dizzy and 

tremulous.  The patient also reports falling approximately 3 days ago onto a 

concrete floor.  He states he fell flat on his back and does not know how long 

he was on the ground before.  He has significant erythema and skin breakdown on 

the bilateral buttocks and hamstring regions.  He denies any chest pain or 

shortness of breath.  Endorses weakness and dizziness.  Denies nausea/vomiting/

diarrhea.  Positive tremors.  No lateralizing signs/symptoms.  No cough or 

congestion.





3-6 Follow-up alcohol withdrawal.  Patient became confused and agitated and 

required 4 doses of Ativan overnight.  He is lethargic easily arousable he is 

oriented to person and place.  He denies headache or neck pain.  Discussed with 

nurse





3-7 Patient seen this morning.  Confused.  Denies any chest pain or shortness 

of breath.





3-8 The patient seems to be awake and alert.


Denies chest pain or shortness of breath.


Denies hallucinations.


Vital signs seem to be stable.





3-9 NOT STABLE ON FEET YET


STILL TREMULOUS


HAS LONG STANDING HX OF ALCOHOL ABUSE


NOT CLEARED BY PT OR OT FOR DC YET





Objective


Vitals





Vital Signs








  Date Time  Temp Pulse Resp B/P (MAP) Pulse Ox O2 Delivery O2 Flow Rate FiO2


 


3/9/18 13:37   18     


 


3/9/18 12:00 98.1 92 18 90/63 (72) 97   


 


3/9/18 10:07 97.1 96 18 88/62 (71) 94   


 


3/9/18 04:58 98.5 88 18 127/72 (90) 96   


 


3/9/18 00:21 98.8 91 18 114/65 (81) 95   


 


3/8/18 20:23 98.8 95 18 95/63 (74) 98   














I/O      


 


 3/8/18 3/8/18 3/8/18 3/9/18 3/9/18 3/9/18





 07:00 15:00 23:00 07:00 15:00 23:00


 


Intake Total   600 ml   


 


Balance   600 ml   


 


      


 


Intake Oral   600 ml   


 


# Voids   3 2  








Result Diagram:  


3/9/18 0527                                                                    

            3/9/18 0527





Other Results





 Laboratory Tests








Test


  3/7/18


09:02 3/8/18


06:33 3/8/18


06:37 3/9/18


05:27


 


Blood Urea Nitrogen 10 MG/DL   9 MG/DL  10 MG/DL 


 


Creatinine 0.71 MG/DL   0.62 MG/DL  0.75 MG/DL 


 


Random Glucose 82 MG/DL   72 MG/DL  93 MG/DL 


 


Total Protein 5.8 GM/DL    5.6 GM/DL 


 


Albumin 2.6 GM/DL   2.3 GM/DL  2.5 GM/DL 


 


Calcium Level 7.9 MG/DL   8.0 MG/DL  8.3 MG/DL 


 


Magnesium Level 1.9 MG/DL   1.8 MG/DL  1.7 MG/DL 


 


Alkaline Phosphatase 68 U/L    66 U/L 


 


Aspartate Amino Transf


(AST/SGOT) 42 U/L 


  


  


  25 U/L 


 


 


Alanine Aminotransferase


(ALT/SGPT) 57 U/L 


  


  


  42 U/L 


 


 


Total Bilirubin 0.8 MG/DL    0.7 MG/DL 


 


Sodium Level 139 MEQ/L   140 MEQ/L  139 MEQ/L 


 


Potassium Level 3.4 MEQ/L   3.4 MEQ/L  3.6 MEQ/L 


 


Chloride Level 104 MEQ/L   106 MEQ/L  104 MEQ/L 


 


Carbon Dioxide Level 26.3 MEQ/L   25.1 MEQ/L  27.4 MEQ/L 


 


Anion Gap 9 MEQ/L   9 MEQ/L  8 MEQ/L 


 


Estimat Glomerular Filtration


Rate 112 ML/MIN 


  


  131 ML/MIN 


  105 ML/MIN 


 


 


White Blood Count  4.9 TH/MM3   7.1 TH/MM3 


 


Red Blood Count  3.13 MIL/MM3   3.66 MIL/MM3 


 


Hemoglobin  10.5 GM/DL   12.2 GM/DL 


 


Hematocrit  29.8 %   34.9 % 


 


Mean Corpuscular Volume  95.5 FL   95.5 FL 


 


Mean Corpuscular Hemoglobin  33.5 PG   33.5 PG 


 


Mean Corpuscular Hemoglobin


Concent 


  35.1 % 


  


  35.1 % 


 


 


Red Cell Distribution Width  16.7 %   16.7 % 


 


Platelet Count  140 TH/MM3   171 TH/MM3 


 


Mean Platelet Volume  8.2 FL   8.5 FL 


 


Neutrophils (%) (Auto)  63.9 %   70.3 % 


 


Lymphocytes (%) (Auto)  15.5 %   12.3 % 


 


Monocytes (%) (Auto)  17.9 %   15.8 % 


 


Eosinophils (%) (Auto)  1.7 %   0.9 % 


 


Basophils (%) (Auto)  1.0 %   0.7 % 


 


Neutrophils # (Auto)  3.1 TH/MM3   5.0 TH/MM3 


 


Lymphocytes # (Auto)  0.8 TH/MM3   0.9 TH/MM3 


 


Monocytes # (Auto)  0.9 TH/MM3   1.1 TH/MM3 


 


Eosinophils # (Auto)  0.1 TH/MM3   0.1 TH/MM3 


 


Basophils # (Auto)  0.1 TH/MM3   0.1 TH/MM3 


 


CBC Comment  DIFF FINAL   DIFF FINAL 


 


Differential Comment      


 


Phosphorus Level   3.7 MG/DL  3.2 MG/DL 








Imaging





Last Impressions








Chest X-Ray 3/6/18 0000 Signed





Impressions: 





 Service Date/Time:  Tuesday, March 6, 2018 03:05 - CONCLUSION:  No acute 





 cardiopulmonary abnormality is identified.     Jonathan Ramirez MD 


 


Pelvis X-Ray 3/5/18 0000 Signed





Impressions: 





 Service Date/Time:  Monday, March 5, 2018 20:40 - CONCLUSION:  No evidence of 





 recent bone injury.     Luis Arita MD 


 


Head CT 3/5/18 0000 Signed





Impressions: 





 Service Date/Time:  Monday, March 5, 2018 16:36 - CONCLUSION:  1. Small 





 cephalhematoma over the right frontal bone. 2. Otherwise negative. No 

fracture. 





 No acute intracranial process.     Boyd العراقي MD 








Objective Remarks


GENERAL: lying in bed.  Appears comfortable.  Oriented to place, not to date or 

year.


SKIN: Scab over right forehead with bilateral raccoons eyes cool and dry.


HEAD: Normocephalic.


EYES: No scleral icterus. No injection or drainage. 


NECK: Supple, trachea midline. No JVD or lymphadenopathy.


CARDIOVASCULAR: Regular rate and rhythm without murmurs, gallops, or rubs. 


RESPIRATORY: Breath sounds equal bilaterally. No accessory muscle use.


GASTROINTESTINAL: Abdomen soft, non-tender, nondistended. 


MUSCULOSKELETAL: No cyanosis, or edema. 


BACK: Nontender without obvious deformity. No CVA tenderness.


Neuro: Awake alert and oriented 3.  Motor and sensory to be grossly within 

normal limits.  3 out of 5 muscle strength in all muscle groups.  Normal 

speech.  No tremors noted.


Procedures


none


Medications and IVs





Current Medications


Ondansetron HCl (Zofran Inj) 4 mg ONCE  ONCE IVP  Last administered on 3/5/18at 

16:11;  Start 3/5/18 at 16:00;  Stop 3/5/18 at 16:01;  Status DC


Sodium Chloride (NS Flush) 2 ml UNSCH  PRN IV FLUSH FLUSH AFTER USING IV ACCESS

;  Start 3/5/18 at 16:00;  Stop 3/5/18 at 21:38;  Status DC


Flumazenil (Romazicon Inj) 0.2 mg Q1M  PRN IV PUSH SEE LABEL COMMENTS;  Start 3/

5/18 at 16:00


Lorazepam (Ativan) 1 mg Q4H  PRN PO CIWA 8 - 10 Last administered on 3/9/18at 09

:37;  Start 3/5/18 at 16:00


Lorazepam (Ativan Inj) 1 mg Q4H  PRN IV PUSH CIWA 8 - 10 Last administered on 3/

7/18at 17:01;  Start 3/5/18 at 16:00


Lorazepam (Ativan) 2 mg Q2H  PRN PO CIWA 11-14 Last administered on 3/6/18at 04:

05;  Start 3/5/18 at 16:00


Lorazepam (Ativan Inj) 2 mg Q2H  PRN IV PUSH CIWA 11-14 Last administered on 3/8

/18at 00:44;  Start 3/5/18 at 16:00


Lorazepam (Ativan Inj) 2 mg Q1H  PRN IV PUSH CIWA 15-20 Last administered on 3/6

/18at 20:03;  Start 3/5/18 at 16:00


Lorazepam (Ativan Inj) 2 mg Q15M  PRN IV PUSH CIWA > 20 Last administered on 3/7

/18at 10:19;  Start 3/5/18 at 16:00


Tetanus/ Diphtheria Toxoids (Tetanus/ Diphtheria Tox Adult) 0.5 ml ONCE ONCE IM

  Last administered on 3/5/18at 16:12;  Start 3/5/18 at 16:00;  Stop 3/5/18 at 

16:01;  Status DC


Sodium Chloride 1,000 ml @  999 mls/hr BOLUS  ONCE IV  Last administered on 3/5/

18at 17:34;  Start 3/5/18 at 17:30;  Stop 3/5/18 at 18:30;  Status DC


Sodium Chloride 1,000 ml @  999 mls/hr BOLUS  ONCE IV  Last administered on 3/5/

18at 19:01;  Start 3/5/18 at 19:00;  Stop 3/5/18 at 19:18;  Status DC


Calcium Carbonate (Tums Chew) 1,000 mg ONCE  ONCE CHEW  Last administered on 3/5

/18at 22:00;  Start 3/5/18 at 19:30;  Stop 3/5/18 at 19:31;  Status DC


Sodium Chloride 1,000 ml @  50 mls/hr Q20H IV  Last administered on 3/7/18at 10:

58;  Start 3/5/18 at 20:26;  Stop 3/7/18 at 16:54;  Status DC


Sodium Chloride (NS Flush) 2 ml UNSCH  PRN IV FLUSH FLUSH AFTER USING IV ACCESS

;  Start 3/5/18 at 20:30


Sodium Chloride (NS Flush) 2 ml BID IV FLUSH  Last administered on 3/9/18at 09:

30;  Start 3/5/18 at 21:00


Ondansetron HCl (Zofran Inj) 4 mg Q6H  PRN IVP NAUSEA OR VOMITING;  Start 3/5/

18 at 20:30


Naloxone HCl (Narcan Inj) 0.4 mg UNSCH  PRN IV PUSH SEE LABEL COMMENTS;  Start 3

/5/18 at 20:30


Senna/Docusate Sodium (Carlee-Colace) 1 tab BID PO  Last administered on 3/9/18at 

09:27;  Start 3/5/18 at 21:00


Magnesium Hydroxide (Milk Of Magnesia Liq) 30 ml Q12H  PRN PO Mild constipation

;  Start 3/5/18 at 20:30


Sennosides (Senokot) 17.2 mg Q12H  PRN PO Moderate constipation;  Start 3/5/18 

at 20:30


Bisacodyl (Dulcolax Supp) 10 mg DAILY  PRN RECTAL SEVERE CONSITIPATION;  Start 3

/5/18 at 20:30


Lactulose (Lactulose Liq) 30 ml DAILY  PRN PO SEVERE CONSITIPATION;  Start 3/5/

18 at 20:30


Folic Acid (Folate) 1 mg DAILY PO  Last administered on 3/9/18at 09:27;  Start 3

/6/18 at 09:00;  Stop 3/11/18 at 08:59


Thiamine HCl (Vitamin B1) 100 mg DAILY PO  Last administered on 3/9/18at 09:27;

  Start 3/6/18 at 09:00


Multivitamins/ Minerals Therapeutic (Theragran M Tab) 1 tab DAILY PO  Last 

administered on 3/9/18at 09:00;  Start 3/6/18 at 09:00;  Stop 3/11/18 at 08:59


Heparin Sodium (Porcine) (Heparin Inj) 5,000 units Q12HR SQ  Last administered 

on 3/6/18at 10:17;  Start 3/6/18 at 09:00;  Stop 3/6/18 at 15:32;  Status DC


Betamethasone/ Clotrimazole (Lotrisone Cream) 1 applic Q12HR TOPICAL  Last 

administered on 3/9/18at 09:28;  Start 3/6/18 at 10:45


Bacitracin (Baciguent Oint) APPLY TO SCAB ON RIGHT KNEE BID TOPICAL  Last 

administered on 3/9/18at 09:28;  Start 3/6/18 at 21:00


Miscellaneous Information Patient in critical care unit?  Ass... [id number] .XX ;  

Start 3/6/18 at 20:45


Chlorhexidine Gluconate (Chlorhexidine 2% Cloth) 3 pack DAILY@04 TOPICAL  Last 

administered on 3/9/18at 04:00;  Start 3/7/18 at 04:00;  Stop 3/11/18 at 04:01


Chlorhexidine Gluconate (Chlorhexidine 2% Cloth) 3 pack UNSCH  PRN TOPICAL 

HYGIENIC CARE;  Start 3/6/18 at 20:45;  Stop 3/11/18 at 20:34


Potassium Chloride (KCl) 20 meq ONCE  ONCE PO  Last administered on 3/7/18at 12:

38;  Start 3/7/18 at 12:30;  Stop 3/7/18 at 12:31;  Status DC


Chlordiazepoxide (Librium) 5 mg TID PO  Last administered on 3/8/18at 11:23;  

Start 3/7/18 at 13:00;  Stop 3/8/18 at 15:58;  Status DC


Potassium Chloride 10 meq/ Sodium Chloride 1,005 ml @  50 mls/hr Q20H6M IV  

Last administered on 3/8/18at 11:24;  Start 3/7/18 at 18:00;  Stop 3/8/18 at 15:

58;  Status DC


Potassium Chloride (KCl) 40 meq ONCE  ONCE PO  Last administered on 3/8/18at 16:

08;  Start 3/8/18 at 15:30;  Stop 3/8/18 at 15:31;  Status DC


Acetaminophen (Tylenol) 650 mg Q4H  PRN PO HEADACHE Last administered on 3/9/

18at 12:37;  Start 3/9/18 at 12:15


Albuterol/ Ipratropium (Duoneb Neb) 1 ampule Q4HR NEB  PRN NEB SHORTNESS OF 

BREATH Last administered on 3/9/18at 13:04;  Start 3/9/18 at 12:15





A/P


Problem List:  


(1) Alcohol withdrawal


ICD Code:  F10.239 - Alcohol dependence with withdrawal, unspecified


Status:  Acute


Plan:  Seems to be improving.


DC Librium.


Continue CIWA protocol.


Recommended alcohol cessation.





(2) Cephalhematoma


ICD Code:  P12.0 - Cephalhematoma due to birth injury


Plan:  Seen on CT of the head described above.  Otherwise no fractures seen on 

CT of the brain.


CT of the head negative for acute intracranial process.





(3) Hyponatremia


ICD Code:  E87.1 - Hypo-osmolality and hyponatremia


Plan:  Thought to be secondary to beer potomania.


Sodium is 140.


Discontinue fluid restriction.


Monitor BMP


Discontinue 1/2 NS.





(4) Hypokalemia


ICD Code:  E87.6 - Hypokalemia


Plan:  Likely due to poor oral intake.  Continue to replace and monitor BMP.





(5) Tinea cruris


ICD Code:  B35.6 - Tinea cruris


Plan:  On Lotrisone cream.





(6) Thrombocytopenia


ICD Code:  D69.6 - Thrombocytopenia, unspecified


Status:  Chronic


Plan:  Related to alcohol abuse.  Platelets trending up.


Continue to monitor platelets.





Assessment and Plan


CONTINUE DVT PROPHYLAXIS


Discharge Planning


AWAIT SAFE PLACEMENT


LIVES ALONE


GAIT UNSTEADINESS











Carlos Michaud DO Mar 9, 2018 16:57

## 2018-03-10 VITALS
DIASTOLIC BLOOD PRESSURE: 64 MMHG | OXYGEN SATURATION: 99 % | HEART RATE: 84 BPM | SYSTOLIC BLOOD PRESSURE: 98 MMHG | TEMPERATURE: 98.2 F | RESPIRATION RATE: 17 BRPM

## 2018-03-10 VITALS
TEMPERATURE: 97.7 F | DIASTOLIC BLOOD PRESSURE: 70 MMHG | OXYGEN SATURATION: 97 % | SYSTOLIC BLOOD PRESSURE: 103 MMHG | HEART RATE: 90 BPM | RESPIRATION RATE: 17 BRPM

## 2018-03-10 VITALS
TEMPERATURE: 98.3 F | DIASTOLIC BLOOD PRESSURE: 65 MMHG | HEART RATE: 90 BPM | SYSTOLIC BLOOD PRESSURE: 93 MMHG | RESPIRATION RATE: 18 BRPM | OXYGEN SATURATION: 97 %

## 2018-03-10 VITALS
SYSTOLIC BLOOD PRESSURE: 105 MMHG | HEART RATE: 84 BPM | DIASTOLIC BLOOD PRESSURE: 65 MMHG | RESPIRATION RATE: 18 BRPM | TEMPERATURE: 99.2 F | OXYGEN SATURATION: 92 %

## 2018-03-10 VITALS
SYSTOLIC BLOOD PRESSURE: 101 MMHG | HEART RATE: 80 BPM | DIASTOLIC BLOOD PRESSURE: 60 MMHG | TEMPERATURE: 98 F | OXYGEN SATURATION: 95 % | RESPIRATION RATE: 19 BRPM

## 2018-03-10 VITALS
DIASTOLIC BLOOD PRESSURE: 70 MMHG | OXYGEN SATURATION: 97 % | RESPIRATION RATE: 18 BRPM | SYSTOLIC BLOOD PRESSURE: 112 MMHG | HEART RATE: 82 BPM | TEMPERATURE: 98 F

## 2018-03-10 LAB
ALBUMIN SERPL-MCNC: 2.6 GM/DL (ref 3.4–5)
ALP SERPL-CCNC: 68 U/L (ref 45–117)
ALT SERPL-CCNC: 39 U/L (ref 12–78)
AST SERPL-CCNC: 25 U/L (ref 15–37)
BASOPHILS # BLD AUTO: 0.1 TH/MM3 (ref 0–0.2)
BASOPHILS NFR BLD: 0.5 % (ref 0–2)
BILIRUB SERPL-MCNC: 0.6 MG/DL (ref 0.2–1)
BUN SERPL-MCNC: 10 MG/DL (ref 7–18)
CALCIUM SERPL-MCNC: 8.3 MG/DL (ref 8.5–10.1)
CHLORIDE SERPL-SCNC: 104 MEQ/L (ref 98–107)
CREAT SERPL-MCNC: 0.73 MG/DL (ref 0.6–1.3)
EOSINOPHIL # BLD: 0.1 TH/MM3 (ref 0–0.4)
EOSINOPHIL NFR BLD: 0.6 % (ref 0–4)
ERYTHROCYTE [DISTWIDTH] IN BLOOD BY AUTOMATED COUNT: 16.6 % (ref 11.6–17.2)
GFR SERPLBLD BASED ON 1.73 SQ M-ARVRAT: 108 ML/MIN (ref 89–?)
GLUCOSE SERPL-MCNC: 101 MG/DL (ref 74–106)
HBA1C MFR BLD: 5 % (ref 4.3–6)
HCO3 BLD-SCNC: 28.3 MEQ/L (ref 21–32)
HCT VFR BLD CALC: 33.9 % (ref 39–51)
HGB BLD-MCNC: 11.8 GM/DL (ref 13–17)
LYMPHOCYTES # BLD AUTO: 0.8 TH/MM3 (ref 1–4.8)
LYMPHOCYTES NFR BLD AUTO: 8.2 % (ref 9–44)
MAGNESIUM SERPL-MCNC: 1.8 MG/DL (ref 1.5–2.5)
MCH RBC QN AUTO: 33.1 PG (ref 27–34)
MCHC RBC AUTO-ENTMCNC: 34.7 % (ref 32–36)
MCV RBC AUTO: 95.2 FL (ref 80–100)
MONOCYTE #: 1.1 TH/MM3 (ref 0–0.9)
MONOCYTES NFR BLD: 11 % (ref 0–8)
NEUTROPHILS # BLD AUTO: 8.2 TH/MM3 (ref 1.8–7.7)
NEUTROPHILS NFR BLD AUTO: 79.7 % (ref 16–70)
PHOSPHATE SERPL-MCNC: 3.3 MG/DL (ref 2.5–4.9)
PLATELET # BLD: 185 TH/MM3 (ref 150–450)
PMV BLD AUTO: 8.2 FL (ref 7–11)
PROT SERPL-MCNC: 5.9 GM/DL (ref 6.4–8.2)
RBC # BLD AUTO: 3.56 MIL/MM3 (ref 4.5–5.9)
SODIUM SERPL-SCNC: 138 MEQ/L (ref 136–145)
T4 FREE SERPL-MCNC: 1.35 NG/DL (ref 0.76–1.46)
WBC # BLD AUTO: 10.3 TH/MM3 (ref 4–11)

## 2018-03-10 RX ADMIN — ACETAMINOPHEN PRN MG: 325 TABLET ORAL at 11:55

## 2018-03-10 RX ADMIN — MULTIPLE VITAMINS W/ MINERALS TAB SCH TAB: TAB at 08:53

## 2018-03-10 RX ADMIN — ONDANSETRON PRN MG: 2 INJECTION, SOLUTION INTRAMUSCULAR; INTRAVENOUS at 11:55

## 2018-03-10 RX ADMIN — Medication SCH MG: at 08:53

## 2018-03-10 RX ADMIN — CHLORHEXIDINE GLUCONATE SCH PACK: 500 CLOTH TOPICAL at 04:00

## 2018-03-10 RX ADMIN — STANDARDIZED SENNA CONCENTRATE AND DOCUSATE SODIUM SCH TAB: 8.6; 5 TABLET, FILM COATED ORAL at 21:43

## 2018-03-10 RX ADMIN — IPRATROPIUM BROMIDE AND ALBUTEROL SULFATE PRN AMPULE: .5; 3 SOLUTION RESPIRATORY (INHALATION) at 13:55

## 2018-03-10 RX ADMIN — CLOTRIMAZOLE AND BETAMETHASONE DIPROPIONATE SCH APPLIC: 10; .5 CREAM TOPICAL at 21:44

## 2018-03-10 RX ADMIN — STANDARDIZED SENNA CONCENTRATE AND DOCUSATE SODIUM SCH TAB: 8.6; 5 TABLET, FILM COATED ORAL at 08:53

## 2018-03-10 RX ADMIN — BACITRACIN SCH APPLIC: 500 OINTMENT TOPICAL at 09:02

## 2018-03-10 RX ADMIN — ACETAMINOPHEN PRN MG: 325 TABLET ORAL at 00:14

## 2018-03-10 RX ADMIN — CLOTRIMAZOLE AND BETAMETHASONE DIPROPIONATE SCH APPLIC: 10; .5 CREAM TOPICAL at 09:01

## 2018-03-10 RX ADMIN — ACETAMINOPHEN PRN MG: 325 TABLET ORAL at 21:50

## 2018-03-10 RX ADMIN — Medication SCH ML: at 21:43

## 2018-03-10 RX ADMIN — BACITRACIN SCH APPLIC: 500 OINTMENT TOPICAL at 21:45

## 2018-03-10 RX ADMIN — FOLIC ACID SCH MG: 1 TABLET ORAL at 08:53

## 2018-03-10 RX ADMIN — Medication SCH ML: at 09:01

## 2018-03-10 NOTE — HHI.PR
Subjective


Remarks


62-year-old male with a past medical history significant for aortic stenosis 

status post AVR and alcohol abuse presents to the emergency department for 

evaluation of weakness.  The patient reports he has been drinking approximately 

18 beers per day over the past 2 months because he was recently getting 

.  He reports approximately 6 days ago he fell while drunk hitting his 

head resulting in a jagged laceration of the forehead.  The patient reports his 

last drink was approximately 4 days ago.  He complains of being dizzy and 

tremulous.  The patient also reports falling approximately 3 days ago onto a 

concrete floor.  He states he fell flat on his back and does not know how long 

he was on the ground before.  He has significant erythema and skin breakdown on 

the bilateral buttocks and hamstring regions.  He denies any chest pain or 

shortness of breath.  Endorses weakness and dizziness.  Denies nausea/vomiting/

diarrhea.  Positive tremors.  No lateralizing signs/symptoms.  No cough or 

congestion.





3-6 Follow-up alcohol withdrawal.  Patient became confused and agitated and 

required 4 doses of Ativan overnight.  He is lethargic easily arousable he is 

oriented to person and place.  He denies headache or neck pain.  Discussed with 

nurse





3-7 Patient seen this morning.  Confused.  Denies any chest pain or shortness 

of breath.





3-8 The patient seems to be awake and alert.


Denies chest pain or shortness of breath.


Denies hallucinations.


Vital signs seem to be stable.





3-9 NOT STABLE ON FEET YET


STILL TREMULOUS


HAS LONG STANDING HX OF ALCOHOL ABUSE


NOT CLEARED BY PT OR OT FOR DC YET


STARTED ON LIBRIUM 25MG Q6H





3-10 still quite confused today


Not as tremulous as he was yesterday


Needs to work with physical therapy and occupational therapy


Discussed with patient RN and case management





Objective


Vitals





Vital Signs








  Date Time  Temp Pulse Resp B/P (MAP) Pulse Ox O2 Delivery O2 Flow Rate FiO2


 


3/10/18 08:00 97.7 90 17 103/70 (81) 97   


 


3/10/18 04:37 98.0 82 18 112/70 (84) 97   


 


3/10/18 00:00 98.0 80 19 101/60 (74) 95   


 


3/9/18 20:00 98.1 86 18 100/57 (71) 96   


 


3/9/18 16:00 98.0 93 18 100/60 (73) 94   





        


 


3/9/18 13:37   18     


 


3/9/18 12:00 98.1 92 18 90/63 (72) 97   














I/O      


 


 3/9/18 3/9/18 3/9/18 3/10/18 3/10/18 3/10/18





 07:00 15:00 23:00 07:00 15:00 23:00


 


Intake Total  360 ml 400 ml   


 


Output Total   325 ml   


 


Balance  360 ml 75 ml   


 


      


 


Intake Oral  360 ml 400 ml   


 


Output Urine Total   325 ml   


 


# Voids 2 3    


 


# Bowel Movements  0 0   








Result Diagram:  


3/10/18 0532                                                                   

             3/10/18 0532





Other Results





 Laboratory Tests








Test


  3/8/18


06:33 3/8/18


06:37 3/9/18


05:27 3/10/18


05:32


 


White Blood Count 4.9 TH/MM3   7.1 TH/MM3  10.3 TH/MM3 


 


Red Blood Count 3.13 MIL/MM3   3.66 MIL/MM3  3.56 MIL/MM3 


 


Hemoglobin 10.5 GM/DL   12.2 GM/DL  11.8 GM/DL 


 


Hematocrit 29.8 %   34.9 %  33.9 % 


 


Mean Corpuscular Volume 95.5 FL   95.5 FL  95.2 FL 


 


Mean Corpuscular Hemoglobin 33.5 PG   33.5 PG  33.1 PG 


 


Mean Corpuscular Hemoglobin


Concent 35.1 % 


  


  35.1 % 


  34.7 % 


 


 


Red Cell Distribution Width 16.7 %   16.7 %  16.6 % 


 


Platelet Count 140 TH/MM3   171 TH/MM3  185 TH/MM3 


 


Mean Platelet Volume 8.2 FL   8.5 FL  8.2 FL 


 


Neutrophils (%) (Auto) 63.9 %   70.3 %  79.7 % 


 


Lymphocytes (%) (Auto) 15.5 %   12.3 %  8.2 % 


 


Monocytes (%) (Auto) 17.9 %   15.8 %  11.0 % 


 


Eosinophils (%) (Auto) 1.7 %   0.9 %  0.6 % 


 


Basophils (%) (Auto) 1.0 %   0.7 %  0.5 % 


 


Neutrophils # (Auto) 3.1 TH/MM3   5.0 TH/MM3  8.2 TH/MM3 


 


Lymphocytes # (Auto) 0.8 TH/MM3   0.9 TH/MM3  0.8 TH/MM3 


 


Monocytes # (Auto) 0.9 TH/MM3   1.1 TH/MM3  1.1 TH/MM3 


 


Eosinophils # (Auto) 0.1 TH/MM3   0.1 TH/MM3  0.1 TH/MM3 


 


Basophils # (Auto) 0.1 TH/MM3   0.1 TH/MM3  0.1 TH/MM3 


 


CBC Comment DIFF FINAL   DIFF FINAL  DIFF FINAL 


 


Differential Comment       


 


Blood Urea Nitrogen  9 MG/DL  10 MG/DL  10 MG/DL 


 


Creatinine  0.62 MG/DL  0.75 MG/DL  0.73 MG/DL 


 


Random Glucose  72 MG/DL  93 MG/DL  101 MG/DL 


 


Albumin  2.3 GM/DL  2.5 GM/DL  2.6 GM/DL 


 


Calcium Level  8.0 MG/DL  8.3 MG/DL  8.3 MG/DL 


 


Phosphorus Level  3.7 MG/DL  3.2 MG/DL  3.3 MG/DL 


 


Magnesium Level  1.8 MG/DL  1.7 MG/DL  1.8 MG/DL 


 


Sodium Level  140 MEQ/L  139 MEQ/L  138 MEQ/L 


 


Potassium Level  3.4 MEQ/L  3.6 MEQ/L  4.0 MEQ/L 


 


Chloride Level  106 MEQ/L  104 MEQ/L  104 MEQ/L 


 


Carbon Dioxide Level  25.1 MEQ/L  27.4 MEQ/L  28.3 MEQ/L 


 


Anion Gap  9 MEQ/L  8 MEQ/L  6 MEQ/L 


 


Estimat Glomerular Filtration


Rate 


  131 ML/MIN 


  105 ML/MIN 


  108 ML/MIN 


 


 


Total Protein   5.6 GM/DL  5.9 GM/DL 


 


Alkaline Phosphatase   66 U/L  68 U/L 


 


Aspartate Amino Transf


(AST/SGOT) 


  


  25 U/L 


  25 U/L 


 


 


Alanine Aminotransferase


(ALT/SGPT) 


  


  42 U/L 


  39 U/L 


 


 


Total Bilirubin   0.7 MG/DL  0.6 MG/DL 


 


Free Thyroxine    1.35 NG/DL 


 


Thyroid Stimulating Hormone


3rd Gen 


  


  


  0.984 uIU/ML 


 








Imaging





Last Impressions








Chest X-Ray 3/6/18 0000 Signed





Impressions: 





 Service Date/Time:  Tuesday, March 6, 2018 03:05 - CONCLUSION:  No acute 





 cardiopulmonary abnormality is identified.     Jonathan Ramirez MD 


 


Pelvis X-Ray 3/5/18 0000 Signed





Impressions: 





 Service Date/Time:  Monday, March 5, 2018 20:40 - CONCLUSION:  No evidence of 





 recent bone injury.     Luis Arita MD 


 


Head CT 3/5/18 0000 Signed





Impressions: 





 Service Date/Time:  Monday, March 5, 2018 16:36 - CONCLUSION:  1. Small 





 cephalhematoma over the right frontal bone. 2. Otherwise negative. No 

fracture. 





 No acute intracranial process.     Boyd العراقي MD 








Objective Remarks


GENERAL: lying in bed.  Appears comfortable.  Oriented to place, not to date or 

year.  KNew the president was [place]  Knew he was in [place] did not know he 

was in [place]


SKIN: Scab over right forehead with bilateral raccoons eyes cool and dry.


HEAD: Normocephalic.  Trauma as stated above


EYES: No scleral icterus. No injection or drainage.  Extraocular muscles intact 

pupils equal round reactive light accommodation bilateral raccoon eyes


NECK: Supple, trachea midline. No JVD or lymphadenopathy.  Tongue is midline 

oromucosa is moist


CARDIOVASCULAR: Regular rate and rhythm without murmurs, gallops, or rubs.  S1-

S2 no S3 or S4


RESPIRATORY: Breath sounds equal bilaterally. No accessory muscle use.


GASTROINTESTINAL: Abdomen soft, non-tender, nondistended. 


MUSCULOSKELETAL: No cyanosis, or edema. 


BACK: Nontender without obvious deformity. No CVA tenderness.


Neuro: Awake alert and oriented 1.  Motor and sensory to be grossly within 

normal limits.  3 out of 5 muscle strength in all muscle groups.  Normal 

speech.  No tremors noted.


Insight and judgment is limited


Behavior is not appropriate


Procedures


none


Medications and IVs





Current Medications


Ondansetron HCl (Zofran Inj) 4 mg ONCE  ONCE IVP  Last administered on 3/5/18at 

16:11;  Start 3/5/18 at 16:00;  Stop 3/5/18 at 16:01;  Status DC


Sodium Chloride (NS Flush) 2 ml UNSCH  PRN IV FLUSH FLUSH AFTER USING IV ACCESS

;  Start 3/5/18 at 16:00;  Stop 3/5/18 at 21:38;  Status DC


Flumazenil (Romazicon Inj) 0.2 mg Q1M  PRN IV PUSH SEE LABEL COMMENTS;  Start 3/

5/18 at 16:00


Lorazepam (Ativan) 1 mg Q4H  PRN PO CIWA 8 - 10 Last administered on 3/9/18at 09

:37;  Start 3/5/18 at 16:00


Lorazepam (Ativan Inj) 1 mg Q4H  PRN IV PUSH CIWA 8 - 10 Last administered on 3/

7/18at 17:01;  Start 3/5/18 at 16:00


Lorazepam (Ativan) 2 mg Q2H  PRN PO CIWA 11-14 Last administered on 3/6/18at 04:

05;  Start 3/5/18 at 16:00


Lorazepam (Ativan Inj) 2 mg Q2H  PRN IV PUSH CIWA 11-14 Last administered on 3/8

/18at 00:44;  Start 3/5/18 at 16:00


Lorazepam (Ativan Inj) 2 mg Q1H  PRN IV PUSH CIWA 15-20 Last administered on 3/6

/18at 20:03;  Start 3/5/18 at 16:00


Lorazepam (Ativan Inj) 2 mg Q15M  PRN IV PUSH CIWA > 20 Last administered on 3/7

/18at 10:19;  Start 3/5/18 at 16:00


Tetanus/ Diphtheria Toxoids (Tetanus/ Diphtheria Tox Adult) 0.5 ml ONCE ONCE IM

  Last administered on 3/5/18at 16:12;  Start 3/5/18 at 16:00;  Stop 3/5/18 at 

16:01;  Status DC


Sodium Chloride 1,000 ml @  999 mls/hr BOLUS  ONCE IV  Last administered on 3/5/

18at 17:34;  Start 3/5/18 at 17:30;  Stop 3/5/18 at 18:30;  Status DC


Sodium Chloride 1,000 ml @  999 mls/hr BOLUS  ONCE IV  Last administered on 3/5/

18at 19:01;  Start 3/5/18 at 19:00;  Stop 3/5/18 at 19:18;  Status DC


Calcium Carbonate (Tums Chew) 1,000 mg ONCE  ONCE CHEW  Last administered on 3/5

/18at 22:00;  Start 3/5/18 at 19:30;  Stop 3/5/18 at 19:31;  Status DC


Sodium Chloride 1,000 ml @  50 mls/hr Q20H IV  Last administered on 3/7/18at 10:

58;  Start 3/5/18 at 20:26;  Stop 3/7/18 at 16:54;  Status DC


Sodium Chloride (NS Flush) 2 ml UNSCH  PRN IV FLUSH FLUSH AFTER USING IV ACCESS

;  Start 3/5/18 at 20:30


Sodium Chloride (NS Flush) 2 ml BID IV FLUSH  Last administered on 3/10/18at 09:

01;  Start 3/5/18 at 21:00


Ondansetron HCl (Zofran Inj) 4 mg Q6H  PRN IVP NAUSEA OR VOMITING;  Start 3/5/

18 at 20:30


Naloxone HCl (Narcan Inj) 0.4 mg UNSCH  PRN IV PUSH SEE LABEL COMMENTS;  Start 3

/5/18 at 20:30


Senna/Docusate Sodium (Carlee-Colace) 1 tab BID PO  Last administered on 3/10/

18at 08:53;  Start 3/5/18 at 21:00


Magnesium Hydroxide (Milk Of Magnesia Liq) 30 ml Q12H  PRN PO Mild constipation

;  Start 3/5/18 at 20:30


Sennosides (Senokot) 17.2 mg Q12H  PRN PO Moderate constipation;  Start 3/5/18 

at 20:30


Bisacodyl (Dulcolax Supp) 10 mg DAILY  PRN RECTAL SEVERE CONSITIPATION;  Start 3

/5/18 at 20:30


Lactulose (Lactulose Liq) 30 ml DAILY  PRN PO SEVERE CONSITIPATION;  Start 3/5/

18 at 20:30


Folic Acid (Folate) 1 mg DAILY PO  Last administered on 3/10/18at 08:53;  Start 

3/6/18 at 09:00;  Stop 3/11/18 at 08:59


Thiamine HCl (Vitamin B1) 100 mg DAILY PO  Last administered on 3/10/18at 08:53

;  Start 3/6/18 at 09:00


Multivitamins/ Minerals Therapeutic (Theragran M Tab) 1 tab DAILY PO  Last 

administered on 3/10/18at 08:53;  Start 3/6/18 at 09:00;  Stop 3/11/18 at 08:59


Heparin Sodium (Porcine) (Heparin Inj) 5,000 units Q12HR SQ  Last administered 

on 3/6/18at 10:17;  Start 3/6/18 at 09:00;  Stop 3/6/18 at 15:32;  Status DC


Betamethasone/ Clotrimazole (Lotrisone Cream) 1 applic Q12HR TOPICAL  Last 

administered on 3/10/18at 09:01;  Start 3/6/18 at 10:45


Bacitracin (Baciguent Oint) APPLY TO SCAB ON RIGHT KNEE BID TOPICAL  Last 

administered on 3/10/18at 09:02;  Start 3/6/18 at 21:00


Miscellaneous Information Patient in critical care unit?  Ass... [id number] .[id number] ;  

Start 3/6/18 at 20:45


Chlorhexidine Gluconate (Chlorhexidine 2% Cloth) 3 pack DAILY@04 TOPICAL  Last 

administered on 3/10/18at 04:00;  Start 3/7/18 at 04:00;  Stop 3/11/18 at 04:01


Chlorhexidine Gluconate (Chlorhexidine 2% Cloth) 3 pack UNSCH  PRN TOPICAL 

HYGIENIC CARE;  Start 3/6/18 at 20:45;  Stop 3/11/18 at 20:34


Potassium Chloride (KCl) 20 meq ONCE  ONCE PO  Last administered on 3/7/18at 12:

38;  Start 3/7/18 at 12:30;  Stop 3/7/18 at 12:31;  Status DC


Chlordiazepoxide (Librium) 5 mg TID PO  Last administered on 3/8/18at 11:23;  

Start 3/7/18 at 13:00;  Stop 3/8/18 at 15:58;  Status DC


Potassium Chloride 10 meq/ Sodium Chloride 1,005 ml @  50 mls/hr Q20H6M IV  

Last administered on 3/8/18at 11:24;  Start 3/7/18 at 18:00;  Stop 3/8/18 at 15:

58;  Status DC


Potassium Chloride (KCl) 40 meq ONCE  ONCE PO  Last administered on 3/8/18at 16:

08;  Start 3/8/18 at 15:30;  Stop 3/8/18 at 15:31;  Status DC


Acetaminophen (Tylenol) 650 mg Q4H  PRN PO HEADACHE Last administered on 3/10/

18at 00:14;  Start 3/9/18 at 12:15


Albuterol/ Ipratropium (Duoneb Neb) 1 ampule Q4HR NEB  PRN NEB SHORTNESS OF 

BREATH Last administered on 3/9/18at 13:04;  Start 3/9/18 at 12:15


Chlordiazepoxide (Librium) 25 mg TID PO  Last administered on 3/10/18at 08:53;  

Start 3/9/18 at 18:00





A/P


Problem List:  


(1) Alcohol withdrawal


ICD Code:  F10.239 - Alcohol dependence with withdrawal, unspecified


Status:  Acute


Plan:  Seems to be improving.


Have added Librium back.


Continue CIWA protocol.


Recommended alcohol cessation.





(2) Cephalhematoma


ICD Code:  P12.0 - Cephalhematoma due to birth injury


Plan:  Seen on CT of the head described above.  Otherwise no fractures seen on 

CT of the brain.


CT of the head negative for acute intracranial process.





(3) Hyponatremia


ICD Code:  E87.1 - Hypo-osmolality and hyponatremia


Plan:  Thought to be secondary to beer potomania.


Sodium is 140.


Discontinue fluid restriction.


Monitor BMP


Discontinue 1/2 NS.





(4) Hypokalemia


ICD Code:  E87.6 - Hypokalemia


Plan:  Likely due to poor oral intake.  Continue to replace and monitor BMP.





(5) Tinea cruris


ICD Code:  B35.6 - Tinea cruris


Plan:  On Lotrisone cream.





(6) Thrombocytopenia


ICD Code:  D69.6 - Thrombocytopenia, unspecified


Status:  Chronic


Plan:  Related to alcohol abuse.  Platelets trending up.


Continue to monitor platelets.





Assessment and Plan


CONTINUE DVT PROPHYLAXIS


Discharge Planning


AWAIT SAFE PLACEMENT


LIVES ALONE


GAIT UNSTEADINESS











Carlos Michaud DO Mar 10, 2018 10:34

## 2018-03-11 VITALS
DIASTOLIC BLOOD PRESSURE: 62 MMHG | TEMPERATURE: 98.1 F | HEART RATE: 76 BPM | OXYGEN SATURATION: 97 % | SYSTOLIC BLOOD PRESSURE: 112 MMHG | RESPIRATION RATE: 18 BRPM

## 2018-03-11 VITALS
RESPIRATION RATE: 18 BRPM | DIASTOLIC BLOOD PRESSURE: 65 MMHG | TEMPERATURE: 97.2 F | HEART RATE: 73 BPM | OXYGEN SATURATION: 99 % | SYSTOLIC BLOOD PRESSURE: 107 MMHG

## 2018-03-11 VITALS
DIASTOLIC BLOOD PRESSURE: 61 MMHG | SYSTOLIC BLOOD PRESSURE: 113 MMHG | OXYGEN SATURATION: 97 % | TEMPERATURE: 97.4 F | RESPIRATION RATE: 18 BRPM | HEART RATE: 86 BPM

## 2018-03-11 VITALS
HEART RATE: 91 BPM | TEMPERATURE: 99.9 F | SYSTOLIC BLOOD PRESSURE: 109 MMHG | DIASTOLIC BLOOD PRESSURE: 61 MMHG | OXYGEN SATURATION: 97 % | RESPIRATION RATE: 18 BRPM

## 2018-03-11 VITALS — HEART RATE: 86 BPM

## 2018-03-11 VITALS
TEMPERATURE: 97.7 F | HEART RATE: 78 BPM | RESPIRATION RATE: 18 BRPM | OXYGEN SATURATION: 98 % | DIASTOLIC BLOOD PRESSURE: 64 MMHG | SYSTOLIC BLOOD PRESSURE: 111 MMHG

## 2018-03-11 VITALS
DIASTOLIC BLOOD PRESSURE: 64 MMHG | SYSTOLIC BLOOD PRESSURE: 117 MMHG | OXYGEN SATURATION: 98 % | TEMPERATURE: 98.1 F | RESPIRATION RATE: 18 BRPM | HEART RATE: 91 BPM

## 2018-03-11 VITALS — HEART RATE: 88 BPM

## 2018-03-11 VITALS — HEART RATE: 75 BPM

## 2018-03-11 RX ADMIN — CLOTRIMAZOLE AND BETAMETHASONE DIPROPIONATE SCH APPLIC: 10; .5 CREAM TOPICAL at 21:00

## 2018-03-11 RX ADMIN — IPRATROPIUM BROMIDE AND ALBUTEROL SULFATE PRN AMPULE: .5; 3 SOLUTION RESPIRATORY (INHALATION) at 18:30

## 2018-03-11 RX ADMIN — BACITRACIN SCH APPLIC: 500 OINTMENT TOPICAL at 21:00

## 2018-03-11 RX ADMIN — Medication SCH ML: at 08:19

## 2018-03-11 RX ADMIN — STANDARDIZED SENNA CONCENTRATE AND DOCUSATE SODIUM SCH TAB: 8.6; 5 TABLET, FILM COATED ORAL at 08:16

## 2018-03-11 RX ADMIN — Medication SCH ML: at 21:00

## 2018-03-11 RX ADMIN — STANDARDIZED SENNA CONCENTRATE AND DOCUSATE SODIUM SCH TAB: 8.6; 5 TABLET, FILM COATED ORAL at 21:00

## 2018-03-11 RX ADMIN — BACITRACIN SCH APPLIC: 500 OINTMENT TOPICAL at 08:18

## 2018-03-11 RX ADMIN — CHLORHEXIDINE GLUCONATE SCH PACK: 500 CLOTH TOPICAL at 04:00

## 2018-03-11 RX ADMIN — Medication SCH MG: at 08:16

## 2018-03-11 RX ADMIN — CLOTRIMAZOLE AND BETAMETHASONE DIPROPIONATE SCH APPLIC: 10; .5 CREAM TOPICAL at 08:17

## 2018-03-11 NOTE — HHI.PR
Subjective


Remarks


62-year-old male with a past medical history significant for aortic stenosis 

status post AVR and alcohol abuse presents to the emergency department for 

evaluation of weakness.  The patient reports he has been drinking approximately 

18 beers per day over the past 2 months because he was recently getting 

.  He reports approximately 6 days ago he fell while drunk hitting his 

head resulting in a jagged laceration of the forehead.  The patient reports his 

last drink was approximately 4 days ago.  He complains of being dizzy and 

tremulous.  The patient also reports falling approximately 3 days ago onto a 

concrete floor.  He states he fell flat on his back and does not know how long 

he was on the ground before.  He has significant erythema and skin breakdown on 

the bilateral buttocks and hamstring regions.  He denies any chest pain or 

shortness of breath.  Endorses weakness and dizziness.  Denies nausea/vomiting/

diarrhea.  Positive tremors.  No lateralizing signs/symptoms.  No cough or 

congestion.





3-6 Follow-up alcohol withdrawal.  Patient became confused and agitated and 

required 4 doses of Ativan overnight.  He is lethargic easily arousable he is 

oriented to person and place.  He denies headache or neck pain.  Discussed with 

nurse





3-7 Patient seen this morning.  Confused.  Denies any chest pain or shortness 

of breath.





3-8 The patient seems to be awake and alert.


Denies chest pain or shortness of breath.


Denies hallucinations.


Vital signs seem to be stable.





3-9 NOT STABLE ON FEET YET


STILL TREMULOUS


HAS LONG STANDING HX OF ALCOHOL ABUSE


NOT CLEARED BY PT OR OT FOR DC YET


STARTED ON LIBRIUM 25MG Q6H





3-10 still quite confused today


Not as tremulous as he was yesterday


Needs to work with physical therapy and occupational therapy


Discussed with patient RN and case management





3-11 less confused today


hopefully to home soon


work with pt and ot





Objective


Vitals





Vital Signs








  Date Time  Temp Pulse Resp B/P (MAP) Pulse Ox O2 Delivery O2 Flow Rate FiO2


 


3/11/18 09:15  88      


 


3/11/18 08:00 99.9 91 18 109/61 (77) 97   


 


3/11/18 04:00 97.2 73 18 107/65 (79) 99   


 


3/11/18 00:00 98.1 91 18 117/64 (81) 98   


 


3/10/18 20:00 99.2 84 18 105/65 (78) 92   


 


3/10/18 16:00 98.2 84 17 98/64 (75) 99   


 


3/10/18 12:55   18     


 


3/10/18 12:49 98.3 90 18 93/65 (74) 97   





    Manual Cuff/Palpation    





    Automatic Cuff    














I/O      


 


 3/10/18 3/10/18 3/10/18 3/11/18 3/11/18 3/11/18





 07:00 15:00 23:00 07:00 15:00 23:00


 


Output Total  375 ml 200 ml 400 ml  


 


Balance  -375 ml -200 ml -400 ml  


 


      


 


Output Urine Total  375 ml 200 ml 400 ml  


 


# Voids    8  








Result Diagram:  


3/10/18 0532                                                                   

             3/10/18 0532





Other Results





 Laboratory Tests








Test


  3/9/18


05:27 3/10/18


05:32


 


White Blood Count 7.1 TH/MM3  10.3 TH/MM3 


 


Red Blood Count 3.66 MIL/MM3  3.56 MIL/MM3 


 


Hemoglobin 12.2 GM/DL  11.8 GM/DL 


 


Hematocrit 34.9 %  33.9 % 


 


Mean Corpuscular Volume 95.5 FL  95.2 FL 


 


Mean Corpuscular Hemoglobin 33.5 PG  33.1 PG 


 


Mean Corpuscular Hemoglobin


Concent 35.1 % 


  34.7 % 


 


 


Red Cell Distribution Width 16.7 %  16.6 % 


 


Platelet Count 171 TH/MM3  185 TH/MM3 


 


Mean Platelet Volume 8.5 FL  8.2 FL 


 


Neutrophils (%) (Auto) 70.3 %  79.7 % 


 


Lymphocytes (%) (Auto) 12.3 %  8.2 % 


 


Monocytes (%) (Auto) 15.8 %  11.0 % 


 


Eosinophils (%) (Auto) 0.9 %  0.6 % 


 


Basophils (%) (Auto) 0.7 %  0.5 % 


 


Neutrophils # (Auto) 5.0 TH/MM3  8.2 TH/MM3 


 


Lymphocytes # (Auto) 0.9 TH/MM3  0.8 TH/MM3 


 


Monocytes # (Auto) 1.1 TH/MM3  1.1 TH/MM3 


 


Eosinophils # (Auto) 0.1 TH/MM3  0.1 TH/MM3 


 


Basophils # (Auto) 0.1 TH/MM3  0.1 TH/MM3 


 


CBC Comment DIFF FINAL  DIFF FINAL 


 


Differential Comment    


 


Blood Urea Nitrogen 10 MG/DL  10 MG/DL 


 


Creatinine 0.75 MG/DL  0.73 MG/DL 


 


Random Glucose 93 MG/DL  101 MG/DL 


 


Total Protein 5.6 GM/DL  5.9 GM/DL 


 


Albumin 2.5 GM/DL  2.6 GM/DL 


 


Calcium Level 8.3 MG/DL  8.3 MG/DL 


 


Phosphorus Level 3.2 MG/DL  3.3 MG/DL 


 


Magnesium Level 1.7 MG/DL  1.8 MG/DL 


 


Alkaline Phosphatase 66 U/L  68 U/L 


 


Aspartate Amino Transf


(AST/SGOT) 25 U/L 


  25 U/L 


 


 


Alanine Aminotransferase


(ALT/SGPT) 42 U/L 


  39 U/L 


 


 


Total Bilirubin 0.7 MG/DL  0.6 MG/DL 


 


Sodium Level 139 MEQ/L  138 MEQ/L 


 


Potassium Level 3.6 MEQ/L  4.0 MEQ/L 


 


Chloride Level 104 MEQ/L  104 MEQ/L 


 


Carbon Dioxide Level 27.4 MEQ/L  28.3 MEQ/L 


 


Anion Gap 8 MEQ/L  6 MEQ/L 


 


Estimat Glomerular Filtration


Rate 105 ML/MIN 


  108 ML/MIN 


 


 


Hemoglobin A1c  5.0 % 


 


Free Thyroxine  1.35 NG/DL 


 


Thyroid Stimulating Hormone


3rd Gen 


  0.984 uIU/ML 


 








Imaging





Last Impressions








Chest X-Ray 3/6/18 0000 Signed





Impressions: 





 Service Date/Time:  Tuesday, March 6, 2018 03:05 - CONCLUSION:  No acute 





 cardiopulmonary abnormality is identified.     Jonathan Ramirez MD 


 


Pelvis X-Ray 3/5/18 0000 Signed





Impressions: 





 Service Date/Time:  Monday, March 5, 2018 20:40 - CONCLUSION:  No evidence of 





 recent bone injury.     Luis Arita MD 


 


Head CT 3/5/18 0000 Signed





Impressions: 





 Service Date/Time:  Monday, March 5, 2018 16:36 - CONCLUSION:  1. Small 





 cephalhematoma over the right frontal bone. 2. Otherwise negative. No 

fracture. 





 No acute intracranial process.     Boyd العراقي MD 








Objective Remarks


GENERAL: lying in bed.  Appears comfortable.  Oriented to place, not to date or 

year.  KNew the president was TRMYRA,HE  Knew he was in [place] knew he was in 

[place] knew the year 2018


SKIN: Scab over right forehead with bilateral raccoons eyes cool and dry.


HEAD: Normocephalic.  Trauma as stated above


EYES: No scleral icterus. No injection or drainage.  Extraocular muscles intact 

pupils equal round reactive light accommodation bilateral raccoon eyes


NECK: Supple, trachea midline. No JVD or lymphadenopathy.  Tongue is midline 

oromucosa is moist


CARDIOVASCULAR: Regular rate and rhythm without murmurs, gallops, or rubs.  S1-

S2 no S3 or S4


RESPIRATORY: Breath sounds equal bilaterally. No accessory muscle use.


GASTROINTESTINAL: Abdomen soft, non-tender, nondistended. 


MUSCULOSKELETAL: No cyanosis, or edema. 


BACK: Nontender without obvious deformity. No CVA tenderness.


Neuro: Awake alert and oriented 3.  Motor and sensory to be grossly within 

normal limits.   4 out of 5 muscle strength in all muscle groups.  Normal 

speech.  No tremors noted.


Insight and judgment is improved


Mood and behavior is more appropriate


Procedures


none


Medications and IVs





Current Medications


Ondansetron HCl (Zofran Inj) 4 mg ONCE  ONCE IVP  Last administered on 3/5/18at 

16:11;  Start 3/5/18 at 16:00;  Stop 3/5/18 at 16:01;  Status DC


Sodium Chloride (NS Flush) 2 ml UNSCH  PRN IV FLUSH FLUSH AFTER USING IV ACCESS

;  Start 3/5/18 at 16:00;  Stop 3/5/18 at 21:38;  Status DC


Flumazenil (Romazicon Inj) 0.2 mg Q1M  PRN IV PUSH SEE LABEL COMMENTS;  Start 3/

5/18 at 16:00


Lorazepam (Ativan) 1 mg Q4H  PRN PO CIWA 8 - 10 Last administered on 3/10/18at 

21:49;  Start 3/5/18 at 16:00


Lorazepam (Ativan Inj) 1 mg Q4H  PRN IV PUSH CIWA 8 - 10 Last administered on 3/

7/18at 17:01;  Start 3/5/18 at 16:00


Lorazepam (Ativan) 2 mg Q2H  PRN PO CIWA 11-14 Last administered on 3/6/18at 04:

05;  Start 3/5/18 at 16:00


Lorazepam (Ativan Inj) 2 mg Q2H  PRN IV PUSH CIWA 11-14 Last administered on 3/8

/18at 00:44;  Start 3/5/18 at 16:00


Lorazepam (Ativan Inj) 2 mg Q1H  PRN IV PUSH CIWA 15-20 Last administered on 3/6

/18at 20:03;  Start 3/5/18 at 16:00


Lorazepam (Ativan Inj) 2 mg Q15M  PRN IV PUSH CIWA > 20 Last administered on 3/7

/18at 10:19;  Start 3/5/18 at 16:00


Tetanus/ Diphtheria Toxoids (Tetanus/ Diphtheria Tox Adult) 0.5 ml ONCE ONCE IM

  Last administered on 3/5/18at 16:12;  Start 3/5/18 at 16:00;  Stop 3/5/18 at 

16:01;  Status DC


Sodium Chloride 1,000 ml @  999 mls/hr BOLUS  ONCE IV  Last administered on 3/5/

18at 17:34;  Start 3/5/18 at 17:30;  Stop 3/5/18 at 18:30;  Status DC


Sodium Chloride 1,000 ml @  999 mls/hr BOLUS  ONCE IV  Last administered on 3/5/

18at 19:01;  Start 3/5/18 at 19:00;  Stop 3/5/18 at 19:18;  Status DC


Calcium Carbonate (Tums Chew) 1,000 mg ONCE  ONCE CHEW  Last administered on 3/5

/18at 22:00;  Start 3/5/18 at 19:30;  Stop 3/5/18 at 19:31;  Status DC


Sodium Chloride 1,000 ml @  50 mls/hr Q20H IV  Last administered on 3/7/18at 10:

58;  Start 3/5/18 at 20:26;  Stop 3/7/18 at 16:54;  Status DC


Sodium Chloride (NS Flush) 2 ml UNSCH  PRN IV FLUSH FLUSH AFTER USING IV ACCESS

;  Start 3/5/18 at 20:30


Sodium Chloride (NS Flush) 2 ml BID IV FLUSH  Last administered on 3/11/18at 08:

19;  Start 3/5/18 at 21:00


Ondansetron HCl (Zofran Inj) 4 mg Q6H  PRN IVP NAUSEA OR VOMITING Last 

administered on 3/10/18at 11:55;  Start 3/5/18 at 20:30


Naloxone HCl (Narcan Inj) 0.4 mg UNSCH  PRN IV PUSH SEE LABEL COMMENTS;  Start 3

/5/18 at 20:30


Senna/Docusate Sodium (Carlee-Colace) 1 tab BID PO  Last administered on 3/11/

18at 08:16;  Start 3/5/18 at 21:00


Magnesium Hydroxide (Milk Of Magnesia Liq) 30 ml Q12H  PRN PO Mild constipation

;  Start 3/5/18 at 20:30


Sennosides (Senokot) 17.2 mg Q12H  PRN PO Moderate constipation;  Start 3/5/18 

at 20:30


Bisacodyl (Dulcolax Supp) 10 mg DAILY  PRN RECTAL SEVERE CONSITIPATION;  Start 3

/5/18 at 20:30


Lactulose (Lactulose Liq) 30 ml DAILY  PRN PO SEVERE CONSITIPATION;  Start 3/5/

18 at 20:30


Folic Acid (Folate) 1 mg DAILY PO  Last administered on 3/10/18at 08:53;  Start 

3/6/18 at 09:00;  Stop 3/11/18 at 08:59;  Status DC


Thiamine HCl (Vitamin B1) 100 mg DAILY PO  Last administered on 3/11/18at 08:16

;  Start 3/6/18 at 09:00


Multivitamins/ Minerals Therapeutic (Theragran M Tab) 1 tab DAILY PO  Last 

administered on 3/10/18at 08:53;  Start 3/6/18 at 09:00;  Stop 3/11/18 at 08:59

;  Status DC


Heparin Sodium (Porcine) (Heparin Inj) 5,000 units Q12HR SQ  Last administered 

on 3/6/18at 10:17;  Start 3/6/18 at 09:00;  Stop 3/6/18 at 15:32;  Status DC


Betamethasone/ Clotrimazole (Lotrisone Cream) 1 applic Q12HR TOPICAL  Last 

administered on 3/11/18at 08:17;  Start 3/6/18 at 10:45


Bacitracin (Baciguent Oint) APPLY TO SCAB ON RIGHT KNEE BID TOPICAL  Last 

administered on 3/11/18at 08:18;  Start 3/6/18 at 21:00


Miscellaneous Information Patient in critical care unit?  Ass... [id number] .XX ;  

Start 3/6/18 at 20:45


Chlorhexidine Gluconate (Chlorhexidine 2% Cloth) 3 pack DAILY@04 TOPICAL  Last 

administered on 3/10/18at 04:00;  Start 3/7/18 at 04:00;  Stop 3/11/18 at 04:01

;  Status DC


Chlorhexidine Gluconate (Chlorhexidine 2% Cloth) 3 pack UNSCH  PRN TOPICAL 

HYGIENIC CARE;  Start 3/6/18 at 20:45;  Stop 3/11/18 at 20:34


Potassium Chloride (KCl) 20 meq ONCE  ONCE PO  Last administered on 3/7/18at 12:

38;  Start 3/7/18 at 12:30;  Stop 3/7/18 at 12:31;  Status DC


Chlordiazepoxide (Librium) 5 mg TID PO  Last administered on 3/8/18at 11:23;  

Start 3/7/18 at 13:00;  Stop 3/8/18 at 15:58;  Status DC


Potassium Chloride 10 meq/ Sodium Chloride 1,005 ml @  50 mls/hr Q20H6M IV  

Last administered on 3/8/18at 11:24;  Start 3/7/18 at 18:00;  Stop 3/8/18 at 15:

58;  Status DC


Potassium Chloride (KCl) 40 meq ONCE  ONCE PO  Last administered on 3/8/18at 16:

08;  Start 3/8/18 at 15:30;  Stop 3/8/18 at 15:31;  Status DC


Acetaminophen (Tylenol) 650 mg Q4H  PRN PO HEADACHE Last administered on 3/10/

18at 21:50;  Start 3/9/18 at 12:15


Albuterol/ Ipratropium (Duoneb Neb) 1 ampule Q4HR NEB  PRN NEB SHORTNESS OF 

BREATH Last administered on 3/10/18at 13:55;  Start 3/9/18 at 12:15


Chlordiazepoxide (Librium) 25 mg TID PO  Last administered on 3/11/18at 08:16;  

Start 3/9/18 at 18:00





A/P


Problem List:  


(1) Alcohol withdrawal


ICD Code:  F10.239 - Alcohol dependence with withdrawal, unspecified


Status:  Acute


Plan:  Seems to be improving.


Have added Librium back.


Continue [facility] protocol.


Recommended alcohol cessation.





(2) Cephalhematoma


ICD Code:  P12.0 - Cephalhematoma due to birth injury


Plan:  Seen on CT of the head described above.  Otherwise no fractures seen on 

CT of the brain.


CT of the head negative for acute intracranial process.





(3) Hyponatremia


ICD Code:  E87.1 - Hypo-osmolality and hyponatremia


Plan:  Thought to be secondary to beer potomania.


Sodium is 140.


Discontinue fluid restriction.


Monitor BMP


Discontinue 1/2 NS.





(4) Hypokalemia


ICD Code:  E87.6 - Hypokalemia


Plan:  Likely due to poor oral intake.  Continue to replace and monitor BMP.





(5) Tinea cruris


ICD Code:  B35.6 - Tinea cruris


Plan:  On Lotrisone cream.





(6) Thrombocytopenia


ICD Code:  D69.6 - Thrombocytopenia, unspecified


Status:  Chronic


Plan:  Related to alcohol abuse.  Platelets trending up.


Continue to monitor platelets.





Assessment and Plan


CONTINUE DVT PROPHYLAXIS


Discharge Planning


AWAIT SAFE PLACEMENT


LIVES ALONE


GAIT UNSTEADINESS











Carlos Michaud DO Mar 11, 2018 09:33

## 2018-03-12 VITALS
DIASTOLIC BLOOD PRESSURE: 63 MMHG | SYSTOLIC BLOOD PRESSURE: 99 MMHG | TEMPERATURE: 97.7 F | HEART RATE: 87 BPM | OXYGEN SATURATION: 96 %

## 2018-03-12 VITALS
RESPIRATION RATE: 18 BRPM | OXYGEN SATURATION: 96 % | TEMPERATURE: 98.4 F | HEART RATE: 86 BPM | SYSTOLIC BLOOD PRESSURE: 105 MMHG | DIASTOLIC BLOOD PRESSURE: 70 MMHG

## 2018-03-12 VITALS
OXYGEN SATURATION: 98 % | RESPIRATION RATE: 18 BRPM | SYSTOLIC BLOOD PRESSURE: 100 MMHG | TEMPERATURE: 98.3 F | HEART RATE: 89 BPM | DIASTOLIC BLOOD PRESSURE: 65 MMHG

## 2018-03-12 VITALS
OXYGEN SATURATION: 98 % | HEART RATE: 75 BPM | DIASTOLIC BLOOD PRESSURE: 66 MMHG | SYSTOLIC BLOOD PRESSURE: 121 MMHG | TEMPERATURE: 98.5 F | RESPIRATION RATE: 18 BRPM

## 2018-03-12 VITALS
OXYGEN SATURATION: 97 % | HEART RATE: 86 BPM | RESPIRATION RATE: 20 BRPM | TEMPERATURE: 97.7 F | SYSTOLIC BLOOD PRESSURE: 90 MMHG | DIASTOLIC BLOOD PRESSURE: 68 MMHG

## 2018-03-12 VITALS
DIASTOLIC BLOOD PRESSURE: 73 MMHG | RESPIRATION RATE: 18 BRPM | SYSTOLIC BLOOD PRESSURE: 169 MMHG | TEMPERATURE: 97.5 F | OXYGEN SATURATION: 96 % | HEART RATE: 86 BPM

## 2018-03-12 VITALS — HEART RATE: 85 BPM

## 2018-03-12 RX ADMIN — Medication SCH ML: at 20:38

## 2018-03-12 RX ADMIN — STANDARDIZED SENNA CONCENTRATE AND DOCUSATE SODIUM SCH TAB: 8.6; 5 TABLET, FILM COATED ORAL at 20:39

## 2018-03-12 RX ADMIN — CLOTRIMAZOLE AND BETAMETHASONE DIPROPIONATE SCH APPLIC: 10; .5 CREAM TOPICAL at 09:00

## 2018-03-12 RX ADMIN — Medication SCH MG: at 08:51

## 2018-03-12 RX ADMIN — Medication SCH ML: at 09:00

## 2018-03-12 RX ADMIN — CLOTRIMAZOLE AND BETAMETHASONE DIPROPIONATE SCH APPLIC: 10; .5 CREAM TOPICAL at 20:40

## 2018-03-12 RX ADMIN — BACITRACIN SCH APPLIC: 500 OINTMENT TOPICAL at 20:40

## 2018-03-12 RX ADMIN — ACETAMINOPHEN PRN MG: 325 TABLET ORAL at 14:31

## 2018-03-12 RX ADMIN — ACETAMINOPHEN PRN MG: 325 TABLET ORAL at 20:38

## 2018-03-12 RX ADMIN — STANDARDIZED SENNA CONCENTRATE AND DOCUSATE SODIUM SCH TAB: 8.6; 5 TABLET, FILM COATED ORAL at 08:51

## 2018-03-12 RX ADMIN — BACITRACIN SCH APPLIC: 500 OINTMENT TOPICAL at 09:00

## 2018-03-12 NOTE — HHI.PR
Subjective


Remarks


62-year-old male with a past medical history significant for aortic stenosis 

status post AVR and alcohol abuse presents to the emergency department for 

evaluation of weakness.  The patient reports he has been drinking approximately 

18 beers per day over the past 2 months because he was recently getting 

.  He reports approximately 6 days ago he fell while drunk hitting his 

head resulting in a jagged laceration of the forehead.  The patient reports his 

last drink was approximately 4 days ago.  He complains of being dizzy and 

tremulous.  The patient also reports falling approximately 3 days ago onto a 

concrete floor.  He states he fell flat on his back and does not know how long 

he was on the ground before.  He has significant erythema and skin breakdown on 

the bilateral buttocks and hamstring regions.  He denies any chest pain or 

shortness of breath.  Endorses weakness and dizziness.  Denies nausea/vomiting/

diarrhea.  Positive tremors.  No lateralizing signs/symptoms.  No cough or 

congestion.





3-6 Follow-up alcohol withdrawal.  Patient became confused and agitated and 

required 4 doses of Ativan overnight.  He is lethargic easily arousable he is 

oriented to person and place.  He denies headache or neck pain.  Discussed with 

nurse





3-7 Patient seen this morning.  Confused.  Denies any chest pain or shortness 

of breath.





3-8 The patient seems to be awake and alert.


Denies chest pain or shortness of breath.


Denies hallucinations.


Vital signs seem to be stable.





3-9 NOT STABLE ON FEET YET


STILL TREMULOUS


HAS LONG STANDING HX OF ALCOHOL ABUSE


NOT CLEARED BY PT OR OT FOR DC YET


STARTED ON LIBRIUM 25MG Q6H





3-10 still quite confused today


Not as tremulous as he was yesterday


Needs to work with physical therapy and occupational therapy


Discussed with patient RN and case management





3-11 less confused today


hopefully to home soon


work with pt and ot





3-12 STILL NOT STABLE TO GO HOME


VERY UNSTEADY ON HIS FEET


DW RN AND PT AND CM AND PHYSICAL THERAPY


SEEN AMBULATING WITH POOR GAIT





Objective


Vitals





Vital Signs








  Date Time  Temp Pulse Resp B/P (MAP) Pulse Ox O2 Delivery O2 Flow Rate FiO2


 


3/12/18 12:00 98.4 86 18 105/70 (82) 96   


 


3/12/18 09:02 97.5 86 18 169/73 (105) 96   


 


3/12/18 00:00 98.5 75 18 121/66 (84) 98   


 


3/11/18 23:00  86      


 


3/11/18 20:00 97.4 86 18 113/61 (78) 97   


 


3/11/18 16:00  78      


 


3/11/18 16:00 97.7 84 18 111/64 (80) 98   














I/O      


 


 3/11/18 3/11/18 3/11/18 3/12/18 3/12/18 3/12/18





 07:00 15:00 23:00 07:00 15:00 23:00


 


Output Total 400 ml     


 


Balance -400 ml     


 


      


 


Output Urine Total 400 ml     


 


# Voids 8   4  


 


# Bowel Movements  1    








Result Diagram:  


3/10/18 0532                                                                   

             3/10/18 0532





Other Results





 Laboratory Tests








Test


  3/10/18


05:32


 


White Blood Count 10.3 TH/MM3 


 


Red Blood Count 3.56 MIL/MM3 


 


Hemoglobin 11.8 GM/DL 


 


Hematocrit 33.9 % 


 


Mean Corpuscular Volume 95.2 FL 


 


Mean Corpuscular Hemoglobin 33.1 PG 


 


Mean Corpuscular Hemoglobin


Concent 34.7 % 


 


 


Red Cell Distribution Width 16.6 % 


 


Platelet Count 185 TH/MM3 


 


Mean Platelet Volume 8.2 FL 


 


Neutrophils (%) (Auto) 79.7 % 


 


Lymphocytes (%) (Auto) 8.2 % 


 


Monocytes (%) (Auto) 11.0 % 


 


Eosinophils (%) (Auto) 0.6 % 


 


Basophils (%) (Auto) 0.5 % 


 


Neutrophils # (Auto) 8.2 TH/MM3 


 


Lymphocytes # (Auto) 0.8 TH/MM3 


 


Monocytes # (Auto) 1.1 TH/MM3 


 


Eosinophils # (Auto) 0.1 TH/MM3 


 


Basophils # (Auto) 0.1 TH/MM3 


 


CBC Comment DIFF FINAL 


 


Differential Comment  


 


Blood Urea Nitrogen 10 MG/DL 


 


Creatinine 0.73 MG/DL 


 


Random Glucose 101 MG/DL 


 


Total Protein 5.9 GM/DL 


 


Albumin 2.6 GM/DL 


 


Calcium Level 8.3 MG/DL 


 


Phosphorus Level 3.3 MG/DL 


 


Magnesium Level 1.8 MG/DL 


 


Alkaline Phosphatase 68 U/L 


 


Aspartate Amino Transf


(AST/SGOT) 25 U/L 


 


 


Alanine Aminotransferase


(ALT/SGPT) 39 U/L 


 


 


Total Bilirubin 0.6 MG/DL 


 


Sodium Level 138 MEQ/L 


 


Potassium Level 4.0 MEQ/L 


 


Chloride Level 104 MEQ/L 


 


Carbon Dioxide Level 28.3 MEQ/L 


 


Anion Gap 6 MEQ/L 


 


Estimat Glomerular Filtration


Rate 108 ML/MIN 


 


 


Hemoglobin A1c 5.0 % 


 


Free Thyroxine 1.35 NG/DL 


 


Thyroid Stimulating Hormone


3rd Gen 0.984 uIU/ML 


 








Imaging





Last Impressions








Chest X-Ray 3/6/18 0000 Signed





Impressions: 





 Service Date/Time:  Tuesday, March 6, 2018 03:05 - CONCLUSION:  No acute 





 cardiopulmonary abnormality is identified.     Jonathan Ramirez MD 


 


Pelvis X-Ray 3/5/18 0000 Signed





Impressions: 





 Service Date/Time:  Monday, March 5, 2018 20:40 - CONCLUSION:  No evidence of 





 recent bone injury.     Luis Arita MD 


 


Head CT 3/5/18 0000 Signed





Impressions: 





 Service Date/Time:  Monday, March 5, 2018 16:36 - CONCLUSION:  1. Small 





 cephalhematoma over the right frontal bone. 2. Otherwise negative. No 

fracture. 





 No acute intracranial process.     Boyd العراقي MD 








Objective Remarks


GENERAL: lying in bed.  Appears comfortable.  Oriented to place, not to date or 

year.  KNew the president was KIRBY[place]outNow,HE  Knew he was in [place] knew he was in 

[place] knew the year 2018


SKIN: Scab over right forehead with bilateral raccoons eyes cool and dry.


HEAD: Normocephalic.  Trauma as stated above


EYES: No scleral icterus. No injection or drainage.  Extraocular muscles intact 

pupils equal round reactive light accommodation bilateral raccoon eyes


NECK: Supple, trachea midline. No JVD or lymphadenopathy.  Tongue is midline 

oromucosa is moist


CARDIOVASCULAR: Regular rate and rhythm without murmurs, gallops, or rubs.  S1-

S2 no S3 or S4


RESPIRATORY: Breath sounds equal bilaterally. No accessory muscle use.


GASTROINTESTINAL: Abdomen soft, non-tender, nondistended. 


MUSCULOSKELETAL: No cyanosis, or edema. 


BACK: Nontender without obvious deformity. No CVA tenderness.


Neuro: Awake alert and oriented 3.  Motor and sensory to be grossly within 

normal limits.   4 out of 5 muscle strength in all muscle groups.  Normal 

speech.  No tremors noted.


Insight and judgment is improved


Mood and behavior is more appropriate


Procedures


none


Medications and IVs





Current Medications


Ondansetron HCl (Zofran Inj) 4 mg ONCE  ONCE IVP  Last administered on 3/5/18at 

16:11;  Start 3/5/18 at 16:00;  Stop 3/5/18 at 16:01;  Status DC


Sodium Chloride (NS Flush) 2 ml UNSCH  PRN IV FLUSH FLUSH AFTER USING IV ACCESS

;  Start 3/5/18 at 16:00;  Stop 3/5/18 at 21:38;  Status DC


Flumazenil (Romazicon Inj) 0.2 mg Q1M  PRN IV PUSH SEE LABEL COMMENTS;  Start 3/

5/18 at 16:00


Lorazepam (Ativan) 1 mg Q4H  PRN PO CIWA 8 - 10 Last administered on 3/11/18at 

23:59;  Start 3/5/18 at 16:00


Lorazepam (Ativan Inj) 1 mg Q4H  PRN IV PUSH CIWA 8 - 10 Last administered on 3/

7/18at 17:01;  Start 3/5/18 at 16:00


Lorazepam (Ativan) 2 mg Q2H  PRN PO CIWA 11-14 Last administered on 3/6/18at 04:

05;  Start 3/5/18 at 16:00


Lorazepam (Ativan Inj) 2 mg Q2H  PRN IV PUSH CIWA 11-14 Last administered on 3/8

/18at 00:44;  Start 3/5/18 at 16:00


Lorazepam (Ativan Inj) 2 mg Q1H  PRN IV PUSH CIWA 15-20 Last administered on 3/6

/18at 20:03;  Start 3/5/18 at 16:00


Lorazepam (Ativan Inj) 2 mg Q15M  PRN IV PUSH CIWA > 20 Last administered on 3/7

/18at 10:19;  Start 3/5/18 at 16:00


Tetanus/ Diphtheria Toxoids (Tetanus/ Diphtheria Tox Adult) 0.5 ml ONCE ONCE IM

  Last administered on 3/5/18at 16:12;  Start 3/5/18 at 16:00;  Stop 3/5/18 at 

16:01;  Status DC


Sodium Chloride 1,000 ml @  999 mls/hr BOLUS  ONCE IV  Last administered on 3/5/

18at 17:34;  Start 3/5/18 at 17:30;  Stop 3/5/18 at 18:30;  Status DC


Sodium Chloride 1,000 ml @  999 mls/hr BOLUS  ONCE IV  Last administered on 3/5/

18at 19:01;  Start 3/5/18 at 19:00;  Stop 3/5/18 at 19:18;  Status DC


Calcium Carbonate (Tums Chew) 1,000 mg ONCE  ONCE CHEW  Last administered on 3/5

/18at 22:00;  Start 3/5/18 at 19:30;  Stop 3/5/18 at 19:31;  Status DC


Sodium Chloride 1,000 ml @  50 mls/hr Q20H IV  Last administered on 3/7/18at 10:

58;  Start 3/5/18 at 20:26;  Stop 3/7/18 at 16:54;  Status DC


Sodium Chloride (NS Flush) 2 ml UNSCH  PRN IV FLUSH FLUSH AFTER USING IV ACCESS

;  Start 3/5/18 at 20:30


Sodium Chloride (NS Flush) 2 ml BID IV FLUSH  Last administered on 3/11/18at 21:

00;  Start 3/5/18 at 21:00


Ondansetron HCl (Zofran Inj) 4 mg Q6H  PRN IVP NAUSEA OR VOMITING Last 

administered on 3/10/18at 11:55;  Start 3/5/18 at 20:30


Naloxone HCl (Narcan Inj) 0.4 mg UNSCH  PRN IV PUSH SEE LABEL COMMENTS;  Start 3

/5/18 at 20:30


Senna/Docusate Sodium (Carlee-Colace) 1 tab BID PO  Last administered on 3/12/

18at 08:51;  Start 3/5/18 at 21:00


Magnesium Hydroxide (Milk Of Magnesia Liq) 30 ml Q12H  PRN PO Mild constipation

;  Start 3/5/18 at 20:30


Sennosides (Senokot) 17.2 mg Q12H  PRN PO Moderate constipation;  Start 3/5/18 

at 20:30


Bisacodyl (Dulcolax Supp) 10 mg DAILY  PRN RECTAL SEVERE CONSITIPATION;  Start 3

/5/18 at 20:30


Lactulose (Lactulose Liq) 30 ml DAILY  PRN PO SEVERE CONSITIPATION;  Start 3/5/

18 at 20:30


Folic Acid (Folate) 1 mg DAILY PO  Last administered on 3/10/18at 08:53;  Start 

3/6/18 at 09:00;  Stop 3/11/18 at 08:59;  Status DC


Thiamine HCl (Vitamin B1) 100 mg DAILY PO  Last administered on 3/12/18at 08:51

;  Start 3/6/18 at 09:00


Multivitamins/ Minerals Therapeutic (Theragran M Tab) 1 tab DAILY PO  Last 

administered on 3/10/18at 08:53;  Start 3/6/18 at 09:00;  Stop 3/11/18 at 08:59

;  Status DC


Heparin Sodium (Porcine) (Heparin Inj) 5,000 units Q12HR SQ  Last administered 

on 3/6/18at 10:17;  Start 3/6/18 at 09:00;  Stop 3/6/18 at 15:32;  Status DC


Betamethasone/ Clotrimazole (Lotrisone Cream) 1 applic Q12HR TOPICAL  Last 

administered on 3/11/18at 21:00;  Start 3/6/18 at 10:45


Bacitracin (Baciguent Oint) APPLY TO SCAB ON RIGHT KNEE BID TOPICAL  Last 

administered on 3/11/18at 21:00;  Start 3/6/18 at 21:00


Miscellaneous Information Patient in critical care unit?  Ass... Q361D .XX ;  

Start 3/6/18 at 20:45


Chlorhexidine Gluconate (Chlorhexidine 2% Cloth) 3 pack DAILY@04 TOPICAL  Last 

administered on 3/10/18at 04:00;  Start 3/7/18 at 04:00;  Stop 3/11/18 at 04:01

;  Status DC


Chlorhexidine Gluconate (Chlorhexidine 2% Cloth) 3 pack UNSCH  PRN TOPICAL 

HYGIENIC CARE;  Start 3/6/18 at 20:45;  Stop 3/11/18 at 20:34;  Status DC


Potassium Chloride (KCl) 20 meq ONCE  ONCE PO  Last administered on 3/7/18at 12:

38;  Start 3/7/18 at 12:30;  Stop 3/7/18 at 12:31;  Status DC


Chlordiazepoxide (Librium) 5 mg TID PO  Last administered on 3/8/18at 11:23;  

Start 3/7/18 at 13:00;  Stop 3/8/18 at 15:58;  Status DC


Potassium Chloride 10 meq/ Sodium Chloride 1,005 ml @  50 mls/hr Q20H6M IV  

Last administered on 3/8/18at 11:24;  Start 3/7/18 at 18:00;  Stop 3/8/18 at 15:

58;  Status DC


Potassium Chloride (KCl) 40 meq ONCE  ONCE PO  Last administered on 3/8/18at 16:

08;  Start 3/8/18 at 15:30;  Stop 3/8/18 at 15:31;  Status DC


Acetaminophen (Tylenol) 650 mg Q4H  PRN PO HEADACHE Last administered on 3/10/

18at 21:50;  Start 3/9/18 at 12:15


Albuterol/ Ipratropium (Duoneb Neb) 1 ampule Q4HR NEB  PRN NEB SHORTNESS OF 

BREATH Last administered on 3/11/18at 18:30;  Start 3/9/18 at 12:15


Chlordiazepoxide (Librium) 25 mg TID PO  Last administered on 3/12/18at 11:53;  

Start 3/9/18 at 18:00





A/P


Problem List:  


(1) Alcohol withdrawal


ICD Code:  F10.239 - Alcohol dependence with withdrawal, unspecified


Status:  Acute


Plan:  Seems to be improving.


Have added Librium back.


Continue CIWA protocol.


Recommended alcohol cessation.





(2) Cephalhematoma


ICD Code:  P12.0 - Cephalhematoma due to birth injury


Plan:  Seen on CT of the head described above.  Otherwise no fractures seen on 

CT of the brain.


CT of the head negative for acute intracranial process.





(3) Hyponatremia


ICD Code:  E87.1 - Hypo-osmolality and hyponatremia


Plan:  Thought to be secondary to beer potomania.


Sodium is 140.


Discontinue fluid restriction.


Monitor BMP


Discontinue 1/2 NS.





(4) Hypokalemia


ICD Code:  E87.6 - Hypokalemia


Plan:  Likely due to poor oral intake.  Continue to replace and monitor BMP.





(5) Tinea cruris


ICD Code:  B35.6 - Tinea cruris


Plan:  On Lotrisone cream.





(6) Thrombocytopenia


ICD Code:  D69.6 - Thrombocytopenia, unspecified


Status:  Chronic


Plan:  Related to alcohol abuse.  Platelets trending up.


Continue to monitor platelets.





(7) Gait instability


ICD Code:  R26.81 - Unsteadiness on feet


Plan:  PT AND OT 


DOES NOT HAVE SNF BENEFIT





Assessment and Plan


CONTINUE DVT PROPHYLAXIS


Discharge Planning


AWAIT SAFE PLACEMENT


LIVES ALONE


GAIT UNSTEADINESS











Carlos Michaud DO Mar 12, 2018 13:15

## 2018-03-13 VITALS
SYSTOLIC BLOOD PRESSURE: 102 MMHG | OXYGEN SATURATION: 98 % | TEMPERATURE: 98.3 F | HEART RATE: 82 BPM | DIASTOLIC BLOOD PRESSURE: 68 MMHG | RESPIRATION RATE: 18 BRPM

## 2018-03-13 VITALS — HEART RATE: 75 BPM

## 2018-03-13 VITALS
RESPIRATION RATE: 20 BRPM | SYSTOLIC BLOOD PRESSURE: 94 MMHG | DIASTOLIC BLOOD PRESSURE: 60 MMHG | TEMPERATURE: 98.1 F | HEART RATE: 82 BPM | OXYGEN SATURATION: 96 %

## 2018-03-13 VITALS
OXYGEN SATURATION: 99 % | TEMPERATURE: 97.3 F | RESPIRATION RATE: 18 BRPM | HEART RATE: 83 BPM | DIASTOLIC BLOOD PRESSURE: 79 MMHG | SYSTOLIC BLOOD PRESSURE: 116 MMHG

## 2018-03-13 VITALS
HEART RATE: 82 BPM | OXYGEN SATURATION: 98 % | TEMPERATURE: 97.3 F | SYSTOLIC BLOOD PRESSURE: 100 MMHG | RESPIRATION RATE: 20 BRPM | DIASTOLIC BLOOD PRESSURE: 62 MMHG

## 2018-03-13 VITALS
TEMPERATURE: 98.2 F | RESPIRATION RATE: 18 BRPM | OXYGEN SATURATION: 100 % | SYSTOLIC BLOOD PRESSURE: 107 MMHG | DIASTOLIC BLOOD PRESSURE: 69 MMHG | HEART RATE: 82 BPM

## 2018-03-13 VITALS
RESPIRATION RATE: 17 BRPM | TEMPERATURE: 98.8 F | DIASTOLIC BLOOD PRESSURE: 68 MMHG | OXYGEN SATURATION: 95 % | HEART RATE: 80 BPM | SYSTOLIC BLOOD PRESSURE: 107 MMHG

## 2018-03-13 VITALS — HEART RATE: 80 BPM

## 2018-03-13 VITALS — HEART RATE: 74 BPM

## 2018-03-13 LAB
ALBUMIN SERPL-MCNC: 3 GM/DL (ref 3.4–5)
ALP SERPL-CCNC: 68 U/L (ref 45–117)
ALT SERPL-CCNC: 32 U/L (ref 12–78)
AST SERPL-CCNC: 20 U/L (ref 15–37)
BASOPHILS # BLD AUTO: 0 TH/MM3 (ref 0–0.2)
BASOPHILS NFR BLD: 0.4 % (ref 0–2)
BILIRUB SERPL-MCNC: 0.4 MG/DL (ref 0.2–1)
BUN SERPL-MCNC: 20 MG/DL (ref 7–18)
CALCIUM SERPL-MCNC: 8.7 MG/DL (ref 8.5–10.1)
CHLORIDE SERPL-SCNC: 105 MEQ/L (ref 98–107)
CREAT SERPL-MCNC: 0.82 MG/DL (ref 0.6–1.3)
EOSINOPHIL # BLD: 0 TH/MM3 (ref 0–0.4)
EOSINOPHIL NFR BLD: 0.5 % (ref 0–4)
ERYTHROCYTE [DISTWIDTH] IN BLOOD BY AUTOMATED COUNT: 17.1 % (ref 11.6–17.2)
GFR SERPLBLD BASED ON 1.73 SQ M-ARVRAT: 95 ML/MIN (ref 89–?)
GLUCOSE SERPL-MCNC: 94 MG/DL (ref 74–106)
HCO3 BLD-SCNC: 29.1 MEQ/L (ref 21–32)
HCT VFR BLD CALC: 36.1 % (ref 39–51)
HGB BLD-MCNC: 12.5 GM/DL (ref 13–17)
LYMPHOCYTES # BLD AUTO: 1 TH/MM3 (ref 1–4.8)
LYMPHOCYTES NFR BLD AUTO: 10.9 % (ref 9–44)
MAGNESIUM SERPL-MCNC: 2.3 MG/DL (ref 1.5–2.5)
MCH RBC QN AUTO: 33.6 PG (ref 27–34)
MCHC RBC AUTO-ENTMCNC: 34.6 % (ref 32–36)
MCV RBC AUTO: 97.3 FL (ref 80–100)
MONOCYTE #: 0.9 TH/MM3 (ref 0–0.9)
MONOCYTES NFR BLD: 9.1 % (ref 0–8)
NEUTROPHILS # BLD AUTO: 7.5 TH/MM3 (ref 1.8–7.7)
NEUTROPHILS NFR BLD AUTO: 79.1 % (ref 16–70)
PHOSPHATE SERPL-MCNC: 3.1 MG/DL (ref 2.5–4.9)
PLATELET # BLD: 258 TH/MM3 (ref 150–450)
PMV BLD AUTO: 8.2 FL (ref 7–11)
PROT SERPL-MCNC: 6.3 GM/DL (ref 6.4–8.2)
RBC # BLD AUTO: 3.71 MIL/MM3 (ref 4.5–5.9)
SODIUM SERPL-SCNC: 140 MEQ/L (ref 136–145)
WBC # BLD AUTO: 9.5 TH/MM3 (ref 4–11)

## 2018-03-13 RX ADMIN — ACETAMINOPHEN PRN MG: 325 TABLET ORAL at 22:52

## 2018-03-13 RX ADMIN — BACITRACIN SCH APPLIC: 500 OINTMENT TOPICAL at 20:25

## 2018-03-13 RX ADMIN — Medication SCH MG: at 09:04

## 2018-03-13 RX ADMIN — STANDARDIZED SENNA CONCENTRATE AND DOCUSATE SODIUM SCH TAB: 8.6; 5 TABLET, FILM COATED ORAL at 20:26

## 2018-03-13 RX ADMIN — Medication SCH ML: at 09:04

## 2018-03-13 RX ADMIN — Medication SCH ML: at 20:28

## 2018-03-13 RX ADMIN — ACETAMINOPHEN PRN MG: 325 TABLET ORAL at 02:51

## 2018-03-13 RX ADMIN — CLOTRIMAZOLE AND BETAMETHASONE DIPROPIONATE SCH APPLIC: 10; .5 CREAM TOPICAL at 10:20

## 2018-03-13 RX ADMIN — BACITRACIN SCH APPLIC: 500 OINTMENT TOPICAL at 10:20

## 2018-03-13 RX ADMIN — ACETAMINOPHEN PRN MG: 325 TABLET ORAL at 10:16

## 2018-03-13 RX ADMIN — STANDARDIZED SENNA CONCENTRATE AND DOCUSATE SODIUM SCH TAB: 8.6; 5 TABLET, FILM COATED ORAL at 09:04

## 2018-03-13 RX ADMIN — CLOTRIMAZOLE AND BETAMETHASONE DIPROPIONATE SCH APPLIC: 10; .5 CREAM TOPICAL at 20:25

## 2018-03-13 NOTE — HHI.PR
Subjective


Remarks


62-year-old male with a past medical history significant for aortic stenosis 

status post AVR and alcohol abuse presents to the emergency department for 

evaluation of weakness.  The patient reports he has been drinking approximately 

18 beers per day over the past 2 months because he was recently getting 

.  He reports approximately 6 days ago he fell while drunk hitting his 

head resulting in a jagged laceration of the forehead.  The patient reports his 

last drink was approximately 4 days ago.  He complains of being dizzy and 

tremulous.  The patient also reports falling approximately 3 days ago onto a 

concrete floor.  He states he fell flat on his back and does not know how long 

he was on the ground before.  He has significant erythema and skin breakdown on 

the bilateral buttocks and hamstring regions.  He denies any chest pain or 

shortness of breath.  Endorses weakness and dizziness.  Denies nausea/vomiting/

diarrhea.  Positive tremors.  No lateralizing signs/symptoms.  No cough or 

congestion.





3-6 Follow-up alcohol withdrawal.  Patient became confused and agitated and 

required 4 doses of Ativan overnight.  He is lethargic easily arousable he is 

oriented to person and place.  He denies headache or neck pain.  Discussed with 

nurse





3-7 Patient seen this morning.  Confused.  Denies any chest pain or shortness 

of breath.





3-8 The patient seems to be awake and alert.


Denies chest pain or shortness of breath.


Denies hallucinations.


Vital signs seem to be stable.





3-9 NOT STABLE ON FEET YET


STILL TREMULOUS


HAS LONG STANDING HX OF ALCOHOL ABUSE


NOT CLEARED BY PT OR OT FOR DC YET


STARTED ON LIBRIUM 25MG Q6H





3-10 still quite confused today


Not as tremulous as he was yesterday


Needs to work with physical therapy and occupational therapy


Discussed with patient RN and case management





3-11 less confused today


hopefully to home soon


work with pt and ot





3-12 STILL NOT STABLE TO GO HOME


VERY UNSTEADY ON HIS FEET


DW RN AND PT AND CM AND PHYSICAL THERAPY


SEEN AMBULATING WITH POOR GAIT





3-13 still not very mobile


NOT SAFE TO GO HOME YET


VERY UNSTEADY ON HIS FEET


LIVES ALONE





Objective


Vitals





Vital Signs








  Date Time  Temp Pulse Resp B/P (MAP) Pulse Ox O2 Delivery O2 Flow Rate FiO2


 


3/13/18 08:25 98.8 80 17 107/68 (81) 95   


 


3/13/18 06:38  74      


 


3/13/18 05:31 98.1 82 20 94/60 (71) 96   


 


3/13/18 04:24  80      


 


3/13/18 00:51 97.3 82 20 100/62 (75) 98   


 


3/12/18 23:58  85      


 


3/12/18 22:41 97.7 87  99/63 (75) 96   


 


3/12/18 21:59 97.7 86 20 90/68 (75) 97   


 


3/12/18 16:00 98.3 89 18 100/65 (77) 98   


 


3/12/18 16:00  84      


 


3/12/18 12:00 98.4 86 18 105/70 (82) 96   














I/O      


 


 3/12/18 3/12/18 3/12/18 3/13/18 3/13/18 3/13/18





 07:00 15:00 23:00 07:00 15:00 23:00


 


Intake Total  480 ml    


 


Output Total    200 ml  


 


Balance  480 ml  -200 ml  


 


      


 


Intake Oral  480 ml    


 


Output Urine Total    200 ml  


 


# Voids 4 2    


 


# Bowel Movements  1  1  








Result Diagram:  


3/13/18 0647                                                                   

             3/13/18 0647





Other Results





 Laboratory Tests








Test


  3/13/18


06:47


 


White Blood Count 9.5 TH/MM3 


 


Red Blood Count 3.71 MIL/MM3 


 


Hemoglobin 12.5 GM/DL 


 


Hematocrit 36.1 % 


 


Mean Corpuscular Volume 97.3 FL 


 


Mean Corpuscular Hemoglobin 33.6 PG 


 


Mean Corpuscular Hemoglobin


Concent 34.6 % 


 


 


Red Cell Distribution Width 17.1 % 


 


Platelet Count 258 TH/MM3 


 


Mean Platelet Volume 8.2 FL 


 


Neutrophils (%) (Auto) 79.1 % 


 


Lymphocytes (%) (Auto) 10.9 % 


 


Monocytes (%) (Auto) 9.1 % 


 


Eosinophils (%) (Auto) 0.5 % 


 


Basophils (%) (Auto) 0.4 % 


 


Neutrophils # (Auto) 7.5 TH/MM3 


 


Lymphocytes # (Auto) 1.0 TH/MM3 


 


Monocytes # (Auto) 0.9 TH/MM3 


 


Eosinophils # (Auto) 0.0 TH/MM3 


 


Basophils # (Auto) 0.0 TH/MM3 


 


CBC Comment DIFF FINAL 


 


Differential Comment  


 


Blood Urea Nitrogen 20 MG/DL 


 


Creatinine 0.82 MG/DL 


 


Random Glucose 94 MG/DL 


 


Total Protein 6.3 GM/DL 


 


Albumin 3.0 GM/DL 


 


Calcium Level 8.7 MG/DL 


 


Phosphorus Level 3.1 MG/DL 


 


Magnesium Level 2.3 MG/DL 


 


Alkaline Phosphatase 68 U/L 


 


Aspartate Amino Transf


(AST/SGOT) 20 U/L 


 


 


Alanine Aminotransferase


(ALT/SGPT) 32 U/L 


 


 


Total Bilirubin 0.4 MG/DL 


 


Sodium Level 140 MEQ/L 


 


Potassium Level 3.6 MEQ/L 


 


Chloride Level 105 MEQ/L 


 


Carbon Dioxide Level 29.1 MEQ/L 


 


Anion Gap 6 MEQ/L 


 


Estimat Glomerular Filtration


Rate 95 ML/MIN 


 








Imaging





Last Impressions








Chest X-Ray 3/6/18 0000 Signed





Impressions: 





 Service Date/Time:  Tuesday, March 6, 2018 03:05 - CONCLUSION:  No acute 





 cardiopulmonary abnormality is identified.     Jonathan Ramirez MD 


 


Pelvis X-Ray 3/5/18 0000 Signed





Impressions: 





 Service Date/Time:  Monday, March 5, 2018 20:40 - CONCLUSION:  No evidence of 





 recent bone injury.     Luis Arita MD 


 


Head CT 3/5/18 0000 Signed





Impressions: 





 Service Date/Time:  Monday, March 5, 2018 16:36 - CONCLUSION:  1. Small 





 cephalhematoma over the right frontal bone. 2. Otherwise negative. No 

fracture. 





 No acute intracranial process.     Boyd العراقي MD 








Objective Remarks


GENERAL: lying in bed.  Appears comfortable.  Oriented to place, not to date or 

year.  KNew the president was [place],HE  Knew he was in [place] knew he was in 

[place] knew the year 2018


SKIN: Scab over right forehead with bilateral raccoons eyes cool and dry.


HEAD: Normocephalic.  Trauma as stated above


EYES: No scleral icterus. No injection or drainage.  Extraocular muscles intact 

pupils equal round reactive light accommodation bilateral raccoon eyes


NECK: Supple, trachea midline. No JVD or lymphadenopathy.  Tongue is midline 

oromucosa is moist


CARDIOVASCULAR: Regular rate and rhythm without murmurs, gallops, or rubs.  S1-

S2 no S3 or S4


RESPIRATORY: Breath sounds equal bilaterally. No accessory muscle use.


GASTROINTESTINAL: Abdomen soft, non-tender, nondistended. 


MUSCULOSKELETAL: No cyanosis, or edema. 


BACK: Nontender without obvious deformity. No CVA tenderness.


Neuro: Awake alert and oriented 3.  Motor and sensory to be grossly within 

normal limits.   4 out of 5 muscle strength in all muscle groups.  Normal 

speech.  No tremors noted.


Insight and judgment is improved


Mood and behavior is more appropriate


Procedures


none


Medications and IVs





Current Medications








 Medications


  (Trade)  Dose


 Ordered  Sig/Marcia


 Route


 PRN Reason  Start Time


 Stop Time Status Last Admin


Dose Admin


 


 Flumazenil


  (Romazicon Inj)  0.2 mg  Q1M  PRN


 IV PUSH


 SEE LABEL COMMENTS  3/5/18 16:00


     


 


 


 Lorazepam


  (Ativan)  1 mg  Q4H  PRN


 PO


 CIWA 8 - 10  3/5/18 16:00


    3/11/18 23:59


 


 


 Lorazepam


  (Ativan Inj)  1 mg  Q4H  PRN


 IV PUSH


 CIWA 8 - 10  3/5/18 16:00


    3/12/18 20:36


 


 


 Lorazepam


  (Ativan)  2 mg  Q2H  PRN


 PO


 CIWA 11-14  3/5/18 16:00


    3/6/18 04:05


 


 


 Lorazepam


  (Ativan Inj)  2 mg  Q2H  PRN


 IV PUSH


 CIWA 11-14  3/5/18 16:00


    3/8/18 00:44


 


 


 Lorazepam


  (Ativan Inj)  2 mg  Q1H  PRN


 IV PUSH


 CIWA 15-20  3/5/18 16:00


    3/6/18 20:03


 


 


 Lorazepam


  (Ativan Inj)  2 mg  Q15M  PRN


 IV PUSH


 CIWA > 20  3/5/18 16:00


    3/7/18 10:19


 


 


 Sodium Chloride


  (NS Flush)  2 ml  UNSCH  PRN


 IV FLUSH


 FLUSH AFTER USING IV ACCESS  3/5/18 20:30


     


 


 


 Sodium Chloride


  (NS Flush)  2 ml  BID


 IV FLUSH


   3/5/18 21:00


    3/13/18 09:04


 


 


 Ondansetron HCl


  (Zofran Inj)  4 mg  Q6H  PRN


 IVP


 NAUSEA OR VOMITING  3/5/18 20:30


    3/10/18 11:55


 


 


 Naloxone HCl


  (Narcan Inj)  0.4 mg  UNSCH  PRN


 IV PUSH


 SEE LABEL COMMENTS  3/5/18 20:30


     


 


 


 Senna/Docusate


 Sodium


  (Carlee-Colace)  1 tab  BID


 PO


   3/5/18 21:00


    3/13/18 09:04


 


 


 Magnesium


 Hydroxide


  (Milk Of


 Magnesia Liq)  30 ml  Q12H  PRN


 PO


 Mild constipation  3/5/18 20:30


     


 


 


 Sennosides


  (Senokot)  17.2 mg  Q12H  PRN


 PO


 Moderate constipation  3/5/18 20:30


     


 


 


 Bisacodyl


  (Dulcolax Supp)  10 mg  DAILY  PRN


 RECTAL


 SEVERE CONSITIPATION  3/5/18 20:30


     


 


 


 Lactulose


  (Lactulose Liq)  30 ml  DAILY  PRN


 PO


 SEVERE CONSITIPATION  3/5/18 20:30


     


 


 


 Thiamine HCl


  (Vitamin B1)  100 mg  DAILY


 PO


   3/6/18 09:00


    3/13/18 09:04


 


 


 Betamethasone/


 Clotrimazole


  (Lotrisone Cream)  1 applic  Q12HR


 TOPICAL


   3/6/18 10:45


    3/12/18 20:40


 


 


 Bacitracin


  (Baciguent Oint)  APPLY TO


 SCAB ON


 RIGHT KNEE  BID


 TOPICAL


   3/6/18 21:00


    3/12/18 20:40


 


 


 Miscellaneous


 Information  Patient in


 critical care


 unit?  Ass...  Q361D


 .XX


   3/6/18 20:45


     


 


 


 Acetaminophen


  (Tylenol)  650 mg  Q4H  PRN


 PO


 HEADACHE  3/9/18 12:15


    3/13/18 02:51


 


 


 Albuterol/


 Ipratropium


  (Duoneb Neb)  1 ampule  Q4HR NEB  PRN


 NEB


 SHORTNESS OF BREATH  3/9/18 12:15


    3/11/18 18:30


 


 


 Chlordiazepoxide


  (Librium)  25 mg  TID


 PO


   3/9/18 18:00


    3/13/18 09:04


 











A/P


Problem List:  


(1) Alcohol withdrawal


ICD Code:  F10.239 - Alcohol dependence with withdrawal, unspecified


Status:  Acute


Plan:  Seems to be improving.


Have added Librium back.


Continue [facility] protocol.


Recommended alcohol cessation.





(2) Cephalhematoma


ICD Code:  P12.0 - Cephalhematoma due to birth injury


Plan:  Seen on CT of the head described above.  Otherwise no fractures seen on 

CT of the brain.


CT of the head negative for acute intracranial process.





(3) Hyponatremia


ICD Code:  E87.1 - Hypo-osmolality and hyponatremia


Plan:  Thought to be secondary to beer potomania.


Sodium is 140.


Discontinue fluid restriction.


Monitor BMP


Discontinue 1/2 NS.





(4) Hypokalemia


ICD Code:  E87.6 - Hypokalemia


Plan:  Likely due to poor oral intake.  Continue to replace and monitor BMP.





(5) Tinea cruris


ICD Code:  B35.6 - Tinea cruris


Plan:  On Lotrisone cream.





(6) Thrombocytopenia


ICD Code:  D69.6 - Thrombocytopenia, unspecified


Status:  Chronic


Plan:  Related to alcohol abuse.  Platelets trending up.


Continue to monitor platelets.





(7) Gait instability


ICD Code:  R26.81 - Unsteadiness on feet


Plan:  PT AND OT 


DOES NOT HAVE SNF BENEFIT





Assessment and Plan


CONTINUE DVT PROPHYLAXIS


Discharge Planning


AWAIT SAFE PLACEMENT


LIVES ALONE


GAIT UNSTEADINESS











Carlos Michaud DO Mar 13, 2018 10:22

## 2018-03-14 VITALS
TEMPERATURE: 97.4 F | HEART RATE: 79 BPM | RESPIRATION RATE: 19 BRPM | SYSTOLIC BLOOD PRESSURE: 101 MMHG | DIASTOLIC BLOOD PRESSURE: 66 MMHG | OXYGEN SATURATION: 96 %

## 2018-03-14 VITALS
RESPIRATION RATE: 18 BRPM | HEART RATE: 66 BPM | TEMPERATURE: 98 F | OXYGEN SATURATION: 99 % | SYSTOLIC BLOOD PRESSURE: 112 MMHG | DIASTOLIC BLOOD PRESSURE: 70 MMHG

## 2018-03-14 VITALS
DIASTOLIC BLOOD PRESSURE: 76 MMHG | RESPIRATION RATE: 18 BRPM | OXYGEN SATURATION: 99 % | SYSTOLIC BLOOD PRESSURE: 109 MMHG | TEMPERATURE: 97.6 F | HEART RATE: 70 BPM

## 2018-03-14 VITALS
SYSTOLIC BLOOD PRESSURE: 104 MMHG | OXYGEN SATURATION: 98 % | RESPIRATION RATE: 20 BRPM | TEMPERATURE: 98.3 F | HEART RATE: 84 BPM | DIASTOLIC BLOOD PRESSURE: 63 MMHG

## 2018-03-14 VITALS
SYSTOLIC BLOOD PRESSURE: 101 MMHG | DIASTOLIC BLOOD PRESSURE: 64 MMHG | RESPIRATION RATE: 18 BRPM | TEMPERATURE: 97.9 F | HEART RATE: 83 BPM | OXYGEN SATURATION: 100 %

## 2018-03-14 VITALS
TEMPERATURE: 98.2 F | OXYGEN SATURATION: 98 % | SYSTOLIC BLOOD PRESSURE: 104 MMHG | RESPIRATION RATE: 18 BRPM | DIASTOLIC BLOOD PRESSURE: 70 MMHG | HEART RATE: 80 BPM

## 2018-03-14 RX ADMIN — ONDANSETRON PRN MG: 2 INJECTION, SOLUTION INTRAMUSCULAR; INTRAVENOUS at 13:02

## 2018-03-14 RX ADMIN — ACETAMINOPHEN PRN MG: 325 TABLET ORAL at 21:46

## 2018-03-14 RX ADMIN — STANDARDIZED SENNA CONCENTRATE AND DOCUSATE SODIUM SCH TAB: 8.6; 5 TABLET, FILM COATED ORAL at 21:45

## 2018-03-14 RX ADMIN — ACETAMINOPHEN PRN MG: 325 TABLET ORAL at 16:55

## 2018-03-14 RX ADMIN — ACETAMINOPHEN PRN MG: 325 TABLET ORAL at 08:30

## 2018-03-14 RX ADMIN — Medication SCH ML: at 08:30

## 2018-03-14 RX ADMIN — BACITRACIN SCH APPLIC: 500 OINTMENT TOPICAL at 21:47

## 2018-03-14 RX ADMIN — CLOTRIMAZOLE AND BETAMETHASONE DIPROPIONATE SCH APPLIC: 10; .5 CREAM TOPICAL at 21:46

## 2018-03-14 RX ADMIN — STANDARDIZED SENNA CONCENTRATE AND DOCUSATE SODIUM SCH TAB: 8.6; 5 TABLET, FILM COATED ORAL at 08:29

## 2018-03-14 RX ADMIN — BACITRACIN SCH APPLIC: 500 OINTMENT TOPICAL at 08:30

## 2018-03-14 RX ADMIN — Medication SCH MG: at 08:29

## 2018-03-14 RX ADMIN — CLOTRIMAZOLE AND BETAMETHASONE DIPROPIONATE SCH APPLIC: 10; .5 CREAM TOPICAL at 08:31

## 2018-03-14 RX ADMIN — Medication SCH ML: at 21:46

## 2018-03-14 NOTE — HHI.PR
Subjective


Remarks


62-year-old male with a past medical history significant for aortic stenosis 

status post AVR and alcohol abuse presents to the emergency department for 

evaluation of weakness.  The patient reports he has been drinking approximately 

18 beers per day over the past 2 months because he was recently getting 

.  He reports approximately 6 days ago he fell while drunk hitting his 

head resulting in a jagged laceration of the forehead.  The patient reports his 

last drink was approximately 4 days ago.  He complains of being dizzy and 

tremulous.  The patient also reports falling approximately 3 days ago onto a 

concrete floor.  He states he fell flat on his back and does not know how long 

he was on the ground before.  He has significant erythema and skin breakdown on 

the bilateral buttocks and hamstring regions.  He denies any chest pain or 

shortness of breath.  Endorses weakness and dizziness.  Denies nausea/vomiting/

diarrhea.  Positive tremors.  No lateralizing signs/symptoms.  No cough or 

congestion.





3-6 Follow-up alcohol withdrawal.  Patient became confused and agitated and 

required 4 doses of Ativan overnight.  He is lethargic easily arousable he is 

oriented to person and place.  He denies headache or neck pain.  Discussed with 

nurse





3-7 Patient seen this morning.  Confused.  Denies any chest pain or shortness 

of breath.





3-8 The patient seems to be awake and alert.


Denies chest pain or shortness of breath.


Denies hallucinations.


Vital signs seem to be stable.





3-9 NOT STABLE ON FEET YET


STILL TREMULOUS


HAS LONG STANDING HX OF ALCOHOL ABUSE


NOT CLEARED BY PT OR OT FOR DC YET


STARTED ON LIBRIUM 25MG Q6H





3-10 still quite confused today


Not as tremulous as he was yesterday


Needs to work with physical therapy and occupational therapy


Discussed with patient RN and case management





3-11 less confused today


hopefully to home soon


work with pt and ot





3-12 STILL NOT STABLE TO GO HOME


VERY UNSTEADY ON HIS FEET


DW RN AND PT AND CM AND PHYSICAL THERAPY


SEEN AMBULATING WITH POOR GAIT





3-13 still not very mobile


NOT SAFE TO GO HOME YET


VERY UNSTEADY ON HIS FEET


LIVES ALONE





3-14 patient is still not very mobile yet 


He is not safe to go home yet


aSk Barry to evaluate and case management--NOT A NAIR CANDIDATE


CONTINUE TO WORK WITH PT AND OT-NOT CLEARED FOR DC YET





Objective


Vitals





Vital Signs








  Date Time  Temp Pulse Resp B/P (MAP) Pulse Ox O2 Delivery O2 Flow Rate FiO2


 


3/14/18 08:00 98.2 80 18 104/70 (81) 98   


 


3/14/18 04:00 97.4 79 19 101/66 (78) 96   


 


3/14/18 00:00 98.3 84 20 104/63 (77) 98   


 


3/13/18 20:00 98.2 82 18 107/69 (82) 100   


 


3/13/18 16:13 98.3 82 18 102/68 (79) 98   


 


3/13/18 12:04 97.3 83 18 116/79 (91) 99   














I/O      


 


 3/13/18 3/13/18 3/13/18 3/14/18 3/14/18 3/14/18





 07:00 15:00 23:00 07:00 15:00 23:00


 


Output Total 200 ml  200 ml   


 


Balance -200 ml  -200 ml   


 


      


 


Output Urine Total 200 ml  200 ml   


 


# Voids  1  0 2 


 


# Bowel Movements 1   0 0 








Result Diagram:  


3/13/18 0647                                                                   

             3/13/18 0647





Other Results





 Laboratory Tests








Test


  3/13/18


06:47


 


White Blood Count 9.5 TH/MM3 


 


Red Blood Count 3.71 MIL/MM3 


 


Hemoglobin 12.5 GM/DL 


 


Hematocrit 36.1 % 


 


Mean Corpuscular Volume 97.3 FL 


 


Mean Corpuscular Hemoglobin 33.6 PG 


 


Mean Corpuscular Hemoglobin


Concent 34.6 % 


 


 


Red Cell Distribution Width 17.1 % 


 


Platelet Count 258 TH/MM3 


 


Mean Platelet Volume 8.2 FL 


 


Neutrophils (%) (Auto) 79.1 % 


 


Lymphocytes (%) (Auto) 10.9 % 


 


Monocytes (%) (Auto) 9.1 % 


 


Eosinophils (%) (Auto) 0.5 % 


 


Basophils (%) (Auto) 0.4 % 


 


Neutrophils # (Auto) 7.5 TH/MM3 


 


Lymphocytes # (Auto) 1.0 TH/MM3 


 


Monocytes # (Auto) 0.9 TH/MM3 


 


Eosinophils # (Auto) 0.0 TH/MM3 


 


Basophils # (Auto) 0.0 TH/MM3 


 


CBC Comment DIFF FINAL 


 


Differential Comment  


 


Blood Urea Nitrogen 20 MG/DL 


 


Creatinine 0.82 MG/DL 


 


Random Glucose 94 MG/DL 


 


Total Protein 6.3 GM/DL 


 


Albumin 3.0 GM/DL 


 


Calcium Level 8.7 MG/DL 


 


Phosphorus Level 3.1 MG/DL 


 


Magnesium Level 2.3 MG/DL 


 


Alkaline Phosphatase 68 U/L 


 


Aspartate Amino Transf


(AST/SGOT) 20 U/L 


 


 


Alanine Aminotransferase


(ALT/SGPT) 32 U/L 


 


 


Total Bilirubin 0.4 MG/DL 


 


Sodium Level 140 MEQ/L 


 


Potassium Level 3.6 MEQ/L 


 


Chloride Level 105 MEQ/L 


 


Carbon Dioxide Level 29.1 MEQ/L 


 


Anion Gap 6 MEQ/L 


 


Estimat Glomerular Filtration


Rate 95 ML/MIN 


 








Imaging





Last Impressions








Chest X-Ray 3/6/18 0000 Signed





Impressions: 





 Service Date/Time:  Tuesday, March 6, 2018 03:05 - CONCLUSION:  No acute 





 cardiopulmonary abnormality is identified.     Jonathan Ramirez MD 


 


Pelvis X-Ray 3/5/18 0000 Signed





Impressions: 





 Service Date/Time:  Monday, March 5, 2018 20:40 - CONCLUSION:  No evidence of 





 recent bone injury.     Luis Arita MD 


 


Head CT 3/5/18 0000 Signed





Impressions: 





 Service Date/Time:  Monday, March 5, 2018 16:36 - CONCLUSION:  1. Small 





 cephalhematoma over the right frontal bone. 2. Otherwise negative. No 

fracture. 





 No acute intracranial process.     Boyd العراقي MD 








Objective Remarks


GENERAL: lying in bed.  Appears comfortable.  Oriented to place, not to date or 

year.  KNew the president was [place],HE  Knew he was in [place] knew he was in 

[place] knew the year 2018


SKIN: Scab over right forehead with bilateral raccoons eyes cool and dry.


HEAD: Normocephalic.  Trauma as stated above


EYES: No scleral icterus. No injection or drainage.  Extraocular muscles intact 

pupils equal round reactive light accommodation bilateral raccoon eyes


NECK: Supple, trachea midline. No JVD or lymphadenopathy.  Tongue is midline 

oromucosa is moist


CARDIOVASCULAR: Regular rate and rhythm without murmurs, gallops, or rubs.  S1-

S2 no S3 or S4


RESPIRATORY: Breath sounds equal bilaterally. No accessory muscle use.


GASTROINTESTINAL: Abdomen soft, non-tender, nondistended. 


MUSCULOSKELETAL: No cyanosis, or edema. 


BACK: Nontender without obvious deformity. No CVA tenderness.


Neuro: Awake alert and oriented 3.  Motor and sensory to be grossly within 

normal limits.   4 out of 5 muscle strength in all muscle groups.  Normal 

speech.  No tremors noted.


Insight and judgment is improved


Mood and behavior is more appropriate


Procedures


none


Medications and IVs





Current Medications


Ondansetron HCl (Zofran Inj) 4 mg ONCE  ONCE IVP  Last administered on 3/5/18at 

16:11;  Start 3/5/18 at 16:00;  Stop 3/5/18 at 16:01;  Status DC


Sodium Chloride (NS Flush) 2 ml UNSCH  PRN IV FLUSH FLUSH AFTER USING IV ACCESS

;  Start 3/5/18 at 16:00;  Stop 3/5/18 at 21:38;  Status DC


Flumazenil (Romazicon Inj) 0.2 mg Q1M  PRN IV PUSH SEE LABEL COMMENTS;  Start 3/

5/18 at 16:00


Lorazepam (Ativan) 1 mg Q4H  PRN PO CIWA 8 - 10 Last administered on 3/13/18at 

20:23;  Start 3/5/18 at 16:00


Lorazepam (Ativan Inj) 1 mg Q4H  PRN IV PUSH CIWA 8 - 10 Last administered on 3/

12/18at 20:36;  Start 3/5/18 at 16:00


Lorazepam (Ativan) 2 mg Q2H  PRN PO CIWA 11-14 Last administered on 3/6/18at 04:

05;  Start 3/5/18 at 16:00


Lorazepam (Ativan Inj) 2 mg Q2H  PRN IV PUSH CIWA 11-14 Last administered on 3/8

/18at 00:44;  Start 3/5/18 at 16:00


Lorazepam (Ativan Inj) 2 mg Q1H  PRN IV PUSH CIWA 15-20 Last administered on 3/6

/18at 20:03;  Start 3/5/18 at 16:00


Lorazepam (Ativan Inj) 2 mg Q15M  PRN IV PUSH CIWA > 20 Last administered on 3/7

/18at 10:19;  Start 3/5/18 at 16:00


Tetanus/ Diphtheria Toxoids (Tetanus/ Diphtheria Tox Adult) 0.5 ml ONCE ONCE IM

  Last administered on 3/5/18at 16:12;  Start 3/5/18 at 16:00;  Stop 3/5/18 at 

16:01;  Status DC


Sodium Chloride 1,000 ml @  999 mls/hr BOLUS  ONCE IV  Last administered on 3/5/

18at 17:34;  Start 3/5/18 at 17:30;  Stop 3/5/18 at 18:30;  Status DC


Sodium Chloride 1,000 ml @  999 mls/hr BOLUS  ONCE IV  Last administered on 3/5/

18at 19:01;  Start 3/5/18 at 19:00;  Stop 3/5/18 at 19:18;  Status DC


Calcium Carbonate (Tums Chew) 1,000 mg ONCE  ONCE CHEW  Last administered on 3/5

/18at 22:00;  Start 3/5/18 at 19:30;  Stop 3/5/18 at 19:31;  Status DC


Sodium Chloride 1,000 ml @  50 mls/hr Q20H IV  Last administered on 3/7/18at 10:

58;  Start 3/5/18 at 20:26;  Stop 3/7/18 at 16:54;  Status DC


Sodium Chloride (NS Flush) 2 ml UNSCH  PRN IV FLUSH FLUSH AFTER USING IV ACCESS 

Last administered on 3/13/18at 20:24;  Start 3/5/18 at 20:30


Sodium Chloride (NS Flush) 2 ml BID IV FLUSH  Last administered on 3/14/18at 08:

30;  Start 3/5/18 at 21:00


Ondansetron HCl (Zofran Inj) 4 mg Q6H  PRN IVP NAUSEA OR VOMITING Last 

administered on 3/10/18at 11:55;  Start 3/5/18 at 20:30


Naloxone HCl (Narcan Inj) 0.4 mg UNSCH  PRN IV PUSH SEE LABEL COMMENTS;  Start 3

/5/18 at 20:30


Senna/Docusate Sodium (Carlee-Colace) 1 tab BID PO  Last administered on 3/14/

18at 08:29;  Start 3/5/18 at 21:00


Magnesium Hydroxide (Milk Of Magnesia Liq) 30 ml Q12H  PRN PO Mild constipation

;  Start 3/5/18 at 20:30


Sennosides (Senokot) 17.2 mg Q12H  PRN PO Moderate constipation;  Start 3/5/18 

at 20:30


Bisacodyl (Dulcolax Supp) 10 mg DAILY  PRN RECTAL SEVERE CONSITIPATION;  Start 3

/5/18 at 20:30


Lactulose (Lactulose Liq) 30 ml DAILY  PRN PO SEVERE CONSITIPATION;  Start 3/5/

18 at 20:30


Folic Acid (Folate) 1 mg DAILY PO  Last administered on 3/10/18at 08:53;  Start 

3/6/18 at 09:00;  Stop 3/11/18 at 08:59;  Status DC


Thiamine HCl (Vitamin B1) 100 mg DAILY PO  Last administered on 3/14/18at 08:29

;  Start 3/6/18 at 09:00


Multivitamins/ Minerals Therapeutic (Theragran M Tab) 1 tab DAILY PO  Last 

administered on 3/10/18at 08:53;  Start 3/6/18 at 09:00;  Stop 3/11/18 at 08:59

;  Status DC


Heparin Sodium (Porcine) (Heparin Inj) 5,000 units Q12HR SQ  Last administered 

on 3/6/18at 10:17;  Start 3/6/18 at 09:00;  Stop 3/6/18 at 15:32;  Status DC


Betamethasone/ Clotrimazole (Lotrisone Cream) 1 applic Q12HR TOPICAL  Last 

administered on 3/14/18at 08:31;  Start 3/6/18 at 10:45


Bacitracin (Baciguent Oint) APPLY TO SCAB ON RIGHT KNEE BID TOPICAL  Last 

administered on 3/14/18at 08:30;  Start 3/6/18 at 21:00


Miscellaneous Information Patient in critical care unit?  Ass... [id number] .XX ;  

Start 3/6/18 at 20:45


Chlorhexidine Gluconate (Chlorhexidine 2% Cloth) 3 pack DAILY@04 TOPICAL  Last 

administered on 3/10/18at 04:00;  Start 3/7/18 at 04:00;  Stop 3/11/18 at 04:01

;  Status DC


Chlorhexidine Gluconate (Chlorhexidine 2% Cloth) 3 pack UNSCH  PRN TOPICAL 

HYGIENIC CARE;  Start 3/6/18 at 20:45;  Stop 3/11/18 at 20:34;  Status DC


Potassium Chloride (KCl) 20 meq ONCE  ONCE PO  Last administered on 3/7/18at 12:

38;  Start 3/7/18 at 12:30;  Stop 3/7/18 at 12:31;  Status DC


Chlordiazepoxide (Librium) 5 mg TID PO  Last administered on 3/8/18at 11:23;  

Start 3/7/18 at 13:00;  Stop 3/8/18 at 15:58;  Status DC


Potassium Chloride 10 meq/ Sodium Chloride 1,005 ml @  50 mls/hr Q20H6M IV  

Last administered on 3/8/18at 11:24;  Start 3/7/18 at 18:00;  Stop 3/8/18 at 15:

58;  Status DC


Potassium Chloride (KCl) 40 meq ONCE  ONCE PO  Last administered on 3/8/18at 16:

08;  Start 3/8/18 at 15:30;  Stop 3/8/18 at 15:31;  Status DC


Acetaminophen (Tylenol) 650 mg Q4H  PRN PO HEADACHE Last administered on 3/14/

18at 08:30;  Start 3/9/18 at 12:15


Albuterol/ Ipratropium (Duoneb Neb) 1 ampule Q4HR NEB  PRN NEB SHORTNESS OF 

BREATH Last administered on 3/11/18at 18:30;  Start 3/9/18 at 12:15


Chlordiazepoxide (Librium) 25 mg TID PO  Last administered on 3/14/18at 08:29;  

Start 3/9/18 at 18:00


Temazepam (Restoril) 15 mg ONCE  ONCE PO  Last administered on 3/12/18at 23:50;

  Start 3/12/18 at 23:45;  Stop 3/12/18 at 23:46;  Status DC





A/P


Problem List:  


(1) Alcohol withdrawal


ICD Code:  F10.239 - Alcohol dependence with withdrawal, unspecified


Status:  Acute


Plan:  Seems to be improving.


Have added Librium back.


Continue [facility] protocol.


Recommended alcohol cessation.





(2) Cephalhematoma


ICD Code:  P12.0 - Cephalhematoma due to birth injury


Plan:  Seen on CT of the head described above.  Otherwise no fractures seen on 

CT of the brain.


CT of the head negative for acute intracranial process.





(3) Hyponatremia


ICD Code:  E87.1 - Hypo-osmolality and hyponatremia


Plan:  Thought to be secondary to beer potomania.


Sodium is 140.


Discontinue fluid restriction.


Monitor BMP


Discontinue 1/2 NS.





(4) Hypokalemia


ICD Code:  E87.6 - Hypokalemia


Plan:  Likely due to poor oral intake.  Continue to replace and monitor BMP.





(5) Tinea cruris


ICD Code:  B35.6 - Tinea cruris


Plan:  On Lotrisone cream.





(6) Thrombocytopenia


ICD Code:  D69.6 - Thrombocytopenia, unspecified


Status:  Chronic


Plan:  Related to alcohol abuse.  Platelets trending up.


Continue to monitor platelets.





(7) Gait instability


ICD Code:  R26.81 - Unsteadiness on feet


Plan:  PT AND OT 


DOES NOT HAVE SNF BENEFIT





Assessment and Plan


CONTINUE DVT PROPHYLAXIS


Discharge Planning


AWAIT SAFE PLACEMENT


LIVES ALONE


GAIT UNSTEADINESS











Carlos Michaud DO Mar 14, 2018 10:32

## 2018-03-15 VITALS
HEART RATE: 71 BPM | DIASTOLIC BLOOD PRESSURE: 63 MMHG | SYSTOLIC BLOOD PRESSURE: 97 MMHG | OXYGEN SATURATION: 98 % | TEMPERATURE: 98.1 F | RESPIRATION RATE: 18 BRPM

## 2018-03-15 VITALS
RESPIRATION RATE: 18 BRPM | HEART RATE: 86 BPM | OXYGEN SATURATION: 100 % | TEMPERATURE: 98.1 F | DIASTOLIC BLOOD PRESSURE: 75 MMHG | SYSTOLIC BLOOD PRESSURE: 112 MMHG

## 2018-03-15 VITALS
SYSTOLIC BLOOD PRESSURE: 114 MMHG | OXYGEN SATURATION: 98 % | TEMPERATURE: 98 F | HEART RATE: 76 BPM | DIASTOLIC BLOOD PRESSURE: 67 MMHG | RESPIRATION RATE: 18 BRPM

## 2018-03-15 VITALS
OXYGEN SATURATION: 97 % | HEART RATE: 67 BPM | TEMPERATURE: 97.8 F | RESPIRATION RATE: 18 BRPM | DIASTOLIC BLOOD PRESSURE: 65 MMHG | SYSTOLIC BLOOD PRESSURE: 115 MMHG

## 2018-03-15 VITALS
HEART RATE: 64 BPM | RESPIRATION RATE: 18 BRPM | DIASTOLIC BLOOD PRESSURE: 63 MMHG | OXYGEN SATURATION: 97 % | TEMPERATURE: 97.5 F | SYSTOLIC BLOOD PRESSURE: 108 MMHG

## 2018-03-15 VITALS
HEART RATE: 73 BPM | RESPIRATION RATE: 18 BRPM | DIASTOLIC BLOOD PRESSURE: 72 MMHG | SYSTOLIC BLOOD PRESSURE: 121 MMHG | OXYGEN SATURATION: 99 % | TEMPERATURE: 98 F

## 2018-03-15 RX ADMIN — BACITRACIN SCH APPLIC: 500 OINTMENT TOPICAL at 20:55

## 2018-03-15 RX ADMIN — STANDARDIZED SENNA CONCENTRATE AND DOCUSATE SODIUM SCH TAB: 8.6; 5 TABLET, FILM COATED ORAL at 20:54

## 2018-03-15 RX ADMIN — BACITRACIN SCH APPLIC: 500 OINTMENT TOPICAL at 08:21

## 2018-03-15 RX ADMIN — ACETAMINOPHEN PRN MG: 325 TABLET ORAL at 19:07

## 2018-03-15 RX ADMIN — ACETAMINOPHEN PRN MG: 325 TABLET ORAL at 08:21

## 2018-03-15 RX ADMIN — Medication SCH MG: at 08:20

## 2018-03-15 RX ADMIN — CLOTRIMAZOLE AND BETAMETHASONE DIPROPIONATE SCH APPLIC: 10; .5 CREAM TOPICAL at 08:21

## 2018-03-15 RX ADMIN — Medication SCH ML: at 08:21

## 2018-03-15 RX ADMIN — STANDARDIZED SENNA CONCENTRATE AND DOCUSATE SODIUM SCH TAB: 8.6; 5 TABLET, FILM COATED ORAL at 08:20

## 2018-03-15 RX ADMIN — Medication SCH ML: at 20:54

## 2018-03-15 NOTE — HHI.PR
Subjective


Remarks


62-year-old male with a past medical history significant for aortic stenosis 

status post AVR and alcohol abuse presents to the emergency department for 

evaluation of weakness.  The patient reports he has been drinking approximately 

18 beers per day over the past 2 months because he was recently getting 

.  He reports approximately 6 days ago he fell while drunk hitting his 

head resulting in a jagged laceration of the forehead.  The patient reports his 

last drink was approximately 4 days ago.  He complains of being dizzy and 

tremulous.  The patient also reports falling approximately 3 days ago onto a 

concrete floor.  He states he fell flat on his back and does not know how long 

he was on the ground before.  He has significant erythema and skin breakdown on 

the bilateral buttocks and hamstring regions.  He denies any chest pain or 

shortness of breath.  Endorses weakness and dizziness.  Denies nausea/vomiting/

diarrhea.  Positive tremors.  No lateralizing signs/symptoms.  No cough or 

congestion.





3-6 Follow-up alcohol withdrawal.  Patient became confused and agitated and 

required 4 doses of Ativan overnight.  He is lethargic easily arousable he is 

oriented to person and place.  He denies headache or neck pain.  Discussed with 

nurse





3-7 Patient seen this morning.  Confused.  Denies any chest pain or shortness 

of breath.





3-8 The patient seems to be awake and alert.


Denies chest pain or shortness of breath.


Denies hallucinations.


Vital signs seem to be stable.





3-9 NOT STABLE ON FEET YET


STILL TREMULOUS


HAS LONG STANDING HX OF ALCOHOL ABUSE


NOT CLEARED BY PT OR OT FOR DC YET


STARTED ON LIBRIUM 25MG Q6H





3-10 still quite confused today


Not as tremulous as he was yesterday


Needs to work with physical therapy and occupational therapy


Discussed with patient RN and case management





3-11 less confused today


hopefully to home soon


work with pt and ot





3-12 STILL NOT STABLE TO GO HOME


VERY UNSTEADY ON HIS FEET


DW RN AND PT AND CM AND PHYSICAL THERAPY


SEEN AMBULATING WITH POOR GAIT





3-13 still not very mobile


NOT SAFE TO GO HOME YET


VERY UNSTEADY ON HIS FEET


LIVES ALONE





3-14 patient is still not very mobile yet 


He is not safe to go home yet


aSk Barry to evaluate and case management--NOT A NAIR CANDIDATE


CONTINUE TO WORK WITH PT AND OT-NOT CLEARED FOR DC YET





3-15 continues to work with physical therapy


Still not safe for discharge yet


Needs to be more mobile


And be able to take care of himself





Objective


Vitals





Vital Signs








  Date Time  Temp Pulse Resp B/P (MAP) Pulse Ox O2 Delivery O2 Flow Rate FiO2


 


3/15/18 08:00 98.0 73 18 121/72 (88) 99   


 


3/15/18 04:00 97.8 67 18 115/65 (82) 97   


 


3/15/18 00:00 97.5 64 18 108/63 (78) 97   


 


3/14/18 20:00 97.9 83 18 101/64 (76) 100   


 


3/14/18 16:00 98.0 66 18 112/70 (84) 99   


 


3/14/18 12:00 97.6 70 18 109/76 (87) 99   














I/O      


 


 3/14/18 3/14/18 3/14/18 3/15/18 3/15/18 3/15/18





 07:00 15:00 23:00 07:00 15:00 23:00


 


      


 


# Voids 0 2 2  3 


 


# Bowel Movements 0 0 1   








Result Diagram:  


3/13/18 0647                                                                   

             3/13/18 0647





Other Results





 Laboratory Tests








Test


  3/13/18


06:47


 


White Blood Count 9.5 TH/MM3 


 


Red Blood Count 3.71 MIL/MM3 


 


Hemoglobin 12.5 GM/DL 


 


Hematocrit 36.1 % 


 


Mean Corpuscular Volume 97.3 FL 


 


Mean Corpuscular Hemoglobin 33.6 PG 


 


Mean Corpuscular Hemoglobin


Concent 34.6 % 


 


 


Red Cell Distribution Width 17.1 % 


 


Platelet Count 258 TH/MM3 


 


Mean Platelet Volume 8.2 FL 


 


Neutrophils (%) (Auto) 79.1 % 


 


Lymphocytes (%) (Auto) 10.9 % 


 


Monocytes (%) (Auto) 9.1 % 


 


Eosinophils (%) (Auto) 0.5 % 


 


Basophils (%) (Auto) 0.4 % 


 


Neutrophils # (Auto) 7.5 TH/MM3 


 


Lymphocytes # (Auto) 1.0 TH/MM3 


 


Monocytes # (Auto) 0.9 TH/MM3 


 


Eosinophils # (Auto) 0.0 TH/MM3 


 


Basophils # (Auto) 0.0 TH/MM3 


 


CBC Comment DIFF FINAL 


 


Differential Comment  


 


Blood Urea Nitrogen 20 MG/DL 


 


Creatinine 0.82 MG/DL 


 


Random Glucose 94 MG/DL 


 


Total Protein 6.3 GM/DL 


 


Albumin 3.0 GM/DL 


 


Calcium Level 8.7 MG/DL 


 


Phosphorus Level 3.1 MG/DL 


 


Magnesium Level 2.3 MG/DL 


 


Alkaline Phosphatase 68 U/L 


 


Aspartate Amino Transf


(AST/SGOT) 20 U/L 


 


 


Alanine Aminotransferase


(ALT/SGPT) 32 U/L 


 


 


Total Bilirubin 0.4 MG/DL 


 


Sodium Level 140 MEQ/L 


 


Potassium Level 3.6 MEQ/L 


 


Chloride Level 105 MEQ/L 


 


Carbon Dioxide Level 29.1 MEQ/L 


 


Anion Gap 6 MEQ/L 


 


Estimat Glomerular Filtration


Rate 95 ML/MIN 


 








Imaging





Last Impressions








Chest X-Ray 3/6/18 0000 Signed





Impressions: 





 Service Date/Time:  Tuesday, March 6, 2018 03:05 - CONCLUSION:  No acute 





 cardiopulmonary abnormality is identified.     Jonathan Ramirez MD 


 


Pelvis X-Ray 3/5/18 0000 Signed





Impressions: 





 Service Date/Time:  Monday, March 5, 2018 20:40 - CONCLUSION:  No evidence of 





 recent bone injury.     Luis Arita MD 


 


Head CT 3/5/18 0000 Signed





Impressions: 





 Service Date/Time:  Monday, March 5, 2018 16:36 - CONCLUSION:  1. Small 





 cephalhematoma over the right frontal bone. 2. Otherwise negative. No 

fracture. 





 No acute intracranial process.     Boyd العراقي MD 








Objective Remarks


GENERAL: lying in bed.  Appears comfortable.  Oriented to place, not to date or 

year.  KNew the president was [place],HE  Knew he was in [place] knew he was in 

[place] knew the year 2018


SKIN: Scab over right forehead with bilateral raccoons eyes cool and dry.


HEAD: Normocephalic.  Trauma as stated above


EYES: No scleral icterus. No injection or drainage.  Extraocular muscles intact 

pupils equal round reactive light accommodation bilateral raccoon eyes


NECK: Supple, trachea midline. No JVD or lymphadenopathy.  Tongue is midline 

oromucosa is moist


CARDIOVASCULAR: Regular rate and rhythm without murmurs, gallops, or rubs.  S1-

S2 no S3 or S4


RESPIRATORY: Breath sounds equal bilaterally. No accessory muscle use.


GASTROINTESTINAL: Abdomen soft, non-tender, nondistended. 


MUSCULOSKELETAL: No cyanosis, or edema. 


BACK: Nontender without obvious deformity. No CVA tenderness.


Neuro: Awake alert and oriented 3.  Motor and sensory to be grossly within 

normal limits.   4 out of 5 muscle strength in all muscle groups.  Normal 

speech.  No tremors noted.


Insight and judgment is improved


Mood and behavior is more appropriate


Procedures


none


Medications and IVs





Current Medications


Ondansetron HCl (Zofran Inj) 4 mg ONCE  ONCE IVP  Last administered on 3/5/18at 

16:11;  Start 3/5/18 at 16:00;  Stop 3/5/18 at 16:01;  Status DC


Sodium Chloride (NS Flush) 2 ml UNSCH  PRN IV FLUSH FLUSH AFTER USING IV ACCESS

;  Start 3/5/18 at 16:00;  Stop 3/5/18 at 21:38;  Status DC


Flumazenil (Romazicon Inj) 0.2 mg Q1M  PRN IV PUSH SEE LABEL COMMENTS;  Start 3/

5/18 at 16:00


Lorazepam (Ativan) 1 mg Q4H  PRN PO CIWA 8 - 10 Last administered on 3/13/18at 

20:23;  Start 3/5/18 at 16:00


Lorazepam (Ativan Inj) 1 mg Q4H  PRN IV PUSH CIWA 8 - 10 Last administered on 3/

12/18at 20:36;  Start 3/5/18 at 16:00


Lorazepam (Ativan) 2 mg Q2H  PRN PO CIWA 11-14 Last administered on 3/6/18at 04:

05;  Start 3/5/18 at 16:00


Lorazepam (Ativan Inj) 2 mg Q2H  PRN IV PUSH CIWA 11-14 Last administered on 3/8

/18at 00:44;  Start 3/5/18 at 16:00


Lorazepam (Ativan Inj) 2 mg Q1H  PRN IV PUSH CIWA 15-20 Last administered on 3/6

/18at 20:03;  Start 3/5/18 at 16:00


Lorazepam (Ativan Inj) 2 mg Q15M  PRN IV PUSH CIWA > 20 Last administered on 3/7

/18at 10:19;  Start 3/5/18 at 16:00


Tetanus/ Diphtheria Toxoids (Tetanus/ Diphtheria Tox Adult) 0.5 ml ONCE ONCE IM

  Last administered on 3/5/18at 16:12;  Start 3/5/18 at 16:00;  Stop 3/5/18 at 

16:01;  Status DC


Sodium Chloride 1,000 ml @  999 mls/hr BOLUS  ONCE IV  Last administered on 3/5/

18at 17:34;  Start 3/5/18 at 17:30;  Stop 3/5/18 at 18:30;  Status DC


Sodium Chloride 1,000 ml @  999 mls/hr BOLUS  ONCE IV  Last administered on 3/5/

18at 19:01;  Start 3/5/18 at 19:00;  Stop 3/5/18 at 19:18;  Status DC


Calcium Carbonate (Tums Chew) 1,000 mg ONCE  ONCE CHEW  Last administered on 3/5

/18at 22:00;  Start 3/5/18 at 19:30;  Stop 3/5/18 at 19:31;  Status DC


Sodium Chloride 1,000 ml @  50 mls/hr Q20H IV  Last administered on 3/7/18at 10:

58;  Start 3/5/18 at 20:26;  Stop 3/7/18 at 16:54;  Status DC


Sodium Chloride (NS Flush) 2 ml UNSCH  PRN IV FLUSH FLUSH AFTER USING IV ACCESS 

Last administered on 3/13/18at 20:24;  Start 3/5/18 at 20:30


Sodium Chloride (NS Flush) 2 ml BID IV FLUSH  Last administered on 3/15/18at 08:

21;  Start 3/5/18 at 21:00


Ondansetron HCl (Zofran Inj) 4 mg Q6H  PRN IVP NAUSEA OR VOMITING Last 

administered on 3/14/18at 13:02;  Start 3/5/18 at 20:30


Naloxone HCl (Narcan Inj) 0.4 mg UNSCH  PRN IV PUSH SEE LABEL COMMENTS;  Start 3

/5/18 at 20:30


Senna/Docusate Sodium (Carlee-Colace) 1 tab BID PO  Last administered on 3/15/

18at 08:20;  Start 3/5/18 at 21:00


Magnesium Hydroxide (Milk Of Magnesia Liq) 30 ml Q12H  PRN PO Mild constipation

;  Start 3/5/18 at 20:30


Sennosides (Senokot) 17.2 mg Q12H  PRN PO Moderate constipation;  Start 3/5/18 

at 20:30


Bisacodyl (Dulcolax Supp) 10 mg DAILY  PRN RECTAL SEVERE CONSITIPATION;  Start 3

/5/18 at 20:30


Lactulose (Lactulose Liq) 30 ml DAILY  PRN PO SEVERE CONSITIPATION;  Start 3/5/

18 at 20:30


Folic Acid (Folate) 1 mg DAILY PO  Last administered on 3/10/18at 08:53;  Start 

3/6/18 at 09:00;  Stop 3/11/18 at 08:59;  Status DC


Thiamine HCl (Vitamin B1) 100 mg DAILY PO  Last administered on 3/15/18at 08:20

;  Start 3/6/18 at 09:00


Multivitamins/ Minerals Therapeutic (Theragran M Tab) 1 tab DAILY PO  Last 

administered on 3/10/18at 08:53;  Start 3/6/18 at 09:00;  Stop 3/11/18 at 08:59

;  Status DC


Heparin Sodium (Porcine) (Heparin Inj) 5,000 units Q12HR SQ  Last administered 

on 3/6/18at 10:17;  Start 3/6/18 at 09:00;  Stop 3/6/18 at 15:32;  Status DC


Betamethasone/ Clotrimazole (Lotrisone Cream) 1 applic Q12HR TOPICAL  Last 

administered on 3/15/18at 08:21;  Start 3/6/18 at 10:45


Bacitracin (Baciguent Oint) APPLY TO SCAB ON RIGHT KNEE BID TOPICAL  Last 

administered on 3/15/18at 08:21;  Start 3/6/18 at 21:00


Miscellaneous Information Patient in critical care unit?  Ass... [id number] .XX ;  

Start 3/6/18 at 20:45


Chlorhexidine Gluconate (Chlorhexidine 2% Cloth) 3 pack DAILY@04 TOPICAL  Last 

administered on 3/10/18at 04:00;  Start 3/7/18 at 04:00;  Stop 3/11/18 at 04:01

;  Status DC


Chlorhexidine Gluconate (Chlorhexidine 2% Cloth) 3 pack UNSCH  PRN TOPICAL 

HYGIENIC CARE;  Start 3/6/18 at 20:45;  Stop 3/11/18 at 20:34;  Status DC


Potassium Chloride (KCl) 20 meq ONCE  ONCE PO  Last administered on 3/7/18at 12:

38;  Start 3/7/18 at 12:30;  Stop 3/7/18 at 12:31;  Status DC


Chlordiazepoxide (Librium) 5 mg TID PO  Last administered on 3/8/18at 11:23;  

Start 3/7/18 at 13:00;  Stop 3/8/18 at 15:58;  Status DC


Potassium Chloride 10 meq/ Sodium Chloride 1,005 ml @  50 mls/hr Q20H6M IV  

Last administered on 3/8/18at 11:24;  Start 3/7/18 at 18:00;  Stop 3/8/18 at 15:

58;  Status DC


Potassium Chloride (KCl) 40 meq ONCE  ONCE PO  Last administered on 3/8/18at 16:

08;  Start 3/8/18 at 15:30;  Stop 3/8/18 at 15:31;  Status DC


Acetaminophen (Tylenol) 650 mg Q4H  PRN PO HEADACHE Last administered on 3/15/

18at 08:21;  Start 3/9/18 at 12:15


Albuterol/ Ipratropium (Duoneb Neb) 1 ampule Q4HR NEB  PRN NEB SHORTNESS OF 

BREATH Last administered on 3/11/18at 18:30;  Start 3/9/18 at 12:15


Chlordiazepoxide (Librium) 25 mg TID PO  Last administered on 3/15/18at 08:20;  

Start 3/9/18 at 18:00


Temazepam (Restoril) 15 mg ONCE  ONCE PO  Last administered on 3/12/18at 23:50;

  Start 3/12/18 at 23:45;  Stop 3/12/18 at 23:46;  Status DC





A/P


Problem List:  


(1) Alcohol withdrawal


ICD Code:  F10.239 - Alcohol dependence with withdrawal, unspecified


Status:  Acute


Plan:  Seems to be improving.


Have added Librium back.


Continue [facility] protocol.


Recommended alcohol cessation.





(2) Cephalhematoma


ICD Code:  P12.0 - Cephalhematoma due to birth injury


Plan:  Seen on CT of the head described above.  Otherwise no fractures seen on 

CT of the brain.


CT of the head negative for acute intracranial process.





(3) Hyponatremia


ICD Code:  E87.1 - Hypo-osmolality and hyponatremia


Plan:  Thought to be secondary to beer potomania.


Sodium is 140.


Discontinue fluid restriction.


Monitor BMP


Discontinue 1/2 NS.





(4) Hypokalemia


ICD Code:  E87.6 - Hypokalemia


Plan:  Likely due to poor oral intake.  Continue to replace and monitor BMP.





(5) Tinea cruris


ICD Code:  B35.6 - Tinea cruris


Plan:  On Lotrisone cream.





(6) Thrombocytopenia


ICD Code:  D69.6 - Thrombocytopenia, unspecified


Status:  Chronic


Plan:  Related to alcohol abuse.  Platelets trending up.


Continue to monitor platelets.





(7) Gait instability


ICD Code:  R26.81 - Unsteadiness on feet


Plan:  PT AND OT 


DOES NOT HAVE SNF BENEFIT





Continue physical therapy and Occupational Therapy





Assessment and Plan


CONTINUE DVT PROPHYLAXIS


Discharge Planning


AWAIT SAFE PLACEMENT


LIVES ALONE


GAIT UNSTEADINESS











Carlos Michaud DO Mar 15, 2018 10:26

## 2018-03-16 VITALS
OXYGEN SATURATION: 97 % | HEART RATE: 69 BPM | RESPIRATION RATE: 18 BRPM | SYSTOLIC BLOOD PRESSURE: 113 MMHG | TEMPERATURE: 98.6 F | DIASTOLIC BLOOD PRESSURE: 70 MMHG

## 2018-03-16 VITALS
SYSTOLIC BLOOD PRESSURE: 111 MMHG | HEART RATE: 68 BPM | DIASTOLIC BLOOD PRESSURE: 70 MMHG | OXYGEN SATURATION: 96 % | RESPIRATION RATE: 18 BRPM | TEMPERATURE: 97.1 F

## 2018-03-16 VITALS
SYSTOLIC BLOOD PRESSURE: 112 MMHG | OXYGEN SATURATION: 94 % | HEART RATE: 85 BPM | DIASTOLIC BLOOD PRESSURE: 69 MMHG | TEMPERATURE: 97.8 F | RESPIRATION RATE: 18 BRPM

## 2018-03-16 VITALS
TEMPERATURE: 98.2 F | OXYGEN SATURATION: 98 % | SYSTOLIC BLOOD PRESSURE: 117 MMHG | HEART RATE: 72 BPM | DIASTOLIC BLOOD PRESSURE: 68 MMHG | RESPIRATION RATE: 18 BRPM

## 2018-03-16 VITALS
HEART RATE: 70 BPM | RESPIRATION RATE: 16 BRPM | TEMPERATURE: 97.8 F | SYSTOLIC BLOOD PRESSURE: 110 MMHG | DIASTOLIC BLOOD PRESSURE: 67 MMHG | OXYGEN SATURATION: 98 %

## 2018-03-16 VITALS
RESPIRATION RATE: 18 BRPM | SYSTOLIC BLOOD PRESSURE: 98 MMHG | DIASTOLIC BLOOD PRESSURE: 59 MMHG | TEMPERATURE: 97.8 F | HEART RATE: 70 BPM | OXYGEN SATURATION: 96 %

## 2018-03-16 RX ADMIN — BACITRACIN SCH APPLIC: 500 OINTMENT TOPICAL at 21:00

## 2018-03-16 RX ADMIN — Medication SCH ML: at 21:00

## 2018-03-16 RX ADMIN — Medication SCH ML: at 09:01

## 2018-03-16 RX ADMIN — ACETAMINOPHEN PRN MG: 325 TABLET ORAL at 21:20

## 2018-03-16 RX ADMIN — STANDARDIZED SENNA CONCENTRATE AND DOCUSATE SODIUM SCH TAB: 8.6; 5 TABLET, FILM COATED ORAL at 21:20

## 2018-03-16 RX ADMIN — BACITRACIN SCH APPLIC: 500 OINTMENT TOPICAL at 11:32

## 2018-03-16 RX ADMIN — ACETAMINOPHEN PRN MG: 325 TABLET ORAL at 05:36

## 2018-03-16 RX ADMIN — Medication SCH MG: at 08:52

## 2018-03-16 RX ADMIN — STANDARDIZED SENNA CONCENTRATE AND DOCUSATE SODIUM SCH TAB: 8.6; 5 TABLET, FILM COATED ORAL at 08:52

## 2018-03-16 RX ADMIN — ACETAMINOPHEN PRN MG: 325 TABLET ORAL at 01:31

## 2018-03-16 NOTE — HHI.PR
Subjective


Remarks





patient seen early this morning.  Confusion, strength improving.  Discussed 

with physical therapy, possible discharge in next several days.





Objective





Vital Signs








  Date Time  Temp Pulse Resp B/P (MAP) Pulse Ox O2 Delivery O2 Flow Rate FiO2


 


3/16/18 16:00 97.1 68 18 111/70 (84) 96   


 


3/16/18 12:00 97.8 70 18 98/59 (72) 96   


 


3/16/18 08:00 97.8 70 16 110/67 (81) 98   


 


3/16/18 04:00 97.8 85 18 112/69 (83) 94   


 


3/16/18 00:00 98.2 72 18 117/68 (84) 98   














I/O      


 


 3/16/18 3/16/18 3/16/18 3/17/18 3/17/18 3/17/18





 07:00 15:00 23:00 07:00 15:00 23:00


 


Intake Total  780 ml    


 


Output Total 700 ml     


 


Balance -700 ml 780 ml    


 


      


 


Intake Oral  780 ml    


 


Output Urine Total 700 ml     


 


# Voids  3    


 


# Bowel Movements  1    








Result Diagram:  


3/13/18 0647                                                                   

             3/13/18 0647





Objective Remarks


GENERAL: lying in bed.  Appears comfortable.  


SKIN: Warm and dry.


HEAD: Normocephalic.


EYES: No scleral icterus. No injection or drainage. 


NECK: Supple, trachea midline. No JVD.


CARDIOVASCULAR: Regular rate and rhythm without murmurs, gallops, or rubs. 


RESPIRATORY: Breath sounds equal bilaterally. No accessory muscle use.


GASTROINTESTINAL: Abdomen soft, non-tender, nondistended. 


MUSCULOSKELETAL: No cyanosis, or edema. 


BACK: Nontender without obvious deformity. No CVA tenderness.








A/P


Assessment and Plan


===================3/16/18============


Discussed with case management and nursing at [facility] rounds.





====================================





//Alcohol withdrawal


- Seems to be improving.


Have added Librium back.


Continue CIWA protocol.


Recommended alcohol cessation.


= Taper Librium.





//Cephalhematoma


-  Seen on CT of the head described above.  Otherwise no fractures seen on CT 

of the brain.


CT of the head negative for acute intracranial process.





//Hyponatremia


- Thought to be secondary to beer potomania.


Sodium is 140.


Discontinue fluid restriction.


Monitor BMP


Discontinue 1/2 NS.


-Resolved.





//Hypokalemia


- Likely due to poor oral intake.  Continue to replace and monitor BMP.


-Resolved.





//Tinea cruris


-Continue On Lotrisone cream.





//Thrombocytopenia


-  Related to alcohol abuse.  Platelets trending up.


Continue to monitor platelets.





//Gait instability


= PT AND OT 


DOES NOT HAVE SNF BENEFIT


Discharge Planning


patient continues improving.  Continue to monitor.











Aris Ga MD Mar 16, 2018 23:55

## 2018-03-17 VITALS
TEMPERATURE: 97.8 F | SYSTOLIC BLOOD PRESSURE: 112 MMHG | OXYGEN SATURATION: 96 % | HEART RATE: 84 BPM | RESPIRATION RATE: 18 BRPM | DIASTOLIC BLOOD PRESSURE: 61 MMHG

## 2018-03-17 VITALS
RESPIRATION RATE: 16 BRPM | HEART RATE: 71 BPM | SYSTOLIC BLOOD PRESSURE: 109 MMHG | TEMPERATURE: 98.3 F | DIASTOLIC BLOOD PRESSURE: 68 MMHG | OXYGEN SATURATION: 97 %

## 2018-03-17 VITALS
DIASTOLIC BLOOD PRESSURE: 60 MMHG | HEART RATE: 75 BPM | RESPIRATION RATE: 18 BRPM | SYSTOLIC BLOOD PRESSURE: 96 MMHG | TEMPERATURE: 98.1 F | OXYGEN SATURATION: 96 %

## 2018-03-17 VITALS
OXYGEN SATURATION: 97 % | SYSTOLIC BLOOD PRESSURE: 115 MMHG | RESPIRATION RATE: 18 BRPM | DIASTOLIC BLOOD PRESSURE: 87 MMHG | TEMPERATURE: 98 F | HEART RATE: 74 BPM

## 2018-03-17 VITALS
DIASTOLIC BLOOD PRESSURE: 70 MMHG | RESPIRATION RATE: 18 BRPM | SYSTOLIC BLOOD PRESSURE: 119 MMHG | HEART RATE: 87 BPM | OXYGEN SATURATION: 96 % | TEMPERATURE: 97.6 F

## 2018-03-17 VITALS
RESPIRATION RATE: 18 BRPM | OXYGEN SATURATION: 97 % | SYSTOLIC BLOOD PRESSURE: 105 MMHG | HEART RATE: 81 BPM | DIASTOLIC BLOOD PRESSURE: 59 MMHG | TEMPERATURE: 97.2 F

## 2018-03-17 RX ADMIN — ACETAMINOPHEN PRN MG: 325 TABLET ORAL at 19:19

## 2018-03-17 RX ADMIN — Medication SCH ML: at 22:09

## 2018-03-17 RX ADMIN — Medication SCH MG: at 07:44

## 2018-03-17 RX ADMIN — BACITRACIN SCH APPLIC: 500 OINTMENT TOPICAL at 21:00

## 2018-03-17 RX ADMIN — STANDARDIZED SENNA CONCENTRATE AND DOCUSATE SODIUM SCH TAB: 8.6; 5 TABLET, FILM COATED ORAL at 07:44

## 2018-03-17 RX ADMIN — Medication SCH ML: at 07:44

## 2018-03-17 RX ADMIN — ONDANSETRON PRN MG: 2 INJECTION, SOLUTION INTRAMUSCULAR; INTRAVENOUS at 19:16

## 2018-03-17 RX ADMIN — STANDARDIZED SENNA CONCENTRATE AND DOCUSATE SODIUM SCH TAB: 8.6; 5 TABLET, FILM COATED ORAL at 21:00

## 2018-03-17 RX ADMIN — BACITRACIN SCH APPLIC: 500 OINTMENT TOPICAL at 07:45

## 2018-03-17 NOTE — HHI.PR
Subjective


Remarks


Patient seen this morning around 11 AM.  Says he is feeling stronger every day.





Objective





Vital Signs








  Date Time  Temp Pulse Resp B/P (MAP) Pulse Ox O2 Delivery O2 Flow Rate FiO2


 


3/17/18 16:00 97.8 84 18 112/61 (78) 96   


 


3/17/18 12:00 97.6 87 18 119/70 (86) 96   


 


3/17/18 08:00 98.3 71 16 109/68 (82) 97   


 


3/17/18 04:00 98.1 75 18 96/60 (72) 96   


 


3/17/18 00:00 98.0 74 18 115/87 (96) 97   


 


3/16/18 20:00 98.6 69 18 113/70 (84) 97   














I/O      


 


 3/16/18 3/16/18 3/16/18 3/17/18 3/17/18 3/17/18





 07:00 15:00 23:00 07:00 15:00 23:00


 


Intake Total  780 ml   480 ml 


 


Output Total 700 ml     


 


Balance -700 ml 780 ml   480 ml 


 


      


 


Intake Oral  780 ml   480 ml 


 


Output Urine Total 700 ml     


 


# Voids  3  4 3 


 


# Bowel Movements  1   0 1








Result Diagram:  


3/13/18 0647                                                                   

             3/13/18 0647





Objective Remarks


GENERAL: lying in bed.  Appears comfortable.   Alert, oriented person, place, 

not to year.


SKIN: Warm and dry.


HEAD: Normocephalic.


EYES: No scleral icterus. No injection or drainage. 


NECK: Supple, trachea midline. No JVD.


CARDIOVASCULAR: Regular rate and rhythm without murmurs, gallops, or rubs. 


RESPIRATORY: Breath sounds equal bilaterally. No accessory muscle use.


GASTROINTESTINAL: Abdomen soft, non-tender, nondistended. 


MUSCULOSKELETAL: No cyanosis, or edema. 


BACK: Nontender without obvious deformity. No CVA tenderness.








A/P


Assessment and Plan





===================3/17/18============


Patient getting stronger.  Taper Librium.  Possibly discharge home in next few 

days depending on physical therapy recommendations..





====================================





//Alcohol withdrawal


- Seems to be improving.


Have added Librium back.


Continue CIWA protocol.


Recommended alcohol cessation.


= Taper Librium.





//Cephalhematoma


-  Seen on CT of the head described above.  Otherwise no fractures seen on CT 

of the brain.


CT of the head negative for acute intracranial process.





//Hyponatremia


- Thought to be secondary to beer potomania.


Sodium is 140.


Discontinue fluid restriction.


Monitor BMP


Discontinue 1/2 NS.


-Resolved.





//Hypokalemia


- Likely due to poor oral intake.  Continue to replace and monitor BMP.


-Resolved.





//Tinea cruris


-Continue On Lotrisone cream.





//Thrombocytopenia


-  Related to alcohol abuse.  Platelets trending up.


Continue to monitor platelets.





//Gait instability


= PT AND OT 


DOES NOT HAVE SNF BENEFIT


Discharge Planning


patient continues improving.  Continue to monitor.


hopefully can discharge home when cleared for home by physical therapy.











Aris Ga MD Mar 17, 2018 17:12

## 2018-03-18 VITALS
HEART RATE: 77 BPM | OXYGEN SATURATION: 97 % | DIASTOLIC BLOOD PRESSURE: 61 MMHG | RESPIRATION RATE: 18 BRPM | TEMPERATURE: 97.6 F | SYSTOLIC BLOOD PRESSURE: 100 MMHG

## 2018-03-18 VITALS
TEMPERATURE: 98.6 F | OXYGEN SATURATION: 97 % | SYSTOLIC BLOOD PRESSURE: 103 MMHG | DIASTOLIC BLOOD PRESSURE: 59 MMHG | HEART RATE: 74 BPM | RESPIRATION RATE: 18 BRPM

## 2018-03-18 VITALS
HEART RATE: 80 BPM | SYSTOLIC BLOOD PRESSURE: 121 MMHG | DIASTOLIC BLOOD PRESSURE: 70 MMHG | TEMPERATURE: 97 F | RESPIRATION RATE: 18 BRPM | OXYGEN SATURATION: 100 %

## 2018-03-18 VITALS
HEART RATE: 81 BPM | OXYGEN SATURATION: 95 % | SYSTOLIC BLOOD PRESSURE: 120 MMHG | TEMPERATURE: 97.6 F | RESPIRATION RATE: 18 BRPM | DIASTOLIC BLOOD PRESSURE: 70 MMHG

## 2018-03-18 RX ADMIN — ACETAMINOPHEN PRN MG: 325 TABLET ORAL at 10:39

## 2018-03-18 RX ADMIN — Medication SCH MG: at 08:09

## 2018-03-18 RX ADMIN — Medication SCH ML: at 08:08

## 2018-03-18 RX ADMIN — STANDARDIZED SENNA CONCENTRATE AND DOCUSATE SODIUM SCH TAB: 8.6; 5 TABLET, FILM COATED ORAL at 08:09

## 2018-03-18 RX ADMIN — BACITRACIN SCH APPLIC: 500 OINTMENT TOPICAL at 08:11

## 2018-03-18 NOTE — HHI.DS
__________________________________________________





Discharge Summary


Admission Date


Mar 6, 2018 at 08:34


Discharge Date:  Mar 18, 2018


Admitting Diagnosis





Hyponatremia





(1) Alcohol withdrawal


ICD Code:  F10.239 - Alcohol dependence with withdrawal, unspecified


Status:  Acute


(2) Cephalhematoma


ICD Code:  P12.0 - Cephalhematoma due to birth injury


(3) Hyponatremia


ICD Code:  E87.1 - Hypo-osmolality and hyponatremia


(4) Hypokalemia


ICD Code:  E87.6 - Hypokalemia


(5) Tinea cruris


ICD Code:  B35.6 - Tinea cruris


(6) Thrombocytopenia


ICD Code:  D69.6 - Thrombocytopenia, unspecified


Status:  Chronic


(7) Gait instability


ICD Code:  R26.81 - Unsteadiness on feet


Procedures


none


Brief History - From Admission


62-year-old male with a past medical history significant for aortic stenosis 

status post AVR and alcohol abuse presents to the emergency department for 

evaluation of weakness.  The patient reports he has been drinking approximately 

18 beers per day over the past 2 months because he was recently getting 

.  He reports approximately 6 days ago he fell while drunk hitting his 

head resulting in a jagged laceration of the forehead.  The patient reports his 

last drink was approximately 4 days ago.  He complains of being dizzy and 

tremulous.  The patient also reports falling approximately 3 days ago onto a 

concrete floor.  He states he fell flat on his back and does not know how long 

he was on the ground before.  He has significant erythema and skin breakdown on 

the bilateral buttocks and hamstring regions.  He denies any chest pain or 

shortness of breath.  Endorses weakness and dizziness.  Denies nausea/vomiting/

diarrhea.  Positive tremors.  No lateralizing signs/symptoms.  No cough or 

congestion.


Significant Findings





Laboratory Tests








Test


  3/16/18


11:50








Imaging





Last Impressions








Chest X-Ray 3/6/18 0000 Signed





Impressions: 





 Service Date/Time:  Tuesday, March 6, 2018 03:05 - CONCLUSION:  No acute 





 cardiopulmonary abnormality is identified.     Jonathan Ramirez MD 


 


Pelvis X-Ray 3/5/18 0000 Signed





Impressions: 





 Service Date/Time:  Monday, March 5, 2018 20:40 - CONCLUSION:  No evidence of 





 recent bone injury.     Luis Arita MD 


 


Head CT 3/5/18 0000 Signed





Impressions: 





 Service Date/Time:  Monday, March 5, 2018 16:36 - CONCLUSION:  1. Small 





 cephalhematoma over the right frontal bone. 2. Otherwise negative. No 

fracture. 





 No acute intracranial process.     Boyd العراقي MD 








PE at Discharge


GENERAL: lying in bed.  Appears comfortable.  Oriented to place, not to date or 

year.  KNew the president was SILVIA,HE  Knew he was in [place] knew he was in 

[place] knew the year 2018


SKIN: Scab over right forehead with bilateral raccoons eyes cool and dry.


HEAD: Normocephalic.  Trauma as stated above


EYES: No scleral icterus. No injection or drainage.  Extraocular muscles intact 

pupils equal round reactive light accommodation bilateral raccoon eyes


NECK: Supple, trachea midline. No JVD or lymphadenopathy.  Tongue is midline 

oromucosa is moist


CARDIOVASCULAR: Regular rate and rhythm without murmurs, gallops, or rubs.  S1-

S2 no S3 or S4


RESPIRATORY: Breath sounds equal bilaterally. No accessory muscle use.


GASTROINTESTINAL: Abdomen soft, non-tender, nondistended. 


MUSCULOSKELETAL: No cyanosis, or edema. 


BACK: Nontender without obvious deformity. No CVA tenderness.


Neuro: Awake alert and oriented 3.  Motor and sensory to be grossly within 

normal limits.   4 out of 5 muscle strength in all muscle groups.  Normal 

speech.  No tremors noted.


Insight and judgment is improved


Mood and behavior is more appropriate


Hospital Course





Patient was cephalohematoma.  CT on admission with no intracranial bleeding.  

Wound care consulted, cephalohematoma improving.  Patient also presented with 

hyponatremia with sodium 129 likely secondary to beer put a mare.  This 

improved off beer.  Patient with generalized weakness secondary to chronic 

alcoholism, as well as alcohol withdrawal.  Patient was treated with [facility] 

protocol, Librium taper with improvement.  Patient worked with physical therapy

, walking around unit.  Patient is cleared for discharge home by physical 

therapy.  Patient agrees not to drink any alcohol.  Agrees to comply with 

Librium taper.  Patient provided with alcohol rehabilitation resources by case 

management.





Patient also noted to have history of mechanical aortic valve replacement 

necessitating warfarin with INR of 2.53.5.  Patient reported that he had been 

off this medication for 3 months prior to admission.  Patient says he knows how 

to inject Lovenox.  We will discharge Lovenox bridge to warfarin.  Follow-up 

with primary care to monitor INRs.





For problem-based summary from most recent progress note, please see below.








===================3/18/18============


patient much stronger..  By PT for discharge home.  Taper Librium.  Restart 

warfarin with Lovenox bridge for history of mechanical mitral valve.  Follow 

with primary care.


====================================





//Alcohol withdrawal


- Seems to be improving.


Have added Librium back.


Continue CIWA protocol.


Recommended alcohol cessation.


= Taper Librium.





//Cephalhematoma


-  Seen on CT of the head described above.  Otherwise no fractures seen on CT 

of the brain.


CT of the head negative for acute intracranial process.





//Hyponatremia


- Thought to be secondary to beer potomania.


Sodium is 140.


Discontinue fluid restriction.


Monitor BMP


Discontinue 1/2 NS.


-Resolved.





//Hypokalemia


- Likely due to poor oral intake.  Continue to replace and monitor BMP.


-Resolved.





//Tinea cruris


-Continue On Lotrisone cream.





//Thrombocytopenia


-  Related to alcohol abuse.  Platelets trending up.


Continue to monitor platelets.





//mechanical mitral valve.  Needs to be on warfarin with INR 2.53.5.  He ran 

out of this 3 months ago.  Will restart here patient says he knows how to 

administer Lovenox.





//Gait instability


= PT AND OT 


DOES NOT HAVE SNF BENEFIT


Discharge Planning


discharge home.  Follow primary care.  Restart warfarin with Lovenox bridge.  

Librium taper.  Patient conveys understanding


Pt Condition on Discharge:  Good


Discharge Disposition:  Discharge Home


Discharge Time:  > 30 minutes


Discharge Instructions


DIET: Follow Instructions for:  As Tolerated, No Restrictions


Activities you can perform:  Regular-No Restrictions


Follow up Referrals:  


PCP Follow-up - 1 Week


PCP Follow-up





New Medications:  


Enoxaparin Inj (Lovenox Inj) 60 Mg/0.6 Ml Syr


60 MG SQ BID for Blood Clot Prevention for 7 Days, SYRINGE 0 Refills





Warfarin (Warfarin) 4 Mg Tab


4 MG PO DAILY for Blood Clot Prevention, #30 TAB 0 Refills





Chlordiazepoxide HCl (Chlordiazepoxide HCl) 10 Mg Capsule


10 MG PO TID for Alcohol Detox, #9 TAB


Take TWICE daily for 3 days, then ONCE a day


 for 3 Days.


Thiamine HCl (Gnp Vitamin B-1) 100 Mg Tab


100 MG PO DAILY for VITAMIN for 30 Days, #30 TAB

















Aris Ga MD Mar 18, 2018 22:53

## 2018-03-18 NOTE — HHI.PR
Subjective


Remarks


Patient seen this morning around 8 AM.  Says he is feeling stronger.  Feels 

like going home.  He is alert and oriented 4 today.  Processes to stay 

awayfrom alcohol.





Objective





Vital Signs








  Date Time  Temp Pulse Resp B/P (MAP) Pulse Ox O2 Delivery O2 Flow Rate FiO2


 


3/18/18 12:00 97.6 77 18 100/61 (74) 97   


 


3/18/18 11:51   16     


 


3/18/18 08:00 97.6 81 18 120/70 (87) 95   


 


3/18/18 05:14 98.6 74 18 103/59 (74) 97   


 


3/18/18 01:08 97.0 80 18 121/70 (87) 100   














I/O      


 


 3/17/18 3/17/18 3/17/18 3/18/18 3/18/18 3/18/18





 07:00 15:00 23:00 07:00 15:00 23:00


 


Intake Total  480 ml    


 


Balance  480 ml    


 


      


 


Intake Oral  480 ml    


 


# Voids 4 3    


 


# Bowel Movements  0 1   








Objective Remarks


GENERAL: lying in bed.  Appears comfortable.   Alert, oriented person, place,  

year.


SKIN: Warm and dry.


HEAD: Normocephalic.


EYES: No scleral icterus. No injection or drainage. 


NECK: Supple, trachea midline. No JVD.


CARDIOVASCULAR: Regular rate and rhythm without murmurs, gallops, or rubs. 


RESPIRATORY: Breath sounds equal bilaterally. No accessory muscle use.


GASTROINTESTINAL: Abdomen soft, non-tender, nondistended. 


MUSCULOSKELETAL: No cyanosis, or edema. 


BACK: Nontender without obvious deformity. No CVA tenderness.








A/P


Assessment and Plan





===================3/18/18============


patient much stronger..  By PT for discharge home.  Taper Librium.  Restart 

warfarin with Lovenox bridge for history of mechanical mitral valve.  Follow 

with primary care.


====================================





//Alcohol withdrawal


- Seems to be improving.


Have added Librium back.


Continue CIWA protocol.


Recommended alcohol cessation.


= Taper Librium.





//Cephalhematoma


-  Seen on CT of the head described above.  Otherwise no fractures seen on CT 

of the brain.


CT of the head negative for acute intracranial process.





//Hyponatremia


- Thought to be secondary to beer potomania.


Sodium is 140.


Discontinue fluid restriction.


Monitor BMP


Discontinue 1/2 NS.


-Resolved.





//Hypokalemia


- Likely due to poor oral intake.  Continue to replace and monitor BMP.


-Resolved.





//Tinea cruris


-Continue On Lotrisone cream.





//Thrombocytopenia


-  Related to alcohol abuse.  Platelets trending up.


Continue to monitor platelets.





//mechanical mitral valve.  Needs to be on warfarin with INR 2.53.5.  He ran 

out of this 3 months ago.  Will restart here patient says he knows how to 

administer Lovenox.





//Gait instability


= PT AND OT 


DOES NOT HAVE SNF BENEFIT


Discharge Planning


discharge home.  Follow primary care.  Restart warfarin with Lovenox bridge.  

Librium taper.  Patient conveys understanding











Aris Ga MD Mar 18, 2018 22:52

## 2022-03-08 NOTE — RADRPT
EXAM DATE/TIME:  05/06/2017 17:11 

 

[facility] COMPARISON:     

CT THORAX W/O CONTRAST, March 08, 2017, 16:48.

 

 

INDICATIONS :     

Trauma, fall last night.

                      

 

IV CONTRAST:     

96 cc Omnipaque 350 (iohexol) IV 

 

 

RADIATION DOSE:     

7.79 CTDIvol (mGy) 

 

 

MEDICAL HISTORY :     

None  

 

SURGICAL HISTORY :       

mitral valve replacement

 

ENCOUNTER:      

Initial

 

ACUITY:      

1 day

 

PAIN SCALE:      

0/10

 

LOCATION:       

Bilateral chest 

 

TECHNIQUE:      

Volumetric scanning of the chest was performed.  Using automated exposure control and adjustment of t
he mA and/or kV according to patient size, radiation dose was kept as low as reasonably achievable to
 obtain optimal diagnostic quality images. 

 

FINDINGS:     

Compare March 8, 2017. Previous large loculated fluid collection in the right posterior hemithorax ha
s markedly decreased in size. Current right effusion measures about 2.1 cm in thickness. There is rig
ht-sided pleural thickening.

 

Patient post a median sternotomy and aortic valve replacement. There is stable dilatation of the aort
ic root 2 x 4.5 cm. There is an old right lower rib fracture, stable. Left lung remains clear without
 effusion. No pericardial effusion. No acute findings in the upper abdomen. Fatty liver.

 

CONCLUSION:     

1. Decrease in size of loculated right pleural effusion since March 8. Residual loculated fluid in th
e lower right hemithorax measures about 2.1 cm in thickness associated with pleural thickening and ol
d unfused right lower rib fracture.

2. Postoperative median sternotomy and aortic valve replacement. Stable aortic root dilatation to 4.5
 cm with no pneumothorax.

 

 

 

 Alvarez Murillo MD on May 06, 2017 at 18:08           

Board Certified Radiologist.

 This report was verified electronically. Detail Level: Zone Otc Regimen: Recommend [id number] sunscreen daily on sun exposed areas Plan: Will call if areas have not resolved in one month